# Patient Record
Sex: FEMALE | Race: BLACK OR AFRICAN AMERICAN | Employment: OTHER | URBAN - METROPOLITAN AREA
[De-identification: names, ages, dates, MRNs, and addresses within clinical notes are randomized per-mention and may not be internally consistent; named-entity substitution may affect disease eponyms.]

---

## 2018-05-29 LAB — COLONOSCOPY, EXTERNAL: NORMAL

## 2018-07-13 ENCOUNTER — OP HISTORICAL/CONVERTED ENCOUNTER (OUTPATIENT)
Dept: OTHER | Age: 58
End: 2018-07-13

## 2019-01-02 ENCOUNTER — OP HISTORICAL/CONVERTED ENCOUNTER (OUTPATIENT)
Dept: OTHER | Age: 59
End: 2019-01-02

## 2019-02-19 LAB — MAMMOGRAPHY, EXTERNAL: NORMAL

## 2020-06-04 LAB
CREATININE, EXTERNAL: 1.6
HBA1C MFR BLD HPLC: 7.2 %
LDL-C, EXTERNAL: 75

## 2020-08-14 RX ORDER — PIOGLITAZONEHYDROCHLORIDE 15 MG/1
TABLET ORAL
Qty: 30 TAB | Refills: 0 | Status: SHIPPED | OUTPATIENT
Start: 2020-08-14 | End: 2020-09-17

## 2020-09-02 RX ORDER — LANOLIN ALCOHOL/MO/W.PET/CERES
CREAM (GRAM) TOPICAL
Qty: 90 TAB | Refills: 0 | Status: SHIPPED | OUTPATIENT
Start: 2020-09-02 | End: 2020-11-30

## 2020-09-02 RX ORDER — MECLIZINE HYDROCHLORIDE 25 MG/1
TABLET ORAL
Qty: 30 TAB | Refills: 0 | Status: SHIPPED | OUTPATIENT
Start: 2020-09-02 | End: 2020-10-01

## 2020-09-02 RX ORDER — GLIMEPIRIDE 4 MG/1
TABLET ORAL
Qty: 180 TAB | Refills: 0 | Status: SHIPPED | OUTPATIENT
Start: 2020-09-02 | End: 2020-11-23

## 2020-09-14 ENCOUNTER — HOSPITAL ENCOUNTER (OUTPATIENT)
Dept: MAMMOGRAPHY | Age: 60
Discharge: HOME | End: 2020-09-14
Attending: SURGERY
Payer: MEDICARE

## 2020-09-14 DIAGNOSIS — R92.8 ABNORMAL MAMMOGRAM: ICD-10-CM

## 2020-09-14 PROCEDURE — 77065 DX MAMMO INCL CAD UNI: CPT

## 2020-09-17 RX ORDER — PIOGLITAZONEHYDROCHLORIDE 15 MG/1
TABLET ORAL
Qty: 30 TAB | Refills: 2 | Status: SHIPPED | OUTPATIENT
Start: 2020-09-17 | End: 2020-12-16

## 2020-10-01 RX ORDER — MECLIZINE HYDROCHLORIDE 25 MG/1
TABLET ORAL
Qty: 30 TAB | Refills: 0 | Status: SHIPPED | OUTPATIENT
Start: 2020-10-01 | End: 2020-10-26

## 2020-10-01 RX ORDER — FUROSEMIDE 40 MG/1
TABLET ORAL
Qty: 30 TAB | Refills: 0 | Status: SHIPPED | OUTPATIENT
Start: 2020-10-01 | End: 2020-12-16

## 2020-10-01 RX ORDER — LISINOPRIL 10 MG/1
TABLET ORAL
Qty: 90 TAB | Refills: 0 | Status: SHIPPED | OUTPATIENT
Start: 2020-10-01 | End: 2020-12-31 | Stop reason: SDUPTHER

## 2020-10-01 RX ORDER — ALLOPURINOL 300 MG/1
TABLET ORAL
Qty: 30 TAB | Refills: 0 | Status: SHIPPED | OUTPATIENT
Start: 2020-10-01 | End: 2020-10-26

## 2020-10-07 ENCOUNTER — HOSPITAL ENCOUNTER (EMERGENCY)
Age: 60
Discharge: HOME | End: 2020-10-07
Attending: EMERGENCY MEDICINE
Payer: MEDICARE

## 2020-10-07 ENCOUNTER — APPOINTMENT (OUTPATIENT)
Dept: GENERAL RADIOLOGY | Age: 60
End: 2020-10-07
Attending: EMERGENCY MEDICINE
Payer: MEDICARE

## 2020-10-07 VITALS
RESPIRATION RATE: 16 BRPM | SYSTOLIC BLOOD PRESSURE: 124 MMHG | OXYGEN SATURATION: 99 % | BODY MASS INDEX: 47.09 KG/M2 | HEART RATE: 62 BPM | HEIGHT: 66 IN | TEMPERATURE: 98.1 F | WEIGHT: 293 LBS | DIASTOLIC BLOOD PRESSURE: 72 MMHG

## 2020-10-07 DIAGNOSIS — J06.9 ACUTE UPPER RESPIRATORY INFECTION: Primary | ICD-10-CM

## 2020-10-07 LAB
ALBUMIN SERPL-MCNC: 3.3 G/DL (ref 3.5–5)
ALBUMIN/GLOB SERPL: 0.9 {RATIO} (ref 1.1–2.2)
ALP SERPL-CCNC: 97 U/L (ref 45–117)
ALT SERPL-CCNC: 22 U/L (ref 12–78)
ANION GAP SERPL CALC-SCNC: 9 MMOL/L (ref 5–15)
AST SERPL W P-5'-P-CCNC: 15 U/L (ref 15–37)
BASOPHILS # BLD: 0 K/UL (ref 0–0.2)
BASOPHILS NFR BLD: 1 % (ref 0–2.5)
BILIRUB SERPL-MCNC: 0.4 MG/DL (ref 0.2–1)
BUN SERPL-MCNC: 18 MG/DL (ref 6–20)
BUN/CREAT SERPL: 12 (ref 12–20)
CA-I BLD-MCNC: 9.2 MG/DL (ref 8.5–10.1)
CHLORIDE SERPL-SCNC: 104 MMOL/L (ref 97–108)
CO2 SERPL-SCNC: 28 MMOL/L (ref 21–32)
CREAT SERPL-MCNC: 1.45 MG/DL (ref 0.55–1.02)
EOSINOPHIL # BLD: 0.2 K/UL (ref 0–0.7)
EOSINOPHIL NFR BLD: 7 % (ref 0.9–2.9)
ERYTHROCYTE [DISTWIDTH] IN BLOOD BY AUTOMATED COUNT: 15.8 % (ref 11.5–14.5)
GLOBULIN SER CALC-MCNC: 3.6 G/DL (ref 2–4)
GLUCOSE SERPL-MCNC: 74 MG/DL (ref 65–100)
HCT VFR BLD AUTO: 34 % (ref 36–46)
HGB BLD-MCNC: 11.3 G/DL (ref 13.5–17.5)
LYMPHOCYTES # BLD: 1 K/UL (ref 1–4.8)
LYMPHOCYTES NFR BLD: 39 % (ref 20.5–51.1)
MCH RBC QN AUTO: 30 PG (ref 31–34)
MCHC RBC AUTO-ENTMCNC: 33.1 G/DL (ref 31–36)
MCV RBC AUTO: 90.5 FL (ref 80–100)
MONOCYTES # BLD: 0.2 K/UL (ref 0.2–2.4)
MONOCYTES NFR BLD: 8 % (ref 1.7–9.3)
NEUTS SEG # BLD: 1.2 K/UL (ref 1.8–7.7)
NEUTS SEG NFR BLD: 45 % (ref 42–75)
NRBC # BLD: 0 K/UL
NRBC BLD-RTO: 10 PER 100 WBC
PLATELET # BLD AUTO: 222 K/UL
PMV BLD AUTO: 7.7 FL (ref 6.5–11.5)
POTASSIUM SERPL-SCNC: 4 MMOL/L (ref 3.5–5.1)
PROT SERPL-MCNC: 6.9 G/DL (ref 6.4–8.2)
RBC # BLD AUTO: 3.75 M/UL (ref 4.5–5.9)
SODIUM SERPL-SCNC: 141 MMOL/L (ref 136–145)
WBC # BLD AUTO: 2.6 K/UL (ref 4.4–11.3)

## 2020-10-07 PROCEDURE — 85025 COMPLETE CBC W/AUTO DIFF WBC: CPT

## 2020-10-07 PROCEDURE — 99284 EMERGENCY DEPT VISIT MOD MDM: CPT

## 2020-10-07 PROCEDURE — 80053 COMPREHEN METABOLIC PANEL: CPT

## 2020-10-07 PROCEDURE — 36415 COLL VENOUS BLD VENIPUNCTURE: CPT

## 2020-10-07 PROCEDURE — 71045 X-RAY EXAM CHEST 1 VIEW: CPT

## 2020-10-07 PROCEDURE — 74011250637 HC RX REV CODE- 250/637: Performed by: EMERGENCY MEDICINE

## 2020-10-07 PROCEDURE — 74011636637 HC RX REV CODE- 636/637: Performed by: EMERGENCY MEDICINE

## 2020-10-07 RX ORDER — PREDNISONE 20 MG/1
40 TABLET ORAL
Status: COMPLETED | OUTPATIENT
Start: 2020-10-07 | End: 2020-10-07

## 2020-10-07 RX ORDER — DEXTROMETHORPHAN HYDROBROMIDE, GUAIFENESIN 5; 100 MG/5ML; MG/5ML
650 LIQUID ORAL EVERY 8 HOURS
COMMUNITY

## 2020-10-07 RX ORDER — LEVOFLOXACIN 500 MG/1
500 TABLET, FILM COATED ORAL
Status: COMPLETED | OUTPATIENT
Start: 2020-10-07 | End: 2020-10-07

## 2020-10-07 RX ORDER — BISMUTH SUBSALICYLATE 262 MG
1 TABLET,CHEWABLE ORAL DAILY
COMMUNITY

## 2020-10-07 RX ORDER — PREDNISONE 20 MG/1
20 TABLET ORAL DAILY
Qty: 7 TAB | Refills: 0 | Status: SHIPPED | OUTPATIENT
Start: 2020-10-07 | End: 2020-10-14

## 2020-10-07 RX ORDER — ATORVASTATIN CALCIUM 10 MG/1
10 TABLET, FILM COATED ORAL DAILY
COMMUNITY
End: 2020-11-23

## 2020-10-07 RX ORDER — GUAIFENESIN 100 MG/5ML
81 LIQUID (ML) ORAL DAILY
COMMUNITY

## 2020-10-07 RX ORDER — LANOLIN ALCOHOL/MO/W.PET/CERES
325 CREAM (GRAM) TOPICAL
COMMUNITY
End: 2022-03-14 | Stop reason: ALTCHOICE

## 2020-10-07 RX ORDER — LEVOFLOXACIN 500 MG/1
500 TABLET, FILM COATED ORAL DAILY
Qty: 5 TAB | Refills: 0 | Status: SHIPPED | OUTPATIENT
Start: 2020-10-07 | End: 2020-10-12

## 2020-10-07 RX ORDER — MELATONIN
1000 DAILY
COMMUNITY

## 2020-10-07 RX ADMIN — LEVOFLOXACIN 500 MG: 500 TABLET, FILM COATED ORAL at 13:07

## 2020-10-07 RX ADMIN — PREDNISONE 40 MG: 20 TABLET ORAL at 13:07

## 2020-10-07 NOTE — ED TRIAGE NOTES
C/o left upper back pain and cough x 3 days. Pt has hx of COPD. No respiratory distress at this time.

## 2020-10-08 NOTE — ED PROVIDER NOTES
HPI   Patient is a 63 y.o. female [address] who presents to the ER with a chief complaint of Cough, Chest Congestion x 1-2 days  Chief Complaint   Patient presents with    Cough      Past Medical History:   Diagnosis Date    Asthma     Chronic kidney disease     Chronic obstructive pulmonary disease ([facility]     High cholesterol     Hypertension     Menopause        Past Surgical History:   Procedure Laterality Date    HX BREAST BIOPSY Left 2019    HX HYSTERECTOMY      HX OOPHORECTOMY           History reviewed. No pertinent family history.     Social History     Socioeconomic History    Marital status:      Spouse name: Not on file    Number of children: Not on file    Years of education: Not on file    Highest education level: Not on file   Occupational History    Not on file   Social Needs    Financial resource strain: Not on file    Food insecurity     Worry: Not on file     Inability: Not on file    Transportation needs     Medical: Not on file     Non-medical: Not on file   Tobacco Use    Smoking status: Never Smoker    Smokeless tobacco: Never Used   Substance and Sexual Activity    Alcohol use: Never     Frequency: Never    Drug use: Never    Sexual activity: Not on file   Lifestyle    Physical activity     Days per week: Not on file     Minutes per session: Not on file    Stress: Not on file   Relationships    Social connections     Talks on phone: Not on file     Gets together: Not on file     Attends Shinto service: Not on file     Active member of club or organization: Not on file     Attends meetings of clubs or organizations: Not on file     Relationship status: Not on file    Intimate partner violence     Fear of current or ex partner: Not on file     Emotionally abused: Not on file     Physically abused: Not on file     Forced sexual activity: Not on file   Other Topics Concern    Not on file   Social History Narrative    Not on file ALLERGIES: Celebrex [celecoxib]; Cleocin [clindamycin phosphate]; Mushroom; Strawberry; Ultracet [tramadol-acetaminophen]; and Zithromax [azithromycin]    Review of Systems   Constitutional: Negative. HENT: Negative. Eyes: Negative. Respiratory: Negative. Cardiovascular: Negative. Gastrointestinal: Negative. Endocrine: Negative. Genitourinary: Negative. Musculoskeletal: Negative. Skin: Negative. Allergic/Immunologic: Negative. Neurological: Negative. Hematological: Negative. Psychiatric/Behavioral: Negative. Vitals:    10/07/20 1203 10/07/20 1225 10/07/20 1542 10/07/20 1559   BP: 139/77  130/88 124/72   Pulse: 63   62   Resp: 22  18 16   Temp: 98.1 °F (36.7 °C)      SpO2: 99% 99%  99%   Weight: (!) 172.4 kg (380 lb)      Height: 5' 6\" (1.676 m)               Physical Exam  Vitals signs and nursing note reviewed. Constitutional:       Appearance: Normal appearance. She is normal weight. HENT:      Head: Normocephalic and atraumatic. Nose: Nose normal.   Eyes:      Extraocular Movements: Extraocular movements intact. Pupils: Pupils are equal, round, and reactive to light. Neck:      Musculoskeletal: Normal range of motion and neck supple. Cardiovascular:      Rate and Rhythm: Normal rate and regular rhythm. Pulses: Normal pulses. Pulmonary:      Effort: Pulmonary effort is normal.      Breath sounds: Normal breath sounds. Abdominal:      General: Abdomen is flat. Palpations: Abdomen is soft. Musculoskeletal: Normal range of motion. Skin:     General: Skin is warm and dry. Neurological:      General: No focal deficit present. Mental Status: She is alert and oriented to person, place, and time. Psychiatric:         Mood and Affect: Mood normal.          MDM  Number of Diagnoses or Management Options  Acute upper respiratory infection:          Procedures    ICD-10-CM ICD-9-CM    1.  Acute upper respiratory infection  J06.9 465.9

## 2020-10-26 RX ORDER — ALLOPURINOL 300 MG/1
TABLET ORAL
Qty: 30 TAB | Refills: 0 | Status: SHIPPED | OUTPATIENT
Start: 2020-10-26 | End: 2020-11-30

## 2020-10-26 RX ORDER — MECLIZINE HYDROCHLORIDE 25 MG/1
TABLET ORAL
Qty: 30 TAB | Refills: 0 | Status: SHIPPED | OUTPATIENT
Start: 2020-10-26 | End: 2020-11-23

## 2020-11-11 VITALS
TEMPERATURE: 98.1 F | OXYGEN SATURATION: 95 % | WEIGHT: 293 LBS | SYSTOLIC BLOOD PRESSURE: 139 MMHG | HEART RATE: 76 BPM | DIASTOLIC BLOOD PRESSURE: 83 MMHG | RESPIRATION RATE: 20 BRPM | HEIGHT: 66 IN | BODY MASS INDEX: 47.09 KG/M2

## 2020-11-11 PROBLEM — E78.5 HYPERLIPIDEMIA: Status: ACTIVE | Noted: 2020-11-11

## 2020-11-11 PROBLEM — I83.10 VARICOSE VEINS OF LOWER EXTREMITIES WITH INFLAMMATION: Status: ACTIVE | Noted: 2020-11-11

## 2020-11-11 PROBLEM — E87.5 HYPERKALEMIA: Status: ACTIVE | Noted: 2020-11-11

## 2020-11-11 PROBLEM — M10.9 GOUT: Status: ACTIVE | Noted: 2020-11-11

## 2020-11-11 PROBLEM — E11.69 MIXED HYPERLIPIDEMIA DUE TO TYPE 2 DIABETES MELLITUS (HCC): Status: ACTIVE | Noted: 2020-11-11

## 2020-11-11 PROBLEM — D64.9 ANEMIA: Status: ACTIVE | Noted: 2020-11-11

## 2020-11-11 PROBLEM — I10 ESSENTIAL HYPERTENSION: Status: ACTIVE | Noted: 2020-11-11

## 2020-11-11 PROBLEM — I50.9 CHF (CONGESTIVE HEART FAILURE) (HCC): Status: ACTIVE | Noted: 2020-11-11

## 2020-11-11 PROBLEM — N18.9 CHRONIC KIDNEY DISEASE: Status: ACTIVE | Noted: 2020-11-11

## 2020-11-11 PROBLEM — J45.909 ASTHMA: Status: ACTIVE | Noted: 2020-11-11

## 2020-11-11 PROBLEM — J44.9 CHRONIC OBSTRUCTIVE LUNG DISEASE (HCC): Status: ACTIVE | Noted: 2020-11-11

## 2020-11-11 PROBLEM — B96.81 HELICOBACTER PYLORI GASTRITIS: Status: ACTIVE | Noted: 2020-11-11

## 2020-11-11 PROBLEM — E11.9 DIABETES MELLITUS (HCC): Status: ACTIVE | Noted: 2020-11-11

## 2020-11-11 PROBLEM — K57.92 DIVERTICULITIS: Status: ACTIVE | Noted: 2020-11-11

## 2020-11-11 PROBLEM — E66.01 MORBID OBESITY (HCC): Status: ACTIVE | Noted: 2020-11-11

## 2020-11-11 PROBLEM — K21.9 GERD (GASTROESOPHAGEAL REFLUX DISEASE): Status: ACTIVE | Noted: 2020-11-11

## 2020-11-11 PROBLEM — K29.70 HELICOBACTER PYLORI GASTRITIS: Status: ACTIVE | Noted: 2020-11-11

## 2020-11-11 PROBLEM — E78.2 MIXED HYPERLIPIDEMIA DUE TO TYPE 2 DIABETES MELLITUS (HCC): Status: ACTIVE | Noted: 2020-11-11

## 2020-11-11 PROBLEM — M19.90 OSTEOARTHRITIS: Status: ACTIVE | Noted: 2020-11-11

## 2020-11-11 RX ORDER — CALCIUM CARB/VITAMIN D3/VIT K1 500-100-40
TABLET,CHEWABLE ORAL
COMMUNITY
End: 2021-12-14 | Stop reason: ALTCHOICE

## 2020-11-11 RX ORDER — ISOPROPYL ALCOHOL 70 ML/100ML
SWAB TOPICAL
COMMUNITY
End: 2021-09-22 | Stop reason: ALTCHOICE

## 2020-11-11 RX ORDER — GABAPENTIN 300 MG/1
300 CAPSULE ORAL 3 TIMES DAILY
COMMUNITY
End: 2020-12-18

## 2020-11-11 RX ORDER — CIPROFLOXACIN 500 MG/1
TABLET ORAL 2 TIMES DAILY
COMMUNITY
End: 2020-11-30 | Stop reason: ALTCHOICE

## 2020-11-11 RX ORDER — BLOOD SUGAR DIAGNOSTIC
STRIP MISCELLANEOUS SEE ADMIN INSTRUCTIONS
COMMUNITY
End: 2020-12-31 | Stop reason: SDUPTHER

## 2020-11-11 RX ORDER — ALBUTEROL SULFATE 90 UG/1
AEROSOL, METERED RESPIRATORY (INHALATION)
COMMUNITY
End: 2022-05-11 | Stop reason: SDUPTHER

## 2020-11-11 RX ORDER — CODEINE PHOSPHATE AND GUAIFENESIN 10; 100 MG/5ML; MG/5ML
5 SOLUTION ORAL
COMMUNITY
End: 2020-12-18

## 2020-11-11 RX ORDER — HYDROCODONE BITARTRATE AND ACETAMINOPHEN 7.5; 325 MG/1; MG/1
TABLET ORAL
COMMUNITY
End: 2020-12-18

## 2020-11-11 RX ORDER — CEFUROXIME AXETIL 250 MG/1
250 TABLET ORAL 2 TIMES DAILY
COMMUNITY
End: 2021-06-15 | Stop reason: ALTCHOICE

## 2020-11-11 RX ORDER — IPRATROPIUM BROMIDE AND ALBUTEROL SULFATE 2.5; .5 MG/3ML; MG/3ML
3 SOLUTION RESPIRATORY (INHALATION)
COMMUNITY

## 2020-11-11 RX ORDER — AMOXICILLIN AND CLAVULANATE POTASSIUM 875; 125 MG/1; MG/1
TABLET, FILM COATED ORAL EVERY 12 HOURS
COMMUNITY
End: 2021-06-15 | Stop reason: ALTCHOICE

## 2020-11-23 RX ORDER — MECLIZINE HYDROCHLORIDE 25 MG/1
TABLET ORAL
Qty: 30 TAB | Refills: 0 | Status: SHIPPED | OUTPATIENT
Start: 2020-11-23 | End: 2020-12-23

## 2020-11-23 RX ORDER — ATORVASTATIN CALCIUM 10 MG/1
TABLET, FILM COATED ORAL
Qty: 90 TAB | Refills: 0 | Status: SHIPPED | OUTPATIENT
Start: 2020-11-23 | End: 2020-12-31 | Stop reason: SDUPTHER

## 2020-11-23 RX ORDER — GLIMEPIRIDE 4 MG/1
TABLET ORAL
Qty: 180 TAB | Refills: 0 | Status: SHIPPED | OUTPATIENT
Start: 2020-11-23 | End: 2020-12-31 | Stop reason: SDUPTHER

## 2020-11-30 RX ORDER — LANOLIN ALCOHOL/MO/W.PET/CERES
CREAM (GRAM) TOPICAL
Qty: 90 TAB | Refills: 0 | Status: SHIPPED | OUTPATIENT
Start: 2020-11-30 | End: 2021-03-19

## 2020-11-30 RX ORDER — ALLOPURINOL 300 MG/1
TABLET ORAL
Qty: 30 TAB | Refills: 0 | Status: SHIPPED | OUTPATIENT
Start: 2020-11-30 | End: 2020-12-31 | Stop reason: SDUPTHER

## 2020-12-04 ENCOUNTER — TELEPHONE (OUTPATIENT)
Dept: PRIMARY CARE CLINIC | Age: 60
End: 2020-12-04

## 2020-12-16 RX ORDER — FUROSEMIDE 40 MG/1
TABLET ORAL
Qty: 30 TAB | Refills: 0 | Status: SHIPPED | OUTPATIENT
Start: 2020-12-16 | End: 2020-12-31 | Stop reason: SDUPTHER

## 2020-12-16 RX ORDER — PIOGLITAZONEHYDROCHLORIDE 15 MG/1
TABLET ORAL
Qty: 30 TAB | Refills: 0 | Status: SHIPPED | OUTPATIENT
Start: 2020-12-16 | End: 2020-12-31 | Stop reason: SDUPTHER

## 2020-12-18 ENCOUNTER — OFFICE VISIT (OUTPATIENT)
Dept: PRIMARY CARE CLINIC | Age: 60
End: 2020-12-18
Payer: MEDICARE

## 2020-12-18 VITALS
TEMPERATURE: 97.1 F | DIASTOLIC BLOOD PRESSURE: 54 MMHG | WEIGHT: 293 LBS | HEIGHT: 66 IN | RESPIRATION RATE: 20 BRPM | SYSTOLIC BLOOD PRESSURE: 136 MMHG | HEART RATE: 60 BPM | OXYGEN SATURATION: 100 % | BODY MASS INDEX: 47.09 KG/M2

## 2020-12-18 DIAGNOSIS — E11.69 MIXED HYPERLIPIDEMIA DUE TO TYPE 2 DIABETES MELLITUS (HCC): ICD-10-CM

## 2020-12-18 DIAGNOSIS — G47.33 OBSTRUCTIVE SLEEP APNEA SYNDROME: ICD-10-CM

## 2020-12-18 DIAGNOSIS — I10 ESSENTIAL HYPERTENSION: Primary | ICD-10-CM

## 2020-12-18 DIAGNOSIS — J44.9 CHRONIC OBSTRUCTIVE PULMONARY DISEASE, UNSPECIFIED COPD TYPE (HCC): ICD-10-CM

## 2020-12-18 DIAGNOSIS — E11.69 TYPE 2 DIABETES MELLITUS WITH OTHER SPECIFIED COMPLICATION, WITHOUT LONG-TERM CURRENT USE OF INSULIN (HCC): ICD-10-CM

## 2020-12-18 DIAGNOSIS — E78.2 MIXED HYPERLIPIDEMIA DUE TO TYPE 2 DIABETES MELLITUS (HCC): ICD-10-CM

## 2020-12-18 DIAGNOSIS — Z23 ENCOUNTER FOR IMMUNIZATION: ICD-10-CM

## 2020-12-18 DIAGNOSIS — E66.01 MORBID OBESITY (HCC): ICD-10-CM

## 2020-12-18 DIAGNOSIS — I50.9 CHRONIC CONGESTIVE HEART FAILURE, UNSPECIFIED HEART FAILURE TYPE (HCC): ICD-10-CM

## 2020-12-18 PROCEDURE — 99214 OFFICE O/P EST MOD 30 MIN: CPT | Performed by: FAMILY MEDICINE

## 2020-12-18 PROCEDURE — G0008 ADMIN INFLUENZA VIRUS VAC: HCPCS | Performed by: FAMILY MEDICINE

## 2020-12-18 PROCEDURE — 90756 CCIIV4 VACC ABX FREE IM: CPT | Performed by: FAMILY MEDICINE

## 2020-12-18 RX ORDER — BENZONATATE 200 MG/1
CAPSULE ORAL
COMMUNITY
End: 2021-06-15 | Stop reason: ALTCHOICE

## 2020-12-18 NOTE — PROGRESS NOTES
James Crisostomo is a 63 y.o. female who presents today for the following:  Chief Complaint   Patient presents with    Follow Up Chronic Condition    Labs   ,     ICD-10-CM ICD-9-CM    1. Encounter for immunization  Z23 V03.89 INFLUENZA VACCINE (CCIIV4 VACCINE ANTIBIO FREE 0.5 ML)   2. Essential hypertension  I10 401.9    3. Chronic obstructive pulmonary disease, unspecified COPD type ([facility]  J44.9 496    4. Chronic congestive heart failure, unspecified heart failure type (HCC)  I50.9 428.0    5. Mixed hyperlipidemia due to type 2 diabetes mellitus ([facility])  E11.69 250.80     E78.2 272.2    6. Morbid obesity ([facility]  E66.01 278.01    7. Type 2 diabetes mellitus with other specified complication, without long-term current use of insulin ([facility])  E11.69 250.80    8. Obstructive sleep apnea syndrome  G47.33 327.23     . She comes in for follow-up of her hypertension, chronic obstructive pulmonary disease, congestive heart failure, mixed hyperlipidemia, morbid obesity, type 2 diabetes, obstructive sleep apnea. She has actually gained additional weight since her last visit. When we work towards getting her approved for bariatric surgery she demonstrated almost no insight into her problems and had no motivation to make changes. We again discussed this today and the fact that she has to try and do something is changed her life to get her weight off her more conditions are going to happen.     Allergies   Allergen Reactions    Celebrex [Celecoxib] Rash    Cleocin [Clindamycin Phosphate] Palpitations    Mushroom Swelling    Strawberry Rash    Ultracet [Tramadol-Acetaminophen] Rash    Zithromax [Azithromycin] Rash       Current Outpatient Medications   Medication Sig    benzonatate (TESSALON) 200 mg capsule benzonatate 200 mg capsule    furosemide (LASIX) 40 mg tablet TAKE 1 TABLET BY MOUTH ON MONDAY, WEDNESDAY AND FRIDAY    pioglitazone (ACTOS) 15 mg tablet Take 1 tablet by mouth once daily    allopurinoL (ZYLOPRIM) 300 mg tablet Take 1 tablet by mouth once daily    magnesium oxide (MAG-OX) 400 mg tablet Take 1 tablet by mouth once daily    meclizine (ANTIVERT) 25 mg tablet TAKE 1 TABLET BY MOUTH EVERY 8 HOURS AS NEEDED    glimepiride (AMARYL) 4 mg tablet Take 2 tablets by mouth once daily    atorvastatin (LIPITOR) 10 mg tablet Take 1 tablet by mouth once daily    glucose blood VI test strips (Accu-Chek Cherie Plus test strp) strip by Does Not Apply route See Admin Instructions.  lancing device/lancets (ACCU-CHEK FASTCLIX LANCING DEV) by Does Not Apply route.  albuterol (PROVENTIL HFA, VENTOLIN HFA, PROAIR HFA) 90 mcg/actuation inhaler Take  by inhalation.  alcohol swabs (BD Single Use Swabs Regular) padm by Apply Externally route.  Insulin Syringe-Needle U-100 (BD Insulin Syringe Ultra-Fine) 0.3 mL 31 gauge x 5/16\" syrg by Does Not Apply route.  albuterol-ipratropium (DUO-NEB) 2.5 mg-0.5 mg/3 ml nebu 3 mL by Nebulization route.  amoxicillin-clavulanate (AUGMENTIN) 875-125 mg per tablet Take  by mouth every twelve (12) hours.  cefUROXime (CEFTIN) 250 mg tablet Take 250 mg by mouth two (2) times a day.  insulin NPH/insulin regular (NovoLIN 70/30 U-100 Insulin) 100 unit/mL (70-30) injection 20 Units by SubCUTAneous route as needed.  ferrous sulfate (Iron) 325 mg (65 mg iron) tablet Take 325 mg by mouth every Monday, Wednesday, Friday.  multivitamin (ONE A DAY) tablet Take 1 Tab by mouth daily.  aspirin 81 mg chewable tablet Take 81 mg by mouth daily.  cholecalciferol (Vitamin D3) (1000 Units /25 mcg) tablet Take 1,000 Units by mouth daily.  acetaminophen (Tylenol Arthritis Pain) 650 mg TbER Take 650 mg by mouth every eight (8) hours.  lisinopriL (PRINIVIL, ZESTRIL) 10 mg tablet Take 1 tablet by mouth once daily     No current facility-administered medications for this visit.         Past Medical History:   Diagnosis Date    Arthritis     Asthma     Chronic kidney disease     Chronic obstructive pulmonary disease (HCC)     Diabetes (HCC)     GERD (gastroesophageal reflux disease)     High cholesterol     Hypertension     Menopause     Morbid obesity (HCC)        Past Surgical History:   Procedure Laterality Date    HX BREAST BIOPSY Left 2019    HX CHOLECYSTECTOMY      HX ENDOSCOPY  01/01/2019    HX HYSTERECTOMY      HX HYSTERECTOMY      HX OOPHORECTOMY      HX ROTATOR CUFF REPAIR Left     by suture    HX TUBAL LIGATION Bilateral        Family History   Problem Relation Age of Onset    Heart Disease Mother     Lung Disease Mother     Lung Disease Father        Social History     Socioeconomic History    Marital status:      Spouse name: Not on file    Number of children: Not on file    Years of education: Not on file    Highest education level: Not on file   Occupational History    Not on file   Social Needs    Financial resource strain: Not on file    Food insecurity     Worry: Not on file     Inability: Not on file    Transportation needs     Medical: Not on file     Non-medical: Not on file   Tobacco Use    Smoking status: Never Smoker    Smokeless tobacco: Never Used   Substance and Sexual Activity    Alcohol use: Never     Frequency: Never    Drug use: Never    Sexual activity: Not on file   Lifestyle    Physical activity     Days per week: Not on file     Minutes per session: Not on file    Stress: Not on file   Relationships    Social connections     Talks on phone: Not on file     Gets together: Not on file     Attends Evangelical service: Not on file     Active member of club or organization: Not on file     Attends meetings of clubs or organizations: Not on file     Relationship status: Not on file    Intimate partner violence     Fear of current or ex partner: Not on file     Emotionally abused: Not on file     Physically abused: Not on file     Forced sexual activity: Not on file   Other Topics Concern   [address] Service Not Asked    Blood Transfusions Not Asked    Caffeine Concern Not Asked    Occupational Exposure Not Asked    Hobby Hazards Not Asked    Sleep Concern Not Asked    Stress Concern Not Asked    Weight Concern Not Asked    Special Diet Not Asked    Back Care Not Asked    Exercise Not Asked    Bike Helmet Not Asked   [address] Not Asked    Self-Exams Not Asked   Social History Narrative    Not on file         Review of Systems   Constitutional: Negative. Respiratory: Negative. Cardiovascular: Negative. Gastrointestinal: Positive for heartburn. Genitourinary: Negative. Musculoskeletal: Positive for back pain and joint pain. Neurological: Positive for tingling. Endo/Heme/Allergies:        Checks sugars daily and sugars usually under 130   Psychiatric/Behavioral: Negative. All other systems reviewed and are negative. Visit Vitals  BP (!) 136/54 (BP 1 Location: Right arm, BP Patient Position: Sitting)   Pulse 60   Temp 97.1 °F (36.2 °C) (Oral)   Resp 20   Ht 5' 6\" (1.676 m)   Wt (!) 417 lb 2 oz (189.2 kg)   LMP  (LMP Unknown)   SpO2 100%   BMI 67.33 kg/m²       Physical Exam  Vitals signs and nursing note reviewed. Constitutional:       Appearance: Normal appearance. She is well-groomed. She is morbidly obese. Neck:      Musculoskeletal: Normal range of motion and neck supple. Cardiovascular:      Rate and Rhythm: Normal rate and regular rhythm. Pulses: Normal pulses. Heart sounds: Normal heart sounds. Pulmonary:      Effort: Pulmonary effort is normal.      Breath sounds: Normal breath sounds. Abdominal:      General: Abdomen is flat. Bowel sounds are normal.      Palpations: Abdomen is soft. Musculoskeletal: Normal range of motion. Skin:     General: Skin is warm and dry. Neurological:      General: No focal deficit present. Mental Status: She is alert. Psychiatric:         Mood and Affect: Mood normal.         Behavior: Behavior normal. Behavior is cooperative. Thought Content: Thought content normal.         Judgment: Judgment normal.            ICD-10-CM ICD-9-CM    1. Encounter for immunization  Z23 V03.89 INFLUENZA VACCINE (CCIIV4 VACCINE ANTIBIO FREE 0.5 ML)   2. Essential hypertension  I10 401.9    3. Chronic obstructive pulmonary disease, unspecified COPD type ([facility]  J44.9 496    4. Chronic congestive heart failure, unspecified heart failure type ([facility])  I50.9 428.0    5. Mixed hyperlipidemia due to type 2 diabetes mellitus ([facility])  E11.69 250.80     E78.2 272.2    6. Morbid obesity ([facility]  E66.01 278.01    7. Type 2 diabetes mellitus with other specified complication, without long-term current use of insulin ([facility])  E11.69 250.80    8. Obstructive sleep apnea syndrome  G47.33 327.23         1. Encounter for immunization    - INFLUENZA VACCINE (CCIIV4 VACCINE ANTIBIO FREE 0.5 ML)    2. Essential hypertension      3. Chronic obstructive pulmonary disease, unspecified COPD type ([facility]      4. Chronic congestive heart failure, unspecified heart failure type ([facility]      5. Mixed hyperlipidemia due to type 2 diabetes mellitus ([facility]      6. Morbid obesity ([facility]      7. Type 2 diabetes mellitus with other specified complication, without long-term current use of insulin ([facility]      8.  Obstructive sleep apnea syndrome

## 2020-12-19 LAB
ALBUMIN SERPL-MCNC: 4.1 G/DL (ref 3.8–4.8)
ALBUMIN/CREAT UR: <2 MG/G CREAT (ref 0–29)
ALBUMIN/GLOB SERPL: 1.7 {RATIO} (ref 1.2–2.2)
ALP SERPL-CCNC: 112 IU/L (ref 39–117)
ALT SERPL-CCNC: 18 IU/L (ref 0–32)
APPEARANCE UR: CLEAR
AST SERPL-CCNC: 21 IU/L (ref 0–40)
BASOPHILS # BLD AUTO: 0 X10E3/UL (ref 0–0.2)
BASOPHILS NFR BLD AUTO: 1 %
BILIRUB SERPL-MCNC: 0.4 MG/DL (ref 0–1.2)
BILIRUB UR QL STRIP: NEGATIVE
BUN SERPL-MCNC: 15 MG/DL (ref 8–27)
BUN/CREAT SERPL: 13 (ref 12–28)
CALCIUM SERPL-MCNC: 9.4 MG/DL (ref 8.7–10.3)
CHLORIDE SERPL-SCNC: 105 MMOL/L (ref 96–106)
CHOLEST SERPL-MCNC: 148 MG/DL (ref 100–199)
CO2 SERPL-SCNC: 24 MMOL/L (ref 20–29)
COLOR UR: YELLOW
CREAT SERPL-MCNC: 1.14 MG/DL (ref 0.57–1)
CREAT UR-MCNC: 146 MG/DL
EOSINOPHIL # BLD AUTO: 0.2 X10E3/UL (ref 0–0.4)
EOSINOPHIL NFR BLD AUTO: 6 %
ERYTHROCYTE [DISTWIDTH] IN BLOOD BY AUTOMATED COUNT: 14.5 % (ref 11.7–15.4)
EST. AVERAGE GLUCOSE BLD GHB EST-MCNC: 146 MG/DL
GLOBULIN SER CALC-MCNC: 2.4 G/DL (ref 1.5–4.5)
GLUCOSE SERPL-MCNC: 79 MG/DL (ref 65–99)
GLUCOSE UR QL: NEGATIVE
HBA1C MFR BLD: 6.7 % (ref 4.8–5.6)
HCT VFR BLD AUTO: 33 % (ref 34–46.6)
HDLC SERPL-MCNC: 61 MG/DL
HGB BLD-MCNC: 10.9 G/DL (ref 11.1–15.9)
HGB UR QL STRIP: NEGATIVE
IMM GRANULOCYTES # BLD AUTO: 0 X10E3/UL (ref 0–0.1)
IMM GRANULOCYTES NFR BLD AUTO: 0 %
KETONES UR QL STRIP: NEGATIVE
LDLC SERPL CALC-MCNC: 63 MG/DL (ref 0–99)
LEUKOCYTE ESTERASE UR QL STRIP: NEGATIVE
LYMPHOCYTES # BLD AUTO: 1.1 X10E3/UL (ref 0.7–3.1)
LYMPHOCYTES NFR BLD AUTO: 38 %
MCH RBC QN AUTO: 30.2 PG (ref 26.6–33)
MCHC RBC AUTO-ENTMCNC: 33 G/DL (ref 31.5–35.7)
MCV RBC AUTO: 91 FL (ref 79–97)
MICRO URNS: NORMAL
MICROALBUMIN UR-MCNC: <3 UG/ML
MONOCYTES # BLD AUTO: 0.3 X10E3/UL (ref 0.1–0.9)
MONOCYTES NFR BLD AUTO: 9 %
NEUTROPHILS # BLD AUTO: 1.4 X10E3/UL (ref 1.4–7)
NEUTROPHILS NFR BLD AUTO: 46 %
NITRITE UR QL STRIP: NEGATIVE
PH UR STRIP: 5.5 [PH] (ref 5–7.5)
PLATELET # BLD AUTO: 228 X10E3/UL (ref 150–450)
POTASSIUM SERPL-SCNC: 4.6 MMOL/L (ref 3.5–5.2)
PROT SERPL-MCNC: 6.5 G/DL (ref 6–8.5)
PROT UR QL STRIP: NEGATIVE
RBC # BLD AUTO: 3.61 X10E6/UL (ref 3.77–5.28)
SODIUM SERPL-SCNC: 140 MMOL/L (ref 134–144)
SP GR UR: 1.02 (ref 1–1.03)
TRIGL SERPL-MCNC: 143 MG/DL (ref 0–149)
UROBILINOGEN UR STRIP-MCNC: 0.2 MG/DL (ref 0.2–1)
VLDLC SERPL CALC-MCNC: 24 MG/DL (ref 5–40)
WBC # BLD AUTO: 2.9 X10E3/UL (ref 3.4–10.8)

## 2020-12-20 NOTE — PATIENT INSTRUCTIONS
Noninsulin Medicines for Type 2 Diabetes: Care Instructions Overview There are different types of noninsulin medicines for diabetes. Each works in a different way. But they all help you control your blood sugar. Some types help your body make insulin to lower your blood sugar. Others lower how much insulin your body needs. Some can slow how fast your body digests sugars. And some can remove extra glucose through your urine. You may need to take more than one medicine for diabetes. Two or more medicines may work better to lower your blood sugar level than just one does. · Metformin. This lowers how much glucose your liver makes. And it helps you respond better to insulin. It also lowers the amount of stored sugar that your liver releases when you are not eating. · Sulfonylureas. These help your body release more insulin. Some work for many hours. They can cause low blood sugar if you don't eat as you planned. An example is glipizide. · Thiazolidinediones. These reduce the amount of blood glucose. They also help you respond better to insulin. An example is pioglitazone. · SGLT2 inhibitors. These help to remove extra glucose through your urine. They may also help some people lose weight. An example is ertugliflozin. · DPP-4 inhibitors. These help your body raise the level of insulin after you eat. They also help your body make less of a hormone that raises blood sugar. An example is alogliptin. · Incretin hormones (GLP-1 receptor agonists). These help your body make a protein that can raise your insulin level and make you less hungry. They're given as shots or pills. An example is semaglutide. · Meglitinides. These help your body release insulin. They also help slow how your body digests sugars. So they can keep your blood sugar from rising too fast after you eat. · Alpha-glucosidase inhibitors. These keep starches from breaking down. This means that they lower the amount of glucose absorbed when you eat. They don't help your body make more insulin. So they will not cause low blood sugar unless you use them with other medicines for diabetes. Follow-up care is a key part of your treatment and safety. Be sure to make and go to all appointments, and call your doctor if you are having problems. It's also a good idea to know your test results and keep a list of the medicines you take. How can you care for yourself at home? · Eat a healthy diet. Get some exercise each day. This may help you to reduce how much medicine you need. · Do not take other prescription or over-the-counter medicines, vitamins, herbal products, or supplements without talking to your doctor first. Some medicines for type 2 diabetes can cause problems with other medicines or supplements. · Tell your doctor if you plan to get pregnant. Some of these drugs are not safe for pregnant women. · Be safe with medicines. Take your medicines exactly as prescribed. Meglitinides and sulfonylureas can cause your blood sugar to drop very low. Call your doctor if you think you are having a problem with your medicine. · Check your blood sugar often. You can use a glucose monitor. Keeping track can help you know how certain foods, activities, and medicines affect your blood sugar. And it can help you keep your blood sugar from getting so low that it's not safe. When should you call for help? Call 911 anytime you think you may need emergency care. For example, call if: 
  · You passed out (lost consciousness).  
  · You are confused or cannot think clearly.  
  · Your blood sugar is very high or very low. Watch closely for changes in your health, and be sure to contact your doctor if: 
  · Your blood sugar stays outside the level your doctor set for you.  
  · You have any problems. Where can you learn more? Go to http://www.gray.com/ Enter H153 in the search box to learn more about \"Noninsulin Medicines for Type 2 Diabetes: Care Instructions. \" Current as of: December 20, 2019               Content Version: 12.6 © 6666-7711 [employer]. Care instructions adapted under license by [employer] (which disclaims liability or warranty for this information). If you have questions about a medical condition or this instruction, always ask your healthcare professional. [address] any warranty or liability for your use of this information. Learning About Obesity What is obesity? Obesity means having a body mass index (BMI) of 30 or above. BMI is a number that is calculated from your weight and your height. How do you know if your weight is in the obesity range? To know if your weight is in the obesity range, your doctor looks at your body mass index (BMI) and waist size. BMI is a number that is calculated from your weight and your height. To figure out your BMI for yourself, you can use an online tool, such as http://www.gutierrez.com/ on the [employer] of [employer]. If your BMI is 30.0 or higher, it falls within the obesity range. Keep in mind that BMI and waist size are only guides. They are not tools to determine your ideal body weight. What causes obesity? When you take in more calories than you burn off, you gain weight. How you eat, how active you are, and other things affect how your body uses calories and whether you gain weight. If you have family members who have too much body fat, you may have inherited a tendency to gain weight. And your family also helps form your eating and lifestyle habits, which can lead to obesity. Also, our busy lives make it harder to plan and cook healthy meals. For many of us, it's easier to reach for prepared foods, go out to eat, or go to the drive-through. But these foods are often high in saturated fat and calories. Portions are often too large. What can you do to reach a healthy weight? Focus on health, not diets. Diets are hard to stay on and don't work in the long run. It is very hard to stay with a diet that includes lots of big changes in your eating habits. Instead of a diet, focus on lifestyle changes that will improve your health and achieve the right balance of energy and calories. To lose weight, you need to burn more calories than you take in. You can do it by eating healthy foods in reasonable amounts and becoming more active, even a little bit every day. Making small changes over time can add up to a lot. Make a plan for change. Many people have found that naming their reasons for change and staying focused on their plan can make a big difference. Work with your doctor to create a plan that is right for you. · Ask yourself: José Mariscal are my personal, most powerful reasons for wanting this change? What will my life look like when I've made the change? \" · Set your long-term goal. Make it specific, such as \"I will lose x pounds. \" 
· Break your long-term goal into smaller, short-term goals. Make these small steps specific and within your reach, things you know you can do. These steps are what keep you going from day to day. Talk with your doctor about other weight-loss options. If you have a BMI in a certain range and have not been able to lose weight with diet and exercise, medicine or surgery may be an option for you. Before your doctor will prescribe medicines or surgery, he or she will probably want you to be more active and follow your healthy eating plan for a period of time. These habits are key lifelong changes for managing your weight, with or without other medical treatment. And these changes can help you avoid weight-related health problems. How can you stay on your plan for change? Be ready. Choose to start during a time when there are few events that might trigger slip-ups, like holidays, social events, and high-stress periods. Decide on your first few steps. Most people have more success when they make small changes, one step at a time. For example, you might switch a daily candy bar to a piece of fruit, walk 10 minutes more, or add more vegetables to a meal. 
Line up your support people. Make sure you're not going to be alone as you make this change. Connect with people who understand how important it is to you. Ask family members and friends for help in keeping with your plan. And think about who could make it harder for you, and how to handle them. Try tracking. People who keep track of what they eat, feel, and do are better at losing weight. Try writing down things like: · What and how much you eat. · How you feel before and after each meal. 
· Details about each meal (like eating out or at home, eating alone, or with friends or family). · What you do to be active. Look and plan. As you track, look for patterns that you may want to change. Take note of: · When you eat and whether you skip meals. · How often you eat out. · How many fruits and vegetables you eat. · When you eat beyond feeling full. · When and why you eat for reasons other than being hungry. When you stray from your plan, don't get upset. Figure out what made you slip up and how you can fix it. Can you take medicines or have surgery to lose weight? If you have a BMI in a certain range and have not been able to lose weight with diet and exercise, medicine or surgery may be an option for you. If you have a BMI of at least 30.0 (or a BMI of at least 27.0 and another health problem related to your weight), ask your doctor about weight-loss medicines. They work by making you feel less hungry, making you feel full more quickly, or changing how you digest fat. Medicines are used along with diet changes and more physical activity to help you make lasting changes. If you have a BMI of 40.0 or more (or a BMI of 35.0 or more and another health problem related to your weight), your doctor may talk with you about surgery. Weight-loss surgery has risks, and you will need to work with your doctor to compare the risk of having obesity with the risks of surgery. With any option you choose, you will still need to eat a healthy diet and get regular exercise. Follow-up care is a key part of your treatment and safety. Be sure to make and go to all appointments, and call your doctor if you are having problems. It's also a good idea to know your test results and keep a list of the medicines you take. Where can you learn more? Go to http://www.gray.com/ Enter N111 in the search box to learn more about \"Learning About Obesity. \" Current as of: December 11, 2019               Content Version: 12.6 © 8657-6456 [employer]. Care instructions adapted under license by [employer] (which disclaims liability or warranty for this information). If you have questions about a medical condition or this instruction, always ask your healthcare professional. [address] any warranty or liability for your use of this information. When You Are Overweight: Care Instructions Your Care Instructions If you're overweight, your doctor may recommend that you make changes in your eating and exercise habits. Being overweight can lead to serious health problems, such as high blood pressure, heart disease, type 2 diabetes, and arthritis, or it can make these problems worse. Eating a healthy diet and being more active can help you reach and stay at a healthy weight. You don't have to make huge changes all at once. Start by making small changes in your eating and exercise habits. To lose weight, you need to burn more calories than you take in. You can do this by eating healthy foods in reasonable amounts and becoming more active every day. Follow-up care is a key part of your treatment and safety. Be sure to make and go to all appointments, and call your doctor if you are having problems. It's also a good idea to know your test results and keep a list of the medicines you take. How can you care for yourself at home? · Improve your eating habits. You'll be more successful if you work on changing one eating habit at a time. All foods, if eaten in moderation, can be part of healthy eating. Remember to: 
? Eat a variety of foods from each food group. Include grains, vegetables, fruits, dairy, and protein foods. ? Limit foods high in fat, sugar, and calories. ? Eat slowly. And don't do anything else, such as watch TV, while you are eating. ? Pay attention to portion sizes. Put your food on a smaller plate. ? Plan your meals ahead of time. You'll be less likely to grab something that's not as healthy. · Get active. Regular activity can help you feel better, have more energy, and burn more calories. If you haven't been active, start slowly. Start with at least 30 minutes of moderate activity on most days of the week. Then gradually increase the amount of activity. Try for 60 or 90 minutes a day, at least 5 days a week. There are a lot of ways to fit activity into your life. You can: 
? Walk or bike to the store. Or walk with a friend, or walk the dog. 
? Mow the lawn, rake leaves, shovel snow, or do some gardening. ? Use the stairs instead of the elevator, at least for a few floors. · Change your thinking. Your thoughts have a lot to do with how you feel and what you do. When you're trying to reach a healthy weight, changing how you think about certain things may help. Here are some ideas: 
? Don't compare yourself to others. Healthy bodies come in all shapes and sizes. ? Pay attention to how hungry or full you feel. When you eat, be aware of why you're eating and how much you're eating. ? Focus on improving your health instead of dieting. Dieting almost never works over the long term. · Ask your doctor about other health professionals who can help you reach a healthy weight. ? A dietitian can help you make healthy changes in your diet. ? An exercise specialist or  can help you develop a safe and effective exercise program. 
? A counselor or psychiatrist can help you cope with issues such as depression, anxiety, or family problems that can make it hard to focus on reaching a healthy weight. · Get support from your family, your doctor, your friends, a support groupand support yourself. Where can you learn more? Go to http://www.gray.com/ Enter O447 in the search box to learn more about \"When You Are Overweight: Care Instructions. \" Current as of: December 11, 2019               Content Version: 12.6 © 1613-1781 [employer]. Care instructions adapted under license by [employer] (which disclaims liability or warranty for this information). If you have questions about a medical condition or this instruction, always ask your healthcare professional. [address] any warranty or liability for your use of this information. Starting a Weight Loss Plan: Care Instructions Your Care Instructions If you are thinking about losing weight, it can be hard to know where to start. Your doctor can help you set up a weight loss plan that best meets your needs. You may want to take a class on nutrition or exercise, or join a weight loss support group. If you have questions about how to make changes to your eating or exercise habits, ask your doctor about seeing a registered dietitian or an exercise specialist. 
It can be a big challenge to lose weight. But you do not have to make huge changes at once. Make small changes, and stick with them. When those changes become habit, add a few more changes. If you do not think you are ready to make changes right now, try to pick a date in the future. Make an appointment to see your doctor to discuss whether the time is right for you to start a plan. Follow-up care is a key part of your treatment and safety. Be sure to make and go to all appointments, and call your doctor if you are having problems. It's also a good idea to know your test results and keep a list of the medicines you take. How can you care for yourself at home? · Set realistic goals. Many people expect to lose much more weight than is likely. A weight loss of 5% to 10% of your body weight may be enough to improve your health. · Get family and friends involved to provide support. Talk to them about why you are trying to lose weight, and ask them to help. They can help by participating in exercise and having meals with you, even if they may be eating something different. · Find what works best for you. If you do not have time or do not like to cook, a program that offers meal replacement bars or shakes may be better for you. Or if you like to prepare meals, finding a plan that includes daily menus and recipes may be best. 
· Ask your doctor about other health professionals who can help you achieve your weight loss goals. ? A dietitian can help you make healthy changes in your diet. ? An exercise specialist or  can help you develop a safe and effective exercise program. 
? A counselor or psychiatrist can help you cope with issues such as depression, anxiety, or family problems that can make it hard to focus on weight loss. · Consider joining a support group for people who are trying to lose weight. Your doctor can suggest groups in your area. Where can you learn more? Go to http://www.gray.com/ Enter Q036 in the search box to learn more about \"Starting a Weight Loss Plan: Care Instructions. \" Current as of: December 11, 2019               Content Version: 12.6 © 7264-4067 [employer]. Care instructions adapted under license by [employer] (which disclaims liability or warranty for this information). If you have questions about a medical condition or this instruction, always ask your healthcare professional. [address] any warranty or liability for your use of this information.

## 2020-12-23 RX ORDER — MECLIZINE HYDROCHLORIDE 25 MG/1
TABLET ORAL
Qty: 30 TAB | Refills: 0 | Status: SHIPPED | OUTPATIENT
Start: 2020-12-23 | End: 2021-01-22

## 2020-12-31 DIAGNOSIS — I50.9 CHRONIC CONGESTIVE HEART FAILURE, UNSPECIFIED HEART FAILURE TYPE (HCC): ICD-10-CM

## 2020-12-31 DIAGNOSIS — I10 ESSENTIAL HYPERTENSION: ICD-10-CM

## 2020-12-31 DIAGNOSIS — E11.69 TYPE 2 DIABETES MELLITUS WITH OTHER SPECIFIED COMPLICATION, WITHOUT LONG-TERM CURRENT USE OF INSULIN (HCC): Primary | ICD-10-CM

## 2020-12-31 DIAGNOSIS — E78.2 MIXED HYPERLIPIDEMIA DUE TO TYPE 2 DIABETES MELLITUS (HCC): ICD-10-CM

## 2020-12-31 DIAGNOSIS — E11.69 MIXED HYPERLIPIDEMIA DUE TO TYPE 2 DIABETES MELLITUS (HCC): ICD-10-CM

## 2020-12-31 DIAGNOSIS — M10.9 ACUTE GOUT OF MULTIPLE SITES, UNSPECIFIED CAUSE: ICD-10-CM

## 2020-12-31 RX ORDER — BLOOD SUGAR DIAGNOSTIC
STRIP MISCELLANEOUS SEE ADMIN INSTRUCTIONS
Qty: 100 STRIP | Refills: 1 | Status: SHIPPED | OUTPATIENT
Start: 2020-12-31 | End: 2021-09-25

## 2020-12-31 RX ORDER — ATORVASTATIN CALCIUM 10 MG/1
TABLET, FILM COATED ORAL
Qty: 90 TAB | Refills: 1 | Status: SHIPPED | OUTPATIENT
Start: 2020-12-31 | End: 2021-06-15 | Stop reason: SDUPTHER

## 2020-12-31 RX ORDER — FUROSEMIDE 40 MG/1
TABLET ORAL
Qty: 36 TAB | Refills: 1 | Status: SHIPPED | OUTPATIENT
Start: 2020-12-31 | End: 2021-08-28

## 2020-12-31 RX ORDER — LISINOPRIL 10 MG/1
TABLET ORAL
Qty: 90 TAB | Refills: 1 | Status: SHIPPED | OUTPATIENT
Start: 2020-12-31 | End: 2021-06-15 | Stop reason: SDUPTHER

## 2020-12-31 RX ORDER — GLIMEPIRIDE 4 MG/1
TABLET ORAL
Qty: 180 TAB | Refills: 1 | Status: SHIPPED | OUTPATIENT
Start: 2020-12-31 | End: 2021-06-15 | Stop reason: SDUPTHER

## 2020-12-31 RX ORDER — ALLOPURINOL 300 MG/1
TABLET ORAL
Qty: 90 TAB | Refills: 1 | Status: SHIPPED | OUTPATIENT
Start: 2020-12-31 | End: 2021-06-15 | Stop reason: SDUPTHER

## 2020-12-31 RX ORDER — BLOOD-GLUCOSE METER
1 EACH MISCELLANEOUS DAILY
Qty: 1 EACH | Refills: 0 | Status: SHIPPED | OUTPATIENT
Start: 2020-12-31 | End: 2021-12-14 | Stop reason: ALTCHOICE

## 2020-12-31 RX ORDER — PIOGLITAZONEHYDROCHLORIDE 15 MG/1
TABLET ORAL
Qty: 90 TAB | Refills: 1 | Status: SHIPPED | OUTPATIENT
Start: 2020-12-31 | End: 2021-06-15 | Stop reason: SDUPTHER

## 2020-12-31 RX ORDER — BLOOD-GLUCOSE METER
1 EACH MISCELLANEOUS DAILY
COMMUNITY
End: 2020-12-31 | Stop reason: SDUPTHER

## 2021-01-12 ENCOUNTER — TELEPHONE (OUTPATIENT)
Dept: PRIMARY CARE CLINIC | Age: 61
End: 2021-01-12

## 2021-01-12 NOTE — TELEPHONE ENCOUNTER
----- Message from Nikita Phillip MD sent at 1/10/2021  9:04 PM EST -----  Inform the patient that her lab results were normal except for the following:  Overall her labs were normal except your blood count is decreased slightly from 3 months ago. Make sure she is taking a multivitamin once a day. Recommend a repeat CBC in 1 month and a follow-up fasting office visit in 6 months.

## 2021-01-22 RX ORDER — MECLIZINE HYDROCHLORIDE 25 MG/1
TABLET ORAL
Qty: 30 TAB | Refills: 0 | Status: SHIPPED | OUTPATIENT
Start: 2021-01-22 | End: 2021-02-18

## 2021-02-12 DIAGNOSIS — E11.69 TYPE 2 DIABETES MELLITUS WITH OTHER SPECIFIED COMPLICATION, WITHOUT LONG-TERM CURRENT USE OF INSULIN (HCC): Primary | ICD-10-CM

## 2021-02-12 LAB
BASOPHILS # BLD AUTO: 0 X10E3/UL (ref 0–0.2)
BASOPHILS NFR BLD AUTO: 1 %
EOSINOPHIL # BLD AUTO: 0.2 X10E3/UL (ref 0–0.4)
EOSINOPHIL NFR BLD AUTO: 6 %
ERYTHROCYTE [DISTWIDTH] IN BLOOD BY AUTOMATED COUNT: 13.4 % (ref 11.7–15.4)
HCT VFR BLD AUTO: 36.6 % (ref 34–46.6)
HGB BLD-MCNC: 11.9 G/DL (ref 11.1–15.9)
IMM GRANULOCYTES # BLD AUTO: 0 X10E3/UL (ref 0–0.1)
IMM GRANULOCYTES NFR BLD AUTO: 0 %
LYMPHOCYTES # BLD AUTO: 1.2 X10E3/UL (ref 0.7–3.1)
LYMPHOCYTES NFR BLD AUTO: 37 %
MCH RBC QN AUTO: 30 PG (ref 26.6–33)
MCHC RBC AUTO-ENTMCNC: 32.5 G/DL (ref 31.5–35.7)
MCV RBC AUTO: 92 FL (ref 79–97)
MONOCYTES # BLD AUTO: 0.3 X10E3/UL (ref 0.1–0.9)
MONOCYTES NFR BLD AUTO: 9 %
NEUTROPHILS # BLD AUTO: 1.5 X10E3/UL (ref 1.4–7)
NEUTROPHILS NFR BLD AUTO: 47 %
PLATELET # BLD AUTO: 261 X10E3/UL (ref 150–450)
RBC # BLD AUTO: 3.97 X10E6/UL (ref 3.77–5.28)
WBC # BLD AUTO: 3.2 X10E3/UL (ref 3.4–10.8)

## 2021-02-15 ENCOUNTER — APPOINTMENT (OUTPATIENT)
Dept: GENERAL RADIOLOGY | Age: 61
End: 2021-02-15
Attending: EMERGENCY MEDICINE
Payer: MEDICARE

## 2021-02-15 ENCOUNTER — APPOINTMENT (OUTPATIENT)
Dept: CT IMAGING | Age: 61
End: 2021-02-15
Attending: EMERGENCY MEDICINE
Payer: MEDICARE

## 2021-02-15 ENCOUNTER — HOSPITAL ENCOUNTER (EMERGENCY)
Age: 61
Discharge: HOME | End: 2021-02-15
Attending: EMERGENCY MEDICINE
Payer: MEDICARE

## 2021-02-15 VITALS
WEIGHT: 293 LBS | DIASTOLIC BLOOD PRESSURE: 62 MMHG | HEIGHT: 66 IN | RESPIRATION RATE: 18 BRPM | BODY MASS INDEX: 47.09 KG/M2 | HEART RATE: 67 BPM | SYSTOLIC BLOOD PRESSURE: 135 MMHG | TEMPERATURE: 97.6 F | OXYGEN SATURATION: 100 %

## 2021-02-15 DIAGNOSIS — K59.00 CONSTIPATION, UNSPECIFIED CONSTIPATION TYPE: ICD-10-CM

## 2021-02-15 DIAGNOSIS — R10.9 RIGHT FLANK PAIN: Primary | ICD-10-CM

## 2021-02-15 LAB
APPEARANCE UR: CLEAR
BILIRUB UR QL: NEGATIVE
COLOR UR: NORMAL
GLUCOSE UR STRIP.AUTO-MCNC: NEGATIVE MG/DL
HGB UR QL STRIP: NEGATIVE
KETONES UR QL STRIP.AUTO: NEGATIVE MG/DL
LEUKOCYTE ESTERASE UR QL STRIP.AUTO: NEGATIVE
NITRITE UR QL STRIP.AUTO: NEGATIVE
PH UR STRIP: 5 [PH] (ref 5–8)
PROT UR STRIP-MCNC: NEGATIVE MG/DL
SP GR UR REFRACTOMETRY: 1.02 (ref 1–1.03)
UROBILINOGEN UR QL STRIP.AUTO: 0.2 EU/DL (ref 0.2–1)

## 2021-02-15 PROCEDURE — 81003 URINALYSIS AUTO W/O SCOPE: CPT

## 2021-02-15 PROCEDURE — 74011250637 HC RX REV CODE- 250/637: Performed by: EMERGENCY MEDICINE

## 2021-02-15 PROCEDURE — 99283 EMERGENCY DEPT VISIT LOW MDM: CPT

## 2021-02-15 PROCEDURE — 74018 RADEX ABDOMEN 1 VIEW: CPT

## 2021-02-15 RX ORDER — MAGNESIUM CITRATE
296 SOLUTION, ORAL ORAL
Qty: 1 BOTTLE | Refills: 0 | Status: SHIPPED | OUTPATIENT
Start: 2021-02-15 | End: 2021-02-15

## 2021-02-15 RX ORDER — ACETAMINOPHEN 500 MG
1000 TABLET ORAL ONCE
Status: COMPLETED | OUTPATIENT
Start: 2021-02-15 | End: 2021-02-15

## 2021-02-15 RX ADMIN — ACETAMINOPHEN 1000 MG: 500 TABLET ORAL at 12:20

## 2021-02-15 NOTE — ED PROVIDER NOTES
EMERGENCY DEPARTMENT HISTORY AND PHYSICAL EXAM      Date: 2/15/2021  Patient Name: Augustin Lopez    History of Presenting Illness     Chief Complaint   Patient presents with    Flank Pain       History Provided By: Patient    HPI: Augustin Lopez, 63 y.o. female with a past medical history significant diabetes, hypertension, hyperlipidemia, obesity, renal insufficiency, COPD and Previous kidney stone presents to the ED with cc of right flank and right lower lateral rib pain. Pain began 4 days ago has been constant since worse with movement deep breath. Denies nausea vomiting diarrhea no urinary symptoms or change in urine. No fever    There are no other complaints, changes, or physical findings at this time. PCP: Luis Daniel Garcia MD    No current facility-administered medications on file prior to encounter. Current Outpatient Medications on File Prior to Encounter   Medication Sig Dispense Refill    meclizine (ANTIVERT) 25 mg tablet TAKE 1 TABLET BY MOUTH EVERY 8 HOURS AS NEEDED 30 Tab 0    atorvastatin (LIPITOR) 10 mg tablet Take 1 tablet by mouth once daily 90 Tab 1    furosemide (LASIX) 40 mg tablet TAKE 1 TABLET BY MOUTH ON MONDAY, WEDNESDAY AND FRIDAY 36 Tab 1    glimepiride (AMARYL) 4 mg tablet Take 2 tablets by mouth once daily 180 Tab 1    allopurinoL (ZYLOPRIM) 300 mg tablet Take 1 tablet by mouth once daily 90 Tab 1    pioglitazone (ACTOS) 15 mg tablet Take 1 tablet by mouth once daily 90 Tab 1    lisinopriL (PRINIVIL, ZESTRIL) 10 mg tablet Take 1 tablet by mouth once daily 90 Tab 1    glucose blood VI test strips (Accu-Chek Cherie Plus test strp) strip by Does Not Apply route See Admin Instructions. 100 Strip 1    Blood-Glucose Meter misc 1 m by Does Not Apply route daily.  1 Each 0    benzonatate (TESSALON) 200 mg capsule benzonatate 200 mg capsule      magnesium oxide (MAG-OX) 400 mg tablet Take 1 tablet by mouth once daily 90 Tab 0    lancing device/lancets (ACCU-CHEK FASTCLIX LANCING DEV) by Does Not Apply route.  albuterol (PROVENTIL HFA, VENTOLIN HFA, PROAIR HFA) 90 mcg/actuation inhaler Take  by inhalation.  alcohol swabs (BD Single Use Swabs Regular) padm by Apply Externally route.  Insulin Syringe-Needle U-100 (BD Insulin Syringe Ultra-Fine) 0.3 mL 31 gauge x 5/16\" syrg by Does Not Apply route.  albuterol-ipratropium (DUO-NEB) 2.5 mg-0.5 mg/3 ml nebu 3 mL by Nebulization route.  amoxicillin-clavulanate (AUGMENTIN) 875-125 mg per tablet Take  by mouth every twelve (12) hours.  cefUROXime (CEFTIN) 250 mg tablet Take 250 mg by mouth two (2) times a day.  insulin NPH/insulin regular (NovoLIN 70/30 U-100 Insulin) 100 unit/mL (70-30) injection 20 Units by SubCUTAneous route as needed.  ferrous sulfate (Iron) 325 mg (65 mg iron) tablet Take 325 mg by mouth every Monday, Wednesday, Friday.  multivitamin (ONE A DAY) tablet Take 1 Tab by mouth daily.  aspirin 81 mg chewable tablet Take 81 mg by mouth daily.  cholecalciferol (Vitamin D3) (1000 Units /25 mcg) tablet Take 1,000 Units by mouth daily.  acetaminophen (Tylenol Arthritis Pain) 650 mg TbER Take 650 mg by mouth every eight (8) hours.          Past History     Past Medical History:  Past Medical History:   Diagnosis Date    Arthritis     Asthma     Chronic kidney disease     Chronic obstructive pulmonary disease (HCC)     Diabetes ([facility] [address]     GERD (gastroesophageal reflux disease)     High cholesterol     Hypertension     Menopause     Morbid obesity (HCC)     Vertigo        Past Surgical History:  Past Surgical History:   Procedure Laterality Date    HX BREAST BIOPSY Left 2019    HX CHOLECYSTECTOMY      HX ENDOSCOPY  01/01/2019    HX HYSTERECTOMY      HX HYSTERECTOMY      HX OOPHORECTOMY      HX ROTATOR CUFF REPAIR Left     by suture    HX TUBAL LIGATION Bilateral        Family History:  Family History   Problem Relation Age of Onset    Heart Disease Mother     Lung Disease Mother     Lung Disease Father        Social History:  Social History     Tobacco Use    Smoking status: Never Smoker    Smokeless tobacco: Never Used   Substance Use Topics    Alcohol use: Never     Frequency: Never    Drug use: Never       Allergies: Allergies   Allergen Reactions    Celebrex [Celecoxib] Rash    Cleocin [Clindamycin Phosphate] Palpitations    Mushroom Swelling    Strawberry Rash    Ultracet [Tramadol-Acetaminophen] Rash    Zithromax [Azithromycin] Rash         Review of Systems   Review of all other systems negative  Review of Systems    Physical Exam   Pleasant morbidly obese female no acute distress  Physical Exam  Vitals signs and nursing note reviewed. Constitutional:       General: She is not in acute distress. Appearance: Normal appearance. She is not ill-appearing, toxic-appearing or diaphoretic. HENT:      Head: Normocephalic and atraumatic. Nose: Nose normal.      Mouth/Throat:      Mouth: Mucous membranes are moist.   Eyes:      Conjunctiva/sclera: Conjunctivae normal.   Neck:      Musculoskeletal: Normal range of motion and neck supple. No neck rigidity or muscular tenderness. Cardiovascular:      Rate and Rhythm: Normal rate and regular rhythm. Pulses: Normal pulses. Heart sounds: Normal heart sounds. No murmur. Pulmonary:      Effort: Pulmonary effort is normal. No respiratory distress. Breath sounds: Normal breath sounds. No wheezing, rhonchi or rales. Chest:      Chest wall: No tenderness. Abdominal:      General: Abdomen is flat. Palpations: There is no mass. Tenderness: There is no abdominal tenderness. There is right CVA tenderness. There is no left CVA tenderness, guarding or rebound. Hernia: No hernia is present. Comments: Morbidly obese tender near the right flank area though also's right lower thoracic region   Musculoskeletal: Normal range of motion.          General: No tenderness, deformity or signs of injury. Right lower leg: No edema. Left lower leg: No edema. Skin:     General: Skin is warm. Findings: No erythema or rash. Neurological:      General: No focal deficit present. Mental Status: She is alert and oriented to person, place, and time. Cranial Nerves: No cranial nerve deficit. Sensory: No sensory deficit. Motor: No weakness. Psychiatric:         Mood and Affect: Mood normal.         Behavior: Behavior normal.         Thought Content: Thought content normal.         Lab and Diagnostic Study Results     Labs -     Recent Results (from the past 12 hour(s))   URINALYSIS W/ RFLX MICROSCOPIC    Collection Time: 02/15/21  1:15 PM   Result Value Ref Range    Color Yellow/Straw      Appearance Clear Clear      Specific gravity 1.025 1.003 - 1.030      pH (UA) 5.0 5.0 - 8.0      Protein Negative Negative mg/dL    Glucose Negative Negative mg/dL    Ketone Negative Negative mg/dL    Bilirubin Negative Negative      Blood Negative Negative      Urobilinogen 0.2 0.2 - 1.0 EU/dL    Nitrites Negative Negative      Leukocyte Esterase Negative Negative         Radiologic Studies -   @lastxrresult@  CT Results  (Last 48 hours)    None        CXR Results  (Last 48 hours)    None            Medical Decision Making   - I am the first provider for this patient. - I reviewed the vital signs, available nursing notes, past medical history, past surgical history, family history and social history. - Initial assessment performed. The patients presenting problems have been discussed, and they are in agreement with the care plan formulated and outlined with them. I have encouraged them to ask questions as they arise throughout their visit. Vital Signs-Reviewed the patient's vital signs.   Patient Vitals for the past 12 hrs:   Temp Pulse Resp BP SpO2   02/15/21 1126 97.6 °F (36.4 °C) 62 17 (!) 141/58 100 %       Records Reviewed: Nursing Notes and Old Medical Records    The patient presents with right flank pain with a differential diagnosis of pyelonephritis kidney stone muscle strain intra-abdominal process pneumonia      ED Course:          Provider Notes (Medical Decision Making):   59-year-old female with multiple medical problems listed above right flank pain x4 days clinically I suspect this is myofascial pain by exam.  Unable perform CT due to the patient's body habitus plain KUB shows stool throughout colon with air pocket in the hepatic flexure. Constipation has been a problem will give trial citrate of magnesia Tylenol for pain for now instructed return for worse pain fever vomiting or any new symptoms  MDM       Procedures   Medical Decision Makingedical Decision Making  Performed by: Celeste Schuster MD  PROCEDURES:  Procedures       Disposition   Disposition: Condition unchanged and stable  DC- Adult Discharges: All of the diagnostic tests were reviewed and questions answered. Diagnosis, care plan and treatment options were discussed. The patient understands the instructions and will follow up as directed. The patients results have been reviewed with them. They have been counseled regarding their diagnosis. The patient and spouse/SO verbally convey understanding and agreement of the signs, symptoms, diagnosis, treatment and prognosis and additionally agrees to follow up as recommended with their PCP in 24 - 48 hours. They also agree with the care-plan and convey that all of their questions have been answered. I have also put together some discharge instructions for them that include: 1) educational information regarding their diagnosis, 2) how to care for their diagnosis at home, as well a 3) list of reasons why they would want to return to the ED prior to their follow-up appointment, should their condition change. DISCHARGE PLAN:  1.    Current Discharge Medication List      CONTINUE these medications which have NOT CHANGED    Details   meclizine (ANTIVERT) 25 mg tablet TAKE 1 TABLET BY MOUTH EVERY 8 HOURS AS NEEDED  Qty: 30 Tab, Refills: 0      atorvastatin (LIPITOR) 10 mg tablet Take 1 tablet by mouth once daily  Qty: 90 Tab, Refills: 1    Associated Diagnoses: Mixed hyperlipidemia due to type 2 diabetes mellitus ([facility])      furosemide (LASIX) 40 mg tablet TAKE 1 TABLET BY MOUTH ON MONDAY, WEDNESDAY AND FRIDAY  Qty: 36 Tab, Refills: 1    Associated Diagnoses: Chronic congestive heart failure, unspecified heart failure type ([facility])      glimepiride (AMARYL) 4 mg tablet Take 2 tablets by mouth once daily  Qty: 180 Tab, Refills: 1    Associated Diagnoses: Type 2 diabetes mellitus with other specified complication, without long-term current use of insulin ([facility])      allopurinoL (ZYLOPRIM) 300 mg tablet Take 1 tablet by mouth once daily  Qty: 90 Tab, Refills: 1    Associated Diagnoses: Acute gout of multiple sites, unspecified cause      pioglitazone (ACTOS) 15 mg tablet Take 1 tablet by mouth once daily  Qty: 90 Tab, Refills: 1    Associated Diagnoses: Type 2 diabetes mellitus with other specified complication, without long-term current use of insulin ([facility])      lisinopriL (PRINIVIL, ZESTRIL) 10 mg tablet Take 1 tablet by mouth once daily  Qty: 90 Tab, Refills: 1    Associated Diagnoses: Essential hypertension      glucose blood VI test strips (Accu-Chek Cherie Plus test strp) strip by Does Not Apply route See Admin Instructions. Qty: 100 Strip, Refills: 1    Associated Diagnoses: Type 2 diabetes mellitus with other specified complication, without long-term current use of insulin ([facility])      Blood-Glucose Meter misc 1 m by Does Not Apply route daily.   Qty: 1 Each, Refills: 0    Comments: DX: E11.9 NPI [id number]  Associated Diagnoses: Type 2 diabetes mellitus with other specified complication, without long-term current use of insulin ([facility])      benzonatate (TESSALON) 200 mg capsule benzonatate 200 mg capsule      magnesium oxide (MAG-OX) 400 mg tablet Take 1 tablet by mouth once daily  Qty: 90 Tab, Refills: 0      lancing device/lancets (ACCU-CHEK FASTCLIX LANCING DEV) by Does Not Apply route. albuterol (PROVENTIL HFA, VENTOLIN HFA, PROAIR HFA) 90 mcg/actuation inhaler Take  by inhalation. alcohol swabs (BD Single Use Swabs Regular) padm by Apply Externally route. Insulin Syringe-Needle U-100 (BD Insulin Syringe Ultra-Fine) 0.3 mL 31 gauge x 5/16\" syrg by Does Not Apply route. albuterol-ipratropium (DUO-NEB) 2.5 mg-0.5 mg/3 ml nebu 3 mL by Nebulization route. amoxicillin-clavulanate (AUGMENTIN) 875-125 mg per tablet Take  by mouth every twelve (12) hours. cefUROXime (CEFTIN) 250 mg tablet Take 250 mg by mouth two (2) times a day. insulin NPH/insulin regular (NovoLIN 70/30 U-100 Insulin) 100 unit/mL (70-30) injection 20 Units by SubCUTAneous route as needed. ferrous sulfate (Iron) 325 mg (65 mg iron) tablet Take 325 mg by mouth every Monday, Wednesday, Friday. multivitamin (ONE A DAY) tablet Take 1 Tab by mouth daily. aspirin 81 mg chewable tablet Take 81 mg by mouth daily. cholecalciferol (Vitamin D3) (1000 Units /25 mcg) tablet Take 1,000 Units by mouth daily. acetaminophen (Tylenol Arthritis Pain) 650 mg TbER Take 650 mg by mouth every eight (8) hours. 2.   Follow-up Information    None       3. Return to ED if worse   4. Current Discharge Medication List            Diagnosis     Clinical Impression: Right flank pain attestations:    Brenton Escalante MD    Please note that this dictation was completed with [employer], the [employer] voice recognition software. Quite often unanticipated grammatical, syntax, homophones, and other interpretive errors are inadvertently transcribed by the computer software. Please disregard these errors. Please excuse any errors that have escaped final proofreading. Thank you.

## 2021-02-17 ENCOUNTER — TELEPHONE (OUTPATIENT)
Dept: PRIMARY CARE CLINIC | Age: 61
End: 2021-02-17

## 2021-02-17 NOTE — TELEPHONE ENCOUNTER
Would take Miralax twice daily until she starts to have bowel movements and then reduce to once a day. Recommend consult with a GI doctor such as Dr. Cline.

## 2021-02-18 RX ORDER — MECLIZINE HYDROCHLORIDE 25 MG/1
TABLET ORAL
Qty: 30 TAB | Refills: 0 | Status: SHIPPED | OUTPATIENT
Start: 2021-02-18 | End: 2021-03-19

## 2021-02-19 NOTE — TELEPHONE ENCOUNTER
Spoke with patient and informed her of recommendations per Dr. Carmella Dutta. She stated she felt better and was having a little watery stool at times. I also informed her of the referral to a GI. She wants to hold off on this until later. She will call the office when she decides to do this.

## 2021-03-19 RX ORDER — LANOLIN ALCOHOL/MO/W.PET/CERES
CREAM (GRAM) TOPICAL
Qty: 90 TAB | Refills: 0 | Status: SHIPPED | OUTPATIENT
Start: 2021-03-19 | End: 2021-06-15 | Stop reason: SDUPTHER

## 2021-03-19 RX ORDER — MECLIZINE HYDROCHLORIDE 25 MG/1
TABLET ORAL
Qty: 30 TAB | Refills: 0 | Status: SHIPPED | OUTPATIENT
Start: 2021-03-19 | End: 2021-04-16 | Stop reason: SDUPTHER

## 2021-03-30 DIAGNOSIS — E11.9 TYPE 2 DIABETES MELLITUS WITHOUT COMPLICATION, WITHOUT LONG-TERM CURRENT USE OF INSULIN (HCC): Primary | ICD-10-CM

## 2021-04-03 RX ORDER — LANCING DEVICE/LANCETS
100 KIT MISCELLANEOUS DAILY
Qty: 1 KIT | Refills: 0 | Status: SHIPPED | OUTPATIENT
Start: 2021-04-03 | End: 2021-12-14 | Stop reason: ALTCHOICE

## 2021-04-16 NOTE — TELEPHONE ENCOUNTER
Requested Prescriptions     Pending Prescriptions Disp Refills    meclizine (ANTIVERT) 25 mg tablet 30 Tab 0     Request refill to [facility].

## 2021-04-19 ENCOUNTER — TELEPHONE (OUTPATIENT)
Dept: PRIMARY CARE CLINIC | Age: 61
End: 2021-04-19

## 2021-04-19 RX ORDER — MECLIZINE HYDROCHLORIDE 25 MG/1
25 TABLET ORAL
Qty: 30 TAB | Refills: 0 | Status: SHIPPED | OUTPATIENT
Start: 2021-04-19 | End: 2021-04-23

## 2021-04-19 NOTE — TELEPHONE ENCOUNTER
Per [facility] tech. , mammogram was due in Feb. 2021. She stated patient has to call [phone] once the order is sent to schedule her own mammogram or if she wants to wait (which may take months) she can just wait until someone calls her with a time and date.

## 2021-04-19 NOTE — TELEPHONE ENCOUNTER
Called and LVM for Mammography to call me back re: last mammogram patient had and if she is due yet. Our records show last one was 11/11/2020.

## 2021-04-20 ENCOUNTER — TELEPHONE (OUTPATIENT)
Dept: PRIMARY CARE CLINIC | Age: 61
End: 2021-04-20

## 2021-04-20 DIAGNOSIS — R42 DIZZINESS: Primary | ICD-10-CM

## 2021-04-20 DIAGNOSIS — Z12.31 ENCOUNTER FOR SCREENING MAMMOGRAM FOR MALIGNANT NEOPLASM OF BREAST: Primary | ICD-10-CM

## 2021-04-20 NOTE — TELEPHONE ENCOUNTER
Requesting new RX for Meclizine. The 30-day supply was inadvertently sent to [facility].

## 2021-04-23 RX ORDER — MECLIZINE HYDROCHLORIDE 25 MG/1
TABLET ORAL
Qty: 30 TAB | Refills: 0 | Status: SHIPPED | OUTPATIENT
Start: 2021-04-23 | End: 2021-05-19 | Stop reason: SDUPTHER

## 2021-05-19 DIAGNOSIS — R42 DIZZINESS: ICD-10-CM

## 2021-05-19 NOTE — TELEPHONE ENCOUNTER
Requested Prescriptions     Pending Prescriptions Disp Refills    meclizine (ANTIVERT) 25 mg tablet 30 Tablet 0     To [facility].

## 2021-05-24 RX ORDER — MECLIZINE HYDROCHLORIDE 25 MG/1
TABLET ORAL
Qty: 30 TABLET | Refills: 1 | Status: SHIPPED | OUTPATIENT
Start: 2021-05-24 | End: 2021-07-23

## 2021-06-15 ENCOUNTER — TELEPHONE (OUTPATIENT)
Dept: PRIMARY CARE CLINIC | Age: 61
End: 2021-06-15

## 2021-06-15 ENCOUNTER — TRANSCRIBE ORDER (OUTPATIENT)
Dept: REGISTRATION | Age: 61
End: 2021-06-15

## 2021-06-15 ENCOUNTER — HOSPITAL ENCOUNTER (OUTPATIENT)
Dept: GENERAL RADIOLOGY | Age: 61
Discharge: HOME | End: 2021-06-15
Payer: MEDICARE

## 2021-06-15 ENCOUNTER — OFFICE VISIT (OUTPATIENT)
Dept: PRIMARY CARE CLINIC | Age: 61
End: 2021-06-15
Payer: MEDICARE

## 2021-06-15 VITALS
OXYGEN SATURATION: 100 % | HEART RATE: 61 BPM | BODY MASS INDEX: 47.09 KG/M2 | HEIGHT: 66 IN | TEMPERATURE: 97.2 F | SYSTOLIC BLOOD PRESSURE: 132 MMHG | WEIGHT: 293 LBS | DIASTOLIC BLOOD PRESSURE: 61 MMHG | RESPIRATION RATE: 20 BRPM

## 2021-06-15 DIAGNOSIS — I10 ESSENTIAL HYPERTENSION: ICD-10-CM

## 2021-06-15 DIAGNOSIS — M54.16 LUMBAR RADICULOPATHY: Primary | ICD-10-CM

## 2021-06-15 DIAGNOSIS — G47.33 OBSTRUCTIVE SLEEP APNEA SYNDROME: ICD-10-CM

## 2021-06-15 DIAGNOSIS — E66.01 MORBID OBESITY (HCC): ICD-10-CM

## 2021-06-15 DIAGNOSIS — M54.16 LUMBAR RADICULOPATHY: ICD-10-CM

## 2021-06-15 DIAGNOSIS — Z00.00 ENCOUNTER FOR ANNUAL WELLNESS VISIT (AWV) IN MEDICARE PATIENT: Primary | ICD-10-CM

## 2021-06-15 DIAGNOSIS — E11.9 TYPE 2 DIABETES MELLITUS WITHOUT COMPLICATION, WITHOUT LONG-TERM CURRENT USE OF INSULIN (HCC): ICD-10-CM

## 2021-06-15 DIAGNOSIS — M25.552 ACUTE HIP PAIN, LEFT: ICD-10-CM

## 2021-06-15 DIAGNOSIS — R68.89 OTHER GENERAL SYMPTOMS AND SIGNS: ICD-10-CM

## 2021-06-15 DIAGNOSIS — N18.31 STAGE 3A CHRONIC KIDNEY DISEASE (HCC): ICD-10-CM

## 2021-06-15 DIAGNOSIS — E11.69 TYPE 2 DIABETES MELLITUS WITH OTHER SPECIFIED COMPLICATION, WITHOUT LONG-TERM CURRENT USE OF INSULIN (HCC): ICD-10-CM

## 2021-06-15 DIAGNOSIS — M25.552 LEFT HIP PAIN: ICD-10-CM

## 2021-06-15 DIAGNOSIS — Z11.59 ENCOUNTER FOR HEPATITIS C SCREENING TEST FOR LOW RISK PATIENT: ICD-10-CM

## 2021-06-15 DIAGNOSIS — M10.9 ACUTE GOUT OF MULTIPLE SITES, UNSPECIFIED CAUSE: ICD-10-CM

## 2021-06-15 DIAGNOSIS — E11.69 MIXED HYPERLIPIDEMIA DUE TO TYPE 2 DIABETES MELLITUS (HCC): ICD-10-CM

## 2021-06-15 DIAGNOSIS — E78.2 MIXED HYPERLIPIDEMIA: ICD-10-CM

## 2021-06-15 DIAGNOSIS — D64.9 ANEMIA, UNSPECIFIED TYPE: ICD-10-CM

## 2021-06-15 DIAGNOSIS — E78.2 MIXED HYPERLIPIDEMIA DUE TO TYPE 2 DIABETES MELLITUS (HCC): ICD-10-CM

## 2021-06-15 DIAGNOSIS — M19.90 OSTEOARTHRITIS, UNSPECIFIED OSTEOARTHRITIS TYPE, UNSPECIFIED SITE: ICD-10-CM

## 2021-06-15 DIAGNOSIS — K21.9 GASTROESOPHAGEAL REFLUX DISEASE WITHOUT ESOPHAGITIS: ICD-10-CM

## 2021-06-15 DIAGNOSIS — J44.9 CHRONIC OBSTRUCTIVE PULMONARY DISEASE, UNSPECIFIED COPD TYPE (HCC): ICD-10-CM

## 2021-06-15 PROBLEM — D50.9 IRON DEFICIENCY ANEMIA: Status: ACTIVE | Noted: 2021-06-10

## 2021-06-15 LAB — HBA1C MFR BLD HPLC: 7 %

## 2021-06-15 PROCEDURE — 3017F COLORECTAL CA SCREEN DOC REV: CPT | Performed by: FAMILY MEDICINE

## 2021-06-15 PROCEDURE — G8427 DOCREV CUR MEDS BY ELIG CLIN: HCPCS | Performed by: FAMILY MEDICINE

## 2021-06-15 PROCEDURE — 99214 OFFICE O/P EST MOD 30 MIN: CPT | Performed by: FAMILY MEDICINE

## 2021-06-15 PROCEDURE — 73502 X-RAY EXAM HIP UNI 2-3 VIEWS: CPT

## 2021-06-15 PROCEDURE — G8752 SYS BP LESS 140: HCPCS | Performed by: FAMILY MEDICINE

## 2021-06-15 PROCEDURE — G8754 DIAS BP LESS 90: HCPCS | Performed by: FAMILY MEDICINE

## 2021-06-15 PROCEDURE — G0439 PPPS, SUBSEQ VISIT: HCPCS | Performed by: FAMILY MEDICINE

## 2021-06-15 PROCEDURE — 72100 X-RAY EXAM L-S SPINE 2/3 VWS: CPT

## 2021-06-15 PROCEDURE — G8417 CALC BMI ABV UP PARAM F/U: HCPCS | Performed by: FAMILY MEDICINE

## 2021-06-15 PROCEDURE — G9899 SCRN MAM PERF RSLTS DOC: HCPCS | Performed by: FAMILY MEDICINE

## 2021-06-15 PROCEDURE — 3044F HG A1C LEVEL LT 7.0%: CPT | Performed by: FAMILY MEDICINE

## 2021-06-15 PROCEDURE — 83036 HEMOGLOBIN GLYCOSYLATED A1C: CPT | Performed by: FAMILY MEDICINE

## 2021-06-15 PROCEDURE — 2022F DILAT RTA XM EVC RTNOPTHY: CPT | Performed by: FAMILY MEDICINE

## 2021-06-15 PROCEDURE — G8510 SCR DEP NEG, NO PLAN REQD: HCPCS | Performed by: FAMILY MEDICINE

## 2021-06-15 RX ORDER — PIOGLITAZONEHYDROCHLORIDE 15 MG/1
TABLET ORAL
Qty: 90 TABLET | Refills: 1 | Status: SHIPPED | OUTPATIENT
Start: 2021-06-15 | End: 2021-11-07

## 2021-06-15 RX ORDER — GLIMEPIRIDE 4 MG/1
TABLET ORAL
Qty: 180 TABLET | Refills: 1 | Status: SHIPPED | OUTPATIENT
Start: 2021-06-15 | End: 2022-01-25 | Stop reason: SDUPTHER

## 2021-06-15 RX ORDER — LANOLIN ALCOHOL/MO/W.PET/CERES
CREAM (GRAM) TOPICAL
Qty: 90 TABLET | Refills: 1 | Status: SHIPPED | OUTPATIENT
Start: 2021-06-15 | End: 2021-11-05 | Stop reason: SDUPTHER

## 2021-06-15 RX ORDER — LISINOPRIL 10 MG/1
TABLET ORAL
Qty: 90 TABLET | Refills: 1 | Status: SHIPPED | OUTPATIENT
Start: 2021-06-15 | End: 2021-11-07

## 2021-06-15 RX ORDER — ATORVASTATIN CALCIUM 10 MG/1
TABLET, FILM COATED ORAL
Qty: 90 TABLET | Refills: 1 | Status: SHIPPED | OUTPATIENT
Start: 2021-06-15 | End: 2022-01-25 | Stop reason: SDUPTHER

## 2021-06-15 RX ORDER — ALLOPURINOL 300 MG/1
TABLET ORAL
Qty: 90 TABLET | Refills: 1 | Status: SHIPPED | OUTPATIENT
Start: 2021-06-15 | End: 2021-11-07

## 2021-06-15 NOTE — TELEPHONE ENCOUNTER
Re:  Novolin 20 units as needed. HOSP [facility] LARA left message, needs clarification on directions, how often?

## 2021-06-15 NOTE — ASSESSMENT & PLAN NOTE
borderline controlled, continue current medications, medication adherence emphasized, lifestyle modifications recommended

## 2021-06-15 NOTE — ASSESSMENT & PLAN NOTE
asymptomatic, continue current medications, medication adherence emphasized, lifestyle modifications recommended

## 2021-06-15 NOTE — PATIENT INSTRUCTIONS
Medicare Wellness Visit, Female The best way to improve and maintain good health is to have a healthy lifestyle by eating a well-balanced diet, exercising regularly, limiting alcohol and stopping smoking. Regular visits with your physician or non-physician health care provider also support your good health. Preventive screening tests can find health problems before they become diseases or illnesses. Here is a list of your current Health Maintenance items with a due date: 
Health Maintenance Topic Date Due  
 Hepatitis C Screening  Never done  Foot Exam Q1  Never done  Eye Exam Retinal or Dilated  Never done  DTaP/Tdap/Td series (1 - Tdap) Never done  PAP AKA CERVICAL CYTOLOGY  Never done  Shingrix Vaccine Age 50> (1 of 2) Never done  Pneumococcal 0-64 years (1 of 2 - PPSV23) 05/28/2018  MICROALBUMIN Q1  12/18/2021  Lipid Screen  12/18/2021  A1C test (Diabetic or Prediabetic)  06/15/2022  Medicare Yearly Exam  06/16/2022  Breast Cancer Screen Mammogram  09/14/2022  Colorectal Cancer Screening Combo  05/29/2028  Flu Vaccine  Completed  COVID-19 Vaccine  Completed Preventive services such as immunizations prevent serious infections. All people over age 72 should have a Pneumovax and a Prevnar-13 shot to prevent potentially life threatening infections with the pneumococcus bacteria, a common cause of pneumonia. These are once in a lifetime unless you and your provider decide differently. All people over 65 should have a yearly influenza vaccine or \"flu\" shot. This does not prevent infection with cold viruses but has been proven to prevent hospitalization and death from influenza. Although Medicare part B \"regular Medicare\" currently only covers tetanus vaccination in the context of an injury, a tetanus vaccine (Tdap or Td) is recommended every 10 years.  
 
A new 2 shot shingles vaccine series (Shingrix) is recommended after age 48 even for people who have already received Zostavax (the old vaccine). It is also not covered by Medicare part B. Note, however, that both the Shingles vaccine and Tdap/Td are generally covered by secondary carriers. Please check your coverage and out of pocket expenses. Consider contacting your local health department because it may stock these vaccines for a reasonable charge. We currently have documentation of the following immunization history for you: 
Immunization History Administered Date(s) Administered  Covid-19, MODERNA, Mrna, Lnp-s, Pf, 100mcg/0.5mL 03/18/2021, 04/20/2021  Influenza Vaccine 11/18/2019  Influenza Vaccine (>6 mo Afluria QUAD Vial [id number] (0.25 mL) / [id number] (0.5 mL)) 01/11/2018  Influenza Vaccine (Mdck Quadrivalent)(>4 Yrs Flucelvax Quad vial [id number]) 12/18/2020  Pneumococcal Conjugate (PCV-13) 04/02/2018 Screening for infection with Hepatitis C is recommended for anyone born between 80 through [address]. The table at the top of this document indicates the status of this and other preventive services. A bone mass density test (DEXA) to screen for osteoporosis or thinning of the bones should be done at least once after age 72 and may be done up to every 2 years as determined by you and your health care provider. The most recent DEXA we have on file for you is: DEXA Results (most recent): No results found for this or any previous visit. Screening for diabetes mellitus with a blood sugar test (glucose) should be done at least every 3 years until age 79. You and your health care provider may decide whether to continue screening after age 79. The most recent blood glucose we have on file for you is:  
Lab Results Component Value Date/Time Glucose 78 12/18/2020 11:34 AM  
 
 
Fasting sugars >126 on 2 separate occassions are consistent with diabetes. Random sugars >200 on 2 separate occassions are consistent with diabetes Glaucoma is a disease of the eye due to increased ocular pressure that can lead to blindness. People with risk factors for glaucoma ( race, diabetes, family history) should consider screening at least every 2 years by an eye professional.  
 
Cardiovascular screening tests that check for elevated lipids or cholesterol (fatty part of blood) which can lead to heart disease and strokes should be done every 4-6 years through age 79. You and your health care provider may decide whether to continue screening after age 79. The most recent lipid panel we have on file for you is:  
Lab Results Component Value Date/Time Cholesterol, total 148 12/18/2020 11:34 AM  
 HDL Cholesterol 61 12/18/2020 11:34 AM  
 LDL, calculated 63 12/18/2020 11:34 AM  
 VLDL, calculated 24 12/18/2020 11:34 AM  
 Triglyceride 143 12/18/2020 11:34 AM  
 
 
Colorectal cancer screening that evaluates for blood or polyps in your colon for people with average risk should be done yearly as a stool test, every five years as a flexible sigmoidoscope or every 10 years as a colonoscopy up to age 76. You and your health care provider may decide whether to continue screening after age 76. Breast cancer screening with a mammogram is recommended at least once every 2 years  for women age 54-69. You and your health care provider may decide whether to continue screening after age 76. The most recent mammogram we have on file for you is: [facility] Results (most recent): 
Results from Orders Only encounter on 09/14/20 [facility] MAMMO LT DX INCL CAD Narrative DIGITAL UNILATERAL LEFT DIAGNOSTIC MAMMOGRAM WITH CAD: 
 
HISTORY:  Short interval six-month follow-up left breast for 5 mm density seen 
only in the CC projection. COMPARISON:2/20/2020. TECHNIQUE:  Digital left MLO and CC views obtained. Spot compression left CC 
view also obtained. Computer Aided Detection (CAD) was used in evaluating this 
mammogram. 
 
MAMMOGRAM FINDINGS: The breast parenchyma demonstrate scattered fibroglandular tissue. The small 5 mm focal asymmetry is not reproduced in the current study. No new focal abnormality. Impression IMPRESSION: No mammographic evidence of malignancy. ASSESSMENT CODE: BI-RADS Category 2: Benign. RECOMMENDATION: Resume routine annual mammogram in 6 months. According to the [address], yearly mammograms are recommended 
starting at age 36 and continuing as long as a woman is in good health. Clinical Breast Exams should be a part of a periodic health exam - about every 
three years for women in their 25s and 35s and every year for women 40 and over. Breast self exam is an option for women starting in their 25s. Any breast 
change noted on a breast self exam should be reported promptly to the patient's 
healthcare provider. Breast MRI is recommended for women with an approximately 20-25% or greater lifetime risk of breast cancer, including women with a strong 
family history of breast or ovarian cancer and women who have been treated for Hodgkin's disease. A negative Mammography report should not discourage follow 
up or biopsy of a clinically significant finding and/or abnormality. Dense breast tissue may obscure small neoplasms. Screening for cervical cancer with a pap smear is recommended for all women with a cervix until age 72. The frequency of this test is based on the details of her prior pap smear testing. You and your health care provider may decide whether to continue screening after age 72. Your Medicare Wellness Exam is recommended annually. Well Visit, Women 48 to 72: Care Instructions Overview Well visits can help you stay healthy. Your doctor has checked your overall health and may have suggested ways to take good care of yourself. Your doctor also may have recommended tests. At home, you can help prevent illness with healthy eating, regular exercise, and other steps.  
Follow-up care is a key part of your treatment and safety. Be sure to make and go to all appointments, and call your doctor if you are having problems. It's also a good idea to know your test results and keep a list of the medicines you take. How can you care for yourself at home? · Get screening tests that you and your doctor decide on. Screening helps find diseases before any symptoms appear. · Eat healthy foods. Choose fruits, vegetables, whole grains, protein, and low-fat dairy foods. Limit fat, especially saturated fat. Reduce salt in your diet. · Limit alcohol. Have no more than 1 drink a day or 7 drinks a week. · Get at least 30 minutes of exercise on most days of the week. Walking is a good choice. You also may want to do other activities, such as running, swimming, cycling, or playing tennis or team sports. · Reach and stay at a healthy weight. This will lower your risk for many problems, such as obesity, diabetes, heart disease, and high blood pressure. · Do not smoke. Smoking can make health problems worse. If you need help quitting, talk to your doctor about stop-smoking programs and medicines. These can increase your chances of quitting for good. · Care for your mental health. It is easy to get weighed down by worry and stress. Learn strategies to manage stress, like deep breathing and mindfulness, and stay connected with your family and community. If you find you often feel sad or hopeless, talk with your doctor. Treatment can help. · Talk to your doctor about whether you have any risk factors for sexually transmitted infections (STIs). You can help prevent STIs if you wait to have sex with a new partner (or partners) until you've each been tested for STIs. It also helps if you use condoms (male or female condoms) and if you limit your sex partners to one person who only has sex with you. Vaccines are available for some STIs. · If you think you may have a problem with alcohol or drug use, talk to your doctor.  This includes prescription medicines (such as amphetamines and opioids) and illegal drugs (such as cocaine and methamphetamine). Your doctor can help you figure out what type of treatment is best for you. · Protect your skin from too much sun. When you're outdoors from 10 a.m. to 4 p.m., stay in the shade or cover up with clothing and a hat with a wide brim. Wear sunglasses that block UV rays. Even when it's cloudy, put broad-spectrum sunscreen (SPF 30 or higher) on any exposed skin. · See a dentist one or two times a year for checkups and to have your teeth cleaned. · Wear a seat belt in the car. When should you call for help? Watch closely for changes in your health, and be sure to contact your doctor if you have any problems or symptoms that concern you. Where can you learn more? Go to http://www.gray.com/ Enter E527 in the search box to learn more about \"Well Visit, Women 50 to 72: Care Instructions. \" Current as of: May 27, 2020               Content Version: 12.8 © 3124-2384 [employer]. Care instructions adapted under license by [employer] (which disclaims liability or warranty for this information). If you have questions about a medical condition or this instruction, always ask your healthcare professional. [address] any warranty or liability for your use of this information. Anemia: Care Instructions Your Care Instructions Anemia is a low level of red blood cells, which carry oxygen throughout your body. Many things can cause anemia. Lack of iron is one of the most common causes. Your body needs iron to make hemoglobin, a substance in red blood cells that carries oxygen from the lungs to your body's cells. Without enough iron, the body produces fewer and smaller red blood cells. As a result, your body's cells do not get enough oxygen, and you feel tired and weak. And you may have trouble concentrating.  
Bleeding is the most common cause of a lack of iron. You may have heavy menstrual bleeding or bleeding caused by conditions such as ulcers, hemorrhoids, or cancer. Regular use of aspirin or other anti-inflammatory medicines (such as ibuprofen) also can cause bleeding in some people. A lack of iron in your diet also can cause anemia, especially at times when the body needs more iron, such as during pregnancy, infancy, and the teen years. Your doctor may have prescribed iron pills. It may take several months of treatment for your iron levels to return to normal. Your doctor also may suggest that you eat foods that are rich in iron, such as meat and beans. There are many other causes of anemia. It is not always due to a lack of iron. Finding the specific cause of your anemia will help your doctor find the right treatment for you. Follow-up care is a key part of your treatment and safety. Be sure to make and go to all appointments, and call your doctor if you are having problems. It's also a good idea to know your test results and keep a list of the medicines you take. How can you care for yourself at home? · Take your medicines exactly as prescribed. Call your doctor if you think you are having a problem with your medicine. · If your doctor recommends iron pills, take them as directed: ? Try to take the pills on an empty stomach about 1 hour before or 2 hours after meals. But you may need to take iron with food to avoid an upset stomach. ? Do not take antacids or drink milk or caffeine drinks (such as coffee, tea, or cola) at the same time or within 2 hours of the time that you take your iron. They can make it hard for your body to absorb the iron. ? Vitamin C (from food or supplements) helps your body absorb iron. Try taking iron pills with a glass of orange juice or some other food that is high in vitamin C, such as citrus fruits. ? Iron pills may cause stomach problems, such as heartburn, nausea, diarrhea, constipation, and cramps.  Be sure to drink plenty of fluids, and include fruits, vegetables, and fiber in your diet each day. Iron pills often make your bowel movements dark or green. ? If you forget to take an iron pill, do not take a double dose of iron the next time you take a pill. ? Keep iron pills out of the reach of small children. An overdose of iron can be very dangerous. · Follow your doctor's advice about eating iron-rich foods. These include red meat, shellfish, poultry, eggs, beans, raisins, whole-grain bread, and leafy green vegetables. · Steam vegetables to help them keep their iron content. When should you call for help? Call 911 anytime you think you may need emergency care. For example, call if: 
  · You have symptoms of a heart attack. These may include: 
? Chest pain or pressure, or a strange feeling in the chest. 
? Sweating. ? Shortness of breath. ? Nausea or vomiting. ? Pain, pressure, or a strange feeling in the back, neck, jaw, or upper belly or in one or both shoulders or arms. ? Lightheadedness or sudden weakness. ? A fast or irregular heartbeat. After you call 911, the  may tell you to chew 1 adult-strength or 2 to 4 low-dose aspirin. Wait for an ambulance. Do not try to drive yourself.  
  · You passed out (lost consciousness). Call your doctor now or seek immediate medical care if: 
  · You have new or increased shortness of breath.  
  · You are dizzy or lightheaded, or you feel like you may faint.  
  · Your fatigue and weakness continue or get worse.  
  · You have any abnormal bleeding, such as: 
? Nosebleeds. ? Vaginal bleeding that is different (heavier, more frequent, at a different time of the month) than what you are used to. 
? Bloody or black stools, or rectal bleeding. ? Bloody or pink urine. Watch closely for changes in your health, and be sure to contact your doctor if: 
  · You do not get better as expected. Where can you learn more?  
Go to http://shanice-luis a.info/ Enter R301 in the search box to learn more about \"Anemia: Care Instructions. \" Current as of: September 23, 2020               Content Version: 12.8 © 2006-2021 [employer]. Care instructions adapted under license by [employer] (which disclaims liability or warranty for this information). If you have questions about a medical condition or this instruction, always ask your healthcare professional. [place] [address] any warranty or liability for your use of this information. Arthritis: Care Instructions Overview Arthritis, also called osteoarthritis, is a breakdown of the cartilage that cushions your joints. When the cartilage wears down, your bones rub against each other. This causes pain and stiffness. Many people have some arthritis as they age. Arthritis most often affects the joints of the spine, hands, hips, knees, or feet. Arthritis never goes away completely. But medicine and home treatment can help reduce pain and help you stay active. Follow-up care is a key part of your treatment and safety. Be sure to make and go to all appointments, and call your doctor if you are having problems. It's also a good idea to know your test results and keep a list of the medicines you take. How can you care for yourself at home? · Stay at a healthy weight. Being overweight puts extra strain on your joints. · Talk to your doctor or physical therapist about exercises that will help ease joint pain. ? Stretch. You may enjoy gentle forms of yoga to help keep your joints and muscles flexible. ? Walk instead of jog. Other types of exercise that are less stressful on the joints include riding a bike, swimming, [place], or water exercise. ? Lift weights. Strong muscles help reduce stress on your joints. Stronger thigh muscles, for example, take some of the stress off of the knees and hips.  Learn the right way to lift weights so you don't make joint pain worse. · Take your medicines exactly as prescribed. Call your doctor if you think you are having a problem with your medicine. · Take pain medicines exactly as directed. ? If the doctor gave you a prescription medicine for pain, take it as prescribed. ? If you are not taking a prescription pain medicine, ask your doctor if you can take an over-the-counter medicine. · Use a cane, crutch, walker, or another device if you need help to get around. These can help rest your joints. You also can use other things to make life easier, such as a higher toilet seat and padded handles on kitchen utensils. · Do not sit in low chairs. They can make it hard to get up. · Put heat or cold on your sore joints as needed. Use whichever helps you most. You can also switch between hot and cold packs. ? Apply heat 2 or 3 times a day for 20 to 30 minutesusing a heating pad, hot shower, or hot packto relieve pain and stiffness. But don't use heat on a swollen joint. ? Put ice or a cold pack on your sore joint for 10 to 20 minutes at a time. Put a thin cloth between the ice and your skin. When should you call for help? Call your doctor now or seek immediate medical care if: 
  · You have sudden swelling, warmth, or pain in any joint.  
  · You have joint pain and a fever or rash.  
  · You have such bad pain that you cannot use a joint. Watch closely for changes in your health, and be sure to contact your doctor if: 
  · You have mild joint symptoms that continue even with more than 6 weeks of care at home.  
  · You have stomach pain or other problems with your medicine. Where can you learn more? Go to http://www.gray.com/ Enter J209 in the search box to learn more about \"Arthritis: Care Instructions. \" Current as of: August 5, 2020               Content Version: 12.8 © 4220-0691 [employer].   
Care instructions adapted under license by [employer] (which disclaims liability or warranty for this information). If you have questions about a medical condition or this instruction, always ask your healthcare professional. [address] any warranty or liability for your use of this information. Type 2 Diabetes: Care Instructions Your Care Instructions Type 2 diabetes is a disease that develops when the body's tissues cannot use insulin properly. Over time, the pancreas cannot make enough insulin. Insulin is a hormone that helps the body's cells use sugar (glucose) for energy. It also helps the body store extra sugar in muscle, fat, and liver cells. Without insulin, the sugar cannot get into the cells to do its work. It stays in the blood instead. This can cause high blood sugar levels. A person has diabetes when the blood sugar stays too high too much of the time. Over time, diabetes can lead to diseases of the heart, blood vessels, nerves, kidneys, and eyes. You may be able to control your blood sugar by losing weight, eating a healthy diet, and getting daily exercise. You may also have to take insulin or other diabetes medicine. Follow-up care is a key part of your treatment and safety. Be sure to make and go to all appointments. Call your doctor if you are having problems. It's also a good idea to know your test results and keep a list of the medicines you take. How can you care for yourself at home? · Keep your blood sugar at a target level (which you set with your doctor). ? Carbohydratethe body's main source of fuelaffects blood sugar more than any other nutrient. Carbohydrate is in fruits, vegetables, milk, and yogurt. It also is in breads, cereals, vegetables such as potatoes and corn, and sugary foods such as candy and cakes. Follow your meal plan to know how much carbohydrate to eat at each meal and snack. ? Aim for 30 minutes of exercise on most, preferably all, days of the week.  Walking is a good choice. You also may want to do other activities, such as running, swimming, cycling, or playing tennis or team sports. Try to do muscle-strengthening exercises at least 2 times a week. ? Take your medicines exactly as prescribed. Call your doctor if you think you are having a problem with your medicine. You will get more details on the specific medicines your doctor prescribes. · Check your blood sugar as often as your doctor recommends. It is important to keep track of any symptoms you have, such as low blood sugar. Also tell your doctor if you have any changes in your activities, diet, or insulin use. · Talk to your doctor before you start taking aspirin every day. Aspirin can help certain people lower their risk of a heart attack or stroke. But taking aspirin isn't right for everyone, because it can cause serious bleeding. · Do not smoke. If you need help quitting, talk to your doctor about stop-smoking programs and medicines. These can increase your chances of quitting for good. · Keep your cholesterol and blood pressure at normal levels. You may need to take one or more medicines to reach your goals. Take them exactly as directed. Do not stop or change a medicine without talking to your doctor first. 
When should you call for help? Call 911 anytime you think you may need emergency care. For example, call if: 
  · You passed out (lost consciousness), or you suddenly become very sleepy or confused. (You may have very low blood sugar.) Call your doctor now or seek immediate medical care if: 
  · Your blood sugar is 300 mg/dL or is higher than the level your doctor has set for you.  
  · You have symptoms of low blood sugar, such as: ? Sweating. ? Feeling nervous, shaky, and weak. ? Extreme hunger and slight nausea. ? Dizziness and headache. 
? Blurred vision. ? Confusion.   
Watch closely for changes in your health, and be sure to contact your doctor if: 
  · You often have problems controlling your blood sugar.  
  · You have symptoms of long-term diabetes problems, such as: ? New vision changes. ? New pain, numbness, or tingling in your hands or feet. ? Skin problems. Where can you learn more? Go to http://www.gray.com/ Enter C553 in the search box to learn more about \"Type 2 Diabetes: Care Instructions. \" Current as of: August 31, 2020               Content Version: 12.8 © 2006-2021 [employer]. Care instructions adapted under license by [employer] (which disclaims liability or warranty for this information). If you have questions about a medical condition or this instruction, always ask your healthcare professional. [address] any warranty or liability for your use of this information.

## 2021-06-15 NOTE — ACP (ADVANCE CARE PLANNING)
Advance Care Planning     Advance Care Planning (ACP) Physician/NP/PA Conversation      Date of Conversation: 6/15/2021  Conducted with: Patient with Decision Making Capacity    Healthcare Decision Maker:     Primary Decision Maker: Carlos A Grider - [phone]  Click here to complete [address] including selection of the Healthcare Decision Maker Relationship (ie \"Primary\")  Today we referred to ACP Clinical Specialist for assistance. Care Preferences:    Hospitalization: \"If your health worsens and it becomes clear that your chance of recovery is unlikely, what would be your preference regarding hospitalization? \"  The patient would prefer hospitalization. Ventilation: \"If you were unable to breathe on your own and your chance of recovery was unlikely, what would be your preference about the use of a ventilator (breathing machine) if it was available to you? \"   The patient would desire the use of a ventilator. Resuscitation: \"In the event your heart stopped as a result of an underlying serious health condition, would you want attempts to be made to restart your heart, or would you prefer a natural death? \"   Yes, attempt to resuscitate.     Additional topics discussed: treatment goals, benefit/burden of treatment options, artificial nutrition, ventilation preferences, hospitalization preferences, resuscitation preferences and end of life care preferences (vegetative state/imminent death)    Conversation Outcomes / Follow-Up Plan:   ACP incomplete - refer to ACP Clinical Specialist  Reviewed DNR/DNI and patient elects Full Code (Attempt Resuscitation)     Length of Voluntary ACP Conversation in minutes:  <16 minutes (Non-Billable)    Rios Zepeda MD

## 2021-06-15 NOTE — PROGRESS NOTES
Chief Complaint   Patient presents with    Hypertension    CHF    Diabetes    COPD    Labs    Medication Refill    Hip Pain     States has pain that starts in left buttock are and goes around to front of pelvic area for approximately 3 months.  Anemia     Patient states Kidney doctor wants to know why she is anemic. 1. Have you been to the ER, urgent care clinic since your last visit? Hospitalized since your last visit? No    2. Have you seen or consulted any other health care providers outside of the [address] [place] [address] since your last visit? Include any pap smears or colon screening. No      This is the Subsequent Medicare Annual Wellness Exam, performed 12 months or more after the Initial AWV or the last Subsequent AWV    I have reviewed the patient's medical history in detail and updated the computerized patient record. Assessment/Plan   Education and counseling provided:  Are appropriate based on today's review and evaluation  End-of-Life planning (with patient's consent)    1. Encounter for annual wellness visit (AWV) in Medicare patient  -     REFERRAL TO ACP CLINICAL SPECIALIST; Future  2. Type 2 diabetes mellitus without complication, without long-term current use of insulin (HCC)  -     AMB POC HEMOGLOBIN A1C  -     insulin NPH/insulin regular (NovoLIN 70/30 U-100 Insulin) 100 unit/mL (70-30) injection; 20 Units by SubCUTAneous route as needed (Elevated sugar. ). Indications: type 2 diabetes mellitus, Normal, Disp-10 mL, R-1  -     MICROALBUMIN, UR, RAND W/ MICROALB/CREAT RATIO  -     HEMOGLOBIN A1C WITH EAG  3. Chronic obstructive pulmonary disease, unspecified COPD type ([facility]  Assessment & Plan:   borderline controlled, continue current medications, medication adherence emphasized, lifestyle modifications recommended  4.  Mixed hyperlipidemia due to type 2 diabetes mellitus ([facility]  Assessment & Plan:   at goal, continue current medications, medication adherence emphasized, lifestyle modifications recommended  Orders:  -     atorvastatin (LIPITOR) 10 mg tablet; Take 1 tablet by mouth once daily, Normal, Disp-90 Tablet, R-1  5. Morbid obesity ([address]  Assessment & Plan:   uncontrolled, continue current medications, lifestyle modifications recommended  6. Essential hypertension  -     CBC WITH AUTOMATED DIFF  -     METABOLIC PANEL, COMPREHENSIVE  -     URINALYSIS W/ RFLX MICROSCOPIC  -     lisinopriL (PRINIVIL, ZESTRIL) 10 mg tablet; Take 1 tablet by mouth once daily, Normal, Disp-90 Tablet, R-1  7. Gastroesophageal reflux disease without esophagitis  8. Obstructive sleep apnea syndrome  9. Osteoarthritis, unspecified osteoarthritis type, unspecified site  10. Stage 3a chronic kidney disease (HCC)  -     magnesium oxide (MAG-OX) 400 mg tablet; Take 1 tablet by mouth once daily, Normal, Disp-90 Tablet, R-1  -     MAGNESIUM  11. Mixed hyperlipidemia  -     LIPID PANEL  12. Encounter for hepatitis C screening test for low risk patient  -     HEPATITIS C AB  13. Anemia, unspecified type  -     IRON PROFILE  -     CBC WITH AUTOMATED DIFF  -     VITAMIN B12  14. Other general symptoms and signs   -     VITAMIN B12  15. Acute hip pain, left  -     XR HIP LT W OR WO PELV 2-3 VWS; Future  16. Lumbar radiculopathy  -     XR SPINE LUMB 2 OR 3 V; Future  17. Type 2 diabetes mellitus with other specified complication, without long-term current use of insulin (HCC)  -     pioglitazone (ACTOS) 15 mg tablet; Take 1 tablet by mouth once daily, Normal, Disp-90 Tablet, R-1  -     glimepiride (AMARYL) 4 mg tablet; Take 2 tablets by mouth once daily, Normal, Disp-180 Tablet, R-1  18. Acute gout of multiple sites, unspecified cause  -     allopurinoL (ZYLOPRIM) 300 mg tablet;  Take 1 tablet by mouth once daily, Normal, Disp-90 Tablet, R-1       Depression Risk Factor Screening     3 most recent PHQ Screens 6/15/2021   Little interest or pleasure in doing things Not at all   Feeling down, depressed, irritable, or hopeless Not at all   Total Score PHQ 2 0       Alcohol Risk Screen    Do you average more than 1 drink per night or more than 7 drinks a week:  No    On any one occasion in the past three months have you have had more than 3 drinks containing alcohol:  No        Functional Ability and Level of Safety    Hearing: Hearing is good. Activities of Daily Living: The home contains: handrails and grab bars  Patient does total self care      Ambulation: with difficulty, uses a cane     Fall Risk:  No flowsheet data found.    Abuse Screen:  Patient is not abused       Cognitive Screening    Has your family/caregiver stated any concerns about your memory: no         Health Maintenance Due     Health Maintenance Due   Topic Date Due    Foot Exam Q1  Never done    Eye Exam Retinal or Dilated  Never done    DTaP/Tdap/Td series (1 - Tdap) Never done    Shingrix Vaccine Age 50> (1 of 2) Never done    Pneumococcal 0-64 years (1 of 2 - PPSV23) 05/28/2018       Patient Care Team   Patient Care Team:  Gamaliel Mayorga MD as PCP - General (Family Medicine)  Gamaliel Mayorga MD as PCP - [address] [place] [address] Provider  Rachna Barrios MD as Consulting Provider (Pulmonary Disease)    History     Patient Active Problem List   Diagnosis Code    Asthma J45.909    Chronic obstructive pulmonary disease ([address] J44.9    Diabetes mellitus ([address] E11.9    Diverticulitis K57.92    Essential hypertension I10    GERD (gastroesophageal reflux disease) K21.9    Gout M10.9    Hyperkalemia E87.5    Mixed hyperlipidemia due to type 2 diabetes mellitus ([address] E11.69, E78.2    Osteoarthritis H71.41    Helicobacter pylori gastritis K29.70, B96.81    Hyperlipidemia E78.5    Varicose veins of lower extremities with inflammation I83.10    Obstructive sleep apnea syndrome G47.33    Body mass index (BMI)40.0-44.9, adult (HCC) Z68.41    Iron deficiency anemia D50.9    Stage 3a chronic kidney disease ([address] N18.31     Past Medical History: Diagnosis Date    Arthritis     Asthma     Chronic kidney disease     Chronic obstructive pulmonary disease (HCC)     Diabetes [facility]     GERD (gastroesophageal reflux disease)     High cholesterol     Hypertension     Menopause     Morbid obesity (HCC)     Vertigo       Past Surgical History:   Procedure Laterality Date    HX BREAST BIOPSY Left 2019    HX CHOLECYSTECTOMY      HX ENDOSCOPY  01/01/2019    HX HYSTERECTOMY      HX HYSTERECTOMY      HX OOPHORECTOMY      HX ROTATOR CUFF REPAIR Left     by suture    HX TUBAL LIGATION Bilateral      Current Outpatient Medications   Medication Sig Dispense Refill    magnesium oxide (MAG-OX) 400 mg tablet Take 1 tablet by mouth once daily 90 Tablet 1    insulin NPH/insulin regular (NovoLIN 70/30 U-100 Insulin) 100 unit/mL (70-30) injection 20 Units by SubCUTAneous route as needed (Elevated sugar. ). Indications: type 2 diabetes mellitus 10 mL 1    pioglitazone (ACTOS) 15 mg tablet Take 1 tablet by mouth once daily 90 Tablet 1    atorvastatin (LIPITOR) 10 mg tablet Take 1 tablet by mouth once daily 90 Tablet 1    glimepiride (AMARYL) 4 mg tablet Take 2 tablets by mouth once daily 180 Tablet 1    lisinopriL (PRINIVIL, ZESTRIL) 10 mg tablet Take 1 tablet by mouth once daily 90 Tablet 1    allopurinoL (ZYLOPRIM) 300 mg tablet Take 1 tablet by mouth once daily 90 Tablet 1    meclizine (ANTIVERT) 25 mg tablet TAKE 1 TABLET BY MOUTH EVERY 8 HOURS AS NEEDED 30 Tablet 1    Lancing Device with Lancets (Accu-Chek FastClix Lancing Dev) kit 100 Lancet by Does Not Apply route daily. 1 Kit 0    furosemide (LASIX) 40 mg tablet TAKE 1 TABLET BY MOUTH ON MONDAY, WEDNESDAY AND FRIDAY 36 Tab 1    glucose blood VI test strips (Accu-Chek Cherie Plus test strp) strip by Does Not Apply route See Admin Instructions. 100 Strip 1    Blood-Glucose Meter misc 1 m by Does Not Apply route daily.  1 Each 0    albuterol (PROVENTIL HFA, VENTOLIN HFA, PROAIR HFA) 90 mcg/actuation inhaler Take  by inhalation.  alcohol swabs (BD Single Use Swabs Regular) padm by Apply Externally route.  Insulin Syringe-Needle U-100 (BD Insulin Syringe Ultra-Fine) 0.3 mL 31 gauge x 5/16\" syrg by Does Not Apply route.  albuterol-ipratropium (DUO-NEB) 2.5 mg-0.5 mg/3 ml nebu 3 mL by Nebulization route.  ferrous sulfate (Iron) 325 mg (65 mg iron) tablet Take 325 mg by mouth every Monday, Wednesday, Friday.  multivitamin (ONE A DAY) tablet Take 1 Tab by mouth daily.  aspirin 81 mg chewable tablet Take 81 mg by mouth daily.  cholecalciferol (Vitamin D3) (1000 Units /25 mcg) tablet Take 1,000 Units by mouth daily.  acetaminophen (Tylenol Arthritis Pain) 650 mg TbER Take 650 mg by mouth every eight (8) hours. Allergies   Allergen Reactions    Celebrex [Celecoxib] Rash    Cleocin [Clindamycin Phosphate] Palpitations    Mushroom Swelling    Strawberry Rash    Ultracet [Tramadol-Acetaminophen] Rash    Zithromax [Azithromycin] Rash       Family History   Problem Relation Age of Onset    Heart Disease Mother     Lung Disease Mother     Lung Disease Father      Social History     Tobacco Use    Smoking status: Never Smoker    Smokeless tobacco: Never Used   Substance Use Topics    Alcohol use: Never     Harper Orlando (: 1960) is a 63 y.o. female, established patient, here for evaluation of the following chief complaint(s):  Hypertension, CHF, Diabetes, COPD, Labs, Medication Refill, Hip Pain (States has pain that starts in left buttock are and goes around to front of pelvic area for approximately 3 months.), and Anemia (Patient states Kidney doctor wants to know why she is anemic.)       ASSESSMENT/PLAN:  Below is the assessment and plan developed based on review of pertinent history, physical exam, labs, studies, and medications. 1. Encounter for annual wellness visit (AWV) in Medicare patient  -     [facility]; Future  2.  Type 2 diabetes mellitus without complication, without long-term current use of insulin (HCC)  -     AMB POC HEMOGLOBIN A1C  -     insulin NPH/insulin regular (NovoLIN 70/30 U-100 Insulin) 100 unit/mL (70-30) injection; 20 Units by SubCUTAneous route as needed (Elevated sugar. ). Indications: type 2 diabetes mellitus, Normal, Disp-10 mL, R-1  -     MICROALBUMIN, UR, RAND W/ MICROALB/CREAT RATIO  -     HEMOGLOBIN A1C WITH EAG  3. Chronic obstructive pulmonary disease, unspecified COPD type ([facility]  Assessment & Plan:   borderline controlled, continue current medications, medication adherence emphasized, lifestyle modifications recommended  4. Mixed hyperlipidemia due to type 2 diabetes mellitus ([facility]  Assessment & Plan:   at goal, continue current medications, medication adherence emphasized, lifestyle modifications recommended  Orders:  -     atorvastatin (LIPITOR) 10 mg tablet; Take 1 tablet by mouth once daily, Normal, Disp-90 Tablet, R-1  5. Morbid obesity ([facility]  Assessment & Plan:   uncontrolled, continue current medications, lifestyle modifications recommended  6. Essential hypertension  -     CBC WITH AUTOMATED DIFF  -     METABOLIC PANEL, COMPREHENSIVE  -     URINALYSIS W/ RFLX MICROSCOPIC  -     lisinopriL (PRINIVIL, ZESTRIL) 10 mg tablet; Take 1 tablet by mouth once daily, Normal, Disp-90 Tablet, R-1  7. Gastroesophageal reflux disease without esophagitis  8. Obstructive sleep apnea syndrome  9. Osteoarthritis, unspecified osteoarthritis type, unspecified site  10. Stage 3a chronic kidney disease (HCC)  -     magnesium oxide (MAG-OX) 400 mg tablet; Take 1 tablet by mouth once daily, Normal, Disp-90 Tablet, R-1  -     MAGNESIUM  11. Mixed hyperlipidemia  -     LIPID PANEL  12. Encounter for hepatitis C screening test for low risk patient  -     HEPATITIS C AB  13. Anemia, unspecified type  -     IRON PROFILE  -     CBC WITH AUTOMATED DIFF  -     VITAMIN B12  14.  Other general symptoms and signs   -     VITAMIN B12  15. Acute hip pain, left  -     XR HIP LT W OR WO PELV 2-3 VWS; Future  16. Lumbar radiculopathy  -     XR SPINE LUMB 2 OR 3 V; Future  17. Type 2 diabetes mellitus with other specified complication, without long-term current use of insulin (HCC)  -     pioglitazone (ACTOS) 15 mg tablet; Take 1 tablet by mouth once daily, Normal, Disp-90 Tablet, R-1  -     glimepiride (AMARYL) 4 mg tablet; Take 2 tablets by mouth once daily, Normal, Disp-180 Tablet, R-1  18. Acute gout of multiple sites, unspecified cause  -     allopurinoL (ZYLOPRIM) 300 mg tablet; Take 1 tablet by mouth once daily, Normal, Disp-90 Tablet, R-1      Return in about 6 months (around 12/15/2021) for Follow up of chronic medical conditions, Fasting Lab Appointment. SUBJECTIVE/OBJECTIVE:  This patient comes in for an annual Medicare Wellness Exam and follow up of her Morbid obesity, hyperlipidemia, sleep apnea, Asthma, CKD, Anemia, GERD, and arthritis. She has had a pain in her lower back radiating to the pelvic area for about 3 months. Unfortunately she continues to gain weight and her insurance did not cover bariatric surgery which she was hopeful of would help her. She is eating more food during the pandemic and knows she needs to make changes. She does see an endocrinologist and a kidney doctor. Review of Systems   Constitutional: Negative. Respiratory: Negative. Cardiovascular: Negative. Gastrointestinal: Negative. Endocrine: Negative. Genitourinary: Negative. Musculoskeletal: Negative. Allergic/Immunologic: Negative. Neurological: Negative. Hematological: Negative. Psychiatric/Behavioral: Negative. Physical Exam  Vitals and nursing note reviewed. Constitutional:       Appearance: Normal appearance. She is well-developed and well-groomed. She is morbidly obese. HENT:      Head: Normocephalic and atraumatic. Cardiovascular:      Rate and Rhythm: Normal rate and regular rhythm.       Heart sounds: Normal heart sounds. Pulmonary:      Effort: Pulmonary effort is normal.      Breath sounds: Normal breath sounds. Abdominal:      General: Abdomen is flat. Bowel sounds are normal.      Palpations: Abdomen is soft. Musculoskeletal:         General: Normal range of motion. Neurological:      General: No focal deficit present. Mental Status: She is alert. Psychiatric:         Mood and Affect: Mood normal.         Behavior: Behavior normal. Behavior is cooperative. Thought Content: Thought content normal.         Judgment: Judgment normal.       Diabetes mellitus: We will obtain lab work and send this to her endocrinologist.  She will continue her current medications for now  Kidney disease: We will check her labs and make sure she everything is stable  Morbid obesity: This is become significantly abnormal.  She does not seem to be able to control her appetite and when we work with her for the bariatric surgery did not seem motivated to change. We discussed this at length and the need to trying to change her eating attitude. Back pain: This is likely related to her weight. We will obtain an x-ray of the back and the hip area to see if she has significant disease at this point but this pain will likely worsen unless she can get some weight off  Further recommendations once we get the lab work back and see how she is doing. An electronic signature was used to authenticate this note.   -- MD Mihir Amos MD

## 2021-06-15 NOTE — ASSESSMENT & PLAN NOTE
at goal, continue current medications, medication adherence emphasized, lifestyle modifications recommended

## 2021-06-16 LAB
ALBUMIN SERPL-MCNC: 4.1 G/DL (ref 3.8–4.8)
ALBUMIN/CREAT UR: <3 MG/G CREAT (ref 0–29)
ALBUMIN/GLOB SERPL: 1.5 {RATIO} (ref 1.2–2.2)
ALP SERPL-CCNC: 105 IU/L (ref 48–121)
ALT SERPL-CCNC: 15 IU/L (ref 0–32)
APPEARANCE UR: CLEAR
AST SERPL-CCNC: 18 IU/L (ref 0–40)
BASOPHILS # BLD AUTO: 0 X10E3/UL (ref 0–0.2)
BASOPHILS NFR BLD AUTO: 1 %
BILIRUB SERPL-MCNC: 0.5 MG/DL (ref 0–1.2)
BILIRUB UR QL STRIP: NEGATIVE
BUN SERPL-MCNC: 22 MG/DL (ref 8–27)
BUN/CREAT SERPL: 16 (ref 12–28)
CALCIUM SERPL-MCNC: 9.8 MG/DL (ref 8.7–10.3)
CHLORIDE SERPL-SCNC: 108 MMOL/L (ref 96–106)
CHOLEST SERPL-MCNC: 157 MG/DL (ref 100–199)
CO2 SERPL-SCNC: 23 MMOL/L (ref 20–29)
COLOR UR: YELLOW
CREAT SERPL-MCNC: 1.37 MG/DL (ref 0.57–1)
CREAT UR-MCNC: 86.4 MG/DL
EOSINOPHIL # BLD AUTO: 0.2 X10E3/UL (ref 0–0.4)
EOSINOPHIL NFR BLD AUTO: 5 %
ERYTHROCYTE [DISTWIDTH] IN BLOOD BY AUTOMATED COUNT: 14.3 % (ref 11.7–15.4)
EST. AVERAGE GLUCOSE BLD GHB EST-MCNC: 148 MG/DL
GLOBULIN SER CALC-MCNC: 2.8 G/DL (ref 1.5–4.5)
GLUCOSE SERPL-MCNC: 93 MG/DL (ref 65–99)
GLUCOSE UR QL: NEGATIVE
HBA1C MFR BLD: 6.8 % (ref 4.8–5.6)
HCT VFR BLD AUTO: 33.5 % (ref 34–46.6)
HCV AB S/CO SERPL IA: <0.1 S/CO RATIO (ref 0–0.9)
HDLC SERPL-MCNC: 57 MG/DL
HGB BLD-MCNC: 11.2 G/DL (ref 11.1–15.9)
HGB UR QL STRIP: NEGATIVE
IMM GRANULOCYTES # BLD AUTO: 0 X10E3/UL (ref 0–0.1)
IMM GRANULOCYTES NFR BLD AUTO: 0 %
IRON SATN MFR SERPL: 22 % (ref 15–55)
IRON SERPL-MCNC: 64 UG/DL (ref 27–139)
KETONES UR QL STRIP: NEGATIVE
LDLC SERPL CALC-MCNC: 81 MG/DL (ref 0–99)
LEUKOCYTE ESTERASE UR QL STRIP: NEGATIVE
LYMPHOCYTES # BLD AUTO: 0.9 X10E3/UL (ref 0.7–3.1)
LYMPHOCYTES NFR BLD AUTO: 34 %
MAGNESIUM SERPL-MCNC: 2 MG/DL (ref 1.6–2.3)
MCH RBC QN AUTO: 30.4 PG (ref 26.6–33)
MCHC RBC AUTO-ENTMCNC: 33.4 G/DL (ref 31.5–35.7)
MCV RBC AUTO: 91 FL (ref 79–97)
MICRO URNS: NORMAL
MICROALBUMIN UR-MCNC: <3 UG/ML
MONOCYTES # BLD AUTO: 0.3 X10E3/UL (ref 0.1–0.9)
MONOCYTES NFR BLD AUTO: 9 %
NEUTROPHILS # BLD AUTO: 1.4 X10E3/UL (ref 1.4–7)
NEUTROPHILS NFR BLD AUTO: 51 %
NITRITE UR QL STRIP: NEGATIVE
PH UR STRIP: 7.5 [PH] (ref 5–7.5)
PLATELET # BLD AUTO: 218 X10E3/UL (ref 150–450)
POTASSIUM SERPL-SCNC: 4.8 MMOL/L (ref 3.5–5.2)
PROT SERPL-MCNC: 6.9 G/DL (ref 6–8.5)
PROT UR QL STRIP: NEGATIVE
RBC # BLD AUTO: 3.68 X10E6/UL (ref 3.77–5.28)
SODIUM SERPL-SCNC: 144 MMOL/L (ref 134–144)
SP GR UR: 1.02 (ref 1–1.03)
TIBC SERPL-MCNC: 285 UG/DL (ref 250–450)
TRIGL SERPL-MCNC: 106 MG/DL (ref 0–149)
UIBC SERPL-MCNC: 221 UG/DL (ref 118–369)
UROBILINOGEN UR STRIP-MCNC: 0.2 MG/DL (ref 0.2–1)
VIT B12 SERPL-MCNC: 813 PG/ML (ref 232–1245)
VLDLC SERPL CALC-MCNC: 19 MG/DL (ref 5–40)
WBC # BLD AUTO: 2.8 X10E3/UL (ref 3.4–10.8)

## 2021-06-17 ENCOUNTER — PATIENT OUTREACH (OUTPATIENT)
Dept: CASE MANAGEMENT | Age: 61
End: 2021-06-17

## 2021-06-17 NOTE — ACP (ADVANCE CARE PLANNING)
Advance Care Planning   Ambulatory ACP Specialist Patient Outreach    Date:  6/17/2021    ACP Specialist:  Evi Frias LPN    Outreach call to patient in follow-up to ACP Specialist referral from:    [x] PCP  [] Provider   [] Ambulatory Care Management [] Other     For:                  [x] Continued Conversation for ACP decision making / Goals of Care             [] Code Status Discussion             [] Completion of Adv Directive             [] Completion of Portable DNR order             [] Other (Specify)    Date Referral Received:6/15/21    Today's Outreach:  [x] First   [] Second  [] Third                                           Third outreach made by []  phone  [] email []   MyChart     Intervention:  [x] Spoke with Patient   [] Left VM requesting return call      Outcome: Spoke with pt regarding ACP. Pt is interested in completing AMD. Documents will be e-mailed to pt for review prior to appt with specialist on 7/2/21 at 2pm.    Next Step:   [x] ACP scheduled conversation  [] Outreach again in one week               [x] Email / Mail [address]  [] Email / Mail Advance Directive   []  Closing referral.  Routing closure to referring provider/staff and to ACP Specialist .      Thank you for this referral.

## 2021-06-20 PROBLEM — I50.9 CONGESTIVE HEART FAILURE (HCC): Status: RESOLVED | Noted: 2020-11-11 | Resolved: 2021-06-20

## 2021-06-21 ENCOUNTER — TELEPHONE (OUTPATIENT)
Dept: PRIMARY CARE CLINIC | Age: 61
End: 2021-06-21

## 2021-06-21 NOTE — TELEPHONE ENCOUNTER
Patient called back again following up on x-ray results. States she is in a lot of pain. Patient following up on x-ray results and labs.

## 2021-06-21 NOTE — TELEPHONE ENCOUNTER
Nicholas Chin wants to know specifically how you want the patient to take the Novolin Insulin such as how often is as needed,  is it to be on a sliding scale, etc?  Thanks.

## 2021-06-24 NOTE — TELEPHONE ENCOUNTER
Check with the patient to see how she is currently doing this.  Check to see if she sees an endocrinologist.

## 2021-06-28 NOTE — PROGRESS NOTES
Informed patient of xray results of Left hip and recommendations per Dr. Sallie Espitia. Patient stated understanding.

## 2021-06-28 NOTE — TELEPHONE ENCOUNTER
Patient informed of all xray results and recommendations per Dr. Katie Rhodes and stated understanding.

## 2021-06-28 NOTE — PROGRESS NOTES
Informed patient of results of spine xray and recommendations per Dr. Tank Stewart. Patient stated understanding.

## 2021-06-29 ENCOUNTER — TELEPHONE (OUTPATIENT)
Dept: PRIMARY CARE CLINIC | Age: 61
End: 2021-06-29

## 2021-06-29 NOTE — TELEPHONE ENCOUNTER
Spoke with patient. She stated she checks her sugar QID and gives herself 20 units if needed of the Novolin 70/30 insulin.   She does not see an Endocrinologist.

## 2021-06-29 NOTE — TELEPHONE ENCOUNTER
Unable to reach patient. LVM for patient to call office re: amount of insulin she uses and how often.

## 2021-06-30 NOTE — TELEPHONE ENCOUNTER
How does she decide when to take the insulin. This is not how this insulin should be given as it is a daily medicine not an as needed medicine if she is going to use an as needed medicine she should have a regular insulin without the extended release insulin. I recommend she check her sugars 4 times a day prior to each meal and then in the evening for the next few days and let me know what the readings are.   I can then suggest whether to continue this medication but on a daily dose or change to a different type of insulin I also feel she should see an endocrinologist as I think she is not understanding how to use insulin properly

## 2021-07-01 ENCOUNTER — PATIENT OUTREACH (OUTPATIENT)
Dept: CASE MANAGEMENT | Age: 61
End: 2021-07-01

## 2021-07-01 DIAGNOSIS — E11.9 TYPE 2 DIABETES MELLITUS WITHOUT COMPLICATION, WITHOUT LONG-TERM CURRENT USE OF INSULIN (HCC): Primary | ICD-10-CM

## 2021-07-01 DIAGNOSIS — D64.9 ANEMIA, UNSPECIFIED TYPE: ICD-10-CM

## 2021-07-01 NOTE — TELEPHONE ENCOUNTER
Spoke with patient re: to check her blood sugar four times a day (before each meal and in the evening) and to let us know the readings she gets for the next few days. I explained to her that the Novolin Insulin is not to be used PRN. It is to be used on a daily basis. Once Dr. Nic Gallagher receives the readings, he will let her know what to do next. She also agreed to see Dr. Hussein Montero here in [facility]. She asked aboout her lab results because she stated her kidney doctor wanted to know why she was anemic.

## 2021-07-01 NOTE — TELEPHONE ENCOUNTER
Her blood count is just slightly below normal and was normal in February. May be lack of vitamins in her diet. Her white count also was borderline low. Recommend repeat CBC with iron tests in 1 month and if still low will send her to a blood specialist to figure this out. She should be taking a multivitamin tablet daily. Will put in a referral to Dr. Sheri Sim.

## 2021-07-01 NOTE — TELEPHONE ENCOUNTER
Informed patient of lab results and recommendations per Dr. Tank Stewart. Patient stated she takes a MVI every day. She has an appointment with Dr. Chito Linares 09/2021. She will schedule a one month lab appointment.

## 2021-07-01 NOTE — ACP (ADVANCE CARE PLANNING)
Reached out to pt to remind her of appointment with ACP specialist on 7/2/21 at 2pm. Pt states she remembers appt and wishes to move forward.

## 2021-07-02 ENCOUNTER — DOCUMENTATION ONLY (OUTPATIENT)
Dept: CASE MANAGEMENT | Age: 61
End: 2021-07-02

## 2021-07-09 NOTE — PROGRESS NOTES
Informed patient of recommendations per Dr. Nic Gallagher. Patient stated she does not have an OB-GYN so you can make her a referral to see Dr. Troy Speak. She also stated she will not take any of the 70/30 insulin until she hears back from us.

## 2021-07-09 NOTE — PROGRESS NOTES
Informed patient of lab results and recommendations per Dr. Brent Rodrigez. Results sent to Dr. Celsa Trevino. After speaking with patient, she stated she has been having some vaginal bleeding (small amount) randomly since December of 2020, sometimes one day or at the most 2 days. Not enough to wear anything. She wants to know if she needs to keep taking the Ferrous Sulfate? For fasting OV in 6 months. You can review her fingerstick readings from home. They are in her chart under Media with the date 07/09/2021.

## 2021-07-12 DIAGNOSIS — N95.0 POSTMENOPAUSAL BLEEDING: Primary | ICD-10-CM

## 2021-07-12 NOTE — PROGRESS NOTES
Informed patient of recommendations per Dr. Sallie Espitia. Patient stated understanding. She stated she was on Metformin before but it was damaging her kidneys and had to come off of it  Informed of GYN referral that is going to be made.

## 2021-08-03 LAB
BASOPHILS # BLD AUTO: 0 X10E3/UL (ref 0–0.2)
BASOPHILS NFR BLD AUTO: 1 %
EOSINOPHIL # BLD AUTO: 0.2 X10E3/UL (ref 0–0.4)
EOSINOPHIL NFR BLD AUTO: 7 %
ERYTHROCYTE [DISTWIDTH] IN BLOOD BY AUTOMATED COUNT: 14.1 % (ref 11.7–15.4)
HCT VFR BLD AUTO: 33.4 % (ref 34–46.6)
HGB BLD-MCNC: 10.6 G/DL (ref 11.1–15.9)
IMM GRANULOCYTES # BLD AUTO: 0 X10E3/UL (ref 0–0.1)
IMM GRANULOCYTES NFR BLD AUTO: 0 %
IRON SATN MFR SERPL: 26 % (ref 15–55)
IRON SERPL-MCNC: 67 UG/DL (ref 27–139)
LYMPHOCYTES # BLD AUTO: 1 X10E3/UL (ref 0.7–3.1)
LYMPHOCYTES NFR BLD AUTO: 34 %
MCH RBC QN AUTO: 29.6 PG (ref 26.6–33)
MCHC RBC AUTO-ENTMCNC: 31.7 G/DL (ref 31.5–35.7)
MCV RBC AUTO: 93 FL (ref 79–97)
MONOCYTES # BLD AUTO: 0.3 X10E3/UL (ref 0.1–0.9)
MONOCYTES NFR BLD AUTO: 10 %
NEUTROPHILS # BLD AUTO: 1.4 X10E3/UL (ref 1.4–7)
NEUTROPHILS NFR BLD AUTO: 48 %
PLATELET # BLD AUTO: 212 X10E3/UL (ref 150–450)
RBC # BLD AUTO: 3.58 X10E6/UL (ref 3.77–5.28)
TIBC SERPL-MCNC: 258 UG/DL (ref 250–450)
UIBC SERPL-MCNC: 191 UG/DL (ref 118–369)
WBC # BLD AUTO: 2.9 X10E3/UL (ref 3.4–10.8)

## 2021-08-04 ENCOUNTER — HOSPITAL ENCOUNTER (EMERGENCY)
Age: 61
Discharge: HOME | End: 2021-08-04
Attending: EMERGENCY MEDICINE
Payer: MEDICARE

## 2021-08-04 VITALS
WEIGHT: 293 LBS | SYSTOLIC BLOOD PRESSURE: 128 MMHG | HEIGHT: 66 IN | HEART RATE: 62 BPM | RESPIRATION RATE: 16 BRPM | TEMPERATURE: 98.8 F | DIASTOLIC BLOOD PRESSURE: 80 MMHG | OXYGEN SATURATION: 100 % | BODY MASS INDEX: 47.09 KG/M2

## 2021-08-04 DIAGNOSIS — N39.0 URINARY TRACT INFECTION WITHOUT HEMATURIA, SITE UNSPECIFIED: Primary | ICD-10-CM

## 2021-08-04 LAB
APPEARANCE UR: CLEAR
BACTERIA URNS QL MICRO: ABNORMAL /HPF
BILIRUB UR QL: NEGATIVE
COLOR UR: ABNORMAL
GLUCOSE UR STRIP.AUTO-MCNC: NEGATIVE MG/DL
HGB UR QL STRIP: NEGATIVE
KETONES UR QL STRIP.AUTO: NEGATIVE MG/DL
LEUKOCYTE ESTERASE UR QL STRIP.AUTO: ABNORMAL
NITRITE UR QL STRIP.AUTO: NEGATIVE
PH UR STRIP: 6.5 [PH] (ref 5–8)
PROT UR STRIP-MCNC: NEGATIVE MG/DL
RBC #/AREA URNS HPF: ABNORMAL /HPF (ref 0–3)
SP GR UR REFRACTOMETRY: 1.01 (ref 1–1.03)
UROBILINOGEN UR QL STRIP.AUTO: 0.2 EU/DL (ref 0.2–1)
WBC URNS QL MICRO: ABNORMAL /HPF (ref 0–5)

## 2021-08-04 PROCEDURE — 99283 EMERGENCY DEPT VISIT LOW MDM: CPT

## 2021-08-04 PROCEDURE — 81001 URINALYSIS AUTO W/SCOPE: CPT

## 2021-08-04 RX ORDER — SULFAMETHOXAZOLE AND TRIMETHOPRIM 800; 160 MG/1; MG/1
1 TABLET ORAL 2 TIMES DAILY
Qty: 6 TABLET | Refills: 0 | Status: SHIPPED | OUTPATIENT
Start: 2021-08-04 | End: 2021-08-07

## 2021-08-04 NOTE — ED NOTES
RN at bedside, patient assessment complete. Patient ambulated to bathroom for urine sample, patient instructed on proper sample technique, patient verbalized understanding. Patient reports frequent urination, denies fever, reports chills, tender to palpation to right flank. Denies burning with urination.

## 2021-08-04 NOTE — ED TRIAGE NOTES
.  Chief Complaint   Patient presents with   Logan Maribel     pt presents with chills that have been intermitent ongoing since Monday afternoon with left sided flank pain, pt rates pain 6/10. States that she has been taking OTC medications at home w/o relief. Pt denies hematuria or pain on voiding. Pt describes normal bowel movements. Pt ambulatory to triage w/o difficulty.  Pt VS stable    Flank Pain

## 2021-08-04 NOTE — ED PROVIDER NOTES
HPI   Patient reports that she has been experiencing \"cold chills and hot chills\" for the past 1 to 2 days. She reports that this means that at times she feels cold and other times she feels hot. She denies fever. Patient also reports intermittent pain in the left upper and lower back and urinary frequency which is unusual for her. She denies abdominal pain, hematuria, dysuria.     Past Medical History:   Diagnosis Date    Arthritis     Asthma     Chronic kidney disease     Chronic obstructive pulmonary disease (HCC)     Diabetes (HCC)     GERD (gastroesophageal reflux disease)     High cholesterol     Hypertension     Menopause     Morbid obesity (HCC)     Vertigo        Past Surgical History:   Procedure Laterality Date    HX BREAST BIOPSY Left 2019    HX CHOLECYSTECTOMY      HX ENDOSCOPY  01/01/2019    HX HYSTERECTOMY      HX HYSTERECTOMY      HX OOPHORECTOMY      HX ROTATOR CUFF REPAIR Left     by suture    HX TUBAL LIGATION Bilateral          Family History:   Problem Relation Age of Onset    Heart Disease Mother     Lung Disease Mother     Lung Disease Father        Social History     Socioeconomic History    Marital status:      Spouse name: Not on file    Number of children: Not on file    Years of education: Not on file    Highest education level: Not on file   Occupational History    Not on file   Tobacco Use    Smoking status: Never Smoker    Smokeless tobacco: Never Used   Substance and Sexual Activity    Alcohol use: Never    Drug use: Never    Sexual activity: Not on file   Other Topics Concern     Service Not Asked    Blood Transfusions Not Asked    Caffeine Concern Not Asked    Occupational Exposure Not Asked    Hobby Hazards Not Asked    Sleep Concern Not Asked    Stress Concern Not Asked    Weight Concern Not Asked    Special Diet Not Asked    Back Care Not Asked    Exercise Not Asked    Bike Helmet Not Asked   [address] Not Asked    Self-Exams Not Asked   Social History Narrative    Not on file     Social Determinants of Health     Financial Resource Strain:     Difficulty of Paying Living Expenses:    Food Insecurity:     Worried About Running Out of Food in the Last Year:     [address] in the Last Year:    Transportation Needs:     Lack of Transportation (Medical):  Lack of Transportation (Non-Medical):    Physical Activity:     Days of Exercise per Week:     Minutes of Exercise per Session:    Stress:     Feeling of Stress :    Social Connections:     Frequency of Communication with Friends and Family:     Frequency of Social Gatherings with Friends and Family:     Attends Caodaism Services:     Active Member of Clubs or Organizations:     Attends Club or Organization Meetings:     Marital Status:    Intimate Partner Violence:     Fear of Current or Ex-Partner:     Emotionally Abused:     Physically Abused:     Sexually Abused: ALLERGIES: Celebrex [celecoxib], Cleocin [clindamycin phosphate], Mushroom, Strawberry, Ultracet [tramadol-acetaminophen], and Zithromax [azithromycin]    Review of Systems   Constitutional: Positive for chills. HENT: Negative. Eyes: Negative. Respiratory: Negative. Cardiovascular: Negative. Gastrointestinal: Negative. Endocrine: Negative. Genitourinary: Positive for frequency. Musculoskeletal: Negative. Allergic/Immunologic: Negative. Neurological: Negative. Hematological: Negative. Psychiatric/Behavioral: Negative. All other systems reviewed and are negative. Vitals:    08/04/21 0859   BP: (!) 146/75   Pulse: 65   Resp: 20   Temp: 98.7 °F (37.1 °C)   SpO2: 99%   Weight: (!) 180.5 kg (398 lb)   Height: 5' 6\" (1.676 m)            Physical Exam  Vitals reviewed. Constitutional:       General: She is not in acute distress. Appearance: Normal appearance. She is obese. She is not ill-appearing, toxic-appearing or diaphoretic.    HENT: Head: Normocephalic and atraumatic. Nose: Nose normal.      Mouth/Throat:      Mouth: Mucous membranes are moist.   Eyes:      Extraocular Movements: Extraocular movements intact. Pupils: Pupils are equal, round, and reactive to light. Pulmonary:      Effort: Pulmonary effort is normal. No respiratory distress. Breath sounds: Normal breath sounds. No stridor. No wheezing, rhonchi or rales. Abdominal:      Tenderness: There is left CVA tenderness. Musculoskeletal:         General: No swelling, tenderness, deformity or signs of injury. Normal range of motion. Cervical back: Normal range of motion. No rigidity. Right lower leg: No edema. Left lower leg: No edema. Skin:     General: Skin is warm and dry. Coloration: Skin is not jaundiced or pale. Findings: No bruising, erythema, lesion or rash. Neurological:      General: No focal deficit present. Mental Status: She is alert and oriented to person, place, and time. Mental status is at baseline. Cranial Nerves: No cranial nerve deficit. Sensory: No sensory deficit. Motor: No weakness. Coordination: Coordination normal.   Psychiatric:         Mood and Affect: Mood normal.         Behavior: Behavior normal.          MDM     History and physical suggestive of uncomplicated UTI. No fever to suggest pyelonephritis or infected stone. Will check UA and okay for discharge.   Procedures

## 2021-08-04 NOTE — ED NOTES
Patient discharged to home, VSS, rx sent to pharmacy, patient teaching done and pt verbalized understanding. All questions and concerns addressed at this time. Patient ambulated to check out.

## 2021-08-28 DIAGNOSIS — I50.9 CHRONIC CONGESTIVE HEART FAILURE, UNSPECIFIED HEART FAILURE TYPE (HCC): ICD-10-CM

## 2021-08-28 RX ORDER — FUROSEMIDE 40 MG/1
TABLET ORAL
Qty: 36 TABLET | Refills: 1 | Status: SHIPPED | OUTPATIENT
Start: 2021-08-28 | End: 2022-02-17 | Stop reason: SDUPTHER

## 2021-09-13 ENCOUNTER — HOSPITAL ENCOUNTER (EMERGENCY)
Age: 61
Discharge: HOME | End: 2021-09-13
Attending: EMERGENCY MEDICINE | Admitting: EMERGENCY MEDICINE
Payer: MEDICARE

## 2021-09-13 ENCOUNTER — APPOINTMENT (OUTPATIENT)
Dept: GENERAL RADIOLOGY | Age: 61
End: 2021-09-13
Attending: EMERGENCY MEDICINE
Payer: MEDICARE

## 2021-09-13 VITALS
SYSTOLIC BLOOD PRESSURE: 111 MMHG | WEIGHT: 293 LBS | RESPIRATION RATE: 16 BRPM | OXYGEN SATURATION: 100 % | HEART RATE: 63 BPM | BODY MASS INDEX: 47.09 KG/M2 | DIASTOLIC BLOOD PRESSURE: 43 MMHG | TEMPERATURE: 98.3 F | HEIGHT: 66 IN

## 2021-09-13 DIAGNOSIS — B34.9 VIRAL ILLNESS: Primary | ICD-10-CM

## 2021-09-13 LAB
APPEARANCE UR: CLEAR
BILIRUB UR QL: NEGATIVE
COLOR UR: NORMAL
GLUCOSE UR STRIP.AUTO-MCNC: NEGATIVE MG/DL
HGB UR QL STRIP: NEGATIVE
KETONES UR QL STRIP.AUTO: NEGATIVE MG/DL
LEUKOCYTE ESTERASE UR QL STRIP.AUTO: NEGATIVE
NITRITE UR QL STRIP.AUTO: NEGATIVE
PH UR STRIP: 6 [PH] (ref 5–8)
PROT UR STRIP-MCNC: NEGATIVE MG/DL
SARS-COV-2, COV2: NORMAL
SP GR UR REFRACTOMETRY: 1.01 (ref 1–1.03)
UROBILINOGEN UR QL STRIP.AUTO: 0.2 EU/DL (ref 0.2–1)

## 2021-09-13 PROCEDURE — 71046 X-RAY EXAM CHEST 2 VIEWS: CPT

## 2021-09-13 PROCEDURE — U0003 INFECTIOUS AGENT DETECTION BY NUCLEIC ACID (DNA OR RNA); SEVERE ACUTE RESPIRATORY SYNDROME CORONAVIRUS 2 (SARS-COV-2) (CORONAVIRUS DISEASE [COVID-19]), AMPLIFIED PROBE TECHNIQUE, MAKING USE OF HIGH THROUGHPUT TECHNOLOGIES AS DESCRIBED BY CMS-2020-01-R: HCPCS

## 2021-09-13 PROCEDURE — 99283 EMERGENCY DEPT VISIT LOW MDM: CPT

## 2021-09-13 PROCEDURE — 81003 URINALYSIS AUTO W/O SCOPE: CPT

## 2021-09-13 NOTE — ED PROVIDER NOTES
HPI   Patient reports several days of generalized myalgias, suprapubic pressure, and coughing. She woke with a fever this morning. Patient reports she has been vaccinated for Covid. Denies nausea, vomiting, shortness of breath, GI bleeding, chest pain.   Past Medical History:   Diagnosis Date    Arthritis     Asthma     Chronic kidney disease     Chronic obstructive pulmonary disease (HCC)     Diabetes (HCC)     GERD (gastroesophageal reflux disease)     High cholesterol     Hypertension     Menopause     Morbid obesity (HCC)     Vertigo        Past Surgical History:   Procedure Laterality Date    HX BREAST BIOPSY Left 2019    HX CHOLECYSTECTOMY      HX ENDOSCOPY  01/01/2019    HX HYSTERECTOMY      HX HYSTERECTOMY      HX OOPHORECTOMY      HX ROTATOR CUFF REPAIR Left     by suture    HX TUBAL LIGATION Bilateral          Family History:   Problem Relation Age of Onset    Heart Disease Mother     Lung Disease Mother     Lung Disease Father        Social History     Socioeconomic History    Marital status:      Spouse name: Not on file    Number of children: Not on file    Years of education: Not on file    Highest education level: Not on file   Occupational History    Not on file   Tobacco Use    Smoking status: Never Smoker    Smokeless tobacco: Never Used   Substance and Sexual Activity    Alcohol use: Never    Drug use: Never    Sexual activity: Not on file   Other Topics Concern     Service Not Asked    Blood Transfusions Not Asked    Caffeine Concern Not Asked    Occupational Exposure Not Asked    Hobby Hazards Not Asked    Sleep Concern Not Asked    Stress Concern Not Asked    Weight Concern Not Asked    Special Diet Not Asked    Back Care Not Asked    Exercise Not Asked    Bike Helmet Not Asked   [address] Not Asked    Self-Exams Not Asked   Social History Narrative    Not on file     Social Determinants of Health     Financial Resource Strain:  Difficulty of Paying Living Expenses:    Food Insecurity:     Worried About Running Out of Food in the Last Year:     Ran Out of Food in the Last Year:    Transportation Needs:     Lack of Transportation (Medical):  Lack of Transportation (Non-Medical):    Physical Activity:     Days of Exercise per Week:     Minutes of Exercise per Session:    Stress:     Feeling of Stress :    Social Connections:     Frequency of Communication with Friends and Family:     Frequency of Social Gatherings with Friends and Family:     Attends Ep[employer]op[employer] Services:     Active Member of Clubs or Organizations:     Attends Club or Organization Meetings:     Marital Status:    Intimate Partner Violence:     Fear of Current or Ex-Partner:     Emotionally Abused:     Physically Abused:     Sexually Abused: ALLERGIES: Celebrex [celecoxib], Cleocin [clindamycin phosphate], Mushroom, Strawberry, Ultracet [tramadol-acetaminophen], and Zithromax [azithromycin]    Review of Systems   Constitutional: Positive for chills and fever. HENT: Negative. Eyes: Negative. Respiratory: Positive for cough. Negative for shortness of breath. Cardiovascular: Negative. Gastrointestinal: Negative. Endocrine: Negative. Genitourinary: Positive for pelvic pain. Musculoskeletal: Positive for arthralgias and myalgias. Allergic/Immunologic: Negative. Neurological: Negative. Hematological: Negative. Psychiatric/Behavioral: Negative. All other systems reviewed and are negative. Vitals:    09/13/21 1129   BP: (!) 111/43   Pulse: 63   Resp: 16   Temp: 98.3 °F (36.8 °C)   SpO2: 100%   Weight: (!) 174.6 kg (385 lb)   Height: 5' 6\" (1.676 m)            Physical Exam  Vitals and nursing note reviewed. Constitutional:       General: She is not in acute distress. Appearance: Normal appearance. She is obese. She is not ill-appearing or toxic-appearing. HENT:      Head: Normocephalic and atraumatic. Nose: Nose normal.      Mouth/Throat:      Mouth: Mucous membranes are moist.   Eyes:      Extraocular Movements: Extraocular movements intact. Pupils: Pupils are equal, round, and reactive to light. Cardiovascular:      Rate and Rhythm: Normal rate and regular rhythm. Pulses: Normal pulses. Heart sounds: Normal heart sounds. No murmur heard. No friction rub. No gallop. Pulmonary:      Effort: Pulmonary effort is normal. No respiratory distress. Breath sounds: Normal breath sounds. No stridor. No wheezing, rhonchi or rales. Chest:      Chest wall: No tenderness. Abdominal:      General: Abdomen is flat. There is no distension. Palpations: Abdomen is soft. There is no mass. Tenderness: There is no abdominal tenderness. There is no right CVA tenderness, left CVA tenderness, guarding or rebound. Hernia: No hernia is present. Musculoskeletal:         General: No swelling, tenderness, deformity or signs of injury. Normal range of motion. Cervical back: Normal range of motion and neck supple. No rigidity. Right lower leg: No edema. Left lower leg: No edema. Lymphadenopathy:      Cervical: No cervical adenopathy. Skin:     General: Skin is warm and dry. Coloration: Skin is not jaundiced or pale. Findings: No bruising, erythema, lesion or rash. Neurological:      General: No focal deficit present. Mental Status: She is alert and oriented to person, place, and time. Mental status is at baseline. Cranial Nerves: No cranial nerve deficit. Sensory: No sensory deficit. Motor: No weakness. Coordination: Coordination normal.      Gait: Gait normal.   Psychiatric:         Mood and Affect: Mood normal.         Behavior: Behavior normal.          MDM     Patient is well-appearing with reassuring vital signs and exam.  I suspect a viral process-possibly Covid breakthrough case.   Given suprapubic pressure and cough, will check chest x-ray and UA. Likely discharge.   Procedures

## 2021-09-13 NOTE — ED TRIAGE NOTES
Pt c/o chills, body aches,  headache, nausea lasting the last few day pt tried to see PCP today but they are not open

## 2021-09-14 ENCOUNTER — PATIENT OUTREACH (OUTPATIENT)
Dept: CASE MANAGEMENT | Age: 61
End: 2021-09-14

## 2021-09-14 LAB — SARS-COV-2, COV2NT: NOT DETECTED

## 2021-09-14 NOTE — PROGRESS NOTES
Patient contacted regarding COVID-19 risk. Discussed COVID-19 related testing which was pending at this time. Test results were pending. Patient informed of results, if available? no.     LPN Care Coordinator contacted the patient by telephone to perform post discharge assessment. Call within 2 business days of discharge: Yes Verified name and  with patient as identifiers. Provided introduction to self, and explanation of the CTN/ACM role, and reason for call due to risk factors for infection and/or exposure to COVID-19. Symptoms reviewed with patient who verbalized the following symptoms: fatigue and cough      Due to no new or worsening symptoms encounter was not routed to provider for escalation. Discussed follow-up appointments. If no appointment was previously scheduled, appointment scheduling offered:  no. [facility] follow up appointment(s):   Future Appointments   Date Time Provider [place]   9/15/2021  1:00 PM MD KARISSA Bright BS AMB   2021  1:30 PM MD EVELIO Eli BS AMB   2021 11:00 AM Myke Willams MD [facility] BS [facility]     Non-[facility] follow up appointment(s): n/a    Interventions to address risk factors: Education of patient/family/caregiver/guardian to support self-management-VD[facility] and COVID numbers given     Advance Care Planning:   Does patient have an Advance Directive: not on file. Educated patient about risk for severe COVID-19 due to risk factors according to CDC guidelines. LPN CC reviewed discharge instructions, medical action plan and red flag symptoms with the patient who verbalized understanding. Discussed COVID vaccination status: yes. Education provided on COVID-19 vaccination as appropriate. Discussed exposure protocols and quarantine with CDC Guidelines. Patient was given an opportunity to verbalize any questions and concerns and agrees to contact LPN CC or health care provider for questions related to their healthcare.     Reviewed and educated patient on any new and changed medications related to discharge diagnosis     Was patient discharged with a pulse oximeter? no     LPN CC provided contact information. Plan for follow-up call in 5-7 days based on severity of symptoms and risk factors.

## 2021-09-16 DIAGNOSIS — E11.69 TYPE 2 DIABETES MELLITUS WITH OTHER SPECIFIED COMPLICATION, WITHOUT LONG-TERM CURRENT USE OF INSULIN (HCC): ICD-10-CM

## 2021-09-21 ENCOUNTER — PATIENT OUTREACH (OUTPATIENT)
Dept: CASE MANAGEMENT | Age: 61
End: 2021-09-21

## 2021-09-21 NOTE — PROGRESS NOTES
Patient resolved from Transition of Care episode on 9/21/21. ACM/CTN was unsuccessful at contacting this patient today. Patient/family was provided the following resources and education related to COVID-19 during the initial call:                         Signs, symptoms and red flags related to COVID-19            CDC exposure and quarantine guidelines            [facility] exposure contact - [phone]            Contact for their local Department of Health                 Patient has not had any additional ED or hospital visits. No further outreach scheduled with this CTN/ACM. Episode of Care resolved. Patient has this CTN/ACM contact information if future needs arise.

## 2021-09-22 ENCOUNTER — OFFICE VISIT (OUTPATIENT)
Dept: OBGYN CLINIC | Age: 61
End: 2021-09-22
Payer: MEDICARE

## 2021-09-22 VITALS
HEIGHT: 66 IN | WEIGHT: 293 LBS | TEMPERATURE: 98 F | OXYGEN SATURATION: 99 % | SYSTOLIC BLOOD PRESSURE: 146 MMHG | BODY MASS INDEX: 47.09 KG/M2 | DIASTOLIC BLOOD PRESSURE: 70 MMHG | RESPIRATION RATE: 16 BRPM | HEART RATE: 79 BPM

## 2021-09-22 DIAGNOSIS — N93.9 VAGINAL BLEEDING: Primary | ICD-10-CM

## 2021-09-22 DIAGNOSIS — Z11.3 VENEREAL DISEASE SCREENING: ICD-10-CM

## 2021-09-22 DIAGNOSIS — E66.01 MORBID OBESITY WITH BMI OF 60.0-69.9, ADULT (HCC): ICD-10-CM

## 2021-09-22 DIAGNOSIS — Z90.710 HISTORY OF HYSTERECTOMY: ICD-10-CM

## 2021-09-22 PROCEDURE — G8417 CALC BMI ABV UP PARAM F/U: HCPCS | Performed by: OBSTETRICS & GYNECOLOGY

## 2021-09-22 PROCEDURE — G9899 SCRN MAM PERF RSLTS DOC: HCPCS | Performed by: OBSTETRICS & GYNECOLOGY

## 2021-09-22 PROCEDURE — G8510 SCR DEP NEG, NO PLAN REQD: HCPCS | Performed by: OBSTETRICS & GYNECOLOGY

## 2021-09-22 PROCEDURE — G8754 DIAS BP LESS 90: HCPCS | Performed by: OBSTETRICS & GYNECOLOGY

## 2021-09-22 PROCEDURE — 99214 OFFICE O/P EST MOD 30 MIN: CPT | Performed by: OBSTETRICS & GYNECOLOGY

## 2021-09-22 PROCEDURE — G8753 SYS BP > OR = 140: HCPCS | Performed by: OBSTETRICS & GYNECOLOGY

## 2021-09-22 PROCEDURE — 3017F COLORECTAL CA SCREEN DOC REV: CPT | Performed by: OBSTETRICS & GYNECOLOGY

## 2021-09-22 PROCEDURE — G8427 DOCREV CUR MEDS BY ELIG CLIN: HCPCS | Performed by: OBSTETRICS & GYNECOLOGY

## 2021-09-22 NOTE — PROGRESS NOTES
HPI: Karl Granados is a 63 y.o. female, [id number], menopausal, who presents today for the following:  Chief Complaint   Patient presents with    Check Up     having bleeding sometimes and skips a month and a pain in the vagina with some tingling hurts when she walks        Patient states she experiences vaginal spotting (red) intermittently throughout the month. Ongoing for 1 year. Patient had a vaginal hysterectomy in 1992 for fibroids. In 1998, she had laparotomy for \"mass tumor\" which patient states was benign. Performed by Dr Ernestine Mason at [facility] in [place]- possibly ovarian cyst. She complains of pelvic pain with walking. It is a throbbing, pulling sensation. She has chronic constipation- and is on Metamucil. She follows with Dr Hailee Wells. Last colonoscopy was in 2019- diverticulitis.        Past Medical History:   Diagnosis Date    Arthritis     Asthma     Chronic kidney disease     stage 3    Chronic obstructive pulmonary disease (HCC)     Diabetes (HCC)     GERD (gastroesophageal reflux disease)     High cholesterol     Hypertension     Menopause     Morbid obesity (HCC)     Vertigo        Past Surgical History:   Procedure Laterality Date    HX BREAST BIOPSY Left 2019    HX CHOLECYSTECTOMY      HX ENDOSCOPY  01/01/2019    HX HYSTERECTOMY      HX OOPHORECTOMY      HX ORTHOPAEDIC      right shoulder spur    HX ROTATOR CUFF REPAIR Left     by suture    HX TUBAL LIGATION Bilateral        Family History   Problem Relation Age of Onset    Heart Disease Mother     Lung Disease Mother         lung ca    Lung Disease Father         PNA    Cancer Maternal Grandmother         gyn       Social History     Socioeconomic History    Marital status:      Spouse name: Not on file    Number of children: Not on file    Years of education: Not on file    Highest education level: Some college, no degree   Occupational History    Not on file   Tobacco Use    Smoking status: Never Smoker    Smokeless tobacco: Never Used   Vaping Use    Vaping Use: Never used   Substance and Sexual Activity    Alcohol use: Never    Drug use: Never    Sexual activity: Not Currently     Partners: Male     Birth control/protection: Surgical   Other Topics Concern     Service Not Asked    Blood Transfusions Not Asked    Caffeine Concern Not Asked    Occupational Exposure Not Asked    Hobby Hazards Not Asked    Sleep Concern Not Asked    Stress Concern Not Asked    Weight Concern Not Asked    Special Diet Not Asked    Back Care Not Asked    Exercise Not Asked    Bike Helmet Not Asked   [address] Not Asked    Self-Exams Not Asked   Social History Narrative    Not on file     Social Determinants of Health     Financial Resource Strain:     Difficulty of Paying Living Expenses:    Food Insecurity:     Worried About Running Out of Food in the Last Year:     [address] in the Last Year:    Transportation Needs:     Lack of Transportation (Medical):  Lack of Transportation (Non-Medical):    Physical Activity:     Days of Exercise per Week:     Minutes of Exercise per Session:    Stress:     Feeling of Stress :    Social Connections:     Frequency of Communication with Friends and Family:     Frequency of Social Gatherings with Friends and Family:     Attends Confucianist Services:     Active Member of Clubs or Organizations:     Attends Club or Organization Meetings:     Marital Status:    Intimate Partner Violence:     Fear of Current or Ex-Partner:     Emotionally Abused:     Physically Abused:     Sexually Abused:         Allergies   Allergen Reactions    Celebrex [Celecoxib] Rash    Cleocin [Clindamycin Phosphate] Palpitations    Mushroom Swelling    Strawberry Rash    Ultracet [Tramadol-Acetaminophen] Rash    Zithromax [Azithromycin] Rash          Current Outpatient Medications:     furosemide (LASIX) 40 mg tablet, TAKE 1 TABLET ON MONDAY, WEDNESDAY AND FRIDAY, Disp: 36 Tablet, Rfl: 1    meclizine (ANTIVERT) 25 mg tablet, TAKE 1 TABLET BY MOUTH EVERY 8 HOURS AS NEEDED, Disp: 30 Tablet, Rfl: 3    magnesium oxide (MAG-OX) 400 mg tablet, Take 1 tablet by mouth once daily, Disp: 90 Tablet, Rfl: 1    pioglitazone (ACTOS) 15 mg tablet, Take 1 tablet by mouth once daily, Disp: 90 Tablet, Rfl: 1    atorvastatin (LIPITOR) 10 mg tablet, Take 1 tablet by mouth once daily, Disp: 90 Tablet, Rfl: 1    glimepiride (AMARYL) 4 mg tablet, Take 2 tablets by mouth once daily, Disp: 180 Tablet, Rfl: 1    lisinopriL (PRINIVIL, ZESTRIL) 10 mg tablet, Take 1 tablet by mouth once daily, Disp: 90 Tablet, Rfl: 1    allopurinoL (ZYLOPRIM) 300 mg tablet, Take 1 tablet by mouth once daily, Disp: 90 Tablet, Rfl: 1    albuterol (PROVENTIL HFA, VENTOLIN HFA, PROAIR HFA) 90 mcg/actuation inhaler, Take  by inhalation. , Disp: , Rfl:     albuterol-ipratropium (DUO-NEB) 2.5 mg-0.5 mg/3 ml nebu, 3 mL by Nebulization route., Disp: , Rfl:     multivitamin (ONE A DAY) tablet, Take 1 Tab by mouth daily. , Disp: , Rfl:     aspirin 81 mg chewable tablet, Take 81 mg by mouth daily. , Disp: , Rfl:     cholecalciferol (Vitamin D3) (1000 Units /25 mcg) tablet, Take 1,000 Units by mouth daily. , Disp: , Rfl:     acetaminophen (Tylenol Arthritis Pain) 650 mg TbER, Take 650 mg by mouth every eight (8) hours. , Disp: , Rfl:     insulin NPH/insulin regular (NovoLIN 70/30 U-100 Insulin) 100 unit/mL (70-30) injection, 20 Units by SubCUTAneous route as needed (Elevated sugar. ). Indications: type 2 diabetes mellitus (Patient not taking: Reported on 8/4/2021), Disp: 10 mL, Rfl: 1    Lancing Device with Lancets (Accu-Chek FastClix Lancing Dev) kit, 100 Lancet by Does Not Apply route daily. (Patient not taking: Reported on 9/22/2021), Disp: 1 Kit, Rfl: 0    glucose blood VI test strips (Accu-Chek Cherie Plus test strp) strip, by Does Not Apply route See Admin Instructions.  (Patient not taking: Reported on 9/22/2021), Disp: 100 Strip, Rfl: 1    Blood-Glucose Meter misc, 1 m by Does Not Apply route daily. (Patient not taking: Reported on 9/22/2021), Disp: 1 Each, Rfl: 0    Insulin Syringe-Needle U-100 (BD Insulin Syringe Ultra-Fine) 0.3 mL 31 gauge x 5/16\" syrg, by Does Not Apply route. (Patient not taking: Reported on 9/22/2021), Disp: , Rfl:     ferrous sulfate (Iron) 325 mg (65 mg iron) tablet, Take 325 mg by mouth every Monday, Wednesday, Friday. (Patient not taking: Reported on 8/4/2021), Disp: , Rfl:        Review of Systems: Denies issues with eyes, ears, mouth, nose. Denies fevers/chills. Weight fluctuates. Denies chest pain, shortness of breath, nausea, vomiting. Denies dysuria. + Rt and left knee muscle aches, weakness, numbness and tingling. Denies bleeding/clotting d/o's. Denies anxiety/depression, S/HI.        OBJECTIVE:  BP (!) 146/70 (BP 1 Location: Left upper arm, BP Patient Position: Sitting, BP Cuff Size: Adult)   Pulse 79   Temp 98 °F (36.7 °C)   Resp 16   Ht 5' 6\" (1.676 m)   Wt (!) 421 lb (191 kg)   LMP  (LMP Unknown)   SpO2 99%   BMI 67.95 kg/m²      Constitutional  · Appearance: well-nourished, well developed, alert, in no acute distress, obese    HENT  · Head and Face: appears normal    Neck  · Inspection/Palpation: normal appearance      Chest  · Respiratory Effort: normal      Gastrointestinal  · Abdominal Examination: abdomen non-tender to palpation, no masses present    Genitourinary  · External Genitalia: normal appearance for age, no discharge present, no tenderness present, no inflammatory lesions present, no masses present; atrophy present  · Vagina: normal vaginal vault without central or paravaginal defects, no discharge present, no inflammatory lesions present, no masses present, apex appears wnl, atrophic; swab obtained  · Bladder: non-tender to palpation  · Urethra: appears normal  · Cervix: absent  · Uterus: absent  · Adnexa: no adnexal tenderness present, no adnexal masses present  · Perineum: perineum within normal limits, no evidence of trauma, no rashes or skin lesions present  · Anus: anus within normal limits, no hemorrhoids present    Skin  · General Inspection: no rash, no lesions identified    Neurologic/Psychiatric  · Mental Status:  · Orientation: grossly oriented to person, place and time  · Mood and Affect: mood normal, affect appropriate          Assessment/plan:    ICD-10-CM ICD-9-CM    1. Vaginal bleeding  N93.9 623.8 US PELV NON OBS      CT/NG/T.VAGINALIS AMPLIFICATION   2. History of hysterectomy  Z90.710 V88.01 US PELV NON OBS   3. Morbid obesity with BMI of 60.0-69.9, adult (HCC)  E66.01 278.01 US PELV NON OBS    Z68.44 V85.44    4. Venereal disease screening  Z11.3 V74.5 CT/NG/T.VAGINALIS AMPLIFICATION     Might need CT a&p due to h/o benign tumor and morbid obesity if US suboptimal.  Return for vaginal bx.

## 2021-09-22 NOTE — PROGRESS NOTES
1. Have you been to the ER, urgent care clinic since your last visit? Hospitalized since your last visit? Yes last Monday cold chills    2. Have you seen or consulted any other health care providers outside of the [address] [place] [address] since your last visit? Include any pap smears or colon screening.   no    Chief Complaint   Patient presents with    Check Up     having bleeding sometimes and skips a month and a pain in the vagina with some tingling      Visit Vitals  BP (!) 146/70 (BP 1 Location: Left upper arm, BP Patient Position: Sitting, BP Cuff Size: Adult)   Pulse 79   Temp 98 °F (36.7 °C)   Resp 16   Ht 5' 6\" (1.676 m)   Wt (!) 421 lb (191 kg)   LMP  (LMP Unknown)   SpO2 99%   BMI 67.95 kg/m²

## 2021-09-24 LAB
C TRACH RRNA SPEC QL NAA+PROBE: NEGATIVE
N GONORRHOEA RRNA SPEC QL NAA+PROBE: NEGATIVE
T VAGINALIS DNA SPEC QL NAA+PROBE: NEGATIVE

## 2021-09-25 RX ORDER — LANCETS
EACH MISCELLANEOUS
Qty: 102 EACH | Refills: 1 | Status: SHIPPED | OUTPATIENT
Start: 2021-09-25 | End: 2022-01-26

## 2021-09-25 RX ORDER — BLOOD SUGAR DIAGNOSTIC
STRIP MISCELLANEOUS
Qty: 100 STRIP | Refills: 1 | Status: SHIPPED | OUTPATIENT
Start: 2021-09-25 | End: 2021-12-16

## 2021-10-01 ENCOUNTER — HOSPITAL ENCOUNTER (EMERGENCY)
Age: 61
Discharge: HOME | End: 2021-10-01
Attending: EMERGENCY MEDICINE
Payer: MEDICARE

## 2021-10-01 ENCOUNTER — APPOINTMENT (OUTPATIENT)
Dept: VASCULAR SURGERY | Age: 61
End: 2021-10-01
Attending: EMERGENCY MEDICINE
Payer: MEDICARE

## 2021-10-01 VITALS
WEIGHT: 293 LBS | RESPIRATION RATE: 18 BRPM | HEIGHT: 66 IN | TEMPERATURE: 98.4 F | OXYGEN SATURATION: 100 % | BODY MASS INDEX: 47.09 KG/M2 | SYSTOLIC BLOOD PRESSURE: 119 MMHG | HEART RATE: 58 BPM | DIASTOLIC BLOOD PRESSURE: 54 MMHG

## 2021-10-01 DIAGNOSIS — M79.661 RIGHT CALF PAIN: Primary | ICD-10-CM

## 2021-10-01 PROCEDURE — 99283 EMERGENCY DEPT VISIT LOW MDM: CPT

## 2021-10-01 PROCEDURE — 93971 EXTREMITY STUDY: CPT

## 2021-10-01 PROCEDURE — 74011250637 HC RX REV CODE- 250/637: Performed by: EMERGENCY MEDICINE

## 2021-10-01 RX ORDER — ACETAMINOPHEN 500 MG
1000 TABLET ORAL ONCE
Status: COMPLETED | OUTPATIENT
Start: 2021-10-01 | End: 2021-10-01

## 2021-10-01 RX ADMIN — ACETAMINOPHEN 1000 MG: 500 TABLET ORAL at 12:24

## 2021-10-01 NOTE — ED PROVIDER NOTES
EMERGENCY DEPARTMENT HISTORY AND PHYSICAL EXAM      Date: 10/1/2021  Patient Name: Liu Salazar    History of Presenting Illness     Chief Complaint   Patient presents with    Leg Pain     pt presents with c/o right lower extremity pain that began last night as she was leaving [employer]. Pt denies injury at this time. Pt rates pain 10/10 hasw not taken any pain medication at home, pt has pmh of arthritis       History Provided By: Patient    HPI: Liu Salazar, 57 y.o. female with a past medical history significant diabetes, hypertension, hyperlipidemia, obesity and COPD presents to the ED with cc of right lower extremity pain started last night pain intensity rated 10/10 in patient denies any trauma    There are no other complaints, changes, or physical findings at this time. PCP: Roberto Diehl MD    No current facility-administered medications on file prior to encounter. Current Outpatient Medications on File Prior to Encounter   Medication Sig Dispense Refill    Accu-Chek Cherie Plus test strp strip USE TO CHECK SUGAR ONCE A  Strip 1    Accu-Chek Fastclix Lancet Drum misc TEST EVERY  Each 1    furosemide (LASIX) 40 mg tablet TAKE 1 TABLET ON MONDAY, WEDNESDAY AND FRIDAY 36 Tablet 1    meclizine (ANTIVERT) 25 mg tablet TAKE 1 TABLET BY MOUTH EVERY 8 HOURS AS NEEDED 30 Tablet 3    magnesium oxide (MAG-OX) 400 mg tablet Take 1 tablet by mouth once daily 90 Tablet 1    insulin NPH/insulin regular (NovoLIN 70/30 U-100 Insulin) 100 unit/mL (70-30) injection 20 Units by SubCUTAneous route as needed (Elevated sugar. ).  Indications: type 2 diabetes mellitus (Patient not taking: Reported on 8/4/2021) 10 mL 1    pioglitazone (ACTOS) 15 mg tablet Take 1 tablet by mouth once daily 90 Tablet 1    atorvastatin (LIPITOR) 10 mg tablet Take 1 tablet by mouth once daily 90 Tablet 1    glimepiride (AMARYL) 4 mg tablet Take 2 tablets by mouth once daily 180 Tablet 1    lisinopriL (PRINIVIL, ZESTRIL) 10 mg tablet Take 1 tablet by mouth once daily 90 Tablet 1    allopurinoL (ZYLOPRIM) 300 mg tablet Take 1 tablet by mouth once daily 90 Tablet 1    Lancing Device with Lancets (Accu-Chek FastClix Lancing Dev) kit 100 Lancet by Does Not Apply route daily. (Patient not taking: Reported on 9/22/2021) 1 Kit 0    Blood-Glucose Meter misc 1 m by Does Not Apply route daily. (Patient not taking: Reported on 9/22/2021) 1 Each 0    albuterol (PROVENTIL HFA, VENTOLIN HFA, PROAIR HFA) 90 mcg/actuation inhaler Take  by inhalation.  Insulin Syringe-Needle U-100 (BD Insulin Syringe Ultra-Fine) 0.3 mL 31 gauge x 5/16\" syrg by Does Not Apply route. (Patient not taking: Reported on 9/22/2021)      albuterol-ipratropium (DUO-NEB) 2.5 mg-0.5 mg/3 ml nebu 3 mL by Nebulization route.  ferrous sulfate (Iron) 325 mg (65 mg iron) tablet Take 325 mg by mouth every Monday, Wednesday, Friday. (Patient not taking: Reported on 8/4/2021)      multivitamin (ONE A DAY) tablet Take 1 Tab by mouth daily.  aspirin 81 mg chewable tablet Take 81 mg by mouth daily.  cholecalciferol (Vitamin D3) (1000 Units /25 mcg) tablet Take 1,000 Units by mouth daily.  acetaminophen (Tylenol Arthritis Pain) 650 mg TbER Take 650 mg by mouth every eight (8) hours.          Past History     Past Medical History:  Past Medical History:   Diagnosis Date    Arthritis     Asthma     Chronic kidney disease     stage 3    Chronic obstructive pulmonary disease (HCC)     Diabetes (HCC)     GERD (gastroesophageal reflux disease)     High cholesterol     Hypertension     Menopause     Morbid obesity ([facility] [address]     Vertigo        Past Surgical History:  Past Surgical History:   Procedure Laterality Date    HX BREAST BIOPSY Left 2019    HX CHOLECYSTECTOMY      HX ENDOSCOPY  01/01/2019    HX HYSTERECTOMY      HX OOPHORECTOMY      HX ORTHOPAEDIC      right shoulder spur    HX ROTATOR CUFF REPAIR Left     by suture    HX TUBAL LIGATION Bilateral Family History:  Family History   Problem Relation Age of Onset    Heart Disease Mother     Lung Disease Mother         lung ca    Lung Disease Father         PNA    Cancer Maternal Grandmother         gyn       Social History:  Social History     Tobacco Use    Smoking status: Never Smoker    Smokeless tobacco: Never Used   Vaping Use    Vaping Use: Never used   Substance Use Topics    Alcohol use: Never    Drug use: Never       Allergies: Allergies   Allergen Reactions    Celebrex [Celecoxib] Rash    Cleocin [Clindamycin Phosphate] Palpitations    Mushroom Swelling    Strawberry Rash    Ultracet [Tramadol-Acetaminophen] Rash    Zithromax [Azithromycin] Rash         Review of Systems     Review of Systems   Constitutional: Negative for chills and fever. HENT: Negative for rhinorrhea and sore throat. Eyes: Negative for pain and visual disturbance. Respiratory: Negative for cough and shortness of breath. Cardiovascular: Negative for chest pain and leg swelling. Gastrointestinal: Negative for abdominal pain and vomiting. Endocrine: Negative for polydipsia and polyuria. Genitourinary: Negative for dysuria and urgency. Musculoskeletal: Positive for myalgias. Negative for arthralgias and back pain. Skin: Negative for color change and pallor. Neurological: Negative for weakness and numbness. Psychiatric/Behavioral: Negative. Physical Exam     Physical Exam  Vitals and nursing note reviewed. Constitutional:       General: She is not in acute distress. Appearance: Normal appearance. She is morbidly obese. She is not ill-appearing or toxic-appearing. HENT:      Head: Normocephalic and atraumatic. Mouth/Throat:      Mouth: Mucous membranes are moist.      Pharynx: Oropharynx is clear. Eyes:      Extraocular Movements: Extraocular movements intact. Conjunctiva/sclera: Conjunctivae normal.      Pupils: Pupils are equal, round, and reactive to light. Cardiovascular:      Rate and Rhythm: Normal rate and regular rhythm. Pulses: Normal pulses. Heart sounds: Normal heart sounds. Pulmonary:      Effort: Pulmonary effort is normal.      Breath sounds: Normal breath sounds. Abdominal:      General: Bowel sounds are normal.      Palpations: Abdomen is soft. Musculoskeletal:         General: Tenderness present. No deformity or signs of injury. Normal range of motion. Cervical back: Normal range of motion and neck supple. Legs:    Skin:     General: Skin is warm and dry. Capillary Refill: Capillary refill takes less than 2 seconds. Neurological:      General: No focal deficit present. Mental Status: She is alert and oriented to person, place, and time. Psychiatric:         Mood and Affect: Mood normal.         Behavior: Behavior normal.         Lab and Diagnostic Study Results     Labs -   No results found for this or any previous visit (from the past 12 hour(s)). Radiologic Studies -   @lastxrresult@  CT Results  (Last 48 hours)    None        CXR Results  (Last 48 hours)    None            Medical Decision Making   - I am the first provider for this patient. - I reviewed the vital signs, available nursing notes, past medical history, past surgical history, family history and social history. - Initial assessment performed. The patients presenting problems have been discussed, and they are in agreement with the care plan formulated and outlined with them. I have encouraged them to ask questions as they arise throughout their visit. Vital Signs-Reviewed the patient's vital signs.   Patient Vitals for the past 12 hrs:   Temp Pulse Resp BP SpO2   10/01/21 1143     98 %   10/01/21 1141 98.4 °F (36.9 °C) 60 20 (!) 154/65 100 %       Records Reviewed: Nursing Notes    The patient presents with leg pain with a differential diagnosis of closed fracture, ligamentous strain, DVT      ED Course:          Provider Notes (Medical Decision Making): MDM       Procedures   Medical Decision Makingedical Decision Making  Performed by: David Cuello MD  PROCEDURES:  Procedures       Disposition   Disposition: Condition stable and improved  DC- Adult Discharges: All of the diagnostic tests were reviewed and questions answered. Diagnosis, care plan and treatment options were discussed. The patient understands the instructions and will follow up as directed. The patients results have been reviewed with them. They have been counseled regarding their diagnosis. The patient verbally convey understanding and agreement of the signs, symptoms, diagnosis, treatment and prognosis and additionally agrees to follow up as recommended with their PCP in 24 - 48 hours. They also agree with the care-plan and convey that all of their questions have been answered. I have also put together some discharge instructions for them that include: 1) educational information regarding their diagnosis, 2) how to care for their diagnosis at home, as well a 3) list of reasons why they would want to return to the ED prior to their follow-up appointment, should their condition change. DISCHARGE PLAN:  1.    Current Discharge Medication List      CONTINUE these medications which have NOT CHANGED    Details   Accu-Chek Cherie Plus test strp strip USE TO CHECK SUGAR ONCE A DAY  Qty: 100 Strip, Refills: 1    Associated Diagnoses: Type 2 diabetes mellitus with other specified complication, without long-term current use of insulin ([facility])      Accu-Chek Fastclix Lancet Drum misc TEST EVERY DAY  Qty: 102 Each, Refills: 1      furosemide (LASIX) 40 mg tablet TAKE 1 TABLET ON MONDAY, WEDNESDAY AND FRIDAY  Qty: 36 Tablet, Refills: 1    Associated Diagnoses: Chronic congestive heart failure, unspecified heart failure type (HCC)      meclizine (ANTIVERT) 25 mg tablet TAKE 1 TABLET BY MOUTH EVERY 8 HOURS AS NEEDED  Qty: 30 Tablet, Refills: 3    Associated Diagnoses: Dizziness      magnesium oxide (MAG-OX) 400 mg tablet Take 1 tablet by mouth once daily  Qty: 90 Tablet, Refills: 1    Associated Diagnoses: Stage 3a chronic kidney disease ([facility])      insulin NPH/insulin regular (NovoLIN 70/30 U-100 Insulin) 100 unit/mL (70-30) injection 20 Units by SubCUTAneous route as needed (Elevated sugar. ). Indications: type 2 diabetes mellitus  Qty: 10 mL, Refills: 1    Associated Diagnoses: Type 2 diabetes mellitus without complication, without long-term current use of insulin ([facility])      pioglitazone (ACTOS) 15 mg tablet Take 1 tablet by mouth once daily  Qty: 90 Tablet, Refills: 1    Associated Diagnoses: Type 2 diabetes mellitus with other specified complication, without long-term current use of insulin ([facility])      atorvastatin (LIPITOR) 10 mg tablet Take 1 tablet by mouth once daily  Qty: 90 Tablet, Refills: 1    Associated Diagnoses: Mixed hyperlipidemia due to type 2 diabetes mellitus ([facility])      glimepiride (AMARYL) 4 mg tablet Take 2 tablets by mouth once daily  Qty: 180 Tablet, Refills: 1    Associated Diagnoses: Type 2 diabetes mellitus with other specified complication, without long-term current use of insulin ([facility])      lisinopriL (PRINIVIL, ZESTRIL) 10 mg tablet Take 1 tablet by mouth once daily  Qty: 90 Tablet, Refills: 1    Associated Diagnoses: Essential hypertension      allopurinoL (ZYLOPRIM) 300 mg tablet Take 1 tablet by mouth once daily  Qty: 90 Tablet, Refills: 1    Associated Diagnoses: Acute gout of multiple sites, unspecified cause      Lancing Device with Lancets (Accu-Chek FastClix Lancing Dev) kit 100 Lancet by Does Not Apply route daily. Qty: 1 Kit, Refills: 0    Associated Diagnoses: Type 2 diabetes mellitus without complication, without long-term current use of insulin ([facility])      Blood-Glucose Meter misc 1 m by Does Not Apply route daily.   Qty: 1 Each, Refills: 0    Comments: DX: E11.9 NPI [id number]  Associated Diagnoses: Type 2 diabetes mellitus with other specified complication, without long-term current use of insulin (HCC)      albuterol (PROVENTIL HFA, VENTOLIN HFA, PROAIR HFA) 90 mcg/actuation inhaler Take  by inhalation. Insulin Syringe-Needle U-100 (BD Insulin Syringe Ultra-Fine) 0.3 mL 31 gauge x 5/16\" syrg by Does Not Apply route. albuterol-ipratropium (DUO-NEB) 2.5 mg-0.5 mg/3 ml nebu 3 mL by Nebulization route. ferrous sulfate (Iron) 325 mg (65 mg iron) tablet Take 325 mg by mouth every Monday, Wednesday, Friday. multivitamin (ONE A DAY) tablet Take 1 Tab by mouth daily. aspirin 81 mg chewable tablet Take 81 mg by mouth daily. cholecalciferol (Vitamin D3) (1000 Units /25 mcg) tablet Take 1,000 Units by mouth daily. acetaminophen (Tylenol Arthritis Pain) 650 mg TbER Take 650 mg by mouth every eight (8) hours. 2.   Follow-up Information    None       3. Return to ED if worse   4. Current Discharge Medication List            Diagnosis     Clinical Impression: No diagnosis found. Attestations:    Glenny Duncan MD    Please note that this dictation was completed with [employer], the computer voice recognition software. Quite often unanticipated grammatical, syntax, homophones, and other interpretive errors are inadvertently transcribed by the computer software. Please disregard these errors. Please excuse any errors that have escaped final proofreading. Thank you.

## 2021-10-01 NOTE — ED NOTES
Dr. Jo-Ann Benavidez reviewed discharge instructions with the patient. The patient verbalized understanding. All questions and concerns were addressed. The patient declined a wheelchair and is discharged ambulatory in the care of family members with instructions and prescriptions in hand. Pt is alert and oriented x 4. Respirations are clear and unlabored.

## 2021-10-01 NOTE — ED TRIAGE NOTES
.  Chief Complaint   Patient presents with    Leg Pain     pt presents with c/o right lower extremity pain that began last night as she was leaving [employer]. Pt denies injury at this time.  Pt rates pain 10/10 hasw not taken any pain medication at home, pt has pmh of arthritis

## 2021-11-01 ENCOUNTER — TELEPHONE (OUTPATIENT)
Dept: BEHAVIORAL/MENTAL HEALTH CLINIC | Age: 61
End: 2021-11-01

## 2021-11-01 ENCOUNTER — OFFICE VISIT (OUTPATIENT)
Dept: PRIMARY CARE CLINIC | Age: 61
End: 2021-11-01
Payer: MEDICARE

## 2021-11-01 VITALS
OXYGEN SATURATION: 98 % | SYSTOLIC BLOOD PRESSURE: 130 MMHG | TEMPERATURE: 98.7 F | DIASTOLIC BLOOD PRESSURE: 58 MMHG | RESPIRATION RATE: 20 BRPM | HEART RATE: 64 BPM

## 2021-11-01 DIAGNOSIS — D64.9 ANEMIA, UNSPECIFIED TYPE: ICD-10-CM

## 2021-11-01 DIAGNOSIS — M19.90 ARTHRITIS: ICD-10-CM

## 2021-11-01 DIAGNOSIS — R68.89 COLD FEELING: Primary | ICD-10-CM

## 2021-11-01 PROCEDURE — G8754 DIAS BP LESS 90: HCPCS | Performed by: NURSE PRACTITIONER

## 2021-11-01 PROCEDURE — 99214 OFFICE O/P EST MOD 30 MIN: CPT | Performed by: NURSE PRACTITIONER

## 2021-11-01 PROCEDURE — G8417 CALC BMI ABV UP PARAM F/U: HCPCS | Performed by: NURSE PRACTITIONER

## 2021-11-01 PROCEDURE — 3017F COLORECTAL CA SCREEN DOC REV: CPT | Performed by: NURSE PRACTITIONER

## 2021-11-01 PROCEDURE — G8752 SYS BP LESS 140: HCPCS | Performed by: NURSE PRACTITIONER

## 2021-11-01 PROCEDURE — G8427 DOCREV CUR MEDS BY ELIG CLIN: HCPCS | Performed by: NURSE PRACTITIONER

## 2021-11-01 PROCEDURE — G8432 DEP SCR NOT DOC, RNG: HCPCS | Performed by: NURSE PRACTITIONER

## 2021-11-01 PROCEDURE — G9899 SCRN MAM PERF RSLTS DOC: HCPCS | Performed by: NURSE PRACTITIONER

## 2021-11-04 NOTE — PROGRESS NOTES
Merline Ambrosia is a 57 y.o. female who presents to the office today for the following:    Chief Complaint   Patient presents with    Joint Pain    Chills       Past Medical History:   Diagnosis Date    Arthritis     Asthma     Chronic kidney disease     stage 3    Chronic obstructive pulmonary disease ([address]     Diabetes ([address]     GERD (gastroesophageal reflux disease)     High cholesterol     Hypertension     Menopause     Morbid obesity ([address]     Vertigo        Past Surgical History:   Procedure Laterality Date    HX BREAST BIOPSY Left 2019    HX CHOLECYSTECTOMY      HX ENDOSCOPY  01/01/2019    HX HYSTERECTOMY      HX OOPHORECTOMY      HX ORTHOPAEDIC      right shoulder spur    HX ROTATOR CUFF REPAIR Left     by suture    HX TUBAL LIGATION Bilateral         Family History   Problem Relation Age of Onset    Heart Disease Mother     Lung Disease Mother         lung ca    Lung Disease Father         PNA    Cancer Maternal Grandmother         gyn        Social History     Tobacco Use    Smoking status: Never Smoker    Smokeless tobacco: Never Used   Vaping Use    Vaping Use: Never used   Substance Use Topics    Alcohol use: Never    Drug use: Never        HPI  Patient with PMH of type 2 diabetes, copd, obesity, hyperlipidemia, GERD, sleep apnea, osteoarthritis, CKD, anemia and gout who presents with complaint of feeling \"achy\" all over and \"cold feeling. \" Denies any fever, cough, headache, sore throat, ear pain or other associated symptoms. Does have history of arthritis and thinks that joints are hurting due to weather getting cold. Is concerned however that she is \"always\" feeling \"cold. \" Was told before that she was anemic so not sure if this is why.      Current Outpatient Medications on File Prior to Visit   Medication Sig    Accu-Chek Cherie Plus test strp strip USE TO CHECK SUGAR ONCE A DAY    Accu-Chek Fastclix Lancet Drum misc TEST EVERY DAY    furosemide (LASIX) 40 mg tablet TAKE 1 TABLET ON MONDAY, WEDNESDAY AND FRIDAY    meclizine (ANTIVERT) 25 mg tablet TAKE 1 TABLET BY MOUTH EVERY 8 HOURS AS NEEDED    magnesium oxide (MAG-OX) 400 mg tablet Take 1 tablet by mouth once daily    insulin NPH/insulin regular (NovoLIN 70/30 U-100 Insulin) 100 unit/mL (70-30) injection 20 Units by SubCUTAneous route as needed (Elevated sugar. ). Indications: type 2 diabetes mellitus (Patient not taking: Reported on 8/4/2021)    pioglitazone (ACTOS) 15 mg tablet Take 1 tablet by mouth once daily    atorvastatin (LIPITOR) 10 mg tablet Take 1 tablet by mouth once daily    glimepiride (AMARYL) 4 mg tablet Take 2 tablets by mouth once daily    lisinopriL (PRINIVIL, ZESTRIL) 10 mg tablet Take 1 tablet by mouth once daily    allopurinoL (ZYLOPRIM) 300 mg tablet Take 1 tablet by mouth once daily    Lancing Device with Lancets (Accu-Chek FastClix Lancing Dev) kit 100 Lancet by Does Not Apply route daily. (Patient not taking: Reported on 9/22/2021)    Blood-Glucose Meter misc 1 m by Does Not Apply route daily. (Patient not taking: Reported on 9/22/2021)    albuterol (PROVENTIL HFA, VENTOLIN HFA, PROAIR HFA) 90 mcg/actuation inhaler Take  by inhalation.  Insulin Syringe-Needle U-100 (BD Insulin Syringe Ultra-Fine) 0.3 mL 31 gauge x 5/16\" syrg by Does Not Apply route. (Patient not taking: Reported on 9/22/2021)    albuterol-ipratropium (DUO-NEB) 2.5 mg-0.5 mg/3 ml nebu 3 mL by Nebulization route.  ferrous sulfate (Iron) 325 mg (65 mg iron) tablet Take 325 mg by mouth every Monday, Wednesday, Friday. (Patient not taking: Reported on 8/4/2021)    multivitamin (ONE A DAY) tablet Take 1 Tab by mouth daily.  aspirin 81 mg chewable tablet Take 81 mg by mouth daily.  cholecalciferol (Vitamin D3) (1000 Units /25 mcg) tablet Take 1,000 Units by mouth daily.  acetaminophen (Tylenol Arthritis Pain) 650 mg TbER Take 650 mg by mouth every eight (8) hours.      No current facility-administered medications on file prior to visit. No orders of the defined types were placed in this encounter. Review of Systems   Constitutional: Negative for diaphoresis, fever, malaise/fatigue and weight loss. HENT: Negative. Respiratory: Negative. Cardiovascular: Negative. Gastrointestinal: Negative. Genitourinary: Negative. Musculoskeletal: Positive for joint pain and myalgias. Negative for falls and neck pain. Neurological: Negative for dizziness, tingling, sensory change, focal weakness and headaches. Visit Vitals  BP (!) 130/58   Pulse 64   Temp 98.7 °F (37.1 °C)   Resp 20   LMP  (LMP Unknown)   SpO2 98%       Physical Exam  Vitals and nursing note reviewed. Constitutional:       Appearance: Normal appearance. She is obese. Cardiovascular:      Rate and Rhythm: Normal rate and regular rhythm. Pulses: Normal pulses. Pulmonary:      Effort: Pulmonary effort is normal.      Breath sounds: Normal breath sounds. Abdominal:      General: Bowel sounds are normal.      Palpations: Abdomen is soft. Tenderness: There is no abdominal tenderness. There is no guarding. Musculoskeletal:      Right shoulder: Tenderness present. Normal range of motion. Left shoulder: Tenderness present. Normal range of motion. Right hand: Tenderness present. Left hand: Tenderness present. Right knee: Normal range of motion. Tenderness present. Left knee: Normal range of motion. Tenderness present. Right lower leg: No edema. Left lower leg: No edema. Skin:     General: Skin is warm and dry. Neurological:      Mental Status: She is alert and oriented to person, place, and time. Mental status is at baseline. Psychiatric:         Mood and Affect: Mood normal.         Behavior: Behavior normal.            1. Cold feeling    - CBC WITH AUTOMATED DIFF    2.  Anemia, unspecified type  Lab Results   Component Value Date/Time    WBC 2.9 (L) 08/02/2021 08:56 AM    HGB 10.6 (L) 08/02/2021 08:56 AM    HCT 33.4 (L) 08/02/2021 08:56 AM    PLATELET 893 77/87/7913 08:56 AM    MCV 93 08/02/2021 08:56 AM       3. Arthritis    Seems likely symptoms are due to weather changing as she has history of arthritis which is often worsened by cold. Will continue tylenol and topical voltaren prn   Will check cbc to ensure no changes and also has diabetes labs scheduled with Dr. Prabha Harvey in 2 days   Advised if she develops worsening symptoms, follow up immediately with provider.       Patient verbalizes understanding of plan of care as discussed above

## 2021-11-05 DIAGNOSIS — N18.31 STAGE 3A CHRONIC KIDNEY DISEASE (HCC): ICD-10-CM

## 2021-11-07 RX ORDER — LANOLIN ALCOHOL/MO/W.PET/CERES
CREAM (GRAM) TOPICAL
Qty: 90 TABLET | Refills: 1 | Status: SHIPPED | OUTPATIENT
Start: 2021-11-07 | End: 2022-06-24 | Stop reason: SDUPTHER

## 2021-12-11 ENCOUNTER — HOSPITAL ENCOUNTER (EMERGENCY)
Age: 61
Discharge: HOME | End: 2021-12-11
Attending: EMERGENCY MEDICINE
Payer: MEDICARE

## 2021-12-11 VITALS
OXYGEN SATURATION: 97 % | HEIGHT: 66 IN | BODY MASS INDEX: 47.09 KG/M2 | RESPIRATION RATE: 19 BRPM | TEMPERATURE: 97.5 F | SYSTOLIC BLOOD PRESSURE: 129 MMHG | HEART RATE: 71 BPM | DIASTOLIC BLOOD PRESSURE: 65 MMHG | WEIGHT: 293 LBS

## 2021-12-11 DIAGNOSIS — M79.604 PAIN OF RIGHT LOWER EXTREMITY: Primary | ICD-10-CM

## 2021-12-11 LAB
BASOPHILS # BLD: 0 K/UL (ref 0–0.2)
BASOPHILS NFR BLD: 1 % (ref 0–2.5)
EOSINOPHIL # BLD: 0.2 K/UL (ref 0–0.7)
EOSINOPHIL NFR BLD: 4 % (ref 0.9–2.9)
ERYTHROCYTE [DISTWIDTH] IN BLOOD BY AUTOMATED COUNT: 15.1 % (ref 11.5–14.5)
HCT VFR BLD AUTO: 35.8 % (ref 36–46)
HGB BLD-MCNC: 11.9 G/DL (ref 13.5–17.5)
INR PPP: 1 (ref 0.9–1.1)
LYMPHOCYTES # BLD: 1.2 K/UL (ref 1–4.8)
LYMPHOCYTES NFR BLD: 35 % (ref 20.5–51.1)
MCH RBC QN AUTO: 30.9 PG (ref 31–34)
MCHC RBC AUTO-ENTMCNC: 33.2 G/DL (ref 31–36)
MCV RBC AUTO: 93.1 FL (ref 80–100)
MONOCYTES # BLD: 0.2 K/UL (ref 0.2–2.4)
MONOCYTES NFR BLD: 7 % (ref 1.7–9.3)
NEUTS SEG # BLD: 1.9 K/UL (ref 1.8–7.7)
NEUTS SEG NFR BLD: 53 % (ref 42–75)
NRBC # BLD: 0.02 K/UL
NRBC BLD-RTO: 0.5 PER 100 WBC
PLATELET # BLD AUTO: 219 K/UL (ref 150–400)
PMV BLD AUTO: 7.4 FL (ref 6.5–11.5)
PROTHROMBIN TIME: 9.7 SEC (ref 9–11.1)
RBC # BLD AUTO: 3.84 M/UL (ref 4.5–5.9)
WBC # BLD AUTO: 3.5 K/UL (ref 4.4–11.3)

## 2021-12-11 PROCEDURE — 99283 EMERGENCY DEPT VISIT LOW MDM: CPT

## 2021-12-11 PROCEDURE — 36415 COLL VENOUS BLD VENIPUNCTURE: CPT

## 2021-12-11 PROCEDURE — 85025 COMPLETE CBC W/AUTO DIFF WBC: CPT

## 2021-12-11 PROCEDURE — 74011250637 HC RX REV CODE- 250/637: Performed by: EMERGENCY MEDICINE

## 2021-12-11 PROCEDURE — 85610 PROTHROMBIN TIME: CPT

## 2021-12-11 RX ADMIN — RIVAROXABAN 10 MG: 10 TABLET, FILM COATED ORAL at 12:40

## 2021-12-11 NOTE — ED NOTES
Pt given discharge instructions, pt educated on medications given/prescribed. Pt verbalized understanding. Pt has no further questions at this time.

## 2021-12-11 NOTE — ED TRIAGE NOTES
Pt reports \"stinging and burning in calf that started yesterday. Pt denies any known injury. Pt denies SOB at this time. Pt denies hx of blood clots.

## 2021-12-11 NOTE — ED PROVIDER NOTES
EMERGENCY DEPARTMENT HISTORY AND PHYSICAL EXAM      Date: 12/11/2021  Patient Name: Bonifacio Grimaldo    History of Presenting Illness     Chief Complaint   Patient presents with    Leg Pain       History Provided By: Patient    HPI: Bonifacio Grimaldo, 57 y.o. female with a past medical history significant hypertension, obesity and asthma presents to the ED with cc of right leg /calf pain that started yesterday. She denies SOB or chest pain. No hx of DVT or no family hx. She denies recent trauma. Patient took Tylenol with some relief. Pain scale 5/10. There are no other complaints, changes, or physical findings at this time. PCP: Duane Joyner MD    No current facility-administered medications on file prior to encounter. Current Outpatient Medications on File Prior to Encounter   Medication Sig Dispense Refill    magnesium oxide (MAG-OX) 400 mg tablet Take 1 tablet by mouth once daily 90 Tablet 1    pioglitazone (ACTOS) 15 mg tablet TAKE 1 TABLET EVERY DAY 90 Tablet 1    allopurinoL (ZYLOPRIM) 300 mg tablet TAKE 1 TABLET EVERY DAY 90 Tablet 1    lisinopriL (PRINIVIL, ZESTRIL) 10 mg tablet TAKE 1 TABLET EVERY DAY 90 Tablet 1    Accu-Chek Cherie Plus test strp strip USE TO CHECK SUGAR ONCE A  Strip 1    Accu-Chek Fastclix Lancet Drum misc TEST EVERY  Each 1    furosemide (LASIX) 40 mg tablet TAKE 1 TABLET ON MONDAY, WEDNESDAY AND FRIDAY 36 Tablet 1    meclizine (ANTIVERT) 25 mg tablet TAKE 1 TABLET BY MOUTH EVERY 8 HOURS AS NEEDED 30 Tablet 3    insulin NPH/insulin regular (NovoLIN 70/30 U-100 Insulin) 100 unit/mL (70-30) injection 20 Units by SubCUTAneous route as needed (Elevated sugar. ).  Indications: type 2 diabetes mellitus (Patient not taking: Reported on 8/4/2021) 10 mL 1    atorvastatin (LIPITOR) 10 mg tablet Take 1 tablet by mouth once daily 90 Tablet 1    glimepiride (AMARYL) 4 mg tablet Take 2 tablets by mouth once daily 180 Tablet 1    Lancing Device with Lancets (Accu-Chek FastClix Lancing Dev) kit 100 Lancet by Does Not Apply route daily. (Patient not taking: Reported on 9/22/2021) 1 Kit 0    Blood-Glucose Meter misc 1 m by Does Not Apply route daily. (Patient not taking: Reported on 9/22/2021) 1 Each 0    albuterol (PROVENTIL HFA, VENTOLIN HFA, PROAIR HFA) 90 mcg/actuation inhaler Take  by inhalation.  Insulin Syringe-Needle U-100 (BD Insulin Syringe Ultra-Fine) 0.3 mL 31 gauge x 5/16\" syrg by Does Not Apply route. (Patient not taking: Reported on 9/22/2021)      albuterol-ipratropium (DUO-NEB) 2.5 mg-0.5 mg/3 ml nebu 3 mL by Nebulization route.  ferrous sulfate (Iron) 325 mg (65 mg iron) tablet Take 325 mg by mouth every Monday, Wednesday, Friday. (Patient not taking: Reported on 8/4/2021)      multivitamin (ONE A DAY) tablet Take 1 Tab by mouth daily.  aspirin 81 mg chewable tablet Take 81 mg by mouth daily.  cholecalciferol (Vitamin D3) (1000 Units /25 mcg) tablet Take 1,000 Units by mouth daily.  acetaminophen (Tylenol Arthritis Pain) 650 mg TbER Take 650 mg by mouth every eight (8) hours.          Past History     Past Medical History:  Past Medical History:   Diagnosis Date    Arthritis     Asthma     Chronic kidney disease     stage 3    Chronic obstructive pulmonary disease (HCC)     Diabetes (HCC)     GERD (gastroesophageal reflux disease)     High cholesterol     Hypertension     Menopause     Morbid obesity (HCC)     Vertigo        Past Surgical History:  Past Surgical History:   Procedure Laterality Date    HX BREAST BIOPSY Left 2019    HX CHOLECYSTECTOMY      HX ENDOSCOPY  01/01/2019    HX HYSTERECTOMY      HX OOPHORECTOMY      HX ORTHOPAEDIC      right shoulder spur    HX ROTATOR CUFF REPAIR Left     by suture    HX TUBAL LIGATION Bilateral        Family History:  Family History   Problem Relation Age of Onset    Heart Disease Mother     Lung Disease Mother         lung ca    Lung Disease Father         PNA    Cancer Maternal Grandmother         gyn       Social History:  Social History     Tobacco Use    Smoking status: Never Smoker    Smokeless tobacco: Never Used   Vaping Use    Vaping Use: Never used   Substance Use Topics    Alcohol use: Never    Drug use: Never       Allergies: Allergies   Allergen Reactions    Celebrex [Celecoxib] Rash    Cleocin [Clindamycin Phosphate] Palpitations    Mushroom Swelling    Strawberry Rash    Ultracet [Tramadol-Acetaminophen] Rash    Zithromax [Azithromycin] Rash         Review of Systems     Review of Systems   Constitutional: Negative. Negative for diaphoresis and fever. HENT: Negative. Eyes: Negative. Respiratory: Negative. Cardiovascular: Negative. Gastrointestinal: Negative. Endocrine: Negative. Genitourinary: Negative. Musculoskeletal: Negative. Right calf pain with mild swelling   Skin: Negative. Allergic/Immunologic: Negative. Neurological: Negative. Hematological: Negative. Psychiatric/Behavioral: Negative. Physical Exam     Physical Exam  Vitals and nursing note reviewed. Constitutional:       Appearance: Normal appearance. She is obese. HENT:      Head: Normocephalic. Right Ear: Tympanic membrane normal.      Left Ear: Tympanic membrane normal.      Nose: Nose normal.      Mouth/Throat:      Mouth: Mucous membranes are moist.   Eyes:      Pupils: Pupils are equal, round, and reactive to light. Cardiovascular:      Rate and Rhythm: Normal rate. Pulses: Normal pulses. Pulmonary:      Effort: Pulmonary effort is normal.   Abdominal:      General: Abdomen is flat. Palpations: Abdomen is soft. Musculoskeletal:         General: Normal range of motion. Cervical back: Normal range of motion and neck supple. Comments: Right calf with mild swelling and warmth   Skin:     Capillary Refill: Capillary refill takes less than 2 seconds. Neurological:      General: No focal deficit present. Mental Status: She is alert. Psychiatric:         Mood and Affect: Mood normal.         Lab and Diagnostic Study Results     Labs -   No results found for this or any previous visit (from the past 12 hour(s)). Radiologic Studies -   @lastxrresult@  CT Results  (Last 48 hours)    None        CXR Results  (Last 48 hours)    None            Medical Decision Making   - I am the first provider for this patient. - I reviewed the vital signs, available nursing notes, past medical history, past surgical history, family history and social history. - Initial assessment performed. The patients presenting problems have been discussed, and they are in agreement with the care plan formulated and outlined with them. I have encouraged them to ask questions as they arise throughout their visit. Vital Signs-Reviewed the patient's vital signs. Patient Vitals for the past 12 hrs:   Temp Pulse Resp BP SpO2   12/11/21 1044    129/65    12/11/21 1042 97.5 °F (36.4 °C) 71 19  97 %       Records Reviewed: Nursing Notes    The patient presents with right calf pain and swelling with a differential diagnosis of DVT, cellulitis, contusion, DJD    ED Course:          Provider Notes (Medical Decision Making): MDM       Procedures   Medical Decision Makingedical Decision Making  Performed by: Vielka Becker MD  PROCEDURES:  Procedures       Disposition   Disposition: Condition stable        DISCHARGE PLAN:  1.    Current Discharge Medication List      CONTINUE these medications which have NOT CHANGED    Details   magnesium oxide (MAG-OX) 400 mg tablet Take 1 tablet by mouth once daily  Qty: 90 Tablet, Refills: 1    Associated Diagnoses: Stage 3a chronic kidney disease (HCC)      pioglitazone (ACTOS) 15 mg tablet TAKE 1 TABLET EVERY DAY  Qty: 90 Tablet, Refills: 1    Associated Diagnoses: Type 2 diabetes mellitus with other specified complication, without long-term current use of insulin (HCC)      allopurinoL (ZYLOPRIM) 300 mg tablet TAKE 1 TABLET EVERY DAY  Qty: 90 Tablet, Refills: 1    Associated Diagnoses: Acute gout of multiple sites, unspecified cause      lisinopriL (PRINIVIL, ZESTRIL) 10 mg tablet TAKE 1 TABLET EVERY DAY  Qty: 90 Tablet, Refills: 1    Associated Diagnoses: Essential hypertension      Accu-Chek Cherie Plus test strp strip USE TO CHECK SUGAR ONCE A DAY  Qty: 100 Strip, Refills: 1    Associated Diagnoses: Type 2 diabetes mellitus with other specified complication, without long-term current use of insulin ([facility])      Accu-Chek Fastclix Lancet Drum misc TEST EVERY DAY  Qty: 102 Each, Refills: 1      furosemide (LASIX) 40 mg tablet TAKE 1 TABLET ON MONDAY, WEDNESDAY AND FRIDAY  Qty: 36 Tablet, Refills: 1    Associated Diagnoses: Chronic congestive heart failure, unspecified heart failure type ([facility])      meclizine (ANTIVERT) 25 mg tablet TAKE 1 TABLET BY MOUTH EVERY 8 HOURS AS NEEDED  Qty: 30 Tablet, Refills: 3    Associated Diagnoses: Dizziness      insulin NPH/insulin regular (NovoLIN 70/30 U-100 Insulin) 100 unit/mL (70-30) injection 20 Units by SubCUTAneous route as needed (Elevated sugar. ). Indications: type 2 diabetes mellitus  Qty: 10 mL, Refills: 1    Associated Diagnoses: Type 2 diabetes mellitus without complication, without long-term current use of insulin ([facility])      atorvastatin (LIPITOR) 10 mg tablet Take 1 tablet by mouth once daily  Qty: 90 Tablet, Refills: 1    Associated Diagnoses: Mixed hyperlipidemia due to type 2 diabetes mellitus ([facility])      glimepiride (AMARYL) 4 mg tablet Take 2 tablets by mouth once daily  Qty: 180 Tablet, Refills: 1    Associated Diagnoses: Type 2 diabetes mellitus with other specified complication, without long-term current use of insulin ([facility])      Lancing Device with Lancets (Accu-Chek FastClix Lancing Dev) kit 100 Lancet by Does Not Apply route daily.   Qty: 1 Kit, Refills: 0    Associated Diagnoses: Type 2 diabetes mellitus without complication, without long-term current use of insulin ([facility])      Blood-Glucose Meter misc 1 m by Does Not Apply route daily. Qty: 1 Each, Refills: 0    Comments: DX: E11.9 NPI [id number]  Associated Diagnoses: Type 2 diabetes mellitus with other specified complication, without long-term current use of insulin (HCC)      albuterol (PROVENTIL HFA, VENTOLIN HFA, PROAIR HFA) 90 mcg/actuation inhaler Take  by inhalation. Insulin Syringe-Needle U-100 (BD Insulin Syringe Ultra-Fine) 0.3 mL 31 gauge x 5/16\" syrg by Does Not Apply route. albuterol-ipratropium (DUO-NEB) 2.5 mg-0.5 mg/3 ml nebu 3 mL by Nebulization route. ferrous sulfate (Iron) 325 mg (65 mg iron) tablet Take 325 mg by mouth every Monday, Wednesday, Friday. multivitamin (ONE A DAY) tablet Take 1 Tab by mouth daily. aspirin 81 mg chewable tablet Take 81 mg by mouth daily. cholecalciferol (Vitamin D3) (1000 Units /25 mcg) tablet Take 1,000 Units by mouth daily. acetaminophen (Tylenol Arthritis Pain) 650 mg TbER Take 650 mg by mouth every eight (8) hours. 2.   Follow-up Information    None       3. Return to ED if worse   4. Current Discharge Medication List            Diagnosis     Clinical Impression:     ICD-10-CM ICD-9-CM    1. Pain of right lower extremity  M79.604 729.5     r/o DVT     Tyrone Díaz MD    Please note that this dictation was completed with [employer], the computer voice recognition software. Quite often unanticipated grammatical, syntax, homophones, and other interpretive errors are inadvertently transcribed by the computer software. Please disregard these errors. Please excuse any errors that have escaped final proofreading. Thank you.

## 2021-12-11 NOTE — DISCHARGE INSTRUCTIONS
Patient is to follow-up on Monday at  [facility] for Venous Duplex dopple study of right lower leg at 9:00am

## 2021-12-13 ENCOUNTER — HOSPITAL ENCOUNTER (OUTPATIENT)
Dept: VASCULAR SURGERY | Age: 61
Discharge: HOME | End: 2021-12-13
Payer: MEDICARE

## 2021-12-13 ENCOUNTER — TRANSCRIBE ORDER (OUTPATIENT)
Dept: REGISTRATION | Age: 61
End: 2021-12-13

## 2021-12-13 DIAGNOSIS — R52 PAIN: Primary | ICD-10-CM

## 2021-12-13 DIAGNOSIS — R52 PAIN: ICD-10-CM

## 2021-12-13 DIAGNOSIS — R60.9 SWELLING: ICD-10-CM

## 2021-12-13 PROCEDURE — 93971 EXTREMITY STUDY: CPT

## 2021-12-14 ENCOUNTER — OFFICE VISIT (OUTPATIENT)
Dept: PRIMARY CARE CLINIC | Age: 61
End: 2021-12-14
Payer: MEDICARE

## 2021-12-14 VITALS
WEIGHT: 293 LBS | SYSTOLIC BLOOD PRESSURE: 142 MMHG | RESPIRATION RATE: 18 BRPM | BODY MASS INDEX: 47.09 KG/M2 | HEART RATE: 60 BPM | TEMPERATURE: 97.6 F | OXYGEN SATURATION: 99 % | DIASTOLIC BLOOD PRESSURE: 61 MMHG | HEIGHT: 66 IN

## 2021-12-14 DIAGNOSIS — M79.604 RIGHT LEG PAIN: ICD-10-CM

## 2021-12-14 DIAGNOSIS — N18.31 STAGE 3A CHRONIC KIDNEY DISEASE (HCC): ICD-10-CM

## 2021-12-14 DIAGNOSIS — D50.8 IRON DEFICIENCY ANEMIA SECONDARY TO INADEQUATE DIETARY IRON INTAKE: ICD-10-CM

## 2021-12-14 DIAGNOSIS — M1A.09X0 CHRONIC GOUT OF MULTIPLE SITES, UNSPECIFIED CAUSE: ICD-10-CM

## 2021-12-14 DIAGNOSIS — E11.69 TYPE 2 DIABETES MELLITUS WITH OTHER SPECIFIED COMPLICATION, WITHOUT LONG-TERM CURRENT USE OF INSULIN (HCC): Primary | ICD-10-CM

## 2021-12-14 DIAGNOSIS — E78.2 MIXED HYPERLIPIDEMIA DUE TO TYPE 2 DIABETES MELLITUS (HCC): ICD-10-CM

## 2021-12-14 DIAGNOSIS — M19.90 OSTEOARTHRITIS, UNSPECIFIED OSTEOARTHRITIS TYPE, UNSPECIFIED SITE: ICD-10-CM

## 2021-12-14 DIAGNOSIS — E78.2 MIXED HYPERLIPIDEMIA: ICD-10-CM

## 2021-12-14 DIAGNOSIS — I10 ESSENTIAL HYPERTENSION: ICD-10-CM

## 2021-12-14 DIAGNOSIS — K21.9 GASTROESOPHAGEAL REFLUX DISEASE WITHOUT ESOPHAGITIS: ICD-10-CM

## 2021-12-14 DIAGNOSIS — E66.01 MORBID OBESITY WITH BMI OF 60.0-69.9, ADULT (HCC): ICD-10-CM

## 2021-12-14 DIAGNOSIS — E11.69 MIXED HYPERLIPIDEMIA DUE TO TYPE 2 DIABETES MELLITUS (HCC): ICD-10-CM

## 2021-12-14 DIAGNOSIS — Z23 NEEDS FLU SHOT: ICD-10-CM

## 2021-12-14 DIAGNOSIS — J44.9 CHRONIC OBSTRUCTIVE PULMONARY DISEASE, UNSPECIFIED COPD TYPE (HCC): ICD-10-CM

## 2021-12-14 LAB — HBA1C MFR BLD HPLC: 7 %

## 2021-12-14 PROCEDURE — G8510 SCR DEP NEG, NO PLAN REQD: HCPCS | Performed by: FAMILY MEDICINE

## 2021-12-14 PROCEDURE — G8754 DIAS BP LESS 90: HCPCS | Performed by: FAMILY MEDICINE

## 2021-12-14 PROCEDURE — G8417 CALC BMI ABV UP PARAM F/U: HCPCS | Performed by: FAMILY MEDICINE

## 2021-12-14 PROCEDURE — 3051F HG A1C>EQUAL 7.0%<8.0%: CPT | Performed by: FAMILY MEDICINE

## 2021-12-14 PROCEDURE — 90674 CCIIV4 VAC NO PRSV 0.5 ML IM: CPT | Performed by: FAMILY MEDICINE

## 2021-12-14 PROCEDURE — G8427 DOCREV CUR MEDS BY ELIG CLIN: HCPCS | Performed by: FAMILY MEDICINE

## 2021-12-14 PROCEDURE — 3017F COLORECTAL CA SCREEN DOC REV: CPT | Performed by: FAMILY MEDICINE

## 2021-12-14 PROCEDURE — 83036 HEMOGLOBIN GLYCOSYLATED A1C: CPT | Performed by: FAMILY MEDICINE

## 2021-12-14 PROCEDURE — G0008 ADMIN INFLUENZA VIRUS VAC: HCPCS | Performed by: FAMILY MEDICINE

## 2021-12-14 PROCEDURE — 2022F DILAT RTA XM EVC RTNOPTHY: CPT | Performed by: FAMILY MEDICINE

## 2021-12-14 PROCEDURE — G9899 SCRN MAM PERF RSLTS DOC: HCPCS | Performed by: FAMILY MEDICINE

## 2021-12-14 PROCEDURE — 99214 OFFICE O/P EST MOD 30 MIN: CPT | Performed by: FAMILY MEDICINE

## 2021-12-14 PROCEDURE — G8753 SYS BP > OR = 140: HCPCS | Performed by: FAMILY MEDICINE

## 2021-12-14 NOTE — PROGRESS NOTES
Chief Complaint   Patient presents with    Hypertension     follow up     Diabetes     follow up    [facility] Follow Up     Seen in ER on Saturday for right leg pain     1. Have you been to the ER, urgent care clinic since your last visit? Hospitalized since your last visit? Yes Where: Emergency Room     2. Have you seen or consulted any other health care providers outside of the [address] [place] [address] since your last visit? Include any pap smears or colon screening. No   Visit Vitals  BP (!) 142/61 (BP 1 Location: Right arm, BP Patient Position: Sitting, BP Cuff Size: Adult)   Pulse 60   Temp 97.6 °F (36.4 °C) (Oral)   Resp 18   Ht 5' 6\" (1.676 m)   Wt (!) 420 lb 6.4 oz (190.7 kg)   LMP  (LMP Unknown)   SpO2 99%   BMI 67.85 kg/m²     Patient getting flu vaccine today. Will get Booster soon. Jo-Ann Sullivan (: 1960) is a 57 y.o. female, established patient, here for evaluation of the following chief complaint(s):  Hypertension (follow up ), Diabetes (follow up ), and Hospital Follow Up (Seen in ER on Saturday for right leg pain)       ASSESSMENT/PLAN:  Below is the assessment and plan developed based on review of pertinent history, physical exam, labs, studies, and medications. 1. Type 2 diabetes mellitus with other specified complication, without long-term current use of insulin (HCC)  -     AMB POC HEMOGLOBIN A1C  -     MICROALBUMIN, UR, RAND W/ MICROALB/CREAT RATIO  2. Needs flu shot  -     INFLUENZA, INJECTABLE, MDCK, PRESERVATIVE FREE, QUADRIVALENT  3. Essential hypertension  -     METABOLIC PANEL, COMPREHENSIVE  -     URINALYSIS W/ RFLX MICROSCOPIC  4. Gastroesophageal reflux disease without esophagitis  5. Mixed hyperlipidemia due to type 2 diabetes mellitus ([address]  6. Chronic obstructive pulmonary disease, unspecified COPD type ([facility]  7. Osteoarthritis, unspecified osteoarthritis type, unspecified site  8. Stage 3a chronic kidney disease ([facility]  9.  Morbid obesity with BMI of 60.0-69.9, adult ([facility]  10. Iron deficiency anemia secondary to inadequate dietary iron intake  11. Mixed hyperlipidemia  -     LIPID PANEL  12. Chronic gout of multiple sites, unspecified cause  -     URIC ACID  13. Right leg pain  -     REFERRAL TO VASCULAR SURGERY; Future      Return in about 6 months (around 6/14/2022) for Follow up of chronic medical conditions, Fasting Lab Appointment. SUBJECTIVE/OBJECTIVE:  This patient comes in for follow-up of her morbid obesity, hypertension, diabetes mellitus, GERD, history of obstructive sleep apnea, osteoarthritis, stage IIIa chronic kidney disease, history of gout and iron deficiency anemia. She recently was in the emergency room with pain in her right leg and it was determined that this was not a blood clot but that she did have varicose veins. It was recommended she refer to a vein specialist.  She is gained even more weight just in the last month or so. She was unable to do bariatric surgery due to insurance issues. She has an extreme difficulty in controlling what she eats and indicates she is drinking sodas with sugar I have had multiple conversations over the last several years with how dangerous this is and how these are going to continue to affect her overall health. Review of Systems   Constitutional: Negative. Respiratory: Negative. Cardiovascular: Negative. Gastrointestinal: Negative. Endocrine: Negative. Genitourinary: Negative. Musculoskeletal: Positive for arthralgias and myalgias. Allergic/Immunologic: Negative. Neurological: Negative. Hematological: Negative. Psychiatric/Behavioral: Negative. Physical Exam  Vitals and nursing note reviewed. Constitutional:       Appearance: Normal appearance. She is well-developed. She is morbidly obese. HENT:      Head: Normocephalic and atraumatic. Cardiovascular:      Rate and Rhythm: Normal rate and regular rhythm. Heart sounds: Normal heart sounds.    Pulmonary: Effort: Pulmonary effort is normal.      Breath sounds: Normal breath sounds. Abdominal:      General: Abdomen is flat. Bowel sounds are normal.      Palpations: Abdomen is soft. Musculoskeletal:         General: Normal range of motion. Neurological:      General: No focal deficit present. Mental Status: She is alert. Psychiatric:         Mood and Affect: Mood normal.         Behavior: Behavior normal. Behavior is cooperative. Thought Content: Thought content normal.         Judgment: Judgment normal.       Overall this patient's problems are mainly related to her morbid obesity. Again discussed ways that she can cut back her sweets and sugars as well as portion size. She does not seem to be motivated. We will refer her to a vascular surgeon although I do not think she has significant vascular disease at this time. We will check her labs to see how she is doing and recommend further treatment once I see what these are. An electronic signature was used to authenticate this note.   -- Roel Harding MD

## 2021-12-15 LAB
ALBUMIN SERPL-MCNC: 3.8 G/DL (ref 3.8–4.8)
ALBUMIN/CREAT UR: <2 MG/G CREAT (ref 0–29)
ALBUMIN/GLOB SERPL: 1.1 {RATIO} (ref 1.2–2.2)
ALP SERPL-CCNC: 110 IU/L (ref 44–121)
ALT SERPL-CCNC: 16 IU/L (ref 0–32)
APPEARANCE UR: CLEAR
AST SERPL-CCNC: 20 IU/L (ref 0–40)
BILIRUB SERPL-MCNC: 0.5 MG/DL (ref 0–1.2)
BILIRUB UR QL STRIP: NEGATIVE
BUN SERPL-MCNC: 23 MG/DL (ref 8–27)
BUN/CREAT SERPL: 17 (ref 12–28)
CALCIUM SERPL-MCNC: 9.9 MG/DL (ref 8.7–10.3)
CHLORIDE SERPL-SCNC: 105 MMOL/L (ref 96–106)
CHOLEST SERPL-MCNC: 170 MG/DL (ref 100–199)
CO2 SERPL-SCNC: 26 MMOL/L (ref 20–29)
COLOR UR: YELLOW
CREAT SERPL-MCNC: 1.39 MG/DL (ref 0.57–1)
CREAT UR-MCNC: 151 MG/DL
GLOBULIN SER CALC-MCNC: 3.4 G/DL (ref 1.5–4.5)
GLUCOSE SERPL-MCNC: 149 MG/DL (ref 65–99)
GLUCOSE UR QL: NEGATIVE
HDLC SERPL-MCNC: 59 MG/DL
HGB UR QL STRIP: NEGATIVE
KETONES UR QL STRIP: NEGATIVE
LDLC SERPL CALC-MCNC: 90 MG/DL (ref 0–99)
LEUKOCYTE ESTERASE UR QL STRIP: NEGATIVE
MICRO URNS: NORMAL
MICROALBUMIN UR-MCNC: <3 UG/ML
NITRITE UR QL STRIP: NEGATIVE
PH UR STRIP: 6.5 [PH] (ref 5–7.5)
POTASSIUM SERPL-SCNC: 4.8 MMOL/L (ref 3.5–5.2)
PROT SERPL-MCNC: 7.2 G/DL (ref 6–8.5)
PROT UR QL STRIP: NEGATIVE
SODIUM SERPL-SCNC: 144 MMOL/L (ref 134–144)
SP GR UR: 1.02 (ref 1–1.03)
TRIGL SERPL-MCNC: 121 MG/DL (ref 0–149)
URATE SERPL-MCNC: 4 MG/DL (ref 3–7.2)
UROBILINOGEN UR STRIP-MCNC: 1 MG/DL (ref 0.2–1)
VLDLC SERPL CALC-MCNC: 21 MG/DL (ref 5–40)

## 2021-12-16 DIAGNOSIS — E11.69 TYPE 2 DIABETES MELLITUS WITH OTHER SPECIFIED COMPLICATION, WITHOUT LONG-TERM CURRENT USE OF INSULIN (HCC): ICD-10-CM

## 2021-12-16 DIAGNOSIS — R42 DIZZINESS: ICD-10-CM

## 2021-12-16 RX ORDER — BLOOD SUGAR DIAGNOSTIC
STRIP MISCELLANEOUS
Qty: 100 STRIP | Refills: 1 | Status: SHIPPED | OUTPATIENT
Start: 2021-12-16 | End: 2022-03-09 | Stop reason: ALTCHOICE

## 2021-12-16 RX ORDER — MECLIZINE HYDROCHLORIDE 25 MG/1
TABLET ORAL
Qty: 30 TABLET | Refills: 3 | Status: SHIPPED | OUTPATIENT
Start: 2021-12-16 | End: 2022-04-28 | Stop reason: SDUPTHER

## 2021-12-27 ENCOUNTER — TELEPHONE (OUTPATIENT)
Dept: PRIMARY CARE CLINIC | Age: 61
End: 2021-12-27

## 2021-12-27 NOTE — TELEPHONE ENCOUNTER
Patient notified that her kidney level remains stable and BS. Need to work on weight loss. Not canidate for weight loss surgery. Need to work on portion control.

## 2021-12-27 NOTE — TELEPHONE ENCOUNTER
----- Message from Gwendolyn Cardoza MD sent at 12/27/2021 10:08 AM EST -----  Inform the patient that Your lab work was essentially normal except for your mild kidney failure which has been stable now for about a year as well as an elevated sugar. It is very important to work on weight loss as all of these conditions are getting get worse as you gain weight. Since you are not qualified for bariatric surgery you need to simply cut back on foods and portion sizes and make an effort to organize this for yourself. Continue the current medications and follow-up for fasting office visit in 3 months

## 2022-01-21 ENCOUNTER — OFFICE VISIT (OUTPATIENT)
Dept: SURGERY | Age: 62
End: 2022-01-21
Payer: MEDICARE

## 2022-01-21 VITALS
BODY MASS INDEX: 47.09 KG/M2 | SYSTOLIC BLOOD PRESSURE: 140 MMHG | WEIGHT: 293 LBS | HEART RATE: 79 BPM | RESPIRATION RATE: 22 BRPM | DIASTOLIC BLOOD PRESSURE: 65 MMHG | TEMPERATURE: 97.4 F | HEIGHT: 66 IN | OXYGEN SATURATION: 100 %

## 2022-01-21 DIAGNOSIS — E66.01 MORBID OBESITY WITH BMI OF 60.0-69.9, ADULT (HCC): ICD-10-CM

## 2022-01-21 DIAGNOSIS — M19.90 ARTHRITIS: ICD-10-CM

## 2022-01-21 DIAGNOSIS — I83.10 VARICOSE VEINS OF LOWER EXTREMITY WITH INFLAMMATION, UNSPECIFIED LATERALITY: Primary | ICD-10-CM

## 2022-01-21 PROCEDURE — G8427 DOCREV CUR MEDS BY ELIG CLIN: HCPCS | Performed by: SURGERY

## 2022-01-21 PROCEDURE — 3017F COLORECTAL CA SCREEN DOC REV: CPT | Performed by: SURGERY

## 2022-01-21 PROCEDURE — G8754 DIAS BP LESS 90: HCPCS | Performed by: SURGERY

## 2022-01-21 PROCEDURE — 99203 OFFICE O/P NEW LOW 30 MIN: CPT | Performed by: SURGERY

## 2022-01-21 PROCEDURE — G8753 SYS BP > OR = 140: HCPCS | Performed by: SURGERY

## 2022-01-21 PROCEDURE — G8417 CALC BMI ABV UP PARAM F/U: HCPCS | Performed by: SURGERY

## 2022-01-21 PROCEDURE — G9899 SCRN MAM PERF RSLTS DOC: HCPCS | Performed by: SURGERY

## 2022-01-21 PROCEDURE — G8510 SCR DEP NEG, NO PLAN REQD: HCPCS | Performed by: SURGERY

## 2022-01-21 NOTE — PROGRESS NOTES
Visit Vitals  BP (!) 140/65 (BP 1 Location: Left lower arm, BP Patient Position: Sitting, BP Cuff Size: Large adult)   Pulse 79   Temp 97.4 °F (36.3 °C) (Temporal)   Resp 22   Ht 5' 6\" (1.676 m)   Wt (!) 427 lb 12.8 oz (194 kg)   LMP  (LMP Unknown)   SpO2 100%   BMI 69.05 kg/m²     Chief Complaint   Patient presents with    New Patient     both legs hurt

## 2022-01-25 ENCOUNTER — TELEPHONE (OUTPATIENT)
Dept: SURGERY | Age: 62
End: 2022-01-25

## 2022-01-25 DIAGNOSIS — E11.69 MIXED HYPERLIPIDEMIA DUE TO TYPE 2 DIABETES MELLITUS (HCC): ICD-10-CM

## 2022-01-25 DIAGNOSIS — E78.2 MIXED HYPERLIPIDEMIA DUE TO TYPE 2 DIABETES MELLITUS (HCC): ICD-10-CM

## 2022-01-25 DIAGNOSIS — E11.69 TYPE 2 DIABETES MELLITUS WITH OTHER SPECIFIED COMPLICATION, WITHOUT LONG-TERM CURRENT USE OF INSULIN (HCC): ICD-10-CM

## 2022-01-25 RX ORDER — GLIMEPIRIDE 4 MG/1
TABLET ORAL
Qty: 180 TABLET | Refills: 1 | Status: SHIPPED | OUTPATIENT
Start: 2022-01-25 | End: 2022-02-02 | Stop reason: ALTCHOICE

## 2022-01-25 RX ORDER — ATORVASTATIN CALCIUM 10 MG/1
TABLET, FILM COATED ORAL
Qty: 90 TABLET | Refills: 1 | Status: SHIPPED | OUTPATIENT
Start: 2022-01-25 | End: 2022-07-07

## 2022-01-25 NOTE — TELEPHONE ENCOUNTER
Queen Lisa from the lymphedema clinic called stated needs an order for compression stockings for the patient that states \"evaluate and treat for lymphedema I89.0 stated cannot send electronically.  Fax number is [phone] for lymphedema clinic Questions please call Queen Lisa or Satish Vaca [phone]

## 2022-01-26 DIAGNOSIS — E11.69 TYPE 2 DIABETES MELLITUS WITH OTHER SPECIFIED COMPLICATION, WITHOUT LONG-TERM CURRENT USE OF INSULIN (HCC): Primary | ICD-10-CM

## 2022-01-26 RX ORDER — LANCETS
EACH MISCELLANEOUS
Qty: 100 EACH | Refills: 5 | Status: SHIPPED | OUTPATIENT
Start: 2022-01-26 | End: 2022-08-31 | Stop reason: SDUPTHER

## 2022-01-28 ENCOUNTER — TELEPHONE (OUTPATIENT)
Dept: PRIMARY CARE CLINIC | Age: 62
End: 2022-01-28

## 2022-01-28 DIAGNOSIS — E11.69 TYPE 2 DIABETES MELLITUS WITH OTHER SPECIFIED COMPLICATION, WITHOUT LONG-TERM CURRENT USE OF INSULIN (HCC): Primary | ICD-10-CM

## 2022-01-28 RX ORDER — ISOPROPYL ALCOHOL 70 ML/100ML
1 SWAB TOPICAL DAILY
Qty: 100 PAD | Refills: 3 | Status: SHIPPED | OUTPATIENT
Start: 2022-01-28 | End: 2022-08-31 | Stop reason: SDUPTHER

## 2022-01-28 RX ORDER — INSULIN PUMP SYRINGE, 3 ML
EACH MISCELLANEOUS
Qty: 1 KIT | Refills: 0 | Status: SHIPPED | OUTPATIENT
Start: 2022-01-28

## 2022-01-28 RX ORDER — BLOOD-GLUCOSE METER
1 EACH MISCELLANEOUS DAILY
Qty: 1 EACH | Refills: 3 | Status: SHIPPED | OUTPATIENT
Start: 2022-01-28

## 2022-01-28 NOTE — TELEPHONE ENCOUNTER
[facility] following up on request for True Metrix meter, control solution and alcohol swabs. They received order for test strips.

## 2022-01-30 NOTE — PROGRESS NOTES
VASCULAR FOLLOW UP      Subjective:   CHIEF COMPLAINTS:  Knee issues  PRESENTATION OF ILLNESS:  Bandar Fuller is a 57 y.o. very pleasant woman referred from primary care physician because of the knee pain and swelling. Patient has a history of bilateral arthritis. Patient denies recent trauma. Denies any history of deep vein thrombosis. Patient also complains of swelling as well. Patient currently not wearing compression stocking. Patient weight has been steady. Past Medical History:   Diagnosis Date    Arthritis     Asthma     Chronic kidney disease     stage 3    Chronic obstructive pulmonary disease (HCC)     Diabetes (HCC)     GERD (gastroesophageal reflux disease)     High cholesterol     Hypertension     Menopause     Morbid obesity (HCC)     Obstructive sleep apnea on CPAP     Vertigo       Past Surgical History:   Procedure Laterality Date    HX BREAST BIOPSY Left 2019    HX CHOLECYSTECTOMY      HX ENDOSCOPY  01/01/2019    HX HYSTERECTOMY      HX OOPHORECTOMY      HX ORTHOPAEDIC      right shoulder spur    HX ROTATOR CUFF REPAIR Left     by suture    HX TUBAL LIGATION Bilateral      Family History   Problem Relation Age of Onset    Heart Disease Mother     Lung Disease Mother         lung ca    Lung Disease Father         PNA    Cancer Maternal Grandmother         gyn      Social History     Tobacco Use    Smoking status: Never Smoker    Smokeless tobacco: Never Used   Substance Use Topics    Alcohol use: Never       Prior to Admission medications    Medication Sig Start Date End Date Taking? Authorizing Provider   cpap machine kit by Does Not Apply route.    Yes Provider, Historical   meclizine (ANTIVERT) 25 mg tablet TAKE 1 TABLET THREE TIMES DAILY AS NEEDED  FOR  DIZZINESS 12/16/21  Yes Damaris Avelar MD   magnesium oxide (MAG-OX) 400 mg tablet Take 1 tablet by mouth once daily 11/7/21  Yes Damaris Avelar MD   pioglitazone (ACTOS) 15 mg tablet TAKE 1 TABLET EVERY DAY 11/7/21  Yes Gael Abdullahi MD   allopurinoL (ZYLOPRIM) 300 mg tablet TAKE 1 TABLET EVERY DAY 11/7/21  Yes Gael Abdullahi MD   lisinopriL (PRINIVIL, ZESTRIL) 10 mg tablet TAKE 1 TABLET EVERY DAY 11/7/21  Yes Gael Abdullahi MD   furosemide (LASIX) 40 mg tablet TAKE 1 TABLET ON MONDAY, [facility] AND FRIDAY 8/28/21  Yes Gael Abdullahi MD   albuterol (PROVENTIL HFA, VENTOLIN HFA, PROAIR HFA) 90 mcg/actuation inhaler Take  by inhalation. Yes Provider, Historical   albuterol-ipratropium (DUO-NEB) 2.5 mg-0.5 mg/3 ml nebu 3 mL by Nebulization route. Yes Provider, Historical   multivitamin (ONE A DAY) tablet Take 1 Tab by mouth daily. Yes Other, MD Karen   aspirin 81 mg chewable tablet Take 81 mg by mouth daily. Yes Other, MD Karen   cholecalciferol (Vitamin D3) (1000 Units /25 mcg) tablet Take 1,000 Units by mouth daily. Yes Other, MD Karen   acetaminophen (Tylenol Arthritis Pain) 650 mg TbER Take 650 mg by mouth every eight (8) hours. Yes Jose, MD Karen   Blood-Glucose Meter (True Metrix Glucose Meter) monitoring kit Use to check glucose daily 1/28/22   Keon Pak NP   Blood Glucose Control, Low (True Metrix Level 1) soln 1 Bottle by Does Not Apply route daily. 1/28/22   Keon Pak NP   alcohol swabs padm 1 Swab by Apply Externally route daily. 1/28/22   Keon Pak NP   lancets misc Trueplus lancets 33g 1/26/22   Keon Pak NP   glimepiride (AMARYL) 4 mg tablet Take 2 tablets by mouth once daily 1/25/22   Keon Pak NP   atorvastatin (LIPITOR) 10 mg tablet Take 1 tablet by mouth once daily 1/25/22   Keon Pak NP   Accu-Chek Cherie Plus test strp strip TEST BLOOD SUGAR AS DIRECTED 12/16/21   Gael Abdullahi MD   rivaroxaban (Xarelto) 10 mg tablet Take 1 Tablet by mouth daily (with breakfast) for 2 days.  Indications: deep vein thrombosis prevention 12/11/21 12/13/21  Alyx Danielle MD   insulin NPH/insulin regular (NovoLIN 70/30 U-100 Insulin) 100 unit/mL (70-30) injection 20 Units by SubCUTAneous route as needed (Elevated sugar. ). Indications: type 2 diabetes mellitus  Patient not taking: Reported on 8/4/2021 6/15/21   Micki Argueta MD   ferrous sulfate (Iron) 325 mg (65 mg iron) tablet Take 325 mg by mouth every Monday, Wednesday, Friday. Patient not taking: Reported on 8/4/2021    Other, MD Karen     Allergies   Allergen Reactions    Celebrex [Celecoxib] Rash    Cleocin [Clindamycin Phosphate] Palpitations    Mushroom Swelling    Strawberry Rash    Ultracet [Tramadol-Acetaminophen] Rash    Zithromax [Azithromycin] Rash        Review of Systems:  I reviewed the rest of organ systems personally and they were negative signed by Dr. Phuong Martinez    Objective:     Visit Vitals  BP (!) 140/65 (BP 1 Location: Left lower arm, BP Patient Position: Sitting, BP Cuff Size: Large adult)   Pulse 79   Temp 97.4 °F (36.3 °C) (Temporal)   Resp 22   Ht 5' 6\" (1.676 m)   Wt (!) 427 lb 12.8 oz (194 kg)   LMP  (LMP Unknown)   SpO2 100%   BMI 69.05 kg/m²     VITAL SIGNS REVIEWED. Physical Exam:  Patient is well-nourished pleasant in conversation is appropriate. Head and neck examination atraumatic, normocephalic. Gaze appropriate. Conversation appropriate. Neck examination shows supple. No mass. No obvious carotid bruit. Chest examination shows lungs are clear bilaterally well-expanded, no crackles or wheezes. Cardiovascular system regular rate, no obvious murmur. Skin warm to touch  and moist, no skin lesions. Abdomen is soft ,not tender or distended bowel sounds present. No palpable mass. Neurological examinations, no focal neuro deficits moving all 4 extremities. Cranial nerves intact. Sensation is intact as well. Hematologic: No obvious bruise or swelling or obvious lymphadenopathy. Psychosocial: Appropriate. Has good effect. Musculoskeletal system: No muscle wasting, appropriate movements upper and lower extremity.   Vascular examination: Patient has a moderate swelling below the level. CEAP C3 venous disorder. Patient has a palpable pedal pulses. Data Review:   No visits with results within 1 Month(s) from this visit. Latest known visit with results is:   Office Visit on 12/14/2021   Component Date Value Ref Range Status    Hemoglobin A1c (POC) 12/14/2021 7.0  % Final    Glucose 12/14/2021 149* 65 - 99 mg/dL Final    BUN 12/14/2021 23  8 - 27 mg/dL Final    Creatinine 12/14/2021 1.39* 0.57 - 1.00 mg/dL Final    GFR est non-AA 12/14/2021 41* >59 mL/min/1.73 Final    GFR est AA 12/14/2021 47* >59 mL/min/1.73 Final    BUN/Creatinine ratio 12/14/2021 17  12 - 28 Final    Sodium 12/14/2021 144  134 - 144 mmol/L Final    Potassium 12/14/2021 4.8  3.5 - 5.2 mmol/L Final    Chloride 12/14/2021 105  96 - 106 mmol/L Final    CO2 12/14/2021 26  20 - 29 mmol/L Final    Calcium 12/14/2021 9.9  8.7 - 10.3 mg/dL Final    Protein, total 12/14/2021 7.2  6.0 - 8.5 g/dL Final    Albumin 12/14/2021 3.8  3.8 - 4.8 g/dL Final    GLOBULIN, TOTAL 12/14/2021 3.4  1.5 - 4.5 g/dL Final    A-G Ratio 12/14/2021 1.1* 1.2 - 2.2 Final    Bilirubin, total 12/14/2021 0.5  0.0 - 1.2 mg/dL Final    Alk.  phosphatase 12/14/2021 110  44 - 121 IU/L Final    AST (SGOT) 12/14/2021 20  0 - 40 IU/L Final    ALT (SGPT) 12/14/2021 16  0 - 32 IU/L Final    Cholesterol, total 12/14/2021 170  100 - 199 mg/dL Final    Triglyceride 12/14/2021 121  0 - 149 mg/dL Final    HDL Cholesterol 12/14/2021 59  >39 mg/dL Final    VLDL, calculated 12/14/2021 21  5 - 40 mg/dL Final    LDL, calculated 12/14/2021 90  0 - 99 mg/dL Final    Specific Gravity 12/14/2021 1.022  1.005 - 1.030 Final    pH (UA) 12/14/2021 6.5  5.0 - 7.5 Final    Color 12/14/2021 Yellow  Yellow Final    Appearance 12/14/2021 Clear  Clear Final    Leukocyte Esterase 12/14/2021 Negative  Negative Final    Protein 12/14/2021 Negative  Negative/Trace Final    Glucose 12/14/2021 Negative  Negative Final    Ketone 12/14/2021 Negative  Negative Final    Blood 12/14/2021 Negative  Negative Final    Bilirubin 12/14/2021 Negative  Negative Final    Urobilinogen 12/14/2021 1.0  0.2 - 1.0 mg/dL Final    Nitrites 12/14/2021 Negative  Negative Final    Microscopic Examination 12/14/2021 Comment   Final    Creatinine, urine 12/14/2021 151.0  Not Estab. mg/dL Final    Microalbumin, urine 12/14/2021 <3.0  Not Estab. ug/mL Final    Microalb/Creat ratio (ug/mg creat.) 12/14/2021 <2  0 - 29 mg/g creat Final    Uric acid 12/14/2021 4.0  3.0 - 7.2 mg/dL Final        Assessment:     Problem List Items Addressed This Visit        Circulatory    Varicose veins of lower extremities with inflammation - Primary       Skeletal    Arthritis              Plan:     Patient did not have previous recent x-rays. So we will order bilateral knee x-rays. Also we will order venous duplex ultrasound. Patient also prescribed mid thigh custom made compression stocking with a pressure system of 20 to 30 mmHg. Patient will be reassessed 1 month follow-up. Patient will be sent to lymphedema clinic at [facility] for custom made compression sock.         Mikhail Richardson MD

## 2022-02-02 ENCOUNTER — OFFICE VISIT (OUTPATIENT)
Dept: ENDOCRINOLOGY | Age: 62
End: 2022-02-02
Payer: MEDICARE

## 2022-02-02 ENCOUNTER — TELEPHONE (OUTPATIENT)
Dept: PRIMARY CARE CLINIC | Age: 62
End: 2022-02-02

## 2022-02-02 VITALS
TEMPERATURE: 98 F | HEIGHT: 66 IN | HEART RATE: 70 BPM | BODY MASS INDEX: 47.09 KG/M2 | OXYGEN SATURATION: 99 % | SYSTOLIC BLOOD PRESSURE: 129 MMHG | WEIGHT: 293 LBS | DIASTOLIC BLOOD PRESSURE: 73 MMHG

## 2022-02-02 DIAGNOSIS — E11.69 TYPE 2 DIABETES MELLITUS WITH OTHER SPECIFIED COMPLICATION, WITHOUT LONG-TERM CURRENT USE OF INSULIN (HCC): Primary | ICD-10-CM

## 2022-02-02 LAB — GLUCOSE POC: 114 MG/DL

## 2022-02-02 PROCEDURE — G8752 SYS BP LESS 140: HCPCS | Performed by: INTERNAL MEDICINE

## 2022-02-02 PROCEDURE — G8417 CALC BMI ABV UP PARAM F/U: HCPCS | Performed by: INTERNAL MEDICINE

## 2022-02-02 PROCEDURE — 99204 OFFICE O/P NEW MOD 45 MIN: CPT | Performed by: INTERNAL MEDICINE

## 2022-02-02 PROCEDURE — 2022F DILAT RTA XM EVC RTNOPTHY: CPT | Performed by: INTERNAL MEDICINE

## 2022-02-02 PROCEDURE — G8754 DIAS BP LESS 90: HCPCS | Performed by: INTERNAL MEDICINE

## 2022-02-02 PROCEDURE — G8510 SCR DEP NEG, NO PLAN REQD: HCPCS | Performed by: INTERNAL MEDICINE

## 2022-02-02 PROCEDURE — 3017F COLORECTAL CA SCREEN DOC REV: CPT | Performed by: INTERNAL MEDICINE

## 2022-02-02 PROCEDURE — G8427 DOCREV CUR MEDS BY ELIG CLIN: HCPCS | Performed by: INTERNAL MEDICINE

## 2022-02-02 PROCEDURE — G9899 SCRN MAM PERF RSLTS DOC: HCPCS | Performed by: INTERNAL MEDICINE

## 2022-02-02 PROCEDURE — 3046F HEMOGLOBIN A1C LEVEL >9.0%: CPT | Performed by: INTERNAL MEDICINE

## 2022-02-02 PROCEDURE — 82962 GLUCOSE BLOOD TEST: CPT | Performed by: INTERNAL MEDICINE

## 2022-02-02 RX ORDER — GLIMEPIRIDE 1 MG/1
1 TABLET ORAL 2 TIMES DAILY
Qty: 60 TABLET | Refills: 3 | Status: SHIPPED | OUTPATIENT
Start: 2022-02-02 | End: 2022-03-04

## 2022-02-02 RX ORDER — DULAGLUTIDE 0.75 MG/.5ML
0.75 INJECTION, SOLUTION SUBCUTANEOUS
Qty: 2 ML | Refills: 3 | Status: SHIPPED | OUTPATIENT
Start: 2022-02-02 | End: 2022-02-02

## 2022-02-02 RX ORDER — METFORMIN HYDROCHLORIDE 500 MG/1
TABLET, EXTENDED RELEASE ORAL
Qty: 60 TABLET | Refills: 3 | Status: SHIPPED | OUTPATIENT
Start: 2022-02-02 | End: 2022-05-18

## 2022-02-02 NOTE — TELEPHONE ENCOUNTER
Called pt back she said she cant afford to pay for the copay or deducatable for the jardiance. She said she may just have to go back on metformin?         ----- Message from Jesus Clemente sent at 2/2/2022  1:24 PM EST -----  Subject: Message to Provider    QUESTIONS  Information for Provider? PT has a prescription that is too expensive and   would like a call back to get something cheaper. ---------------------------------------------------------------------------  --------------  [place] [employer]  What is the best way for the office to contact you? OK to leave message on   voicemail  Preferred Call Back Phone Number? [phone]  ---------------------------------------------------------------------------  --------------  SCRIPT ANSWERS  Relationship to Patient?  Self

## 2022-02-02 NOTE — TELEPHONE ENCOUNTER
----- Message from Miladys Becker sent at 2/2/2022  2:10 PM EST -----  Subject: Medication Problem    QUESTIONS  Name of Medication? dulaglutide (Trulicity) 5.89 PE/0.1 mL sub-q pen  Patient-reported dosage and instructions? 0.5 mL by SubCUTAneous route   every seven (7) days for 30 days. What question or problem do you have with the medication? Rx ordered are   too expensive for pt. Pt is requesting new rx to be sent to pharmacy. Please contact pt once this is done. Preferred Pharmacy? [address], [address] phone number (if available)? [phone]  Additional Information for Provider?   ---------------------------------------------------------------------------  --------------  CALL BACK INFO  What is the best way for the office to contact you? OK to leave message on   voicemail  Preferred Call Back Phone Number? [phone]  ---------------------------------------------------------------------------  --------------  SCRIPT ANSWERS  Relationship to Patient?  Self

## 2022-02-02 NOTE — TELEPHONE ENCOUNTER
In that case I want to start her on Metformin  mg 1 tablet with dinner for 2 weeks and then increase to 2 tablets with dinner. Decrease glimepiride to 1 mg twice a day before meals. I am sending new prescriptions.

## 2022-02-02 NOTE — LETTER
2/2/2022    Patient: Dasha Gonzales   YOB: 1960   Date of Visit: 2/2/2022     Alexis Costa NP  [id number] [facility]  [address]    Dear Alexis Costa NP,      Thank you for referring Ms. Shaila Kitchen to [place] [address] for evaluation. My notes for this consultation are attached. If you have questions, please do not hesitate to call me. I look forward to following your patient along with you.       Sincerely,    Мария Olson MD

## 2022-02-02 NOTE — PROGRESS NOTES
History and Physical    Patient: Hilaria Tafoya MRN: [id number]  SSN: [id number]    YOB: 1960  Age: 57 y.o. Sex: female      Subjective:      Hilaria Tafoya is a 57 y.o. female past medical history of hypertension, hyperlipidemia, morbid obesity is sent to me by primary care provider Cathy Cunningham NP for type 2 diabetes mellitus. Patient was diagnosed with diabetes in 2006. She was on insulin at one point but she did not require it, so it was stopped. Currently she is on glimepiride 4 mg every day and pioglitazone 15 mg daily. She has a lot of fluid retention and lower extremity edema for which going to start using compression stockings soon. She is describing symptoms of hypoglycemia in the afternoons and then she craves for sweets. She eats 2-3 meals per day, overall her eating habits are not bad but she still feels like snacking on sweets etc. in between meals. Currently not able to exercise because of vascular issues. Up-to-date with diabetic eye exam.  No history of frequent urinary tract infections or yeast infections. Glucometer reading: Checking blood glucose once a day every day. Readings are ranging from 82 to 187 mg/dL    · Diagnosis: 2006  · Current treatment: glimepiride 4 mg every day, pioglitazone 15 mg  · Past treatment: Novolin 70/30 (not needed), metformin (kidneys)  · Glucose checks: once a day  · Hyperglycemia: no  · Hypoglycemia: yes afternoons  · Meals per day: 2-3, breakfast: yogurt and bread/ cereal or sausage, lunch: fruit, dinner: salad and baked meat, snacks: chips, soda   · Exercise: no   · DM related hospitalizations: no    Smoking: no  Family history of coronary artery disease/stroke: no    Complications of DM:  · CAD: no, ?  CHF non-obstructive  · CVA: no  · PVD: no  · Amputations: no   · Retinopathy: no; last exam was   · Gastropathy: no  · Nephropathy: ckd stage 3  · Neuropathy: yes   Sees podiatrist:    Medications:  · Statin: atorvastatin 10  · ACE-I: lisinopril  · ASA: yes    · Diabetes education: no    Past Medical History:   Diagnosis Date    Arthritis     Asthma     Chronic kidney disease     stage 3    Chronic obstructive pulmonary disease (HCC)     Diabetes (HCC)     GERD (gastroesophageal reflux disease)     High cholesterol     Hypertension     Menopause     Morbid obesity (HCC)     Obstructive sleep apnea on CPAP     Vertigo      Past Surgical History:   Procedure Laterality Date    HX BREAST BIOPSY Left 2019    HX CHOLECYSTECTOMY      HX ENDOSCOPY  01/01/2019    HX HYSTERECTOMY      HX OOPHORECTOMY      HX ORTHOPAEDIC      right shoulder spur    HX ROTATOR CUFF REPAIR Left     by suture    HX TUBAL LIGATION Bilateral       Family History   Problem Relation Age of Onset    Heart Disease Mother     Lung Disease Mother         lung ca    Lung Disease Father         PNA    Cancer Maternal Grandmother         gyn     Social History     Tobacco Use    Smoking status: Never Smoker    Smokeless tobacco: Never Used   Substance Use Topics    Alcohol use: Never      Prior to Admission medications    Medication Sig Start Date End Date Taking? Authorizing Provider   dulaglutide (Trulicity) 6.92 ZX/8.5 mL sub-q pen 0.5 mL by SubCUTAneous route every seven (7) days for 30 days. 2/2/22 3/4/22 Yes Claribel Pollard MD   empagliflozin (Jardiance) 10 mg tablet Take 1 Tablet by mouth daily for 30 days. 2/2/22 3/4/22 Yes Claribel Pollard MD   Blood-Glucose Meter (True Metrix Glucose Meter) monitoring kit Use to check glucose daily 1/28/22  Yes Ilan Mcguire NP   Blood Glucose Control, Low (True Metrix Level 1) soln 1 Bottle by Does Not Apply route daily. 1/28/22  Yes Ilan Mcguire NP   alcohol swabs padm 1 Swab by Apply Externally route daily.  1/28/22  Yes Ilan Mcguire NP   lancets misc Trueplus lancets 33g 1/26/22  Yes Ilan Mcguire NP   atorvastatin (LIPITOR) 10 mg tablet Take 1 tablet by mouth once daily 1/25/22  Yes Ilan Mcguire NP cpap machine kit by Does Not Apply route. Yes Provider, Historical   Accu-Chek Cherie Plus test strp strip TEST BLOOD SUGAR AS DIRECTED 12/16/21  Yes Nadege Pereira MD   meclizine (ANTIVERT) 25 mg tablet TAKE 1 TABLET THREE TIMES DAILY AS NEEDED  FOR  DIZZINESS 12/16/21  Yes Nadege Pereira MD   magnesium oxide (MAG-OX) 400 mg tablet Take 1 tablet by mouth once daily 11/7/21  Yes Nadege Pereira MD   allopurinoL (ZYLOPRIM) 300 mg tablet TAKE 1 TABLET EVERY DAY 11/7/21  Yes Nadege Pereira MD   lisinopriL (PRINIVIL, ZESTRIL) 10 mg tablet TAKE 1 TABLET EVERY DAY 11/7/21  Yes Nadege Pereira MD   furosemide (LASIX) 40 mg tablet TAKE 1 TABLET ON MONDAY, [facility] AND FRIDAY 8/28/21  Yes Nadege Pereira MD   albuterol (PROVENTIL HFA, VENTOLIN HFA, PROAIR HFA) 90 mcg/actuation inhaler Take  by inhalation. Yes Provider, Historical   albuterol-ipratropium (DUO-NEB) 2.5 mg-0.5 mg/3 ml nebu 3 mL by Nebulization route. Yes Provider, Historical   multivitamin (ONE A DAY) tablet Take 1 Tab by mouth daily. Yes Other, MD Karen   aspirin 81 mg chewable tablet Take 81 mg by mouth daily. Yes Other, MD Karen   cholecalciferol (Vitamin D3) (1000 Units /25 mcg) tablet Take 1,000 Units by mouth daily. Yes Jose, MD Karen   acetaminophen (Tylenol Arthritis Pain) 650 mg TbER Take 650 mg by mouth every eight (8) hours. Yes Jose, MD Karen   rivaroxaban (Xarelto) 10 mg tablet Take 1 Tablet by mouth daily (with breakfast) for 2 days. Indications: deep vein thrombosis prevention 12/11/21 12/13/21  Tabby Logan MD   insulin NPH/insulin regular (NovoLIN 70/30 U-100 Insulin) 100 unit/mL (70-30) injection 20 Units by SubCUTAneous route as needed (Elevated sugar. ). Indications: type 2 diabetes mellitus  Patient not taking: Reported on 8/4/2021 6/15/21   Nadege Pereira MD   ferrous sulfate (Iron) 325 mg (65 mg iron) tablet Take 325 mg by mouth every Monday, Wednesday, Friday.   Patient not taking: Reported on 8/4/2021    Karen Garcia MD        Allergies   Allergen Reactions    Celebrex [Celecoxib] Rash    Cleocin [Clindamycin Phosphate] Palpitations    Mushroom Swelling    Strawberry Rash    Ultracet [Tramadol-Acetaminophen] Rash    Zithromax [Azithromycin] Rash       Review of Systems:  ROS    A comprehensive review of systems was preformed and it is negative except mentioned in HPI    Objective:     Vitals:    02/02/22 1145   BP: 129/73   Pulse: 70   Temp: 98 °F (36.7 °C)   TempSrc: Temporal   SpO2: 99%   Weight: (!) 414 lb (187.8 kg)   Height: 5' 6\" (1.676 m)        Physical Exam:    Physical Exam  Vitals and nursing note reviewed. Constitutional:       Appearance: She is obese. HENT:      Head: Normocephalic and atraumatic. Cardiovascular:      Rate and Rhythm: Normal rate and regular rhythm. Pulmonary:      Effort: Pulmonary effort is normal.      Breath sounds: Normal breath sounds. Musculoskeletal:         General: Swelling present. Neurological:      General: No focal deficit present. Mental Status: She is alert and oriented to person, place, and time. Psychiatric:         Mood and Affect: Mood normal.         Behavior: Behavior normal.       diabetic foot exam:  Bilateral diabetic foot exam was performed today. Dorsalis pedis pulses 1+ bilaterally. Monofilament sensation decreased bilaterally. No ulcers or skin breakdown.      Labs and Imaging:    Last 3 Recorded Weights in this Encounter    02/02/22 1145   Weight: (!) 414 lb (187.8 kg)        Lab Results   Component Value Date/Time    Hemoglobin A1c 6.8 (H) 06/15/2021 12:37 PM    Hemoglobin A1c 6.7 (H) 12/18/2020 11:34 AM    Hemoglobin A1c, External 7.2 06/04/2020 12:00 AM        Assessment:     Patient Active Problem List   Diagnosis Code    Asthma J45.909    Chronic obstructive pulmonary disease ([facility] [address] J44.9    Diabetes mellitus ([facility] [address] E11.9    Diverticulitis K57.92    Essential hypertension I10    GERD (gastroesophageal reflux disease) K21.9    Gout M10.9    Hyperkalemia E87.5    Mixed hyperlipidemia due to type 2 diabetes mellitus (HCC) E11.69, E78.2    Arthritis E16.01    Helicobacter pylori gastritis K29.70, B96.81    Hyperlipidemia E78.5    Varicose veins of lower extremities with inflammation I83.10    Obstructive sleep apnea syndrome G47.33    Morbid obesity with BMI of 60.0-69.9, adult (HCC) E66.01, Z68.44    Iron deficiency anemia D50.9    Stage 3a chronic kidney disease (HCC) N18.31    History of hysterectomy Z90.710    Vaginal bleeding N93.9           Plan:     Type 2 diabetes mellitus, uncontrolled:  I reviewed progress note and labs from the referring provider's office. Hemoglobin A1c was 6.8% on 6/152021, 7% on 2021. Fingerstick blood glucose is 114 mg/dL in my office today. Up to date with diabetes related annual labs: 2021 except TSH  Up to date with diabetic eye exam: 2021  Plan:  Discussed with patient portance of controlling diabetes to prevent endorgan damage. Her meals are not bad but snacks appear to be a problem. Discussed with patient about healthy snacking. Stop pioglitazone because of obesity and lower extremity edema, stop glimepiride because of hypoglycemia symptoms in the afternoon. Start Jardiance 10 mg daily and Trulicity 9.51 mg weekly. Check blood glucose once a day every day and bring glucometer to next visit in 2 months. Essential hypertension:  Blood pressure well controlled on current medications. No microalbuminuria, last checked in 2021. Mixed hyperlipidemia:  2021: Total cholesterol 170, triglycerides 121, LDL 90. Continue atorvastatin 10 mg daily. CKD stage III:  GFR 47 in 2021. Orders Placed This Encounter    AMB POC GLUCOSE BLOOD, BY GLUCOSE MONITORING DEVICE    dulaglutide (Trulicity) 5.66 PX/8.3 mL sub-q pen     Si.5 mL by SubCUTAneous route every seven (7) days for 30 days.      Dispense:  2 mL Refill:  3     DC amaryl    empagliflozin (Jardiance) 10 mg tablet     Sig: Take 1 Tablet by mouth daily for 30 days.      Dispense:  30 Tablet     Refill:  3     DC pioglitazone        Signed By: Stephanie Denny MD     February 2, 2022      Return to clinic 2 months

## 2022-02-02 NOTE — TELEPHONE ENCOUNTER
You will need to contact Dr. Sheffield Read office as she just had appointment with her to see what she wants to change it to.

## 2022-02-15 ENCOUNTER — TELEPHONE (OUTPATIENT)
Dept: SURGERY | Age: 62
End: 2022-02-15

## 2022-02-15 NOTE — TELEPHONE ENCOUNTER
I called scheduling and patient is scheduled for 2/23/2022 at 1pm with an arrival time of 12:30pm. Patient needs to bring her insurance card, photo ID and wear a mask. There is no special prep. I called patient to give her the appointment information and advised her to get the xrays of her knees that Dr. Jaya Irizarry ordered while she was there since she does not need an appointment for that.

## 2022-02-15 NOTE — TELEPHONE ENCOUNTER
Spoke with patient regarding her Duplex needing to be scheduled. I offered to schedule it for her and she agreed. States mid day works best for her.

## 2022-02-17 DIAGNOSIS — I50.9 CHRONIC CONGESTIVE HEART FAILURE, UNSPECIFIED HEART FAILURE TYPE (HCC): ICD-10-CM

## 2022-02-17 RX ORDER — FUROSEMIDE 40 MG/1
TABLET ORAL
Qty: 36 TABLET | Refills: 1 | Status: SHIPPED | OUTPATIENT
Start: 2022-02-17 | End: 2022-09-15

## 2022-02-23 ENCOUNTER — HOSPITAL ENCOUNTER (OUTPATIENT)
Dept: GENERAL RADIOLOGY | Age: 62
Discharge: HOME | End: 2022-02-23
Attending: SURGERY
Payer: MEDICARE

## 2022-02-23 ENCOUNTER — HOSPITAL ENCOUNTER (OUTPATIENT)
Dept: VASCULAR SURGERY | Age: 62
Discharge: HOME | End: 2022-02-23
Attending: SURGERY
Payer: MEDICARE

## 2022-02-23 DIAGNOSIS — I83.10 VARICOSE VEINS OF LOWER EXTREMITY WITH INFLAMMATION, UNSPECIFIED LATERALITY: ICD-10-CM

## 2022-02-23 DIAGNOSIS — M19.90 ARTHRITIS: ICD-10-CM

## 2022-02-23 LAB
LEFT GSV THIGH DIST DIAM: 0.42 CM
LEFT GSV THIGH MID DIAM: 0.82 CM
LEFT GSV THIGH PROX DIAM: 0.83 CM
RIGHT GSV THIGH DIST DIAM: 0.58 CM
RIGHT GSV THIGH MID DIAM: 0.88 CM
RIGHT GSV THIGH PROX DIAM: 1.32 CM

## 2022-02-23 PROCEDURE — 73560 X-RAY EXAM OF KNEE 1 OR 2: CPT

## 2022-02-23 PROCEDURE — 93970 EXTREMITY STUDY: CPT

## 2022-03-09 DIAGNOSIS — E11.69 TYPE 2 DIABETES MELLITUS WITH OTHER SPECIFIED COMPLICATION, WITHOUT LONG-TERM CURRENT USE OF INSULIN (HCC): Primary | ICD-10-CM

## 2022-03-09 RX ORDER — CALCIUM CITRATE/VITAMIN D3 200MG-6.25
TABLET ORAL
Qty: 100 STRIP | Refills: 3 | Status: SHIPPED | OUTPATIENT
Start: 2022-03-09 | End: 2022-08-31 | Stop reason: SDUPTHER

## 2022-03-14 ENCOUNTER — OFFICE VISIT (OUTPATIENT)
Dept: PRIMARY CARE CLINIC | Age: 62
End: 2022-03-14
Payer: MEDICARE

## 2022-03-14 VITALS
BODY MASS INDEX: 47.09 KG/M2 | HEIGHT: 66 IN | DIASTOLIC BLOOD PRESSURE: 78 MMHG | RESPIRATION RATE: 18 BRPM | OXYGEN SATURATION: 99 % | SYSTOLIC BLOOD PRESSURE: 130 MMHG | HEART RATE: 61 BPM | TEMPERATURE: 98 F | WEIGHT: 293 LBS

## 2022-03-14 DIAGNOSIS — D63.1 ANEMIA DUE TO STAGE 3 CHRONIC KIDNEY DISEASE, UNSPECIFIED WHETHER STAGE 3A OR 3B CKD (HCC): ICD-10-CM

## 2022-03-14 DIAGNOSIS — N18.31 STAGE 3A CHRONIC KIDNEY DISEASE (HCC): ICD-10-CM

## 2022-03-14 DIAGNOSIS — E78.2 MIXED HYPERLIPIDEMIA DUE TO TYPE 2 DIABETES MELLITUS (HCC): ICD-10-CM

## 2022-03-14 DIAGNOSIS — K21.9 GASTROESOPHAGEAL REFLUX DISEASE WITHOUT ESOPHAGITIS: ICD-10-CM

## 2022-03-14 DIAGNOSIS — M1A.09X0 CHRONIC GOUT OF MULTIPLE SITES, UNSPECIFIED CAUSE: ICD-10-CM

## 2022-03-14 DIAGNOSIS — E66.01 MORBID OBESITY WITH BMI OF 60.0-69.9, ADULT (HCC): ICD-10-CM

## 2022-03-14 DIAGNOSIS — I87.2 VENOUS INSUFFICIENCY: ICD-10-CM

## 2022-03-14 DIAGNOSIS — G47.33 OBSTRUCTIVE SLEEP APNEA SYNDROME: ICD-10-CM

## 2022-03-14 DIAGNOSIS — N39.0 URINARY TRACT INFECTION WITHOUT HEMATURIA, SITE UNSPECIFIED: ICD-10-CM

## 2022-03-14 DIAGNOSIS — I10 ESSENTIAL HYPERTENSION: ICD-10-CM

## 2022-03-14 DIAGNOSIS — E11.69 MIXED HYPERLIPIDEMIA DUE TO TYPE 2 DIABETES MELLITUS (HCC): ICD-10-CM

## 2022-03-14 DIAGNOSIS — N18.30 ANEMIA DUE TO STAGE 3 CHRONIC KIDNEY DISEASE, UNSPECIFIED WHETHER STAGE 3A OR 3B CKD (HCC): ICD-10-CM

## 2022-03-14 DIAGNOSIS — J44.9 CHRONIC OBSTRUCTIVE PULMONARY DISEASE, UNSPECIFIED COPD TYPE (HCC): ICD-10-CM

## 2022-03-14 DIAGNOSIS — M17.0 PRIMARY OSTEOARTHRITIS OF BOTH KNEES: ICD-10-CM

## 2022-03-14 DIAGNOSIS — E11.69 TYPE 2 DIABETES MELLITUS WITH OTHER SPECIFIED COMPLICATION, WITHOUT LONG-TERM CURRENT USE OF INSULIN (HCC): Primary | ICD-10-CM

## 2022-03-14 PROBLEM — N93.9 VAGINAL BLEEDING: Status: RESOLVED | Noted: 2021-09-22 | Resolved: 2022-03-14

## 2022-03-14 LAB — HBA1C MFR BLD HPLC: 6.7 %

## 2022-03-14 PROCEDURE — G8754 DIAS BP LESS 90: HCPCS | Performed by: NURSE PRACTITIONER

## 2022-03-14 PROCEDURE — 2022F DILAT RTA XM EVC RTNOPTHY: CPT | Performed by: NURSE PRACTITIONER

## 2022-03-14 PROCEDURE — G8752 SYS BP LESS 140: HCPCS | Performed by: NURSE PRACTITIONER

## 2022-03-14 PROCEDURE — 3044F HG A1C LEVEL LT 7.0%: CPT | Performed by: NURSE PRACTITIONER

## 2022-03-14 PROCEDURE — G8417 CALC BMI ABV UP PARAM F/U: HCPCS | Performed by: NURSE PRACTITIONER

## 2022-03-14 PROCEDURE — 83036 HEMOGLOBIN GLYCOSYLATED A1C: CPT | Performed by: NURSE PRACTITIONER

## 2022-03-14 PROCEDURE — G8427 DOCREV CUR MEDS BY ELIG CLIN: HCPCS | Performed by: NURSE PRACTITIONER

## 2022-03-14 PROCEDURE — 3017F COLORECTAL CA SCREEN DOC REV: CPT | Performed by: NURSE PRACTITIONER

## 2022-03-14 PROCEDURE — G9899 SCRN MAM PERF RSLTS DOC: HCPCS | Performed by: NURSE PRACTITIONER

## 2022-03-14 PROCEDURE — 99214 OFFICE O/P EST MOD 30 MIN: CPT | Performed by: NURSE PRACTITIONER

## 2022-03-14 PROCEDURE — G8432 DEP SCR NOT DOC, RNG: HCPCS | Performed by: NURSE PRACTITIONER

## 2022-03-14 RX ORDER — PEN NEEDLE, DIABETIC 31 GX3/16"
NEEDLE, DISPOSABLE MISCELLANEOUS
Qty: 100 PEN NEEDLE | Refills: 11 | Status: SHIPPED | OUTPATIENT
Start: 2022-03-14

## 2022-03-14 RX ORDER — GLIMEPIRIDE 1 MG/1
1 TABLET ORAL 2 TIMES DAILY
COMMUNITY
End: 2022-05-18

## 2022-03-14 NOTE — PROGRESS NOTES
Chief Complaint   Patient presents with    Diabetes     fasting 3 month check up      1. Have you been to the ER, urgent care clinic since your last visit? Hospitalized since your last visit? No    2. Have you seen or consulted any other health care providers outside of the [address] [place] [address] since your last visit? Include any pap smears or colon screening.  No   Visit Vitals  BP (!) 168/73 (BP 1 Location: Left arm, BP Patient Position: Sitting, BP Cuff Size: Adult)   Pulse 61   Temp 98 °F (36.7 °C) (Oral)   Resp 18   Ht 5' 6\" (1.676 m)   Wt (!) 417 lb (189.1 kg)   LMP  (LMP Unknown)   SpO2 99%   BMI 67.31 kg/m²

## 2022-03-14 NOTE — PROGRESS NOTES
Estuardo Lopez is a 57 y.o. female who presents to the office today for the following:    Chief Complaint   Patient presents with    Diabetes     fasting 3 month check up        Past Medical History:   Diagnosis Date    Arthritis     Asthma     Chronic kidney disease     stage 3    Chronic obstructive pulmonary disease ([address]     Diabetes ([address]     GERD (gastroesophageal reflux disease)     High cholesterol     Hypertension     Menopause     Morbid obesity ([address]     Obstructive sleep apnea on CPAP     Vertigo        Past Surgical History:   Procedure Laterality Date    HX BREAST BIOPSY Left 2019    HX CHOLECYSTECTOMY      HX ENDOSCOPY  01/01/2019    HX HYSTERECTOMY      HX OOPHORECTOMY      HX ORTHOPAEDIC      right shoulder spur    HX ROTATOR CUFF REPAIR Left     by suture    HX TUBAL LIGATION Bilateral         Family History   Problem Relation Age of Onset    Heart Disease Mother     Lung Disease Mother         lung ca    Lung Disease Father         PNA    Cancer Maternal Grandmother         gyn        Social History     Tobacco Use    Smoking status: Never Smoker    Smokeless tobacco: Never Used   Vaping Use    Vaping Use: Never used   Substance Use Topics    Alcohol use: Never    Drug use: Never        HPI  Patient here today as new patient to provider with PMH of hypertension, hyperlipidemia, type 2 diabetes, venous insufficiency, osteoarthritis of knees, copd, CKD, morbid obesity, sleep apnea, anemia and GERD. States that she follows with multiple specialists including vascular, nephrology and endocrinology. Reports taking medicines as directed. Has felt well overall but does want to discuss referral back to bariatric surgeon. States that she has been interested in gastric sleeve and had completed required steps but then provider left. Noticed that they have a new provider in [place] and wants to see them.  Is still trying to make dietary changes and increase exercise but has not had as much success as she hoped. Wanted to start on trulicity to also help but this was too expensive. Current Outpatient Medications on File Prior to Visit   Medication Sig    glimepiride (AMARYL) 1 mg tablet Take 1 mg by mouth two (2) times a day.  glucose blood VI test strips (True Metrix Glucose Test Strip) strip Use one strip to check glucose twice a day    furosemide (LASIX) 40 mg tablet TAKE 1 TABLET ON MONDAY, WEDNESDAY AND FRIDAY    metFORMIN ER (GLUCOPHAGE XR) 500 mg tablet Take 1 tablet with dinner for 2 weeks and then increase to 2 tablets with dinner    Blood-Glucose Meter (True Metrix Glucose Meter) monitoring kit Use to check glucose daily    Blood Glucose Control, Low (True Metrix Level 1) soln 1 Bottle by Does Not Apply route daily.  alcohol swabs padm 1 Swab by Apply Externally route daily.  lancets misc Trueplus lancets 33g    atorvastatin (LIPITOR) 10 mg tablet Take 1 tablet by mouth once daily    cpap machine kit by Does Not Apply route.  meclizine (ANTIVERT) 25 mg tablet TAKE 1 TABLET THREE TIMES DAILY AS NEEDED  FOR  DIZZINESS    magnesium oxide (MAG-OX) 400 mg tablet Take 1 tablet by mouth once daily    allopurinoL (ZYLOPRIM) 300 mg tablet TAKE 1 TABLET EVERY DAY    lisinopriL (PRINIVIL, ZESTRIL) 10 mg tablet TAKE 1 TABLET EVERY DAY    albuterol (PROVENTIL HFA, VENTOLIN HFA, PROAIR HFA) 90 mcg/actuation inhaler Take  by inhalation.  albuterol-ipratropium (DUO-NEB) 2.5 mg-0.5 mg/3 ml nebu 3 mL by Nebulization route.  multivitamin (ONE A DAY) tablet Take 1 Tab by mouth daily.  aspirin 81 mg chewable tablet Take 81 mg by mouth daily.  cholecalciferol (Vitamin D3) (1000 Units /25 mcg) tablet Take 1,000 Units by mouth daily.  acetaminophen (Tylenol Arthritis Pain) 650 mg TbER Take 650 mg by mouth every eight (8) hours.  [DISCONTINUED] rivaroxaban (Xarelto) 10 mg tablet Take 1 Tablet by mouth daily (with breakfast) for 2 days.  Indications: deep vein thrombosis prevention    [DISCONTINUED] insulin NPH/insulin regular (NovoLIN 70/30 U-100 Insulin) 100 unit/mL (70-30) injection 20 Units by SubCUTAneous route as needed (Elevated sugar. ). Indications: type 2 diabetes mellitus (Patient not taking: Reported on 2021)    [DISCONTINUED] ferrous sulfate (Iron) 325 mg (65 mg iron) tablet Take 325 mg by mouth every Monday, Wednesday, Friday. (Patient not taking: Reported on 2021)     No current facility-administered medications on file prior to visit. Medications Ordered Today   Medications    semaglutide (OZEMPIC) 0.25 mg or 0.5 mg/dose (2 mg/1.5 ml) subq pen     Si.25 mg by SubCUTAneous route every seven (7) days. Dispense:  1 Box     Refill:  3    Insulin Needles, Disposable, 32 gauge x \" ndle     Sig: Use pen needle to inject medicine every 7 days     Dispense:  100 Pen Needle     Refill:  11     [facility]#[id number]        Review of Systems   Constitutional: Negative. Respiratory: Negative. Cardiovascular: Positive for leg swelling. Negative for chest pain, palpitations and orthopnea. Gastrointestinal: Negative. Genitourinary: Negative. Musculoskeletal: Positive for joint pain and myalgias. Negative for falls. Neurological: Negative. Visit Vitals  /78 (BP 1 Location: Left arm, BP Patient Position: Sitting, BP Cuff Size: Adult)   Pulse 61   Temp 98 °F (36.7 °C) (Oral)   Resp 18   Ht 5' 6\" (1.676 m)   Wt (!) 417 lb (189.1 kg)   LMP  (LMP Unknown)   SpO2 99%   BMI 67.31 kg/m²       Physical Exam  Vitals and nursing note reviewed. Constitutional:       Appearance: Normal appearance. She is obese. Eyes:      Pupils: Pupils are equal, round, and reactive to light. Neck:      Vascular: No carotid bruit. Cardiovascular:      Rate and Rhythm: Normal rate and regular rhythm. Pulses: Normal pulses. Heart sounds: Normal heart sounds.    Pulmonary:      Effort: Pulmonary effort is normal.      Breath sounds: Normal breath sounds. Abdominal:      General: Bowel sounds are normal.      Palpations: Abdomen is soft. Tenderness: There is no abdominal tenderness. There is no guarding. Musculoskeletal:      Right lower leg: Edema present. Left lower leg: Edema present. Lymphadenopathy:      Cervical: No cervical adenopathy. Skin:     General: Skin is warm and dry. Neurological:      Mental Status: She is alert. Gait: Gait abnormal.      Comments: Using walker            1. Type 2 diabetes mellitus with other specified complication, without long-term current use of insulin (LT[facility], located within [facility])  Lab Results   Component Value Date/Time    Hemoglobin A1c 6.8 (H) 06/15/2021 12:37 PM    Hemoglobin A1c (POC) 6.7 03/14/2022 11:20 AM    Hemoglobin A1c, External 7.2 06/04/2020 12:00 AM   Sugars are well controlled  On metformin and glimepiride  Did have trulicity sent but this was too expensive as well as jardiance  Will see if ozempic is covered and reviewed potential side effects/risks of medication  Continue to check fasting sugars and bring to next appointment  Continue annual eye exam  Encourage following diabetic diet and increasing regular exercise to 3-5 times weekly  She will continue care under Dr. Hu Diaz  - AMB POC HEMOGLOBIN A1C    2. Essential hypertension  Blood pressure is controlled and continue medication as directed    3. Chronic obstructive pulmonary disease, unspecified COPD type ([address]  Stable    4. Stage 3a chronic kidney disease ([address]  Lab Results   Component Value Date/Time    Creatinine 1.39 (H) 12/14/2021 11:36 AM   Stable and follows with nephrology    5. Mixed hyperlipidemia due to type 2 diabetes mellitus ([address]  Lab Results   Component Value Date/Time    LDL, calculated 90 12/14/2021 11:36 AM   Stable and continue statin medication as directed    6. Obstructive sleep apnea syndrome  Continue cpap therapy     7.  Chronic gout of multiple sites, unspecified cause  Lab Results   Component Value Date/Time    Uric acid 4.0 12/14/2021 11:36 AM   Stable and continue on allopurinol as directed    8. Morbid obesity with BMI of 60.0-69.9, adult [facility])  Patient reports she has been trying to make dietary changes over the last 6 months as well as increasing exercise as much as possible. Has had some weight loss but feels this has been difficult over lifetime  She was in bariatric program and had completed all steps. States that she was sent letter that he left and wants to see if she can be referred to new provider. Do feel that she is good candidate for bariatric surgery and referral sent. 9. Anemia  Lab Results   Component Value Date/Time    WBC 3.5 (L) 12/11/2021 12:08 PM    HGB 11.9 (L) 12/11/2021 12:08 PM    HCT 35.8 (L) 12/11/2021 12:08 PM    PLATELET 326 23/24/4689 12:08 PM    MCV 93.1 12/11/2021 12:08 PM   Stable and continue to monitor cbc    10. Gastroesophageal reflux disease without esophagitis  Stable    11. Primary osteoarthritis of both knees  Does report increased pain in both knees  Working on weight loss   Uses tylenol and topical prn    12. Venous insufficiency  Wears compression stocking   Following with Dr. Ruperto Steel      Patient verbalizes understanding of plan of care as discussed above    Follow-up and Dispositions    · Return in about 3 months (around 6/14/2022) for or sooner for worsening symptoms.

## 2022-03-15 LAB
ALBUMIN SERPL-MCNC: 4.3 G/DL (ref 3.8–4.8)
ALBUMIN/CREAT UR: 16 MG/G CREAT (ref 0–29)
ALBUMIN/GLOB SERPL: 1.6 {RATIO} (ref 1.2–2.2)
ALP SERPL-CCNC: 112 IU/L (ref 44–121)
ALT SERPL-CCNC: 19 IU/L (ref 0–32)
APPEARANCE UR: CLEAR
AST SERPL-CCNC: 25 IU/L (ref 0–40)
BACTERIA #/AREA URNS HPF: ABNORMAL /[HPF]
BASOPHILS # BLD AUTO: 0 X10E3/UL (ref 0–0.2)
BASOPHILS NFR BLD AUTO: 1 %
BILIRUB SERPL-MCNC: 0.4 MG/DL (ref 0–1.2)
BILIRUB UR QL STRIP: NEGATIVE
BUN SERPL-MCNC: 18 MG/DL (ref 8–27)
BUN/CREAT SERPL: 14 (ref 12–28)
CALCIUM SERPL-MCNC: 9.8 MG/DL (ref 8.7–10.3)
CASTS URNS QL MICRO: ABNORMAL /LPF
CHLORIDE SERPL-SCNC: 105 MMOL/L (ref 96–106)
CHOLEST SERPL-MCNC: 152 MG/DL (ref 100–199)
CO2 SERPL-SCNC: 22 MMOL/L (ref 20–29)
COLOR UR: YELLOW
CREAT SERPL-MCNC: 1.32 MG/DL (ref 0.57–1)
CREAT UR-MCNC: 142.8 MG/DL
EGFR: 46 ML/MIN/1.73
EOSINOPHIL # BLD AUTO: 0.1 X10E3/UL (ref 0–0.4)
EOSINOPHIL NFR BLD AUTO: 4 %
EPI CELLS #/AREA URNS HPF: ABNORMAL /HPF (ref 0–10)
ERYTHROCYTE [DISTWIDTH] IN BLOOD BY AUTOMATED COUNT: 14.1 % (ref 11.7–15.4)
GLOBULIN SER CALC-MCNC: 2.7 G/DL (ref 1.5–4.5)
GLUCOSE SERPL-MCNC: 117 MG/DL (ref 65–99)
GLUCOSE UR QL STRIP: NEGATIVE
HCT VFR BLD AUTO: 35.4 % (ref 34–46.6)
HDLC SERPL-MCNC: 59 MG/DL
HGB BLD-MCNC: 11.2 G/DL (ref 11.1–15.9)
HGB UR QL STRIP: NEGATIVE
IMM GRANULOCYTES # BLD AUTO: 0 X10E3/UL (ref 0–0.1)
IMM GRANULOCYTES NFR BLD AUTO: 0 %
KETONES UR QL STRIP: NEGATIVE
LDLC SERPL CALC-MCNC: 73 MG/DL (ref 0–99)
LEUKOCYTE ESTERASE UR QL STRIP: ABNORMAL
LYMPHOCYTES # BLD AUTO: 1.2 X10E3/UL (ref 0.7–3.1)
LYMPHOCYTES NFR BLD AUTO: 36 %
MCH RBC QN AUTO: 29.1 PG (ref 26.6–33)
MCHC RBC AUTO-ENTMCNC: 31.6 G/DL (ref 31.5–35.7)
MCV RBC AUTO: 92 FL (ref 79–97)
MICRO URNS: ABNORMAL
MICROALBUMIN UR-MCNC: 22.4 UG/ML
MONOCYTES # BLD AUTO: 0.3 X10E3/UL (ref 0.1–0.9)
MONOCYTES NFR BLD AUTO: 8 %
NEUTROPHILS # BLD AUTO: 1.7 X10E3/UL (ref 1.4–7)
NEUTROPHILS NFR BLD AUTO: 51 %
NITRITE UR QL STRIP: NEGATIVE
PH UR STRIP: 6 [PH] (ref 5–7.5)
PLATELET # BLD AUTO: 243 X10E3/UL (ref 150–450)
POTASSIUM SERPL-SCNC: 5.6 MMOL/L (ref 3.5–5.2)
PROT SERPL-MCNC: 7 G/DL (ref 6–8.5)
PROT UR QL STRIP: NEGATIVE
RBC # BLD AUTO: 3.85 X10E6/UL (ref 3.77–5.28)
RBC #/AREA URNS HPF: ABNORMAL /HPF (ref 0–2)
SODIUM SERPL-SCNC: 143 MMOL/L (ref 134–144)
SP GR UR STRIP: 1.02 (ref 1–1.03)
TRIGL SERPL-MCNC: 112 MG/DL (ref 0–149)
UROBILINOGEN UR STRIP-MCNC: 0.2 MG/DL (ref 0.2–1)
VLDLC SERPL CALC-MCNC: 20 MG/DL (ref 5–40)
WBC # BLD AUTO: 3.3 X10E3/UL (ref 3.4–10.8)
WBC #/AREA URNS HPF: >30 /HPF (ref 0–5)

## 2022-03-16 NOTE — PROGRESS NOTES
Please let patient know that urine shows large WBC. Is she having any urinary symptoms or vaginal discharge? Kidney functions abnormal but stable compared to baseline. Otherwise labs are normal. If any questions, let me know.

## 2022-03-18 ENCOUNTER — OFFICE VISIT (OUTPATIENT)
Dept: SURGERY | Age: 62
End: 2022-03-18
Payer: MEDICARE

## 2022-03-18 VITALS
TEMPERATURE: 97.5 F | HEART RATE: 60 BPM | DIASTOLIC BLOOD PRESSURE: 82 MMHG | WEIGHT: 293 LBS | RESPIRATION RATE: 18 BRPM | SYSTOLIC BLOOD PRESSURE: 138 MMHG | OXYGEN SATURATION: 97 % | HEIGHT: 66 IN | BODY MASS INDEX: 47.09 KG/M2

## 2022-03-18 DIAGNOSIS — I83.10 VARICOSE VEINS OF LOWER EXTREMITY WITH INFLAMMATION, UNSPECIFIED LATERALITY: Primary | ICD-10-CM

## 2022-03-18 PROBLEM — M10.9 GOUT: Status: ACTIVE | Noted: 2020-11-11

## 2022-03-18 PROBLEM — I10 ESSENTIAL HYPERTENSION: Status: ACTIVE | Noted: 2020-11-11

## 2022-03-18 PROBLEM — E11.9 DIABETES MELLITUS (HCC): Status: ACTIVE | Noted: 2020-11-11

## 2022-03-18 PROCEDURE — 99213 OFFICE O/P EST LOW 20 MIN: CPT | Performed by: SURGERY

## 2022-03-18 PROCEDURE — G8752 SYS BP LESS 140: HCPCS | Performed by: SURGERY

## 2022-03-18 PROCEDURE — G9899 SCRN MAM PERF RSLTS DOC: HCPCS | Performed by: SURGERY

## 2022-03-18 PROCEDURE — G8427 DOCREV CUR MEDS BY ELIG CLIN: HCPCS | Performed by: SURGERY

## 2022-03-18 PROCEDURE — G8417 CALC BMI ABV UP PARAM F/U: HCPCS | Performed by: SURGERY

## 2022-03-18 PROCEDURE — G8510 SCR DEP NEG, NO PLAN REQD: HCPCS | Performed by: SURGERY

## 2022-03-18 PROCEDURE — 3017F COLORECTAL CA SCREEN DOC REV: CPT | Performed by: SURGERY

## 2022-03-18 PROCEDURE — G8754 DIAS BP LESS 90: HCPCS | Performed by: SURGERY

## 2022-03-18 NOTE — PROGRESS NOTES
Chief Complaint   Patient presents with    Follow-up     Right and Left Leg. Patient states the right leg is worse. Visit Vitals  /82 (BP 1 Location: Left arm, BP Patient Position: Sitting)   Pulse 60   Temp 97.5 °F (36.4 °C) (Temporal)   Resp 18   Ht 5' 6\" (1.676 m)   Wt (!) 414 lb 6.4 oz (188 kg)   LMP  (LMP Unknown)   SpO2 97%   BMI 66.89 kg/m²     1. Have you been to the ER, urgent care clinic since your last visit? Hospitalized since your last visit? No    2. Have you seen or consulted any other health care providers outside of the [address] [place] [address] since your last visit? Include any pap smears or colon screening.  No

## 2022-03-19 PROBLEM — K57.92 DIVERTICULITIS: Status: ACTIVE | Noted: 2020-11-11

## 2022-03-19 PROBLEM — B96.81 HELICOBACTER PYLORI GASTRITIS: Status: ACTIVE | Noted: 2020-11-11

## 2022-03-19 PROBLEM — I83.10 VARICOSE VEINS OF LOWER EXTREMITIES WITH INFLAMMATION: Status: ACTIVE | Noted: 2020-11-11

## 2022-03-19 PROBLEM — J44.9 CHRONIC OBSTRUCTIVE PULMONARY DISEASE (HCC): Status: ACTIVE | Noted: 2020-11-11

## 2022-03-19 PROBLEM — K21.9 GERD (GASTROESOPHAGEAL REFLUX DISEASE): Status: ACTIVE | Noted: 2020-11-11

## 2022-03-19 PROBLEM — E87.5 HYPERKALEMIA: Status: ACTIVE | Noted: 2020-11-11

## 2022-03-19 PROBLEM — E78.2 MIXED HYPERLIPIDEMIA DUE TO TYPE 2 DIABETES MELLITUS (HCC): Status: ACTIVE | Noted: 2020-11-11

## 2022-03-19 PROBLEM — K29.70 HELICOBACTER PYLORI GASTRITIS: Status: ACTIVE | Noted: 2020-11-11

## 2022-03-19 PROBLEM — Z90.710 HISTORY OF HYSTERECTOMY: Status: ACTIVE | Noted: 2021-09-22

## 2022-03-19 PROBLEM — G47.33 OBSTRUCTIVE SLEEP APNEA SYNDROME: Status: ACTIVE | Noted: 2020-12-18

## 2022-03-19 PROBLEM — J45.909 ASTHMA: Status: ACTIVE | Noted: 2020-11-11

## 2022-03-19 PROBLEM — M19.90 ARTHRITIS: Status: ACTIVE | Noted: 2020-11-11

## 2022-03-19 PROBLEM — E11.69 MIXED HYPERLIPIDEMIA DUE TO TYPE 2 DIABETES MELLITUS (HCC): Status: ACTIVE | Noted: 2020-11-11

## 2022-03-19 PROBLEM — N18.31 STAGE 3A CHRONIC KIDNEY DISEASE (HCC): Status: ACTIVE | Noted: 2021-06-10

## 2022-03-20 PROBLEM — E78.5 HYPERLIPIDEMIA: Status: ACTIVE | Noted: 2020-11-11

## 2022-03-20 PROBLEM — D50.9 IRON DEFICIENCY ANEMIA: Status: ACTIVE | Noted: 2021-06-10

## 2022-03-20 PROBLEM — E66.01 MORBID OBESITY WITH BMI OF 60.0-69.9, ADULT (HCC): Status: ACTIVE | Noted: 2018-11-26

## 2022-03-21 NOTE — PROGRESS NOTES
VASCULAR FOLLOW UP      Subjective:   CHIEF COMPLAINTS:  Leg swelling  PRESENTATION OF ILLNESS:  Dennys Samuels is a 57 y.o. very pleasant woman is here today follow-up 1 month. Patient is complaining of right leg pain worse than the left side. Patient was has a swelling secondary to chronic venous hypertension. Patient has a venous ultrasound shows bilateral venous valvular reflux documented. This was informed about the patient. Patient currently waiting for custom made compression stocking from lymphedema clinic. Past Medical History:   Diagnosis Date    Arthritis     Asthma     Chronic kidney disease     stage 3    Chronic obstructive pulmonary disease (HCC)     Diabetes (HCC)     GERD (gastroesophageal reflux disease)     High cholesterol     Hypertension     Menopause     Morbid obesity (HCC)     Obstructive sleep apnea on CPAP     Vertigo       Past Surgical History:   Procedure Laterality Date    HX BREAST BIOPSY Left 2019    HX CHOLECYSTECTOMY      HX ENDOSCOPY  01/01/2019    HX HYSTERECTOMY      HX OOPHORECTOMY      HX ORTHOPAEDIC      right shoulder spur    HX ROTATOR CUFF REPAIR Left     by suture    HX TUBAL LIGATION Bilateral      Family History   Problem Relation Age of Onset    Heart Disease Mother     Lung Disease Mother         lung ca    Lung Disease Father         PNA    Cancer Maternal Grandmother         gyn      Social History     Tobacco Use    Smoking status: Never Smoker    Smokeless tobacco: Never Used   Substance Use Topics    Alcohol use: Never       Prior to Admission medications    Medication Sig Start Date End Date Taking? Authorizing Provider   glimepiride (AMARYL) 1 mg tablet Take 1 mg by mouth two (2) times a day. Yes Provider, Historical   semaglutide (OZEMPIC) 0.25 mg or 0.5 mg/dose (2 mg/1.5 ml) subq pen 0.25 mg by SubCUTAneous route every seven (7) days.  3/14/22  Yes Jackquelyn Holstein, NP   Insulin Needles, Disposable, 32 gauge x 5/32\" ndle Use pen needle to inject medicine every 7 days 3/14/22  Yes Lashell Velázquez NP   glucose blood VI test strips (True Metrix Glucose Test Strip) strip Use one strip to check glucose twice a day 3/9/22  Yes Lashell Velázquez NP   furosemide (LASIX) 40 mg tablet TAKE 1 TABLET ON MONDAY, [facility] AND FRIDAY 2/17/22  Yes Lashell Velázquez NP   metFORMIN ER (GLUCOPHAGE XR) 500 mg tablet Take 1 tablet with dinner for 2 weeks and then increase to 2 tablets with dinner 2/2/22  Yes Iftikhar Hoffman MD   Blood-Glucose Meter (True Metrix Glucose Meter) monitoring kit Use to check glucose daily 1/28/22  Yes Lashell Velázquez NP   Blood Glucose Control, Low (True Metrix Level 1) soln 1 Bottle by Does Not Apply route daily. 1/28/22  Yes Lashell Velázquez NP   alcohol swabs padm 1 Swab by Apply Externally route daily. 1/28/22  Yes Lashell Velázquez NP   lancets misc Trueplus lancets 33g 1/26/22  Yes Lashell Velázquez NP   atorvastatin (LIPITOR) 10 mg tablet Take 1 tablet by mouth once daily 1/25/22  Yes Lashell Velázquez NP   cpap machine kit by Does Not Apply route. Yes Provider, Historical   meclizine (ANTIVERT) 25 mg tablet TAKE 1 TABLET THREE TIMES DAILY AS NEEDED  FOR  DIZZINESS 12/16/21  Yes Pratik Felix MD   magnesium oxide (MAG-OX) 400 mg tablet Take 1 tablet by mouth once daily 11/7/21  Yes Pratik Felix MD   allopurinoL (ZYLOPRIM) 300 mg tablet TAKE 1 TABLET EVERY DAY 11/7/21  Yes Pratik Felix MD   lisinopriL (PRINIVIL, ZESTRIL) 10 mg tablet TAKE 1 TABLET EVERY DAY 11/7/21  Yes Pratik Felix MD   albuterol (PROVENTIL HFA, VENTOLIN HFA, PROAIR HFA) 90 mcg/actuation inhaler Take  by inhalation. Yes Provider, Historical   albuterol-ipratropium (DUO-NEB) 2.5 mg-0.5 mg/3 ml nebu 3 mL by Nebulization route. Yes Provider, Historical   multivitamin (ONE A DAY) tablet Take 1 Tab by mouth daily. Yes Other, MD Karen   aspirin 81 mg chewable tablet Take 81 mg by mouth daily.    Yes Other, MD Karen   cholecalciferol (Vitamin D3) (1000 Units /25 mcg) tablet Take 1,000 Units by mouth daily. Yes Other, MD Karen   acetaminophen (Tylenol Arthritis Pain) 650 mg TbER Take 650 mg by mouth every eight (8) hours. Yes Other, MD Karen     Allergies   Allergen Reactions    Celebrex [Celecoxib] Rash    Cleocin [Clindamycin Phosphate] Palpitations    Mushroom Swelling    Strawberry Rash    Ultracet [Tramadol-Acetaminophen] Rash    Zithromax [Azithromycin] Rash        Review of Systems:  I reviewed the rest of organ systems personally and they were negative signed by Dr. Jenni Rodriguez    Objective:     Visit Vitals  /82 (BP 1 Location: Left arm, BP Patient Position: Sitting)   Pulse 60   Temp 97.5 °F (36.4 °C) (Temporal)   Resp 18   Ht 5' 6\" (1.676 m)   Wt (!) 414 lb 6.4 oz (188 kg)   LMP  (LMP Unknown)   SpO2 97%   BMI 66.89 kg/m²     VITAL SIGNS REVIEWED. Physical Exam:  Patient is well-nourished pleasant in conversation is appropriate. Head and neck examination atraumatic, normocephalic. Gaze appropriate. Conversation appropriate. Neck examination shows supple. No mass. No obvious carotid bruit. Chest examination shows lungs are clear bilaterally well-expanded, no crackles or wheezes. Cardiovascular system regular rate, no obvious murmur. Skin warm to touch  and moist, no skin lesions. Abdomen is soft ,not tender or distended bowel sounds present. No palpable mass. Neurological examinations, no focal neuro deficits moving all 4 extremities. Cranial nerves intact. Sensation is intact as well. Hematologic: No obvious bruise or swelling or obvious lymphadenopathy. Psychosocial: Appropriate. Has good effect. Musculoskeletal system: No muscle wasting, appropriate movements upper and lower extremity. Vascular examination: Patient is CEAP C3 venous disorder. Patient has palpable cords noted bilateral DP and PT.         Data Review:   Office Visit on 03/14/2022   Component Date Value Ref Range Status    Hemoglobin A1c (POC) 03/14/2022 6.7  % Final    WBC 03/14/2022 3.3* 3.4 - 10.8 x10E3/uL Final    RBC 03/14/2022 3.85  3.77 - 5.28 x10E6/uL Final    HGB 03/14/2022 11.2  11.1 - 15.9 g/dL Final    HCT 03/14/2022 35.4  34.0 - 46.6 % Final    MCV 03/14/2022 92  79 - 97 fL Final    MCH 03/14/2022 29.1  26.6 - 33.0 pg Final    MCHC 03/14/2022 31.6  31.5 - 35.7 g/dL Final    RDW 03/14/2022 14.1  11.7 - 15.4 % Final    PLATELET 52/32/0276 023  150 - 450 x10E3/uL Final    NEUTROPHILS 03/14/2022 51  Not Estab. % Final    Lymphocytes 03/14/2022 36  Not Estab. % Final    MONOCYTES 03/14/2022 8  Not Estab. % Final    EOSINOPHILS 03/14/2022 4  Not Estab. % Final    BASOPHILS 03/14/2022 1  Not Estab. % Final    ABS. NEUTROPHILS 03/14/2022 1.7  1.4 - 7.0 x10E3/uL Final    Abs Lymphocytes 03/14/2022 1.2  0.7 - 3.1 x10E3/uL Final    ABS. MONOCYTES 03/14/2022 0.3  0.1 - 0.9 x10E3/uL Final    ABS. EOSINOPHILS 03/14/2022 0.1  0.0 - 0.4 x10E3/uL Final    ABS. BASOPHILS 03/14/2022 0.0  0.0 - 0.2 x10E3/uL Final    IMMATURE GRANULOCYTES 03/14/2022 0  Not Estab. % Final    ABS. IMM. GRANS. 03/14/2022 0.0  0.0 - 0.1 x10E3/uL Final    Glucose 03/14/2022 117* 65 - 99 mg/dL Final    BUN 03/14/2022 18  8 - 27 mg/dL Final    Creatinine 03/14/2022 1.32* 0.57 - 1.00 mg/dL Final    eGFR 03/14/2022 46* >59 mL/min/1.73 Final    BUN/Creatinine ratio 03/14/2022 14  12 - 28 Final    Sodium 03/14/2022 143  134 - 144 mmol/L Final    Potassium 03/14/2022 5.6* 3.5 - 5.2 mmol/L Final    Chloride 03/14/2022 105  96 - 106 mmol/L Final    CO2 03/14/2022 22  20 - 29 mmol/L Final    Calcium 03/14/2022 9.8  8.7 - 10.3 mg/dL Final    Protein, total 03/14/2022 7.0  6.0 - 8.5 g/dL Final    Albumin 03/14/2022 4.3  3.8 - 4.8 g/dL Final    GLOBULIN, TOTAL 03/14/2022 2.7  1.5 - 4.5 g/dL Final    A-G Ratio 03/14/2022 1.6  1.2 - 2.2 Final    Bilirubin, total 03/14/2022 0.4  0.0 - 1.2 mg/dL Final    Alk.  phosphatase 03/14/2022 112  44 - 121 IU/L Final    AST (SGOT) 03/14/2022 25  0 - 40 IU/L Final    ALT (SGPT) 03/14/2022 19  0 - 32 IU/L Final    Specific Gravity 03/14/2022 1.020  1.005 - 1.030 Final    pH (UA) 03/14/2022 6.0  5.0 - 7.5 Final    Color 03/14/2022 Yellow  Yellow Final    Appearance 03/14/2022 Clear  Clear Final    Leukocyte Esterase 03/14/2022 2+* Negative Final    Protein 03/14/2022 Negative  Negative/Trace Final    Glucose 03/14/2022 Negative  Negative Final    Ketone 03/14/2022 Negative  Negative Final    Blood 03/14/2022 Negative  Negative Final    Bilirubin 03/14/2022 Negative  Negative Final    Urobilinogen 03/14/2022 0.2  0.2 - 1.0 mg/dL Final    Nitrites 03/14/2022 Negative  Negative Final    Microscopic Examination 03/14/2022 See additional order   Final    Creatinine, urine 03/14/2022 142.8  Not Estab. mg/dL Final    Microalbumin, urine 03/14/2022 22.4  Not Estab. ug/mL Final    Microalb/Creat ratio (ug/mg creat.) 03/14/2022 16  0 - 29 mg/g creat Final    Cholesterol, total 03/14/2022 152  100 - 199 mg/dL Final    Triglyceride 03/14/2022 112  0 - 149 mg/dL Final    HDL Cholesterol 03/14/2022 59  >39 mg/dL Final    VLDL, calculated 03/14/2022 20  5 - 40 mg/dL Final    LDL, calculated 03/14/2022 73  0 - 99 mg/dL Final    WBC 03/14/2022 >30* 0 - 5 /hpf Final    RBC 03/14/2022 None seen  0 - 2 /hpf Final    Epithelial cells 03/14/2022 0-10  0 - 10 /hpf Final    Casts 03/14/2022 None seen  None seen /lpf Final    Bacteria 03/14/2022 Many* None seen/Few Final   Hospital Outpatient Visit on 02/23/2022   Component Date Value Ref Range Status    Left GSV Thigh Dist Diam 02/23/2022 0.42  cm Final    Left GSV Thigh Mid Diam 02/23/2022 0.82  cm Final    Left GSV Thigh Prox Diam 02/23/2022 0.83  cm Final    Right GSV Thigh Dist Diam 02/23/2022 0.58  cm Final    Right GSV Thigh Mid Diam 02/23/2022 0.88  cm Final    Right GSV Thigh Prox Diam 02/23/2022 1.32  cm Final        Assessment:     Problem List Items Addressed This Visit        Circulatory    Varicose veins of lower extremities with inflammation - Primary              Plan:     Patient was advised to continue her compression stocking once she gets the custom made compression stocking from lymphedema clinic. Patient has a bilateral knee x-ray which shows degenerative joint disease. Patient was also informed about venous reflux testing which confirms venous valvular reflux disorder which causing her significant swelling both legs. Patient was encouraged to continue her compression stocking with current compression gradient. Patient will be reassessed 8-month follow-up.         Nargis Stoner MD

## 2022-03-22 ENCOUNTER — NURSE TRIAGE (OUTPATIENT)
Dept: OTHER | Facility: CLINIC | Age: 62
End: 2022-03-22

## 2022-03-22 NOTE — TELEPHONE ENCOUNTER
Received call from Liat Wright at [facility] with Red Flag Complaint. Subjective: Caller states \"started feeling bad Sunday night and Monday morning, with Covid like symptoms. Headache and body aches are her worst symptoms\"     Current Symptoms: see Travel screen, wheezing    Onset: 2 day ago; sudden, rapid, unchanged    Associated Symptoms: reduced activity    Pain Severity: 7/10; aching and pain; constant, moderate    Temperature: 100.4 yesterday. Not checked today orally, nebulizer at home, needs nebulizer refills    What has been tried: tylenol    LMP: NA Pregnant: NA    Recommended disposition: Discuss with PCP and callback by nurse in 1 hour. Care advice provided, patient verbalizes understanding; denies any other questions or concerns; instructed to call back for any new or worsening symptoms. Patient/Caller agrees with recommended disposition; writer provided warm transfer to [employer] at [facility] for appointment scheduling    Attention Provider: Thank you for allowing me to participate in the care of your patient. The patient was connected to triage in response to information provided to the [facility]. Please do not respond through this encounter as the response is not directed to a shared pool.         Reason for Disposition   MILD difficulty breathing (e.g., minimal/no SOB at rest, SOB with walking, pulse <100)    Protocols used: CORONAVIRUS (COVID-19) DIAGNOSED OR SUSPECTED-ADULT-[place]

## 2022-03-24 RX ORDER — NITROFURANTOIN 25; 75 MG/1; MG/1
100 CAPSULE ORAL 2 TIMES DAILY
Qty: 10 CAPSULE | Refills: 0 | Status: SHIPPED | OUTPATIENT
Start: 2022-03-24 | End: 2022-03-29

## 2022-03-24 NOTE — PROGRESS NOTES
Informed patient  that urine shows large WBC. Is she having any urinary symptoms or vaginal discharge? Kidney functions abnormal but stable compared to baseline. Otherwise labs are normal. If any questions, let me know per ANICETO Bhatt NP. Patient states she always has to urinate a lot and her urine does have an odor to it but no other symptoms and no vaginal discharge. She stated if she needs a RX, to send it to [facility].

## 2022-03-25 ENCOUNTER — TELEPHONE (OUTPATIENT)
Dept: PRIMARY CARE CLINIC | Age: 62
End: 2022-03-25

## 2022-03-25 NOTE — TELEPHONE ENCOUNTER
----- Message from Lynsey Torres sent at 3/25/2022 12:00 PM EDT -----  Subject: Message to Provider    QUESTIONS  Information for Provider? pt calling to find out when Rogerio Harvey would   like to see her again, went over blood work with pt on 3/24 on phone but   never gave information about when to be seen again Please call with   information  ---------------------------------------------------------------------------  --------------  [address]  What is the best way for the office to contact you? OK to leave message on   voicemail  Preferred Call Back Phone Number?  [phone]  ---------------------------------------------------------------------------  --------------  SCRIPT ANSWERS  undefined

## 2022-04-28 DIAGNOSIS — I10 ESSENTIAL HYPERTENSION: ICD-10-CM

## 2022-04-28 DIAGNOSIS — R42 DIZZINESS: ICD-10-CM

## 2022-04-28 RX ORDER — LISINOPRIL 10 MG/1
TABLET ORAL
Qty: 90 TABLET | Refills: 1 | Status: SHIPPED | OUTPATIENT
Start: 2022-04-28 | End: 2022-08-02 | Stop reason: ALTCHOICE

## 2022-04-28 RX ORDER — MECLIZINE HYDROCHLORIDE 25 MG/1
TABLET ORAL
Qty: 90 TABLET | Refills: 1 | Status: SHIPPED | OUTPATIENT
Start: 2022-04-28 | End: 2022-10-05 | Stop reason: SDUPTHER

## 2022-05-10 ENCOUNTER — TELEPHONE (OUTPATIENT)
Dept: PRIMARY CARE CLINIC | Age: 62
End: 2022-05-10

## 2022-05-10 NOTE — TELEPHONE ENCOUNTER
Patient complains of chills, headache, body aches since Sunday. She was feverish for 2 days. No fever today.

## 2022-05-11 ENCOUNTER — OFFICE VISIT (OUTPATIENT)
Dept: PRIMARY CARE CLINIC | Age: 62
End: 2022-05-11
Payer: MEDICARE

## 2022-05-11 VITALS
OXYGEN SATURATION: 98 % | HEART RATE: 71 BPM | TEMPERATURE: 98 F | BODY MASS INDEX: 47.09 KG/M2 | HEIGHT: 66 IN | SYSTOLIC BLOOD PRESSURE: 139 MMHG | RESPIRATION RATE: 20 BRPM | DIASTOLIC BLOOD PRESSURE: 72 MMHG | WEIGHT: 293 LBS

## 2022-05-11 DIAGNOSIS — J44.9 CHRONIC OBSTRUCTIVE PULMONARY DISEASE, UNSPECIFIED COPD TYPE (HCC): ICD-10-CM

## 2022-05-11 DIAGNOSIS — J06.9 VIRAL UPPER RESPIRATORY ILLNESS: Primary | ICD-10-CM

## 2022-05-11 PROCEDURE — 99214 OFFICE O/P EST MOD 30 MIN: CPT | Performed by: NURSE PRACTITIONER

## 2022-05-11 PROCEDURE — G9899 SCRN MAM PERF RSLTS DOC: HCPCS | Performed by: NURSE PRACTITIONER

## 2022-05-11 PROCEDURE — G8417 CALC BMI ABV UP PARAM F/U: HCPCS | Performed by: NURSE PRACTITIONER

## 2022-05-11 PROCEDURE — G8754 DIAS BP LESS 90: HCPCS | Performed by: NURSE PRACTITIONER

## 2022-05-11 PROCEDURE — G8427 DOCREV CUR MEDS BY ELIG CLIN: HCPCS | Performed by: NURSE PRACTITIONER

## 2022-05-11 PROCEDURE — 3017F COLORECTAL CA SCREEN DOC REV: CPT | Performed by: NURSE PRACTITIONER

## 2022-05-11 PROCEDURE — G8432 DEP SCR NOT DOC, RNG: HCPCS | Performed by: NURSE PRACTITIONER

## 2022-05-11 PROCEDURE — G8752 SYS BP LESS 140: HCPCS | Performed by: NURSE PRACTITIONER

## 2022-05-11 RX ORDER — ALBUTEROL SULFATE 90 UG/1
1 AEROSOL, METERED RESPIRATORY (INHALATION)
Qty: 18 G | Refills: 1 | Status: SHIPPED | OUTPATIENT
Start: 2022-05-11

## 2022-05-11 RX ORDER — BENZONATATE 200 MG/1
200 CAPSULE ORAL
Qty: 30 CAPSULE | Refills: 0 | Status: SHIPPED | OUTPATIENT
Start: 2022-05-11 | End: 2022-05-18

## 2022-05-11 NOTE — PROGRESS NOTES
Do Kennedy is a 57 y.o. female who presents to the office today for the following:    Chief Complaint   Patient presents with    Chills    Cough    Headache    Wheezing    Sneezing       Past Medical History:   Diagnosis Date    Arthritis     Asthma     Chronic kidney disease     stage 3    Chronic obstructive pulmonary disease ([address]     Diabetes ([facility] [address]     GERD (gastroesophageal reflux disease)     High cholesterol     Hypertension     Menopause     Morbid obesity ([address]     Obstructive sleep apnea on CPAP     Vertigo        Past Surgical History:   Procedure Laterality Date    HX BREAST BIOPSY Left 2019    HX CHOLECYSTECTOMY      HX ENDOSCOPY  01/01/2019    HX HYSTERECTOMY      HX OOPHORECTOMY      HX ORTHOPAEDIC      right shoulder spur    HX ROTATOR CUFF REPAIR Left     by suture    HX TUBAL LIGATION Bilateral         Family History   Problem Relation Age of Onset    Heart Disease Mother     Lung Disease Mother         lung ca    Lung Disease Father         PNA    Cancer Maternal Grandmother         gyn        Social History     Tobacco Use    Smoking status: Never Smoker    Smokeless tobacco: Never Used   Vaping Use    Vaping Use: Never used   Substance Use Topics    Alcohol use: Never    Drug use: Never        HPI  Patient here today with complaint of cough and congestion that started about 4 days ago. No fever, chest pain or breathing difficulty. Has had some intermittent wheezing use albuterol a couple of times. Took tylenol also. No household sick contacts. No known exposure to covid 19. Current Outpatient Medications on File Prior to Visit   Medication Sig    lisinopriL (PRINIVIL, ZESTRIL) 10 mg tablet TAKE 1 TABLET EVERY DAY    meclizine (ANTIVERT) 25 mg tablet TAKE 1 TABLET THREE TIMES DAILY AS NEEDED  FOR  DIZZINESS    glimepiride (AMARYL) 1 mg tablet Take 1 mg by mouth two (2) times a day.     semaglutide (OZEMPIC) 0.25 mg or 0.5 mg/dose (2 mg/1.5 ml) subq pen 0.25 mg by SubCUTAneous route every seven (7) days.  Insulin Needles, Disposable, 32 gauge x 5/32\" ndle Use pen needle to inject medicine every 7 days    glucose blood VI test strips (True Metrix Glucose Test Strip) strip Use one strip to check glucose twice a day    furosemide (LASIX) 40 mg tablet TAKE 1 TABLET ON MONDAY, WEDNESDAY AND FRIDAY    metFORMIN ER (GLUCOPHAGE XR) 500 mg tablet Take 1 tablet with dinner for 2 weeks and then increase to 2 tablets with dinner    Blood-Glucose Meter (True Metrix Glucose Meter) monitoring kit Use to check glucose daily    Blood Glucose Control, Low (True Metrix Level 1) soln 1 Bottle by Does Not Apply route daily.  alcohol swabs padm 1 Swab by Apply Externally route daily.  lancets misc Trueplus lancets 33g    atorvastatin (LIPITOR) 10 mg tablet Take 1 tablet by mouth once daily    cpap machine kit by Does Not Apply route.  magnesium oxide (MAG-OX) 400 mg tablet Take 1 tablet by mouth once daily    allopurinoL (ZYLOPRIM) 300 mg tablet TAKE 1 TABLET EVERY DAY    albuterol-ipratropium (DUO-NEB) 2.5 mg-0.5 mg/3 ml nebu 3 mL by Nebulization route.  multivitamin (ONE A DAY) tablet Take 1 Tab by mouth daily.  aspirin 81 mg chewable tablet Take 81 mg by mouth daily.  cholecalciferol (Vitamin D3) (1000 Units /25 mcg) tablet Take 1,000 Units by mouth daily.  acetaminophen (Tylenol Arthritis Pain) 650 mg TbER Take 650 mg by mouth every eight (8) hours. No current facility-administered medications on file prior to visit. Medications Ordered Today   Medications    albuterol (PROVENTIL HFA, VENTOLIN HFA, PROAIR HFA) 90 mcg/actuation inhaler     Sig: Take 1 Puff by inhalation every four (4) hours as needed for Wheezing. Dispense:  18 g     Refill:  1    benzonatate (TESSALON) 200 mg capsule     Sig: Take 1 Capsule by mouth three (3) times daily as needed for Cough for up to 7 days.      Dispense:  30 Capsule     Refill:  0        Review of Systems   Constitutional: Positive for malaise/fatigue. Negative for chills, fever and weight loss. HENT: Positive for congestion. Negative for ear pain, sinus pain and sore throat. Respiratory: Positive for cough, sputum production and wheezing. Cardiovascular: Negative. Gastrointestinal: Negative. Genitourinary: Negative. Musculoskeletal: Positive for myalgias. Neurological: Positive for headaches. Negative for dizziness. Visit Vitals  /72 (BP 1 Location: Left upper arm, BP Patient Position: Sitting, BP Cuff Size: Large adult)   Pulse 71   Temp 98 °F (36.7 °C) (Oral)   Resp 20   Ht 5' 6\" (1.676 m)   Wt (!) 402 lb 9.6 oz (182.6 kg)   LMP  (LMP Unknown)   SpO2 98%   BMI 64.98 kg/m²       Physical Exam  Vitals and nursing note reviewed. Constitutional:       Appearance: Normal appearance. She is obese. HENT:      Right Ear: Tympanic membrane normal.      Left Ear: Tympanic membrane normal.      Mouth/Throat:      Pharynx: No oropharyngeal exudate or posterior oropharyngeal erythema. Cardiovascular:      Rate and Rhythm: Normal rate. Pulmonary:      Breath sounds: Normal breath sounds. Abdominal:      General: Bowel sounds are normal.      Palpations: Abdomen is soft. Tenderness: There is no abdominal tenderness. There is no guarding. Musculoskeletal:         General: Normal range of motion. Skin:     General: Skin is warm and dry. Neurological:      Mental Status: She is alert and oriented to person, place, and time. Mental status is at baseline. 1. Viral upper respiratory illness    - benzonatate (TESSALON) 200 mg capsule; Take 1 Capsule by mouth three (3) times daily as needed for Cough for up to 7 days. Dispense: 30 Capsule; Refill: 0    2. Chronic obstructive pulmonary disease, unspecified COPD type ([facility])    - albuterol (PROVENTIL HFA, VENTOLIN HFA, PROAIR HFA) 90 mcg/actuation inhaler;  Take 1 Puff by inhalation every four (4) hours as needed for Wheezing. Dispense: 18 g; Refill: 1    Advised that symptoms are consistent with viral URI  She is diabetic and would like to avoid steroids  Has albuterol to use prn for wheezing  Continue supportive care with fluids, mucinex prn, cool mist humidifier and tylenol prn  Advised if develops fever or other worsening condition, follow up immediately or seek emergency care  Also follow up if symptoms not resolving over next 3-4 days      Patient verbalizes understanding of plan of care as discussed above    Follow-up and Dispositions    · Return if symptoms worsen or fail to improve.

## 2022-05-11 NOTE — PROGRESS NOTES
Chief Complaint   Patient presents with    Chills    Cough    Headache    Wheezing    Sneezing     Started Saturday. 1. Have you been to the ER, urgent care clinic since your last visit? Hospitalized since your last visit? No    2. Have you seen or consulted any other health care providers outside of the [address] [place] [address] since your last visit? Include any pap smears or colon screening.  No

## 2022-05-20 DIAGNOSIS — M10.9 ACUTE GOUT OF MULTIPLE SITES, UNSPECIFIED CAUSE: ICD-10-CM

## 2022-05-20 RX ORDER — ALLOPURINOL 300 MG/1
TABLET ORAL
Qty: 90 TABLET | Refills: 0 | Status: SHIPPED | OUTPATIENT
Start: 2022-05-20 | End: 2022-09-01

## 2022-06-01 ENCOUNTER — OFFICE VISIT (OUTPATIENT)
Dept: ENDOCRINOLOGY | Age: 62
End: 2022-06-01
Payer: MEDICARE

## 2022-06-01 VITALS
OXYGEN SATURATION: 95 % | HEIGHT: 66 IN | BODY MASS INDEX: 47.09 KG/M2 | WEIGHT: 293 LBS | SYSTOLIC BLOOD PRESSURE: 137 MMHG | DIASTOLIC BLOOD PRESSURE: 67 MMHG | HEART RATE: 67 BPM | TEMPERATURE: 98.2 F

## 2022-06-01 DIAGNOSIS — E11.69 TYPE 2 DIABETES MELLITUS WITH OTHER SPECIFIED COMPLICATION, WITHOUT LONG-TERM CURRENT USE OF INSULIN (HCC): Primary | ICD-10-CM

## 2022-06-01 LAB
GLUCOSE POC: 170 MG/DL
HBA1C MFR BLD HPLC: 7.3 %

## 2022-06-01 PROCEDURE — G8752 SYS BP LESS 140: HCPCS | Performed by: INTERNAL MEDICINE

## 2022-06-01 PROCEDURE — 3017F COLORECTAL CA SCREEN DOC REV: CPT | Performed by: INTERNAL MEDICINE

## 2022-06-01 PROCEDURE — G8417 CALC BMI ABV UP PARAM F/U: HCPCS | Performed by: INTERNAL MEDICINE

## 2022-06-01 PROCEDURE — G8754 DIAS BP LESS 90: HCPCS | Performed by: INTERNAL MEDICINE

## 2022-06-01 PROCEDURE — 83036 HEMOGLOBIN GLYCOSYLATED A1C: CPT | Performed by: INTERNAL MEDICINE

## 2022-06-01 PROCEDURE — G8510 SCR DEP NEG, NO PLAN REQD: HCPCS | Performed by: INTERNAL MEDICINE

## 2022-06-01 PROCEDURE — 3051F HG A1C>EQUAL 7.0%<8.0%: CPT | Performed by: INTERNAL MEDICINE

## 2022-06-01 PROCEDURE — G8427 DOCREV CUR MEDS BY ELIG CLIN: HCPCS | Performed by: INTERNAL MEDICINE

## 2022-06-01 PROCEDURE — 99214 OFFICE O/P EST MOD 30 MIN: CPT | Performed by: INTERNAL MEDICINE

## 2022-06-01 PROCEDURE — 2022F DILAT RTA XM EVC RTNOPTHY: CPT | Performed by: INTERNAL MEDICINE

## 2022-06-01 PROCEDURE — 82962 GLUCOSE BLOOD TEST: CPT | Performed by: INTERNAL MEDICINE

## 2022-06-01 PROCEDURE — G9899 SCRN MAM PERF RSLTS DOC: HCPCS | Performed by: INTERNAL MEDICINE

## 2022-06-01 RX ORDER — GLIMEPIRIDE 1 MG/1
TABLET ORAL
Qty: 270 TABLET | Refills: 2 | Status: SHIPPED | OUTPATIENT
Start: 2022-06-01

## 2022-06-01 RX ORDER — METFORMIN HYDROCHLORIDE 500 MG/1
TABLET, EXTENDED RELEASE ORAL
Qty: 180 TABLET | Refills: 2 | Status: SHIPPED | OUTPATIENT
Start: 2022-06-01

## 2022-06-01 NOTE — PROGRESS NOTES
History and Physical    Patient: Tomás Vicente MRN: [id number]  SSN: [id number]    YOB: 1960  Age: 57 y.o. Sex: female      Subjective:      Tomás Vicente is a 57 y.o. female past medical history of hypertension, hyperlipidemia, morbid obesity is here for follow-up of type 2 diabetes mellitus. She was sent to me by primary care provider Bri No NP. Patient was diagnosed with diabetes in 2006. She was on insulin at one point but she did not require it, so it was stopped. At the last visit she was on glimepiride 4 mg daily and pioglitazone 15 mg daily. These medications were stopped, glimepiride because she was having low blood glucose symptoms in the afternoon and pioglitazone was stopped because of water retention. I try to start her on Jardiance and Trulicity but she has a high deductible, so she could not afford it. So she was put back on glimepiride but we only did 1 mg twice a day and metformin  mg 2 tablets with dinner. She has made several changes in her eating habits, she is trying to avoid junk food, eating smaller portions. She has lost 14 pounds since last visit. Checking blood glucose few times a week. Denies any more hypoglycemia. Currently not able to exercise because of vascular issues. Up-to-date with diabetic eye exam.    Glucometer reading: Checking blood glucose few times a week.   Readings are ranging from 116 to 160 mg/dL    Updated diabetes history:  · Diagnosis: 2006  · Current treatment:  Glimepiride 1 mg twice a day, metformin  mg 2 tablets with dinner  · Past treatment: Novolin 70/30 (not needed), metformin (kidneys), pioglitazone (lower extremity edema)  · Glucose checks: once a day  · Hyperglycemia: no  · Hypoglycemia: yes afternoons  · Meals per day: 2-3, breakfast: yogurt and bread/ cereal or sausage, lunch: fruit, dinner: salad and baked meat, snacks: chips, soda   · Exercise: no   · DM related hospitalizations: no    Smoking: no  Family history of coronary artery disease/stroke: no    Complications of DM:  · CAD: no, ? CHF non-obstructive  · CVA: no  · PVD: no  · Amputations: no   · Retinopathy: no; last exam was   · Gastropathy: no  · Nephropathy: ckd stage 3  · Neuropathy: yes   Sees podiatrist:    Medications:  · Statin: atorvastatin 10  · ACE-I: lisinopril  · ASA: yes    · Diabetes education: no    Past Medical History:   Diagnosis Date    Arthritis     Asthma     Chronic kidney disease     stage 3    Chronic obstructive pulmonary disease (HCC)     Diabetes (HCC)     GERD (gastroesophageal reflux disease)     High cholesterol     Hypertension     Menopause     Morbid obesity ([facility] [address]     Obstructive sleep apnea on CPAP     Vertigo      Past Surgical History:   Procedure Laterality Date    HX BREAST BIOPSY Left 2019    HX CHOLECYSTECTOMY      HX ENDOSCOPY  01/01/2019    HX HYSTERECTOMY      HX OOPHORECTOMY      HX ORTHOPAEDIC      right shoulder spur    HX ROTATOR CUFF REPAIR Left     by suture    HX TUBAL LIGATION Bilateral       Family History   Problem Relation Age of Onset    Heart Disease Mother     Lung Disease Mother         lung ca    Lung Disease Father         PNA    Cancer Maternal Grandmother         gyn     Social History     Tobacco Use    Smoking status: Never Smoker    Smokeless tobacco: Never Used   Substance Use Topics    Alcohol use: Never      Prior to Admission medications    Medication Sig Start Date End Date Taking?  Authorizing Provider   glimepiride (AMARYL) 1 mg tablet 1 tab before breakfast and 2 tabs before dinner, 90 days 6/1/22  Yes Maco Escobar MD   metFORMIN ER (GLUCOPHAGE XR) 500 mg tablet TAKE 1 TABLET WITH DINNER FOR 2 WEEKS, THEN TAKE 2 TABLETS WITH DINNER THEREAFTER 6/1/22  Yes Maco Escobar MD   allopurinoL (ZYLOPRIM) 300 mg tablet TAKE 1 TABLET EVERY DAY 5/20/22  Yes Matthieu Soto NP   albuterol (PROVENTIL HFA, VENTOLIN HFA, PROAIR HFA) 90 mcg/actuation inhaler Take 1 Puff by inhalation every four (4) hours as needed for Wheezing. 5/11/22  Yes Alayna Benavidez NP   lisinopriL (PRINIVIL, ZESTRIL) 10 mg tablet TAKE 1 TABLET EVERY DAY 4/28/22  Yes Alayna Benavidez NP   meclizine (ANTIVERT) 25 mg tablet TAKE 1 TABLET THREE TIMES DAILY AS NEEDED  FOR  DIZZINESS 4/28/22  Yes Alayna Benavidez NP   Insulin Needles, Disposable, 32 gauge x 5/32\" ndle Use pen needle to inject medicine every 7 days 3/14/22  Yes Alayna Benavidez NP   glucose blood VI test strips (True Metrix Glucose Test Strip) strip Use one strip to check glucose twice a day 3/9/22  Yes Alayna Benavidez NP   furosemide (LASIX) 40 mg tablet TAKE 1 TABLET ON MONDAY, [facility] AND FRIDAY 2/17/22  Yes Alayna Benavidez NP   Blood-Glucose Meter (True Metrix Glucose Meter) monitoring kit Use to check glucose daily 1/28/22  Yes Alayna Benavidez NP   Blood Glucose Control, Low (True Metrix Level 1) soln 1 Bottle by Does Not Apply route daily. 1/28/22  Yes Alayna Benavidez NP   alcohol swabs padm 1 Swab by Apply Externally route daily. 1/28/22  Yes Alayna Benavidez NP   lancets misc Trueplus lancets 33g 1/26/22  Yes Alayna Benavidez NP   atorvastatin (LIPITOR) 10 mg tablet Take 1 tablet by mouth once daily 1/25/22  Yes Alayna Benavidez NP   cpap machine kit by Does Not Apply route. Yes Provider, Historical   magnesium oxide (MAG-OX) 400 mg tablet Take 1 tablet by mouth once daily 11/7/21  Yes Buffy Menon MD   albuterol-ipratropium (DUO-NEB) 2.5 mg-0.5 mg/3 ml nebu 3 mL by Nebulization route. Yes Provider, Historical   multivitamin (ONE A DAY) tablet Take 1 Tab by mouth daily. Yes Other, MD Karen   aspirin 81 mg chewable tablet Take 81 mg by mouth daily. Yes Jose, MD Karen   cholecalciferol (Vitamin D3) (1000 Units /25 mcg) tablet Take 1,000 Units by mouth daily. Yes Jose, MD Karen   acetaminophen (Tylenol Arthritis Pain) 650 mg TbER Take 650 mg by mouth every eight (8) hours.    Yes Other, MD Karen   semaglutide (OZEMPIC) 0.25 mg or 0.5 mg/dose (2 mg/1.5 ml) subq pen 0.25 mg by SubCUTAneous route every seven (7) days. Patient not taking: Reported on 6/1/2022 3/14/22   Jude Posada NP        Allergies   Allergen Reactions    Celebrex [Celecoxib] Rash    Cleocin [Clindamycin Phosphate] Palpitations    Mushroom Swelling    Strawberry Rash    Ultracet [Tramadol-Acetaminophen] Rash    Zithromax [Azithromycin] Rash       Review of Systems:  ROS    A comprehensive review of systems was preformed and it is negative except mentioned in HPI    Objective:     Vitals:    06/01/22 1320   BP: 137/67   Pulse: 67   Temp: 98.2 °F (36.8 °C)   TempSrc: Temporal   SpO2: 95%   Weight: (!) 400 lb 9.6 oz (181.7 kg)   Height: 5' 6\" (1.676 m)        Physical Exam:    Physical Exam  Vitals and nursing note reviewed. Constitutional:       Appearance: She is obese. HENT:      Head: Normocephalic and atraumatic. Cardiovascular:      Rate and Rhythm: Normal rate and regular rhythm. Pulmonary:      Effort: Pulmonary effort is normal.      Breath sounds: Normal breath sounds. Musculoskeletal:         General: Swelling present. Neurological:      Mental Status: She is alert. 2-2-2022:  diabetic foot exam:  Bilateral diabetic foot exam was performed today. Dorsalis pedis pulses 1+ bilaterally. Monofilament sensation decreased bilaterally. No ulcers or skin breakdown.      Labs and Imaging:    Last 3 Recorded Weights in this Encounter    06/01/22 1320   Weight: (!) 400 lb 9.6 oz (181.7 kg)        Lab Results   Component Value Date/Time    Hemoglobin A1c 6.8 (H) 06/15/2021 12:37 PM    Hemoglobin A1c 6.7 (H) 12/18/2020 11:34 AM    Hemoglobin A1c, External 7.2 06/04/2020 12:00 AM        Assessment:     Patient Active Problem List   Diagnosis Code    Asthma J45.909    Chronic obstructive pulmonary disease ([facility] [address] J44.9    Diabetes mellitus ([facility] [address] E11.9    Diverticulitis K57.92    Essential hypertension I10    GERD (gastroesophageal reflux disease) K21.9    Gout M10.9    Hyperkalemia E87.5    Mixed hyperlipidemia due to type 2 diabetes mellitus (HCC) E11.69, E78.2    Arthritis P19.10    Helicobacter pylori gastritis K29.70, B96.81    Hyperlipidemia E78.5    Varicose veins of lower extremities with inflammation I83.10    Obstructive sleep apnea syndrome G47.33    Morbid obesity with BMI of 60.0-69.9, adult (HCC) E66.01, Z68.44    Iron deficiency anemia D50.9    Stage 3a chronic kidney disease (HCC) N18.31    History of hysterectomy Z90.710           Plan:     Type 2 diabetes mellitus, uncontrolled:  Hemoglobin A1c was 6.8% on 6/152021, 7% on 12-, 7.3% today. Fingerstick blood glucose is 170 mg/dL in my office today. Up to date with diabetes related annual labs: December 2021 except TSH  Up to date with diabetic eye exam: October 2021  Plan:  Discussed with patient portance of controlling diabetes to prevent endorgan damage. Her meals are not bad but snacks appear to be a problem. Discussed with patient about healthy snacking. Although hemoglobin A1c has worsened but overall she appears to be doing better because she has lost weight and does not have any more hypoglycemia. Continue metformin  mg 2 tablets with dinner, not increasing the dose because of her GFR. Continue glimepiride 1 mg before breakfast, increase to 2 mg before dinner which is her biggest meal.  Check blood glucose once a day every day and follow-up with PCP in 3 months. Essential hypertension:  Blood pressure well controlled on current medications. No microalbuminuria, last checked in December 2021. Mixed hyperlipidemia:  12-: Total cholesterol 170, triglycerides 121, LDL 90. Continue atorvastatin 10 mg daily. CKD stage III:  GFR 47 in December 2021, 46 in March 2022. Morbid obesity:  Encourage patient to go back to see the bariatric surgeon. She has lost 14 pounds since last visit.     Orders Placed This Encounter    AMB POC GLUCOSE BLOOD, BY GLUCOSE MONITORING DEVICE    AMB POC HEMOGLOBIN A1C    glimepiride (AMARYL) 1 mg tablet     Si tab before breakfast and 2 tabs before dinner, 90 days     Dispense:  270 Tablet     Refill:  2    metFORMIN ER (GLUCOPHAGE XR) 500 mg tablet     Sig: TAKE 1 TABLET WITH DINNER FOR 2 WEEKS, THEN TAKE 2 TABLETS WITH DINNER THEREAFTER     Dispense:  180 Tablet     Refill:  2        Signed By: Prema Ortiz MD     2022      Return to clinic

## 2022-06-01 NOTE — LETTER
6/1/2022    Patient: Tresa Godfrey   YOB: 1960   Date of Visit: 6/1/2022     Ashley Cruz NP  [id number] [facility]  Via In Basket    Dear Ashley Cruz NP,      Thank you for referring Ms. Marcela Campa to [place] [address] for evaluation. My notes for this consultation are attached. If you have questions, please do not hesitate to call me. I look forward to following your patient along with you.       Sincerely,    Ginette Siegel MD

## 2022-06-07 ENCOUNTER — HOSPITAL ENCOUNTER (EMERGENCY)
Age: 62
Discharge: HOME | End: 2022-06-07
Attending: EMERGENCY MEDICINE
Payer: MEDICARE

## 2022-06-07 VITALS
DIASTOLIC BLOOD PRESSURE: 93 MMHG | SYSTOLIC BLOOD PRESSURE: 157 MMHG | BODY MASS INDEX: 47.09 KG/M2 | OXYGEN SATURATION: 97 % | RESPIRATION RATE: 19 BRPM | TEMPERATURE: 99.2 F | WEIGHT: 293 LBS | HEART RATE: 87 BPM | HEIGHT: 66 IN

## 2022-06-07 DIAGNOSIS — U07.1 COVID-19: Primary | ICD-10-CM

## 2022-06-07 LAB
FLUAV RNA SPEC QL NAA+PROBE: NOT DETECTED
FLUBV RNA SPEC QL NAA+PROBE: NOT DETECTED
SARS-COV-2, COV2: DETECTED

## 2022-06-07 PROCEDURE — 99282 EMERGENCY DEPT VISIT SF MDM: CPT

## 2022-06-07 PROCEDURE — 74011250637 HC RX REV CODE- 250/637: Performed by: EMERGENCY MEDICINE

## 2022-06-07 PROCEDURE — 87636 SARSCOV2 & INF A&B AMP PRB: CPT

## 2022-06-07 RX ORDER — HYDRALAZINE HYDROCHLORIDE 25 MG/1
50 TABLET, FILM COATED ORAL
Status: DISCONTINUED | OUTPATIENT
Start: 2022-06-07 | End: 2022-06-07 | Stop reason: HOSPADM

## 2022-06-07 NOTE — ED PROVIDER NOTES
Patient presents with complaint of fever chills and congestion since last night. She denies chest pain or shortness of breath. Patient was vaccinated for Covid.             Past Medical History:   Diagnosis Date    Arthritis     Asthma     Chronic kidney disease     stage 3    Chronic obstructive pulmonary disease (HCC)     Diabetes (HCC)     GERD (gastroesophageal reflux disease)     High cholesterol     Hypertension     Menopause     Morbid obesity (HCC)     Obstructive sleep apnea on CPAP     Vertigo        Past Surgical History:   Procedure Laterality Date    HX BREAST BIOPSY Left 2019    HX CHOLECYSTECTOMY      HX ENDOSCOPY  01/01/2019    HX HYSTERECTOMY      HX OOPHORECTOMY      HX ORTHOPAEDIC      right shoulder spur    HX ROTATOR CUFF REPAIR Left     by suture    HX TUBAL LIGATION Bilateral          Family History:   Problem Relation Age of Onset    Heart Disease Mother     Lung Disease Mother         lung ca    Lung Disease Father         PNA    Cancer Maternal Grandmother         gyn       Social History     Socioeconomic History    Marital status:      Spouse name: Not on file    Number of children: Not on file    Years of education: Not on file    Highest education level: Some college, no degree   Occupational History    Not on file   Tobacco Use    Smoking status: Never Smoker    Smokeless tobacco: Never Used   Vaping Use    Vaping Use: Never used   Substance and Sexual Activity    Alcohol use: Never    Drug use: Never    Sexual activity: Not Currently     Partners: Male     Birth control/protection: Surgical   Other Topics Concern     Service Not Asked    Blood Transfusions Not Asked    Caffeine Concern Not Asked    Occupational Exposure Not Asked    Hobby Hazards Not Asked    Sleep Concern Not Asked    Stress Concern Not Asked    Weight Concern Not Asked    Special Diet Not Asked    Back Care Not Asked    Exercise Not Asked    Bike Helmet Not Asked   Morgan Not Asked    Self-Exams Not Asked   Social History Narrative    Not on file     Social Determinants of Health     Financial Resource Strain:     Difficulty of Paying Living Expenses: Not on file   Food Insecurity:     Worried About Running Out of Food in the Last Year: Not on file    Kaden of Food in the Last Year: Not on file   Transportation Needs:     Lack of Transportation (Medical): Not on file    Lack of Transportation (Non-Medical): Not on file   Physical Activity:     Days of Exercise per Week: Not on file    Minutes of Exercise per Session: Not on file   Stress:     Feeling of Stress : Not on file   Social Connections:     Frequency of Communication with Friends and Family: Not on file    Frequency of Social Gatherings with Friends and Family: Not on file    Attends Buddhism Services: Not on file    Active Member of [address] [place] [address] [email or web address] or Organizations: Not on file    Attends Club or Organization Meetings: Not on file    Marital Status: Not on file   Intimate Partner Violence:     Fear of Current or Ex-Partner: Not on file    Emotionally Abused: Not on file    Physically Abused: Not on file    Sexually Abused: Not on file   Housing Stability:     Unable to Pay for Housing in the Last Year: Not on file    Number of [place] in the Last Year: Not on file    Unstable Housing in the Last Year: Not on file         ALLERGIES: Celebrex [celecoxib], Cleocin [clindamycin phosphate], Mushroom, Strawberry, Ultracet [tramadol-acetaminophen], and Zithromax [azithromycin]    Review of Systems   Constitutional: Positive for fever. HENT: Positive for congestion. Eyes: Negative. Respiratory: Negative. Cardiovascular: Negative. Gastrointestinal: Negative. Endocrine: Negative. Genitourinary: Negative. Skin: Negative. Allergic/Immunologic: Negative. Neurological: Negative. Hematological: Negative. Psychiatric/Behavioral: Negative.     All other systems reviewed and are negative. Vitals:    06/07/22 0221 06/07/22 0225   BP: (!) 199/114    Pulse: (!) 107    Resp: 19    Temp: 99.2 °F (37.3 °C)    SpO2: 97%    Weight:  (!) 181.4 kg (400 lb)   Height:  5' 6\" (1.676 m)            Physical Exam  Vitals and nursing note reviewed. Constitutional:       Appearance: She is well-developed. HENT:      Head: Normocephalic and atraumatic. Nose: Congestion and rhinorrhea present. Cardiovascular:      Rate and Rhythm: Normal rate and regular rhythm. Heart sounds: Normal heart sounds. Pulmonary:      Breath sounds: Normal breath sounds. Abdominal:      General: Bowel sounds are normal.      Palpations: Abdomen is soft. Musculoskeletal:         General: Normal range of motion. Cervical back: Normal range of motion and neck supple. Neurological:      General: No focal deficit present. Mental Status: She is alert.    Psychiatric:         Mood and Affect: Mood normal.         Behavior: Behavior normal.          MDM       Procedures

## 2022-06-07 NOTE — ED TRIAGE NOTES
Pt states that around 2100 last night she began to experience chills and a slight fever of 101 in which she took tylenol. Pt states that she continued to have chills and when she took her temp at home a little bit ago it read 102. 3.  Pt is currently 99.2 per hospital.

## 2022-06-07 NOTE — Clinical Note
[address]  [facility] EMERGENCY DEPT  [address] [facility] [address]  [place] [address]  [phone]    Work/School Note    Date: 6/7/2022     To Whom It May concern:    Tonie Pacheco was evaluated by the following provider(s):  Attending Provider: Clau Vela Fairbanks Matty virus is suspected. Per the CDC guidelines we recommend home isolation until the following conditions are all met:    1. At least five days have passed since symptoms first appeared and/or had a close exposure,   2. After home isolation for five days, wearing a mask around others for the next five days,  3. At least 24 have passed since last fever without the use of fever-reducing medications and  4.  Symptoms (eg cough, shortness of breath) have improved      Sincerely,          Nikunj Jin MD

## 2022-06-07 NOTE — Clinical Note
[address]  [facility] EMERGENCY DEPT  [address] [facility] [address]  [place]ita Rosenbaum [id number] [phone]    Work/School Note    Date: 6/7/2022     To Whom It May concern:    Sabra Oden was evaluated by the following provider(s):  Attending Provider: Opal Garcia [address] [place] [address] virus is suspected. Per the CDC guidelines we recommend home isolation until the following conditions are all met:    1. At least five days have passed since symptoms first appeared and/or had a close exposure,   2. After home isolation for five days, wearing a mask around others for the next five days,  3. At least 24 have passed since last fever without the use of fever-reducing medications and  4.  Symptoms (eg cough, shortness of breath) have improved      Sincerely,          Faviola Modi RN

## 2022-06-08 ENCOUNTER — PATIENT OUTREACH (OUTPATIENT)
Dept: CASE MANAGEMENT | Age: 62
End: 2022-06-08

## 2022-06-08 NOTE — PROGRESS NOTES
Patient contacted regarding COVID-19 diagnosis. Discussed COVID-19 related testing which was available at this time. Test results were positive. Patient informed of results, if available? yes. LPN Care Coordinator contacted the patient by telephone to perform post discharge assessment. Call within 2 business days of discharge: Yes Verified name and  with patient as identifiers. Provided introduction to self, and explanation of the CTN/ACM role, and reason for call due to risk factors for infection and/or exposure to COVID-19. Symptoms reviewed with patient who verbalized the following symptoms: no new symptoms and no worsening symptoms      Due to no new or worsening symptoms encounter was not routed to provider for escalation. Discussed follow-up appointments. If no appointment was previously scheduled, appointment scheduling offered:  no. [facility] follow up appointment(s):   Future Appointments   Date Time Provider Braden [place]   2022 10:30 AM Enrique Barrios NP SPCPE BS AMB   2022 11:00 AM Berhane Rubio MD SSSE BS AMB     Non-[facility] follow up appointment(s): NA    Interventions to address risk factors: Obtained and reviewed discharge summary and/or continuity of care documents  Reviewed discharge instructions, medical action plan and red flags with patient who verbalized understanding. Advance Care Planning:   Does patient have an Advance Directive: decision makers updated. Educated patient about risk for severe COVID-19 due to risk factors according to CDC guidelines. LPN CC reviewed discharge instructions, medical action plan and red flag symptoms with the patient who verbalized understanding. Discussed COVID vaccination status: no. Education provided on COVID-19 vaccination as appropriate. Discussed exposure protocols and quarantine with CDC Guidelines.  Patient was given an opportunity to verbalize any questions and concerns and agrees to contact LPN CC or health care provider for questions related to their healthcare. Reviewed and educated patient on any new and changed medications related to discharge diagnosis     Was patient discharged with a pulse oximeter? no .    LPN CC provided contact information. Plan for follow-up call in 5-7 days based on severity of symptoms and risk factors.

## 2022-06-15 ENCOUNTER — PATIENT OUTREACH (OUTPATIENT)
Dept: CASE MANAGEMENT | Age: 62
End: 2022-06-15

## 2022-06-15 NOTE — PROGRESS NOTES
Patient resolved from [address] Transitions episode on 6/15/22. Discussed COVID-19 related testing which was available at this time. Test results were positive. Patient informed of results, if available? yes     Patient/family has been provided the following resources and education related to COVID-19:                         Signs, symptoms and red flags related to COVID-19            [facility] exposure and quarantine guidelines            [employer] exposure contact - [phone]            Contact for their local Department of Health                 Patient currently reports that the following symptoms have improved:  no new symptoms and no worsening symptoms. No further outreach scheduled with this CTN/ACM/LPN/HC/ MA. Episode of Care resolved. Patient has this CTN/ACM/LPN/HC/MA contact information if future needs arise.

## 2022-06-24 DIAGNOSIS — N18.31 STAGE 3A CHRONIC KIDNEY DISEASE (HCC): ICD-10-CM

## 2022-06-24 RX ORDER — LANOLIN ALCOHOL/MO/W.PET/CERES
CREAM (GRAM) TOPICAL
Qty: 90 TABLET | Refills: 1 | Status: SHIPPED | OUTPATIENT
Start: 2022-06-24

## 2022-07-07 DIAGNOSIS — E78.2 MIXED HYPERLIPIDEMIA DUE TO TYPE 2 DIABETES MELLITUS (HCC): ICD-10-CM

## 2022-07-07 DIAGNOSIS — E11.69 MIXED HYPERLIPIDEMIA DUE TO TYPE 2 DIABETES MELLITUS (HCC): ICD-10-CM

## 2022-07-07 RX ORDER — ATORVASTATIN CALCIUM 10 MG/1
TABLET, FILM COATED ORAL
Qty: 90 TABLET | Refills: 1 | Status: SHIPPED | OUTPATIENT
Start: 2022-07-07

## 2022-08-02 ENCOUNTER — OFFICE VISIT (OUTPATIENT)
Dept: PRIMARY CARE CLINIC | Age: 62
End: 2022-08-02
Payer: MEDICARE

## 2022-08-02 VITALS
TEMPERATURE: 97.6 F | DIASTOLIC BLOOD PRESSURE: 75 MMHG | RESPIRATION RATE: 18 BRPM | OXYGEN SATURATION: 98 % | HEIGHT: 66 IN | WEIGHT: 293 LBS | HEART RATE: 77 BPM | SYSTOLIC BLOOD PRESSURE: 164 MMHG | BODY MASS INDEX: 47.09 KG/M2

## 2022-08-02 DIAGNOSIS — G47.33 OBSTRUCTIVE SLEEP APNEA SYNDROME: ICD-10-CM

## 2022-08-02 DIAGNOSIS — E66.01 MORBID OBESITY WITH BMI OF 60.0-69.9, ADULT (HCC): ICD-10-CM

## 2022-08-02 DIAGNOSIS — Z12.31 SCREENING MAMMOGRAM, ENCOUNTER FOR: ICD-10-CM

## 2022-08-02 DIAGNOSIS — K21.9 GASTROESOPHAGEAL REFLUX DISEASE WITHOUT ESOPHAGITIS: ICD-10-CM

## 2022-08-02 DIAGNOSIS — N18.30 ANEMIA DUE TO STAGE 3 CHRONIC KIDNEY DISEASE, UNSPECIFIED WHETHER STAGE 3A OR 3B CKD (HCC): ICD-10-CM

## 2022-08-02 DIAGNOSIS — I10 ESSENTIAL HYPERTENSION: ICD-10-CM

## 2022-08-02 DIAGNOSIS — J44.9 CHRONIC OBSTRUCTIVE PULMONARY DISEASE, UNSPECIFIED COPD TYPE (HCC): ICD-10-CM

## 2022-08-02 DIAGNOSIS — E11.69 MIXED HYPERLIPIDEMIA DUE TO TYPE 2 DIABETES MELLITUS (HCC): ICD-10-CM

## 2022-08-02 DIAGNOSIS — M17.0 PRIMARY OSTEOARTHRITIS OF BOTH KNEES: ICD-10-CM

## 2022-08-02 DIAGNOSIS — E11.69 TYPE 2 DIABETES MELLITUS WITH OTHER SPECIFIED COMPLICATION, WITHOUT LONG-TERM CURRENT USE OF INSULIN (HCC): ICD-10-CM

## 2022-08-02 DIAGNOSIS — I87.2 VENOUS INSUFFICIENCY: ICD-10-CM

## 2022-08-02 DIAGNOSIS — M1A.09X0 CHRONIC GOUT OF MULTIPLE SITES, UNSPECIFIED CAUSE: ICD-10-CM

## 2022-08-02 DIAGNOSIS — N18.31 STAGE 3A CHRONIC KIDNEY DISEASE (HCC): Primary | ICD-10-CM

## 2022-08-02 DIAGNOSIS — E78.2 MIXED HYPERLIPIDEMIA DUE TO TYPE 2 DIABETES MELLITUS (HCC): ICD-10-CM

## 2022-08-02 DIAGNOSIS — D63.1 ANEMIA DUE TO STAGE 3 CHRONIC KIDNEY DISEASE, UNSPECIFIED WHETHER STAGE 3A OR 3B CKD (HCC): ICD-10-CM

## 2022-08-02 PROCEDURE — 3017F COLORECTAL CA SCREEN DOC REV: CPT | Performed by: NURSE PRACTITIONER

## 2022-08-02 PROCEDURE — 2022F DILAT RTA XM EVC RTNOPTHY: CPT | Performed by: NURSE PRACTITIONER

## 2022-08-02 PROCEDURE — G8754 DIAS BP LESS 90: HCPCS | Performed by: NURSE PRACTITIONER

## 2022-08-02 PROCEDURE — G8432 DEP SCR NOT DOC, RNG: HCPCS | Performed by: NURSE PRACTITIONER

## 2022-08-02 PROCEDURE — G8753 SYS BP > OR = 140: HCPCS | Performed by: NURSE PRACTITIONER

## 2022-08-02 PROCEDURE — 99214 OFFICE O/P EST MOD 30 MIN: CPT | Performed by: NURSE PRACTITIONER

## 2022-08-02 PROCEDURE — G9899 SCRN MAM PERF RSLTS DOC: HCPCS | Performed by: NURSE PRACTITIONER

## 2022-08-02 PROCEDURE — G8417 CALC BMI ABV UP PARAM F/U: HCPCS | Performed by: NURSE PRACTITIONER

## 2022-08-02 PROCEDURE — G8427 DOCREV CUR MEDS BY ELIG CLIN: HCPCS | Performed by: NURSE PRACTITIONER

## 2022-08-02 PROCEDURE — 3051F HG A1C>EQUAL 7.0%<8.0%: CPT | Performed by: NURSE PRACTITIONER

## 2022-08-02 RX ORDER — LISINOPRIL 10 MG/1
10 TABLET ORAL 2 TIMES DAILY
Qty: 180 TABLET | Refills: 1 | Status: SHIPPED | OUTPATIENT
Start: 2022-08-02 | End: 2022-10-31

## 2022-08-02 NOTE — PROGRESS NOTES
Carmel Valdez is a 57 y.o. female who presents to the office today for the following:    Chief Complaint   Patient presents with    Hypertension       Past Medical History:   Diagnosis Date    Arthritis     Asthma     Chronic kidney disease     stage 3    Chronic obstructive pulmonary disease ([address]     Diabetes ([address]     GERD (gastroesophageal reflux disease)     High cholesterol     Hypertension     Menopause     Morbid obesity ([address]     Obstructive sleep apnea on CPAP     Vertigo        Past Surgical History:   Procedure Laterality Date    HX BREAST BIOPSY Left 2019    HX CHOLECYSTECTOMY      HX ENDOSCOPY  01/01/2019    HX HYSTERECTOMY      HX OOPHORECTOMY      HX ORTHOPAEDIC      right shoulder spur    HX ROTATOR CUFF REPAIR Left     by suture    HX TUBAL LIGATION Bilateral         Family History   Problem Relation Age of Onset    Heart Disease Mother     Lung Disease Mother         lung ca    Lung Disease Father         PNA    Cancer Maternal Grandmother         gyn        Social History     Tobacco Use    Smoking status: Never    Smokeless tobacco: Never   Vaping Use    Vaping Use: Never used   Substance Use Topics    Alcohol use: Never    Drug use: Never        HPI  Patient here today for follow up of chronic conditions with PMH of hypertension, hyperlipidemia, type 2 diabetes, venous insufficiency, osteoarthritis of knees, copd, CKD, morbid obesity, sleep apnea, anemia and GERD. States that she follows with multiple specialists including vascular, nephrology and endocrinology. Reports taking medicines as directed. Only concern has been regarding some diarrhea after taking her evening metformin. Was increased to 2 tablets nightly from 1 tablet and now getting stomach cramping and diarrhea at night. Does not occur all throughout day. No vomiting associated.      Current Outpatient Medications on File Prior to Visit   Medication Sig    atorvastatin (LIPITOR) 10 mg tablet TAKE 1 TABLET EVERY DAY    magnesium oxide (MAG-OX) 400 mg tablet Take 1 tablet by mouth once daily    glimepiride (AMARYL) 1 mg tablet 1 tab before breakfast and 2 tabs before dinner, 90 days    metFORMIN ER (GLUCOPHAGE XR) 500 mg tablet TAKE 1 TABLET WITH DINNER FOR 2 WEEKS, THEN TAKE 2 TABLETS WITH DINNER THEREAFTER    allopurinoL (ZYLOPRIM) 300 mg tablet TAKE 1 TABLET EVERY DAY    albuterol (PROVENTIL HFA, VENTOLIN HFA, PROAIR HFA) 90 mcg/actuation inhaler Take 1 Puff by inhalation every four (4) hours as needed for Wheezing. meclizine (ANTIVERT) 25 mg tablet TAKE 1 TABLET THREE TIMES DAILY AS NEEDED  FOR  DIZZINESS    [DISCONTINUED] lisinopriL (PRINIVIL, ZESTRIL) 10 mg tablet TAKE 1 TABLET EVERY DAY    Insulin Needles, Disposable, 32 gauge x 5/32\" ndle Use pen needle to inject medicine every 7 days    [DISCONTINUED] semaglutide (OZEMPIC) 0.25 mg or 0.5 mg/dose (2 mg/1.5 ml) subq pen 0.25 mg by SubCUTAneous route every seven (7) days. (Patient not taking: Reported on 6/1/2022)    glucose blood VI test strips (True Metrix Glucose Test Strip) strip Use one strip to check glucose twice a day    furosemide (LASIX) 40 mg tablet TAKE 1 TABLET ON MONDAY, WEDNESDAY AND FRIDAY    Blood-Glucose Meter (True Metrix Glucose Meter) monitoring kit Use to check glucose daily    Blood Glucose Control, Low (True Metrix Level 1) soln 1 Bottle by Does Not Apply route daily. alcohol swabs padm 1 Swab by Apply Externally route daily. lancets misc Trueplus lancets 33g    cpap machine kit by Does Not Apply route. albuterol-ipratropium (DUO-NEB) 2.5 mg-0.5 mg/3 ml nebu 3 mL by Nebulization route. multivitamin (ONE A DAY) tablet Take 1 Tab by mouth daily. aspirin 81 mg chewable tablet Take 81 mg by mouth daily. cholecalciferol (Vitamin D3) (1000 Units /25 mcg) tablet Take 1,000 Units by mouth daily. acetaminophen (Tylenol Arthritis Pain) 650 mg TbER Take 650 mg by mouth every eight (8) hours.      No current facility-administered medications on file prior to visit. Medications Ordered Today   Medications    lisinopriL (PRINIVIL, ZESTRIL) 10 mg tablet     Sig: Take 1 Tablet by mouth two (2) times a day for 90 days. Dispense:  180 Tablet     Refill:  1        Review of Systems   Constitutional: Negative. Respiratory: Negative. Cardiovascular:  Positive for leg swelling. Negative for chest pain, palpitations and orthopnea. Gastrointestinal:  Positive for diarrhea. Negative for abdominal pain, constipation, heartburn, nausea and vomiting. Genitourinary: Negative. Musculoskeletal:  Positive for joint pain and myalgias. Negative for falls. Neurological: Negative. Visit Vitals  BP (!) 164/75 (BP 1 Location: Left upper arm, BP Patient Position: Sitting, BP Cuff Size: Small adult)   Pulse 77   Temp 97.6 °F (36.4 °C) (Temporal)   Resp 18   Ht 5' 6\" (1.676 m)   Wt (!) 392 lb (177.8 kg)   LMP  (LMP Unknown)   SpO2 98%   BMI 63.27 kg/m²       Physical Exam  Vitals and nursing note reviewed. Constitutional:       Appearance: Normal appearance. She is obese. Eyes:      Pupils: Pupils are equal, round, and reactive to light. Neck:      Vascular: No carotid bruit. Cardiovascular:      Rate and Rhythm: Normal rate and regular rhythm. Pulses: Normal pulses. Heart sounds: Normal heart sounds. Pulmonary:      Effort: Pulmonary effort is normal.      Breath sounds: Normal breath sounds. Abdominal:      General: Bowel sounds are normal.      Palpations: Abdomen is soft. Tenderness: There is no abdominal tenderness. There is no guarding. Musculoskeletal:      Right lower leg: Edema present. Left lower leg: Edema present. Lymphadenopathy:      Cervical: No cervical adenopathy. Skin:     General: Skin is warm and dry. Neurological:      Mental Status: She is alert. Gait: Gait abnormal.      Comments: Using walker          1.  Type 2 diabetes mellitus with other specified complication, without long-term current use of insulin ([facility]  Lab Results   Component Value Date/Time    Hemoglobin A1c 6.8 (H) 06/15/2021 12:37 PM    Hemoglobin A1c (POC) 7.3 06/01/2022 01:27 PM    Hemoglobin A1c, External 7.2 06/04/2020 12:00 AM   Sugars are well controlled  On metformin and glimepiride  Try changing his metformin to 500mg in the am and 500mg in the pm due to diarrhea with increased to 2 at night  Did have trulicity sent but this was too expensive as well as jardiance  Will see if ozempic is covered and reviewed potential side effects/risks of medication  Continue to check fasting sugars and bring to next appointment  Continue annual eye exam  Encourage following diabetic diet and increasing regular exercise to 3-5 times weekly  She will continue care under Dr. Zaira Muñoz  - AMB POC HEMOGLOBIN A1C    2. Essential hypertension  Blood pressure is elevated today on exam  Reports home readings also elevated above 938 systolic  Going to increase the lisinopril to 10mg twice daily   Check readings at home and bring to nurse visit in 2-4 weeks    3. Chronic obstructive pulmonary disease, unspecified COPD type ([facility]  Stable    4. Stage 3a chronic kidney disease ([facility]  Lab Results   Component Value Date/Time    Creatinine 1.32 (H) 03/14/2022 12:06 PM   Stable and follows with nephrology    5. Mixed hyperlipidemia due to type 2 diabetes mellitus ([facility]  Lab Results   Component Value Date/Time    LDL, calculated 73 03/14/2022 12:06 PM   Stable and continue statin medication as directed    6. Obstructive sleep apnea syndrome  Continue cpap therapy     7. Chronic gout of multiple sites, unspecified cause  Lab Results   Component Value Date/Time    Uric acid 4.0 12/14/2021 11:36 AM   Stable and continue on allopurinol as directed    8. Morbid obesity with BMI of 60.0-69.9, adult ([facility]  About 10lb weight loss in past 4 months  Patient reports she has been continuing to try  to make dietary changes  as well as increasing exercise as much as possible.        9. Anemia  Lab Results   Component Value Date/Time    WBC 3.3 (L) 03/14/2022 12:06 PM    HGB 11.2 03/14/2022 12:06 PM    HCT 35.4 03/14/2022 12:06 PM    PLATELET 992 27/29/9840 12:06 PM    MCV 92 03/14/2022 12:06 PM   Stable and continue to monitor cbc    10. Gastroesophageal reflux disease without esophagitis  Stable    11. Primary osteoarthritis of both knees  Does report increased pain in both knees  Working on weight loss   Uses tylenol and topical prn    12. Venous insufficiency  Wears compression stocking   Following with Dr. Akhil Parks      Patient verbalizes understanding of plan of care as discussed above    Follow-up and Dispositions    Return in about 3 months (around 11/2/2022), or 4 weeks repeat blood pressure, for or sooner for worsening symptoms.

## 2022-08-02 NOTE — PROGRESS NOTES
Chief Complaint   Patient presents with    Hypertension     Follow up     1. \"Have you been to the ER, urgent care clinic since your last visit? Hospitalized since your last visit? \"  In chart     2. \"Have you seen or consulted any other health care providers outside of the [address] [place] [address] since your last visit? \"  In chart       3. For patients aged 39-70: Has the patient had a colonoscopy / FIT/ Cologuard? Yes - no Care Gap present      If the patient is female:    4. For patients aged 41-77: Has the patient had a mammogram within the past 2 years? Yes - no Care Gap present      5. For patients aged 21-65: Has the patient had a pap smear?  Yes - no Care Gap present

## 2022-08-03 LAB
ALBUMIN SERPL-MCNC: 4.1 G/DL (ref 3.8–4.8)
ALBUMIN/GLOB SERPL: 1.3 {RATIO} (ref 1.2–2.2)
ALP SERPL-CCNC: 144 IU/L (ref 44–121)
ALT SERPL-CCNC: 19 IU/L (ref 0–32)
AST SERPL-CCNC: 16 IU/L (ref 0–40)
BASOPHILS # BLD AUTO: 0 X10E3/UL (ref 0–0.2)
BASOPHILS NFR BLD AUTO: 0 %
BILIRUB SERPL-MCNC: 0.5 MG/DL (ref 0–1.2)
BUN SERPL-MCNC: 23 MG/DL (ref 8–27)
BUN/CREAT SERPL: 16 (ref 12–28)
CALCIUM SERPL-MCNC: 10.2 MG/DL (ref 8.7–10.3)
CHLORIDE SERPL-SCNC: 97 MMOL/L (ref 96–106)
CHOLEST SERPL-MCNC: 166 MG/DL (ref 100–199)
CO2 SERPL-SCNC: 24 MMOL/L (ref 20–29)
CREAT SERPL-MCNC: 1.48 MG/DL (ref 0.57–1)
EGFR: 40 ML/MIN/1.73
EOSINOPHIL # BLD AUTO: 0.2 X10E3/UL (ref 0–0.4)
EOSINOPHIL NFR BLD AUTO: 4 %
ERYTHROCYTE [DISTWIDTH] IN BLOOD BY AUTOMATED COUNT: 14.1 % (ref 11.7–15.4)
GLOBULIN SER CALC-MCNC: 3.1 G/DL (ref 1.5–4.5)
GLUCOSE SERPL-MCNC: 286 MG/DL (ref 65–99)
HCT VFR BLD AUTO: 39.1 % (ref 34–46.6)
HDLC SERPL-MCNC: 59 MG/DL
HGB BLD-MCNC: 12.5 G/DL (ref 11.1–15.9)
IMM GRANULOCYTES # BLD AUTO: 0 X10E3/UL (ref 0–0.1)
IMM GRANULOCYTES NFR BLD AUTO: 0 %
LDLC SERPL CALC-MCNC: 81 MG/DL (ref 0–99)
LYMPHOCYTES # BLD AUTO: 1.4 X10E3/UL (ref 0.7–3.1)
LYMPHOCYTES NFR BLD AUTO: 33 %
MCH RBC QN AUTO: 29 PG (ref 26.6–33)
MCHC RBC AUTO-ENTMCNC: 32 G/DL (ref 31.5–35.7)
MCV RBC AUTO: 91 FL (ref 79–97)
MONOCYTES # BLD AUTO: 0.4 X10E3/UL (ref 0.1–0.9)
MONOCYTES NFR BLD AUTO: 10 %
NEUTROPHILS # BLD AUTO: 2.2 X10E3/UL (ref 1.4–7)
NEUTROPHILS NFR BLD AUTO: 53 %
PLATELET # BLD AUTO: 255 X10E3/UL (ref 150–450)
POTASSIUM SERPL-SCNC: 5 MMOL/L (ref 3.5–5.2)
PROT SERPL-MCNC: 7.2 G/DL (ref 6–8.5)
RBC # BLD AUTO: 4.31 X10E6/UL (ref 3.77–5.28)
SODIUM SERPL-SCNC: 136 MMOL/L (ref 134–144)
TRIGL SERPL-MCNC: 154 MG/DL (ref 0–149)
TSH SERPL DL<=0.005 MIU/L-ACNC: 3.83 UIU/ML (ref 0.45–4.5)
VLDLC SERPL CALC-MCNC: 26 MG/DL (ref 5–40)
WBC # BLD AUTO: 4.2 X10E3/UL (ref 3.4–10.8)

## 2022-08-04 NOTE — PROGRESS NOTES
Please let patient know that kidney functions do remain decreased but are similar to baseline. Otherwise labwork is essentially in range. If any questions, let me know. Please get updated home bp readings.

## 2022-08-04 NOTE — PROGRESS NOTES
Informed patient of lab results and recommendations per ANICETO White NP. Patient stated understanding and had no questions at this time. Stated she will take some BP's at home and call the numbers in to us.

## 2022-08-11 LAB
APPEARANCE UR: CLEAR
BACTERIA #/AREA URNS HPF: ABNORMAL /[HPF]
BILIRUB UR QL STRIP: NEGATIVE
CASTS URNS QL MICRO: ABNORMAL /LPF
COLOR UR: YELLOW
EPI CELLS #/AREA URNS HPF: ABNORMAL /HPF (ref 0–10)
GLUCOSE UR QL STRIP: ABNORMAL
HGB UR QL STRIP: NEGATIVE
KETONES UR QL STRIP: NEGATIVE
LEUKOCYTE ESTERASE UR QL STRIP: ABNORMAL
MICRO URNS: ABNORMAL
NITRITE UR QL STRIP: NEGATIVE
PH UR STRIP: 5.5 [PH] (ref 5–7.5)
PROT UR QL STRIP: ABNORMAL
RBC #/AREA URNS HPF: ABNORMAL /HPF (ref 0–2)
SP GR UR STRIP: 1.02 (ref 1–1.03)
UROBILINOGEN UR STRIP-MCNC: 0.2 MG/DL (ref 0.2–1)
WBC #/AREA URNS HPF: ABNORMAL /HPF (ref 0–5)

## 2022-08-14 NOTE — PROGRESS NOTES
Repeat urine is improved with just few WBC. If no urinary symptoms then feel no concern but if she has any let me know.

## 2022-08-22 ENCOUNTER — HOSPITAL ENCOUNTER (OUTPATIENT)
Dept: MAMMOGRAPHY | Age: 62
Discharge: HOME | End: 2022-08-22
Attending: NURSE PRACTITIONER
Payer: MEDICARE

## 2022-08-22 DIAGNOSIS — Z12.31 SCREENING MAMMOGRAM, ENCOUNTER FOR: ICD-10-CM

## 2022-08-22 PROCEDURE — 77063 BREAST TOMOSYNTHESIS BI: CPT

## 2022-08-22 NOTE — PROGRESS NOTES
FINDINGS: Bilateral digital screening mammography was performed and is  interpreted in conjunction with a computer assisted detection (CAD) system. Additionally, tomosynthesis of both breasts in the CC and MLO projections was  performed. No suspicious masses or calcifications are identified. There has been  no significant change.     IMPRESSION  BI-RADS 1: Negative. No mammographic evidence of malignancy.     RECOMMENDATIONS:  Next screening mammogram is recommended in one year. If any changes noted in breast sooner, notify provider.

## 2022-08-23 NOTE — PROGRESS NOTES
Letter to patient. IMPRESSION  BI-RADS 1: Negative. No mammographic evidence of malignancy.     RECOMMENDATIONS:  Next screening mammogram is recommended in one year. If any changes noted in breast sooner, notify provider.

## 2022-08-25 ENCOUNTER — TELEPHONE (OUTPATIENT)
Dept: PRIMARY CARE CLINIC | Age: 62
End: 2022-08-25

## 2022-08-25 VITALS — SYSTOLIC BLOOD PRESSURE: 120 MMHG | DIASTOLIC BLOOD PRESSURE: 60 MMHG

## 2022-08-25 DIAGNOSIS — E11.69 TYPE 2 DIABETES MELLITUS WITH OTHER SPECIFIED COMPLICATION, WITHOUT LONG-TERM CURRENT USE OF INSULIN (HCC): Primary | ICD-10-CM

## 2022-08-25 NOTE — TELEPHONE ENCOUNTER
Patient stated her BS's are going up and she feels like she needs to be on Insulin again She was taking Novolin 70/30 20 units \"whenever she felt like sugar was up\" per patient. I asked her about sliding scale and she agreed that is what she went by. For the past few days her sugar has been as high as 475 and no lower than in the 260's. Her BP is 120/60 today. Has F/U with you 11/2022.

## 2022-08-26 ENCOUNTER — TELEPHONE (OUTPATIENT)
Dept: PRIMARY CARE CLINIC | Age: 62
End: 2022-08-26

## 2022-08-26 NOTE — TELEPHONE ENCOUNTER
Informed patient of new RX and recommendations per ANICETO Clark NP. Patient stated understanding. Patient asked if RX could be sent for the $25 vial of Reli On Insulin that [facility] has because the one sent in will cost the patient over $100. [facility] has Reli On N, R and 70/30 for $25 per vial per Ginny Short

## 2022-08-29 ENCOUNTER — TELEPHONE (OUTPATIENT)
Dept: PRIMARY CARE CLINIC | Age: 62
End: 2022-08-29

## 2022-08-29 RX ORDER — HUMAN INSULIN 100 [IU]/ML
10 INJECTION, SUSPENSION SUBCUTANEOUS
Qty: 18 ML | Refills: 0 | Status: SHIPPED | OUTPATIENT
Start: 2022-08-29 | End: 2022-09-26 | Stop reason: SDUPTHER

## 2022-08-29 NOTE — TELEPHONE ENCOUNTER
Informed patient that BP readings are good. She stated she had gotten Insulin and started taking it. She will call us the readings next week.

## 2022-08-30 ENCOUNTER — TELEPHONE (OUTPATIENT)
Dept: PRIMARY CARE CLINIC | Age: 62
End: 2022-08-30

## 2022-08-31 DIAGNOSIS — Z76.89 ENCOUNTER FOR WEIGHT MANAGEMENT: Primary | ICD-10-CM

## 2022-08-31 DIAGNOSIS — M10.9 ACUTE GOUT OF MULTIPLE SITES, UNSPECIFIED CAUSE: ICD-10-CM

## 2022-08-31 DIAGNOSIS — E66.01 MORBID OBESITY (HCC): ICD-10-CM

## 2022-08-31 DIAGNOSIS — E11.69 TYPE 2 DIABETES MELLITUS WITH OTHER SPECIFIED COMPLICATION, WITHOUT LONG-TERM CURRENT USE OF INSULIN (HCC): ICD-10-CM

## 2022-08-31 NOTE — TELEPHONE ENCOUNTER
Informed patient that she could do 20 units BID as requested per A. Phoenix, NP. Informed patient that Insulin was started at lower dose because Provider wanted to make sure her sugars would not go to low because she was going back on Insulin. Patient stated understanding.

## 2022-08-31 NOTE — TELEPHONE ENCOUNTER
Patient stated the Insulin is working but her sugars are only staying in the low 200's such as from 209-220's. She stated she took 20 units last PM and this morning her sugar was 188. She asked if she could take 20 units twice daily instead of the 10 units because it brings her sugar down better.

## 2022-09-01 ENCOUNTER — TELEPHONE (OUTPATIENT)
Dept: PRIMARY CARE CLINIC | Age: 62
End: 2022-09-01

## 2022-09-01 RX ORDER — LANCETS
EACH MISCELLANEOUS
Qty: 100 EACH | Refills: 5 | Status: SHIPPED | OUTPATIENT
Start: 2022-09-01

## 2022-09-01 RX ORDER — CALCIUM CITRATE/VITAMIN D3 200MG-6.25
TABLET ORAL
Qty: 100 STRIP | Refills: 5 | Status: SHIPPED | OUTPATIENT
Start: 2022-09-01

## 2022-09-01 RX ORDER — ALLOPURINOL 300 MG/1
TABLET ORAL
Qty: 90 TABLET | Refills: 0 | Status: SHIPPED | OUTPATIENT
Start: 2022-09-01

## 2022-09-01 RX ORDER — ISOPROPYL ALCOHOL 70 ML/100ML
1 SWAB TOPICAL DAILY
Qty: 100 PAD | Refills: 5 | Status: SHIPPED | OUTPATIENT
Start: 2022-09-01

## 2022-09-01 NOTE — TELEPHONE ENCOUNTER
Patient called in additional BP readings:     AM  PM    8/29 122/68  118/70    8/30 119/75  125/66    8/31 127/62  98/68    09/01 113/79

## 2022-09-13 ENCOUNTER — TELEPHONE (OUTPATIENT)
Dept: PRIMARY CARE CLINIC | Age: 62
End: 2022-09-13

## 2022-09-13 NOTE — TELEPHONE ENCOUNTER
Patient wanted to let you know that her blood sugar levels have come down with the addition of the insulin. Her lowest reading has been 93 and highest around 118.

## 2022-09-25 ENCOUNTER — HOSPITAL ENCOUNTER (EMERGENCY)
Age: 62
Discharge: HOME | End: 2022-09-25
Attending: EMERGENCY MEDICINE
Payer: MEDICARE

## 2022-09-25 ENCOUNTER — APPOINTMENT (OUTPATIENT)
Dept: CT IMAGING | Age: 62
End: 2022-09-25
Attending: EMERGENCY MEDICINE
Payer: MEDICARE

## 2022-09-25 VITALS
TEMPERATURE: 98.2 F | HEIGHT: 66 IN | SYSTOLIC BLOOD PRESSURE: 129 MMHG | DIASTOLIC BLOOD PRESSURE: 74 MMHG | WEIGHT: 293 LBS | OXYGEN SATURATION: 99 % | RESPIRATION RATE: 22 BRPM | HEART RATE: 98 BPM | BODY MASS INDEX: 47.09 KG/M2

## 2022-09-25 DIAGNOSIS — K21.9 GASTROESOPHAGEAL REFLUX DISEASE WITHOUT ESOPHAGITIS: Primary | ICD-10-CM

## 2022-09-25 LAB
ALBUMIN SERPL-MCNC: 3.6 G/DL (ref 3.5–5)
ALBUMIN/GLOB SERPL: 1 {RATIO} (ref 1.1–2.2)
ALP SERPL-CCNC: 104 U/L (ref 45–117)
ALT SERPL-CCNC: 20 U/L (ref 12–78)
ANION GAP SERPL CALC-SCNC: 11 MMOL/L (ref 5–15)
AST SERPL W P-5'-P-CCNC: 24 U/L (ref 15–37)
BASOPHILS # BLD: 0 K/UL (ref 0–0.2)
BASOPHILS NFR BLD: 1 % (ref 0–2.5)
BILIRUB SERPL-MCNC: 0.6 MG/DL (ref 0.2–1)
BUN SERPL-MCNC: 19 MG/DL (ref 6–20)
BUN/CREAT SERPL: 9 (ref 12–20)
CA-I BLD-MCNC: 9.6 MG/DL (ref 8.5–10.1)
CHLORIDE SERPL-SCNC: 104 MMOL/L (ref 97–108)
CO2 SERPL-SCNC: 27 MMOL/L (ref 21–32)
CREAT SERPL-MCNC: 2.02 MG/DL (ref 0.55–1.02)
EOSINOPHIL # BLD: 0.1 K/UL (ref 0–0.7)
EOSINOPHIL NFR BLD: 3 % (ref 0.9–2.9)
ERYTHROCYTE [DISTWIDTH] IN BLOOD BY AUTOMATED COUNT: 16.9 % (ref 11.5–14.5)
GLOBULIN SER CALC-MCNC: 3.6 G/DL (ref 2–4)
GLUCOSE SERPL-MCNC: 253 MG/DL (ref 65–100)
HCT VFR BLD AUTO: 36 % (ref 36–46)
HGB BLD-MCNC: 11.6 G/DL (ref 13.5–17.5)
LACTATE SERPL-SCNC: 2.2 MMOL/L (ref 0.4–2)
LIPASE SERPL-CCNC: 71 U/L (ref 73–393)
LYMPHOCYTES # BLD: 1.2 K/UL (ref 1–4.8)
LYMPHOCYTES NFR BLD: 31 % (ref 20.5–51.1)
MCH RBC QN AUTO: 29.6 PG (ref 31–34)
MCHC RBC AUTO-ENTMCNC: 32.3 G/DL (ref 31–36)
MCV RBC AUTO: 91.4 FL (ref 80–100)
MONOCYTES # BLD: 0.2 K/UL (ref 0.2–2.4)
MONOCYTES NFR BLD: 6 % (ref 1.7–9.3)
NEUTS SEG # BLD: 2.4 K/UL (ref 1.8–7.7)
NEUTS SEG NFR BLD: 59 % (ref 42–75)
NRBC # BLD: 0.01 K/UL
NRBC BLD-RTO: 0.2 PER 100 WBC
PLATELET # BLD AUTO: 298 K/UL (ref 150–400)
PMV BLD AUTO: 8 FL (ref 6.5–11.5)
POTASSIUM SERPL-SCNC: 4.5 MMOL/L (ref 3.5–5.1)
PROT SERPL-MCNC: 7.2 G/DL (ref 6.4–8.2)
RBC # BLD AUTO: 3.93 M/UL (ref 4.5–5.9)
SODIUM SERPL-SCNC: 142 MMOL/L (ref 136–145)
WBC # BLD AUTO: 4.1 K/UL (ref 4.4–11.3)

## 2022-09-25 PROCEDURE — 99284 EMERGENCY DEPT VISIT MOD MDM: CPT

## 2022-09-25 PROCEDURE — 85025 COMPLETE CBC W/AUTO DIFF WBC: CPT

## 2022-09-25 PROCEDURE — 74011250636 HC RX REV CODE- 250/636: Performed by: EMERGENCY MEDICINE

## 2022-09-25 PROCEDURE — 96374 THER/PROPH/DIAG INJ IV PUSH: CPT

## 2022-09-25 PROCEDURE — 80053 COMPREHEN METABOLIC PANEL: CPT

## 2022-09-25 PROCEDURE — 96375 TX/PRO/DX INJ NEW DRUG ADDON: CPT

## 2022-09-25 PROCEDURE — 74176 CT ABD & PELVIS W/O CONTRAST: CPT

## 2022-09-25 PROCEDURE — 36415 COLL VENOUS BLD VENIPUNCTURE: CPT

## 2022-09-25 PROCEDURE — 83690 ASSAY OF LIPASE: CPT

## 2022-09-25 PROCEDURE — 83605 ASSAY OF LACTIC ACID: CPT

## 2022-09-25 RX ORDER — ONDANSETRON 2 MG/ML
4 INJECTION INTRAMUSCULAR; INTRAVENOUS
Status: COMPLETED | OUTPATIENT
Start: 2022-09-25 | End: 2022-09-25

## 2022-09-25 RX ORDER — FENTANYL CITRATE 50 UG/ML
50 INJECTION, SOLUTION INTRAMUSCULAR; INTRAVENOUS
Status: COMPLETED | OUTPATIENT
Start: 2022-09-25 | End: 2022-09-25

## 2022-09-25 RX ORDER — SODIUM CHLORIDE 9 MG/ML
125 INJECTION, SOLUTION INTRAVENOUS ONCE
Status: COMPLETED | OUTPATIENT
Start: 2022-09-25 | End: 2022-09-25

## 2022-09-25 RX ADMIN — ONDANSETRON 4 MG: 2 INJECTION INTRAMUSCULAR; INTRAVENOUS at 20:33

## 2022-09-25 RX ADMIN — FENTANYL CITRATE 50 MCG: 50 INJECTION, SOLUTION INTRAMUSCULAR; INTRAVENOUS at 20:32

## 2022-09-25 RX ADMIN — SODIUM CHLORIDE 125 ML/HR: 9 INJECTION, SOLUTION INTRAVENOUS at 20:32

## 2022-09-26 ENCOUNTER — OFFICE VISIT (OUTPATIENT)
Dept: GASTROENTEROLOGY | Age: 62
End: 2022-09-26
Payer: MEDICARE

## 2022-09-26 VITALS
WEIGHT: 293 LBS | DIASTOLIC BLOOD PRESSURE: 73 MMHG | HEART RATE: 78 BPM | HEIGHT: 66 IN | RESPIRATION RATE: 18 BRPM | SYSTOLIC BLOOD PRESSURE: 136 MMHG | TEMPERATURE: 98 F | OXYGEN SATURATION: 97 % | BODY MASS INDEX: 47.09 KG/M2

## 2022-09-26 DIAGNOSIS — K21.00 GASTROESOPHAGEAL REFLUX DISEASE WITH ESOPHAGITIS WITHOUT HEMORRHAGE: ICD-10-CM

## 2022-09-26 DIAGNOSIS — K57.92 DIVERTICULITIS: ICD-10-CM

## 2022-09-26 DIAGNOSIS — N18.4 CKD (CHRONIC KIDNEY DISEASE) STAGE 4, GFR 15-29 ML/MIN (HCC): ICD-10-CM

## 2022-09-26 DIAGNOSIS — R10.30 LOWER ABDOMINAL PAIN: Primary | ICD-10-CM

## 2022-09-26 DIAGNOSIS — B96.81 HELICOBACTER PYLORI GASTRITIS: ICD-10-CM

## 2022-09-26 DIAGNOSIS — K57.30 DIVERTICULAR DISEASE OF COLON: ICD-10-CM

## 2022-09-26 DIAGNOSIS — K29.70 HELICOBACTER PYLORI GASTRITIS: ICD-10-CM

## 2022-09-26 DIAGNOSIS — E11.69 TYPE 2 DIABETES MELLITUS WITH OTHER SPECIFIED COMPLICATION, WITHOUT LONG-TERM CURRENT USE OF INSULIN (HCC): ICD-10-CM

## 2022-09-26 DIAGNOSIS — E66.01 MORBID OBESITY WITH BMI OF 60.0-69.9, ADULT (HCC): ICD-10-CM

## 2022-09-26 PROCEDURE — 99214 OFFICE O/P EST MOD 30 MIN: CPT | Performed by: INTERNAL MEDICINE

## 2022-09-26 PROCEDURE — G8754 DIAS BP LESS 90: HCPCS | Performed by: INTERNAL MEDICINE

## 2022-09-26 PROCEDURE — G8510 SCR DEP NEG, NO PLAN REQD: HCPCS | Performed by: INTERNAL MEDICINE

## 2022-09-26 PROCEDURE — G9899 SCRN MAM PERF RSLTS DOC: HCPCS | Performed by: INTERNAL MEDICINE

## 2022-09-26 PROCEDURE — G8427 DOCREV CUR MEDS BY ELIG CLIN: HCPCS | Performed by: INTERNAL MEDICINE

## 2022-09-26 PROCEDURE — G8417 CALC BMI ABV UP PARAM F/U: HCPCS | Performed by: INTERNAL MEDICINE

## 2022-09-26 PROCEDURE — 3017F COLORECTAL CA SCREEN DOC REV: CPT | Performed by: INTERNAL MEDICINE

## 2022-09-26 PROCEDURE — G8752 SYS BP LESS 140: HCPCS | Performed by: INTERNAL MEDICINE

## 2022-09-26 RX ORDER — METRONIDAZOLE 500 MG/1
500 TABLET ORAL 3 TIMES DAILY
Qty: 42 TABLET | Refills: 0 | Status: SHIPPED | OUTPATIENT
Start: 2022-09-26

## 2022-09-26 RX ORDER — HUMAN INSULIN 100 [IU]/ML
10 INJECTION, SUSPENSION SUBCUTANEOUS
Qty: 18 ML | Refills: 2 | Status: SHIPPED | OUTPATIENT
Start: 2022-09-26 | End: 2022-12-25

## 2022-09-26 NOTE — PROGRESS NOTES
Chief Complaint   Patient presents with    Abdominal Pain     Seen in ER last night, severe abd pain. After eating, mostly. 1. Have you been to the ER, urgent care clinic since your last visit? Hospitalized since your last visit? Yes Reason for visit: abd pain    2. Have you seen or consulted any other health care providers outside of the [address] [place] [address] since your last visit? Include any pap smears or colon screening.  No  Visit Vitals  /73 (BP 1 Location: Right arm, BP Patient Position: Sitting, BP Cuff Size: Adult)   Pulse 78   Temp 98 °F (36.7 °C) (Oral)   Resp 18   Ht 5' 6\" (1.676 m)   Wt (!) 181.9 kg (401 lb)   LMP  (LMP Unknown)   SpO2 97%   BMI 64.72 kg/m²

## 2022-09-26 NOTE — ED TRIAGE NOTES
Pt states that she has been having abdominal pain and bowel movements every time she eats. She not loose stool like diarrhea just lots of stool after eating. Pt states that this has been going on for a few months, and she has seen her provider about it but not GI provider.

## 2022-09-26 NOTE — ED PROVIDER NOTES
Patient presents with epigastric and lower abdominal pain for past 2 months. Pain is moderate. Pain is worse with food or pressure. She has a history of GERD and sees Dr Jeet Batista. No fever or vomiting. Social History : No Tobacco or alcohol.         Past Medical History:   Diagnosis Date    Arthritis     Asthma     Chronic kidney disease     stage 3    Chronic obstructive pulmonary disease (HCC)     Diabetes (HCC)     GERD (gastroesophageal reflux disease)     High cholesterol     Hypertension     Menopause     Morbid obesity ([address]     Obstructive sleep apnea on CPAP     Vertigo        Past Surgical History:   Procedure Laterality Date    HX BREAST BIOPSY Left 2019    HX CHOLECYSTECTOMY      HX ENDOSCOPY  01/01/2019    HX HYSTERECTOMY      HX OOPHORECTOMY      HX ORTHOPAEDIC      right shoulder spur    HX ROTATOR CUFF REPAIR Left     by suture    HX TUBAL LIGATION Bilateral          Family History:   Problem Relation Age of Onset    Heart Disease Mother     Lung Disease Mother         lung ca    Lung Disease Father         PNA    Cancer Maternal Grandmother         gyn       Social History     Socioeconomic History    Marital status:      Spouse name: Not on file    Number of children: Not on file    Years of education: Not on file    Highest education level: Some college, no degree   Occupational History    Not on file   Tobacco Use    Smoking status: Never    Smokeless tobacco: Never   Vaping Use    Vaping Use: Never used   Substance and Sexual Activity    Alcohol use: Never    Drug use: Never    Sexual activity: Not Currently     Partners: Male     Birth control/protection: Surgical   Other Topics Concern     Service Not Asked    Blood Transfusions Not Asked    Caffeine Concern Not Asked    Occupational Exposure Not Asked    Hobby Hazards Not Asked    Sleep Concern Not Asked    Stress Concern Not Asked    Weight Concern Not Asked    Special Diet Not Asked    Back Care Not Asked    Exercise Not Asked    Bike Helmet Not Asked    Seat Belt Not Asked    Self-Exams Not Asked   Social History Narrative    Not on file     Social Determinants of Health     Financial Resource Strain: Not on file   Food Insecurity: Not on file   Transportation Needs: Not on file   Physical Activity: Not on file   Stress: Not on file   Social Connections: Not on file   Intimate Partner Violence: Not on file   Housing Stability: Not on file         ALLERGIES: Celebrex [celecoxib], Cleocin [clindamycin phosphate], Mushroom, Strawberry, Ultracet [tramadol-acetaminophen], and Zithromax [azithromycin]    Review of Systems   Constitutional: Negative. Negative for chills and fever. HENT: Negative. Eyes: Negative. Respiratory: Negative. Cardiovascular: Negative. Gastrointestinal:  Positive for abdominal pain and diarrhea. Negative for blood in stool, constipation, rectal pain and vomiting. Endocrine: Negative. Genitourinary: Negative. Skin: Negative. Allergic/Immunologic: Negative. Neurological: Negative. Hematological: Negative. Psychiatric/Behavioral: Negative. All other systems reviewed and are negative. Vitals:    09/25/22 2010   BP: 129/74   Pulse: 98   Resp: 22   Temp: 98.2 °F (36.8 °C)   SpO2: 99%   Weight: (!) 182.4 kg (402 lb 1.6 oz)   Height: 5' 6\" (1.676 m)            Physical Exam  Vitals and nursing note reviewed. Constitutional:       General: She is not in acute distress. Appearance: She is well-developed. She is obese. She is not ill-appearing. HENT:      Head: Normocephalic and atraumatic. Cardiovascular:      Rate and Rhythm: Normal rate and regular rhythm. Heart sounds: Normal heart sounds. Pulmonary:      Breath sounds: Normal breath sounds. Abdominal:      General: Bowel sounds are normal.      Palpations: Abdomen is soft. Tenderness: There is abdominal tenderness in the epigastric area. There is no guarding or rebound.    Musculoskeletal: General: Normal range of motion. Cervical back: Normal range of motion and neck supple. Neurological:      General: No focal deficit present. Mental Status: She is alert.    Psychiatric:         Mood and Affect: Mood normal.         Behavior: Behavior normal.        MDM         Procedures

## 2022-09-28 ENCOUNTER — TELEPHONE (OUTPATIENT)
Dept: GASTROENTEROLOGY | Age: 62
End: 2022-09-28

## 2022-09-30 ENCOUNTER — TELEPHONE (OUTPATIENT)
Dept: GASTROENTEROLOGY | Age: 62
End: 2022-09-30

## 2022-09-30 DIAGNOSIS — K57.92 DIVERTICULITIS: Primary | ICD-10-CM

## 2022-10-01 NOTE — PROGRESS NOTES
Jo-Ann Sullivan is a 60 y.o. female who presents today for the following:  Chief Complaint   Patient presents with    Abdominal Pain     Seen in ER last night, severe abd pain. After eating, mostly. Allergies   Allergen Reactions    Celebrex [Celecoxib] Rash    Cleocin [Clindamycin Phosphate] Palpitations    Mushroom Swelling    Strawberry Rash    Ultracet [Tramadol-Acetaminophen] Rash    Zithromax [Azithromycin] Rash       Current Outpatient Medications   Medication Sig    metroNIDAZOLE (FLAGYL) 500 mg tablet Take 1 Tablet by mouth three (3) times daily. Indications: diverticulitis    furosemide (LASIX) 40 mg tablet TAKE 1 TABLET ON MONDAY, WEDNESDAY AND FRIDAY    alcohol swabs padm 1 Swab by Apply Externally route daily. lancets misc Trueplus lancets 33g    glucose blood VI test strips (True Metrix Glucose Test Strip) strip Use one strip to check glucose twice a day    allopurinoL (ZYLOPRIM) 300 mg tablet TAKE 1 TABLET EVERY DAY    lisinopriL (PRINIVIL, ZESTRIL) 10 mg tablet Take 1 Tablet by mouth two (2) times a day for 90 days. atorvastatin (LIPITOR) 10 mg tablet TAKE 1 TABLET EVERY DAY    magnesium oxide (MAG-OX) 400 mg tablet Take 1 tablet by mouth once daily    glimepiride (AMARYL) 1 mg tablet 1 tab before breakfast and 2 tabs before dinner, 90 days    metFORMIN ER (GLUCOPHAGE XR) 500 mg tablet TAKE 1 TABLET WITH DINNER FOR 2 WEEKS, THEN TAKE 2 TABLETS WITH DINNER THEREAFTER    albuterol (PROVENTIL HFA, VENTOLIN HFA, PROAIR HFA) 90 mcg/actuation inhaler Take 1 Puff by inhalation every four (4) hours as needed for Wheezing.     meclizine (ANTIVERT) 25 mg tablet TAKE 1 TABLET THREE TIMES DAILY AS NEEDED  FOR  DIZZINESS    Insulin Needles, Disposable, 32 gauge x 5/32\" ndle Use pen needle to inject medicine every 7 days    Blood-Glucose Meter (True Metrix Glucose Meter) monitoring kit Use to check glucose daily    Blood Glucose Control, Low (True Metrix Level 1) soln 1 Bottle by Does Not Apply route daily.    cpap machine kit by Does Not Apply route. albuterol-ipratropium (DUO-NEB) 2.5 mg-0.5 mg/3 ml nebu 3 mL by Nebulization route. multivitamin (ONE A DAY) tablet Take 1 Tab by mouth daily. aspirin 81 mg chewable tablet Take 81 mg by mouth daily. cholecalciferol (VITAMIN D3) (1000 Units /25 mcg) tablet Take 1,000 Units by mouth daily. acetaminophen (TYLENOL) 650 mg TbER Take 650 mg by mouth every eight (8) hours. insulin NPH (NovoLIN N Flexpen) 100 unit/mL (3 mL) inpn 10 Units by SubCUTAneous route Before breakfast and dinner for 90 days. No current facility-administered medications for this visit.        Past Medical History:   Diagnosis Date    Arthritis     Asthma     Chronic kidney disease     stage 3    Chronic obstructive pulmonary disease (HCC)     Diabetes (HCC)     GERD (gastroesophageal reflux disease)     High cholesterol     Hypertension     Menopause     Morbid obesity ([address]     Obstructive sleep apnea on CPAP     Vertigo        Past Surgical History:   Procedure Laterality Date    HX BREAST BIOPSY Left 2019    HX CHOLECYSTECTOMY      HX ENDOSCOPY  01/01/2019    HX HYSTERECTOMY      HX OOPHORECTOMY      HX ORTHOPAEDIC      right shoulder spur    HX ROTATOR CUFF REPAIR Left     by suture    HX TUBAL LIGATION Bilateral        Family History   Problem Relation Age of Onset    Heart Disease Mother     Lung Disease Mother         lung ca    Lung Disease Father         PNA    Cancer Maternal Grandmother         gyn       Social History     Socioeconomic History    Marital status:      Spouse name: Not on file    Number of children: Not on file    Years of education: Not on file    Highest education level: Some college, no degree   Occupational History    Not on file   Tobacco Use    Smoking status: Never    Smokeless tobacco: Never   Vaping Use    Vaping Use: Never used   Substance and Sexual Activity    Alcohol use: Never    Drug use: Never    Sexual activity: Not Currently Partners: Male     Birth control/protection: Surgical   Other Topics Concern     Service Not Asked    Blood Transfusions Not Asked    Caffeine Concern Not Asked    Occupational Exposure Not Asked    Hobby Hazards Not Asked    Sleep Concern Not Asked    Stress Concern Not Asked    Weight Concern Not Asked    Special Diet Not Asked    Back Care Not Asked    Exercise Not Asked    Bike Helmet Not Asked    Seat Belt Not Asked    Self-Exams Not Asked   Social History Narrative    Not on file     Social Determinants of Health     Financial Resource Strain: Not on file   Food Insecurity: Not on file   Transportation Needs: Not on file   Physical Activity: Not on file   Stress: Not on file   Social Connections: Not on file   Intimate Partner Violence: Not on file   Housing Stability: Not on file         HPI  50-year-old female with history of hypertension, hyperlipidemia, gastroesophageal reflux disease, Helicobacter pylori gastritis, diabetes mellitus type 2, asthma, COPD, obstructive sleep apnea, arthritis, chronic kidney disease stage III, diverticular disease, and iron deficiency anemia who comes in for evaluation of abdominal pain. Patient states she has a long history of problems with her abdomen. Patient states when she eats the that she has a bowel movement or pain in her abdomen. The pain is located lower abdomen. No improvement after bowel movement. No vomiting but she does have some nausea. Bowel movements are formed but still moves after each meal.  Pain is crampy in character. No gross GI bleeding. Stools may be dark at times. Last colonoscopy was in 2017 or 18. Does have a history of diverticular disease. She states she went to the emergency room last night and had a CT scan of the abdomen which did show diverticulosis. Review of Systems   Constitutional: Negative. HENT: Negative. Negative for nosebleeds. Eyes: Negative. Respiratory: Negative. Cardiovascular: Negative. Gastrointestinal:  Positive for abdominal pain and nausea. Negative for blood in stool, constipation, diarrhea, heartburn, melena and vomiting. Genitourinary: Negative. Musculoskeletal:  Positive for joint pain. Skin: Negative. Neurological: Negative. Endo/Heme/Allergies: Negative. Psychiatric/Behavioral: Negative. All other systems reviewed and are negative. Visit Vitals  /73 (BP 1 Location: Right arm, BP Patient Position: Sitting, BP Cuff Size: Adult)   Pulse 78   Temp 98 °F (36.7 °C) (Oral)   Resp 18   Ht 5' 6\" (1.676 m)   Wt (!) 181.9 kg (401 lb)   LMP  (LMP Unknown)   SpO2 97%   BMI 64.72 kg/m²     Physical Exam  Vitals and nursing note reviewed. Constitutional:       Appearance: Normal appearance. She is obese. HENT:      Head: Normocephalic and atraumatic. Nose: Nose normal.      Mouth/Throat:      Mouth: Mucous membranes are moist.      Pharynx: Oropharynx is clear. Eyes:      General: No scleral icterus. Conjunctiva/sclera: Conjunctivae normal.      Pupils: Pupils are equal, round, and reactive to light. Cardiovascular:      Rate and Rhythm: Normal rate and regular rhythm. Pulses: Normal pulses. Heart sounds: Normal heart sounds. Pulmonary:      Effort: Pulmonary effort is normal.      Breath sounds: Normal breath sounds. Abdominal:      General: Bowel sounds are normal. There is no distension. Palpations: Abdomen is soft. There is no mass. Tenderness: There is abdominal tenderness. There is no right CVA tenderness, left CVA tenderness, guarding or rebound. Hernia: No hernia is present. Musculoskeletal:         General: Normal range of motion. Cervical back: Normal range of motion and neck supple. Skin:     General: Skin is warm and dry. Coloration: Skin is not jaundiced. Neurological:      General: No focal deficit present. Mental Status: She is alert and oriented to person, place, and time.    Psychiatric: Mood and Affect: Mood normal.         Behavior: Behavior normal.         Thought Content: Thought content normal.         Judgment: Judgment normal.          1. Lower abdominal pain  Probably secondary to diverticular disease. Will give patient a course of antibiotic therapy with metronidazole. - metroNIDAZOLE (FLAGYL) 500 mg tablet; Take 1 Tablet by mouth three (3) times daily. Indications: diverticulitis  Dispense: 42 Tablet; Refill: 0    2. CKD (chronic kidney disease) stage 4, GFR 15-29 ml/min ([facility])      3. Morbid obesity with BMI of 60.0-69.9, adult ([facility]      4. Diverticulitis    - metroNIDAZOLE (FLAGYL) 500 mg tablet; Take 1 Tablet by mouth three (3) times daily. Indications: diverticulitis  Dispense: 42 Tablet; Refill: 0    5. Diverticular disease of colon    - metroNIDAZOLE (FLAGYL) 500 mg tablet; Take 1 Tablet by mouth three (3) times daily. Indications: diverticulitis  Dispense: 42 Tablet; Refill: 0    6. Gastroesophageal reflux disease with esophagitis without hemorrhage      7.  Helicobacter pylori gastritis

## 2022-10-04 ENCOUNTER — TELEPHONE (OUTPATIENT)
Dept: PRIMARY CARE CLINIC | Age: 62
End: 2022-10-04

## 2022-10-05 DIAGNOSIS — R42 DIZZINESS: ICD-10-CM

## 2022-10-05 RX ORDER — MECLIZINE HYDROCHLORIDE 25 MG/1
TABLET ORAL
Qty: 90 TABLET | Refills: 1 | Status: SHIPPED | OUTPATIENT
Start: 2022-10-05

## 2022-10-05 RX ORDER — MECLIZINE HYDROCHLORIDE 25 MG/1
TABLET ORAL
Qty: 90 TABLET | Refills: 1 | Status: SHIPPED | OUTPATIENT
Start: 2022-10-05 | End: 2022-10-05

## 2022-10-06 RX ORDER — CIPROFLOXACIN 500 MG/1
500 TABLET ORAL 2 TIMES DAILY
Qty: 30 TABLET | Refills: 0 | Status: SHIPPED | OUTPATIENT
Start: 2022-10-06

## 2022-11-09 ENCOUNTER — OFFICE VISIT (OUTPATIENT)
Dept: GASTROENTEROLOGY | Age: 62
End: 2022-11-09
Payer: MEDICARE

## 2022-11-09 VITALS
DIASTOLIC BLOOD PRESSURE: 70 MMHG | RESPIRATION RATE: 14 BRPM | SYSTOLIC BLOOD PRESSURE: 140 MMHG | HEIGHT: 66 IN | OXYGEN SATURATION: 98 % | WEIGHT: 293 LBS | TEMPERATURE: 97.6 F | BODY MASS INDEX: 47.09 KG/M2

## 2022-11-09 DIAGNOSIS — R19.7 DIARRHEA OF PRESUMED INFECTIOUS ORIGIN: ICD-10-CM

## 2022-11-09 DIAGNOSIS — N18.4 CKD (CHRONIC KIDNEY DISEASE) STAGE 4, GFR 15-29 ML/MIN (HCC): ICD-10-CM

## 2022-11-09 DIAGNOSIS — K29.70 HELICOBACTER PYLORI GASTRITIS: ICD-10-CM

## 2022-11-09 DIAGNOSIS — K21.00 GASTROESOPHAGEAL REFLUX DISEASE WITH ESOPHAGITIS WITHOUT HEMORRHAGE: ICD-10-CM

## 2022-11-09 DIAGNOSIS — B96.81 HELICOBACTER PYLORI GASTRITIS: ICD-10-CM

## 2022-11-09 DIAGNOSIS — R19.7 DIARRHEA OF PRESUMED INFECTIOUS ORIGIN: Primary | ICD-10-CM

## 2022-11-09 DIAGNOSIS — D50.9 IRON DEFICIENCY ANEMIA, UNSPECIFIED IRON DEFICIENCY ANEMIA TYPE: ICD-10-CM

## 2022-11-09 DIAGNOSIS — R10.30 LOWER ABDOMINAL PAIN: ICD-10-CM

## 2022-11-09 DIAGNOSIS — K57.30 DIVERTICULAR DISEASE OF COLON: ICD-10-CM

## 2022-11-09 DIAGNOSIS — I10 ESSENTIAL HYPERTENSION: ICD-10-CM

## 2022-11-09 PROCEDURE — G8754 DIAS BP LESS 90: HCPCS | Performed by: INTERNAL MEDICINE

## 2022-11-09 PROCEDURE — 3074F SYST BP LT 130 MM HG: CPT | Performed by: INTERNAL MEDICINE

## 2022-11-09 PROCEDURE — G9899 SCRN MAM PERF RSLTS DOC: HCPCS | Performed by: INTERNAL MEDICINE

## 2022-11-09 PROCEDURE — 3078F DIAST BP <80 MM HG: CPT | Performed by: INTERNAL MEDICINE

## 2022-11-09 PROCEDURE — G8417 CALC BMI ABV UP PARAM F/U: HCPCS | Performed by: INTERNAL MEDICINE

## 2022-11-09 PROCEDURE — G8753 SYS BP > OR = 140: HCPCS | Performed by: INTERNAL MEDICINE

## 2022-11-09 PROCEDURE — G8427 DOCREV CUR MEDS BY ELIG CLIN: HCPCS | Performed by: INTERNAL MEDICINE

## 2022-11-09 PROCEDURE — G8510 SCR DEP NEG, NO PLAN REQD: HCPCS | Performed by: INTERNAL MEDICINE

## 2022-11-09 PROCEDURE — 3017F COLORECTAL CA SCREEN DOC REV: CPT | Performed by: INTERNAL MEDICINE

## 2022-11-09 PROCEDURE — 99214 OFFICE O/P EST MOD 30 MIN: CPT | Performed by: INTERNAL MEDICINE

## 2022-11-09 RX ORDER — CIPROFLOXACIN 500 MG/1
500 TABLET ORAL 2 TIMES DAILY
Qty: 30 TABLET | Refills: 0 | Status: SHIPPED | OUTPATIENT
Start: 2022-11-09

## 2022-11-09 NOTE — PROGRESS NOTES
1. Have you been to the ER, urgent care clinic since your last visit? Hospitalized since your last visit? No     2. Have you seen or consulted any other health care providers outside of the [address] [place] [address] since your last visit? Include any pap smears or colon screening.   No   Chief Complaint   Patient presents with    Follow-up     1 mo follow up     Visit Vitals  BP (!) 140/70 (BP 1 Location: Right upper arm, BP Patient Position: Sitting, BP Cuff Size: Large adult)   Temp 97.6 °F (36.4 °C) (Temporal)   Resp 14   Ht 5' 6\" (1.676 m)   Wt (!) 174.5 kg (384 lb 9.6 oz)   LMP  (LMP Unknown)   SpO2 98% Comment: room air   BMI 62.08 kg/m²

## 2022-11-12 PROBLEM — I10 ESSENTIAL HYPERTENSION: Status: RESOLVED | Noted: 2020-11-11 | Resolved: 2022-11-12

## 2022-11-13 NOTE — PROGRESS NOTES
Juan F Senior is a 60 y.o. female who presents today for the following:  Chief Complaint   Patient presents with    Follow-up     1 mo follow up    Abdominal Pain    Diarrhea         Allergies   Allergen Reactions    Celebrex [Celecoxib] Rash    Cleocin [Clindamycin Phosphate] Palpitations    Mushroom Swelling    Strawberry Rash    Ultracet [Tramadol-Acetaminophen] Rash    Zithromax [Azithromycin] Rash       Current Outpatient Medications   Medication Sig    ciprofloxacin HCl (CIPRO) 500 mg tablet Take 1 Tablet by mouth two (2) times a day. Indications: diverticulitis    meclizine (ANTIVERT) 25 mg tablet TAKE 1 TABLET THREE TIMES DAILY AS NEEDED  FOR  DIZZINESS    insulin NPH (NovoLIN N Flexpen) 100 unit/mL (3 mL) inpn 10 Units by SubCUTAneous route Before breakfast and dinner for 90 days. furosemide (LASIX) 40 mg tablet TAKE 1 TABLET ON MONDAY, WEDNESDAY AND FRIDAY    alcohol swabs padm 1 Swab by Apply Externally route daily. lancets misc Trueplus lancets 33g    glucose blood VI test strips (True Metrix Glucose Test Strip) strip Use one strip to check glucose twice a day    allopurinoL (ZYLOPRIM) 300 mg tablet TAKE 1 TABLET EVERY DAY    atorvastatin (LIPITOR) 10 mg tablet TAKE 1 TABLET EVERY DAY    magnesium oxide (MAG-OX) 400 mg tablet Take 1 tablet by mouth once daily    glimepiride (AMARYL) 1 mg tablet 1 tab before breakfast and 2 tabs before dinner, 90 days    metFORMIN ER (GLUCOPHAGE XR) 500 mg tablet TAKE 1 TABLET WITH DINNER FOR 2 WEEKS, THEN TAKE 2 TABLETS WITH DINNER THEREAFTER    Insulin Needles, Disposable, 32 gauge x 5/32\" ndle Use pen needle to inject medicine every 7 days    Blood-Glucose Meter (True Metrix Glucose Meter) monitoring kit Use to check glucose daily    Blood Glucose Control, Low (True Metrix Level 1) soln 1 Bottle by Does Not Apply route daily. cpap machine kit by Does Not Apply route. multivitamin (ONE A DAY) tablet Take 1 Tab by mouth daily.     aspirin 81 mg chewable tablet Take 81 mg by mouth daily. cholecalciferol (VITAMIN D3) (1000 Units /25 mcg) tablet Take 1,000 Units by mouth daily. albuterol (PROVENTIL HFA, VENTOLIN HFA, PROAIR HFA) 90 mcg/actuation inhaler Take 1 Puff by inhalation every four (4) hours as needed for Wheezing. albuterol-ipratropium (DUO-NEB) 2.5 mg-0.5 mg/3 ml nebu 3 mL by Nebulization route. acetaminophen (TYLENOL) 650 mg TbER Take 650 mg by mouth every eight (8) hours. No current facility-administered medications for this visit.        Past Medical History:   Diagnosis Date    Arthritis     Asthma     Chronic kidney disease     stage 3    Chronic obstructive pulmonary disease (HCC)     Diabetes (HCC)     GERD (gastroesophageal reflux disease)     High cholesterol     Hypertension     Menopause     Morbid obesity ([address]     Obstructive sleep apnea on CPAP     Vertigo        Past Surgical History:   Procedure Laterality Date    HX BREAST BIOPSY Left 2019    HX CHOLECYSTECTOMY      HX ENDOSCOPY  01/01/2019    HX HYSTERECTOMY      HX OOPHORECTOMY      HX ORTHOPAEDIC      right shoulder spur    HX ROTATOR CUFF REPAIR Left     by suture    HX TUBAL LIGATION Bilateral        Family History   Problem Relation Age of Onset    Heart Disease Mother     Lung Disease Mother         lung ca    Lung Disease Father         PNA    Cancer Maternal Grandmother         gyn       Social History     Socioeconomic History    Marital status:      Spouse name: Not on file    Number of children: Not on file    Years of education: Not on file    Highest education level: Some college, no degree   Occupational History    Not on file   Tobacco Use    Smoking status: Never    Smokeless tobacco: Never   Vaping Use    Vaping Use: Never used   Substance and Sexual Activity    Alcohol use: Never    Drug use: Never    Sexual activity: Not Currently     Partners: Male     Birth control/protection: Surgical   Other Topics Concern     Service Not Asked    Blood Transfusions Not Asked    Caffeine Concern Not Asked    Occupational Exposure Not Asked    Hobby Hazards Not Asked    Sleep Concern Not Asked    Stress Concern Not Asked    Weight Concern Not Asked    Special Diet Not Asked    Back Care Not Asked    Exercise Not Asked    Bike Helmet Not Asked    Seat Belt Not Asked    Self-Exams Not Asked   Social History Narrative    Not on file     Social Determinants of Health     Financial Resource Strain: Not on file   Food Insecurity: Not on file   Transportation Needs: Not on file   Physical Activity: Not on file   Stress: Not on file   Social Connections: Not on file   Intimate Partner Violence: Not on file   Housing Stability: Not on file         HPI  65-year-old female with a history of hyperlipidemia, diabetes mellitus type 2, gout, gastroesophageal reflux disease, asthma, COPD, obstructive sleep apnea, chronic kidney disease stage IV, morbid obesity, and diverticular disease who comes in for evaluation of diarrhea and abdominal pain. Patient states that the antibiotic therapy was completed and the diarrhea resolved actually became constipated. She states now she has had increased abdominal pain and the diarrhea has returned. The diarrhea is not as bad as it was in the past.  She has crampy abdominal pain in the suprapubic region. Her bowel movements and then about twice a day. Eating causes of bowel movements now. Took 2 courses of antibiotic therapy with metronidazole. Review of Systems   Constitutional: Negative. HENT: Negative. Negative for nosebleeds. Eyes: Negative. Respiratory: Negative. Cardiovascular: Negative. Gastrointestinal:  Positive for abdominal pain and diarrhea. Negative for blood in stool, constipation, heartburn, melena, nausea and vomiting. Genitourinary: Negative. Musculoskeletal:  Positive for joint pain. Skin: Negative. Neurological: Negative. Endo/Heme/Allergies: Negative. Psychiatric/Behavioral: Negative. All other systems reviewed and are negative. Visit Vitals  BP (!) 140/70 (BP 1 Location: Right upper arm, BP Patient Position: Sitting, BP Cuff Size: Large adult)   Temp 97.6 °F (36.4 °C) (Temporal)   Resp 14   Ht 5' 6\" (1.676 m)   Wt (!) 174.5 kg (384 lb 9.6 oz)   LMP  (LMP Unknown)   SpO2 98% Comment: room air   BMI 62.08 kg/m²     Physical Exam  Vitals and nursing note reviewed. Constitutional:       Appearance: Normal appearance. She is obese. HENT:      Head: Normocephalic and atraumatic. Nose: Nose normal.      Mouth/Throat:      Mouth: Mucous membranes are moist.      Pharynx: Oropharynx is clear. Eyes:      General: No scleral icterus. Conjunctiva/sclera: Conjunctivae normal.      Pupils: Pupils are equal, round, and reactive to light. Cardiovascular:      Rate and Rhythm: Normal rate and regular rhythm. Pulses: Normal pulses. Heart sounds: Normal heart sounds. Pulmonary:      Effort: Pulmonary effort is normal.      Breath sounds: Normal breath sounds. Abdominal:      General: Bowel sounds are normal. There is no distension. Palpations: Abdomen is soft. There is no mass. Tenderness: There is abdominal tenderness. There is no right CVA tenderness, left CVA tenderness, guarding or rebound. Hernia: No hernia is present. Musculoskeletal:         General: Normal range of motion. Cervical back: Normal range of motion and neck supple. Right lower leg: Edema present. Left lower leg: Edema present. Skin:     General: Skin is warm and dry. Coloration: Skin is not jaundiced. Neurological:      General: No focal deficit present. Mental Status: She is alert and oriented to person, place, and time. Psychiatric:         Mood and Affect: Mood normal.         Behavior: Behavior normal.         Thought Content:  Thought content normal.         Judgment: Judgment normal.          1. Diarrhea of presumed infectious origin  Will give another course of antibiotic therapy, but change the antibiotic to ciprofloxacin. She responded well but the symptoms restarted. - GASTROINTESTINAL PROFILE, STOOL, PCR; Future  - ciprofloxacin HCl (CIPRO) 500 mg tablet; Take 1 Tablet by mouth two (2) times a day. Indications: diverticulitis  Dispense: 30 Tablet; Refill: 0    2. Lower abdominal pain  Probably secondary to diverticular disease. 3. Diverticular disease of colon      4. Iron deficiency anemia, unspecified iron deficiency anemia type      5. CKD (chronic kidney disease) stage 4, GFR 15-29 ml/min ([facility])      6. Essential hypertension      7. Gastroesophageal reflux disease with esophagitis without hemorrhage      8.  Helicobacter pylori gastritis

## 2022-11-14 ENCOUNTER — APPOINTMENT (OUTPATIENT)
Dept: CT IMAGING | Age: 62
End: 2022-11-14
Attending: EMERGENCY MEDICINE
Payer: MEDICARE

## 2022-11-14 ENCOUNTER — HOSPITAL ENCOUNTER (EMERGENCY)
Age: 62
Discharge: HOME | End: 2022-11-14
Attending: EMERGENCY MEDICINE
Payer: MEDICARE

## 2022-11-14 VITALS
HEIGHT: 66 IN | BODY MASS INDEX: 47.09 KG/M2 | OXYGEN SATURATION: 99 % | SYSTOLIC BLOOD PRESSURE: 108 MMHG | TEMPERATURE: 98.1 F | WEIGHT: 293 LBS | RESPIRATION RATE: 18 BRPM | DIASTOLIC BLOOD PRESSURE: 62 MMHG | HEART RATE: 60 BPM

## 2022-11-14 DIAGNOSIS — T78.3XXA ANGIOEDEMA, INITIAL ENCOUNTER: Primary | ICD-10-CM

## 2022-11-14 PROCEDURE — 99284 EMERGENCY DEPT VISIT MOD MDM: CPT

## 2022-11-14 PROCEDURE — 70450 CT HEAD/BRAIN W/O DYE: CPT

## 2022-11-14 NOTE — DISCHARGE INSTRUCTIONS
Stop your lisinopril and follow-up with your primary care doctor to discuss a new blood pressure medication

## 2022-11-14 NOTE — ED PROVIDER NOTES
EMERGENCY DEPARTMENT HISTORY AND PHYSICAL EXAM      Date: 11/14/2022  Patient Name: Valdez Case    History of Presenting Illness     Chief Complaint   Patient presents with    Facial Swelling       History Provided By: Patient    HPI: Valdez Case, 60 y.o. female presenting with left-sided facial swelling. Patient states she had woken up from a short nap about 1 hour ago. When she woke up she noticed the left side of her face left side of her lips were swollen. She states she feels like numbness in her left cheek. She did not notice any weakness to her face, arms, legs. No headache or vision changes. Patient is on lisinopril    There are no other complaints, changes, or physical findings at this time. PCP: Dorys Gonzalez NP    No current facility-administered medications on file prior to encounter. Current Outpatient Medications on File Prior to Encounter   Medication Sig Dispense Refill    ciprofloxacin HCl (CIPRO) 500 mg tablet Take 1 Tablet by mouth two (2) times a day. Indications: diverticulitis 30 Tablet 0    meclizine (ANTIVERT) 25 mg tablet TAKE 1 TABLET THREE TIMES DAILY AS NEEDED  FOR  DIZZINESS 90 Tablet 1    insulin NPH (NovoLIN N Flexpen) 100 unit/mL (3 mL) inpn 10 Units by SubCUTAneous route Before breakfast and dinner for 90 days. 18 mL 2    furosemide (LASIX) 40 mg tablet TAKE 1 TABLET ON MONDAY, WEDNESDAY AND FRIDAY 36 Tablet 1    alcohol swabs padm 1 Swab by Apply Externally route daily.  100 Pad 5    lancets misc Trueplus lancets 33g 100 Each 5    glucose blood VI test strips (True Metrix Glucose Test Strip) strip Use one strip to check glucose twice a day 100 Strip 5    allopurinoL (ZYLOPRIM) 300 mg tablet TAKE 1 TABLET EVERY DAY 90 Tablet 0    atorvastatin (LIPITOR) 10 mg tablet TAKE 1 TABLET EVERY DAY 90 Tablet 1    magnesium oxide (MAG-OX) 400 mg tablet Take 1 tablet by mouth once daily 90 Tablet 1    glimepiride (AMARYL) 1 mg tablet 1 tab before breakfast and 2 tabs before dinner, 90 days 270 Tablet 2    metFORMIN ER (GLUCOPHAGE XR) 500 mg tablet TAKE 1 TABLET WITH DINNER FOR 2 WEEKS, THEN TAKE 2 TABLETS WITH DINNER THEREAFTER 180 Tablet 2    albuterol (PROVENTIL HFA, VENTOLIN HFA, PROAIR HFA) 90 mcg/actuation inhaler Take 1 Puff by inhalation every four (4) hours as needed for Wheezing. 18 g 1    Insulin Needles, Disposable, 32 gauge x 5/32\" ndle Use pen needle to inject medicine every 7 days 100 Pen Needle 11    Blood-Glucose Meter (True Metrix Glucose Meter) monitoring kit Use to check glucose daily 1 Kit 0    Blood Glucose Control, Low (True Metrix Level 1) soln 1 Bottle by Does Not Apply route daily. 1 Each 3    cpap machine kit by Does Not Apply route. albuterol-ipratropium (DUO-NEB) 2.5 mg-0.5 mg/3 ml nebu 3 mL by Nebulization route. multivitamin (ONE A DAY) tablet Take 1 Tab by mouth daily. aspirin 81 mg chewable tablet Take 81 mg by mouth daily. cholecalciferol (VITAMIN D3) (1000 Units /25 mcg) tablet Take 1,000 Units by mouth daily. acetaminophen (TYLENOL) 650 mg TbER Take 650 mg by mouth every eight (8) hours.          Past History     Past Medical History:  Past Medical History:   Diagnosis Date    Arthritis     Asthma     Chronic kidney disease     stage 3    Chronic obstructive pulmonary disease (HCC)     Diabetes (HCC)     GERD (gastroesophageal reflux disease)     High cholesterol     Hypertension     Menopause     Morbid obesity ([address]     Obstructive sleep apnea on CPAP     Vertigo        Past Surgical History:  Past Surgical History:   Procedure Laterality Date    HX BREAST BIOPSY Left 2019    HX CHOLECYSTECTOMY      HX ENDOSCOPY  01/01/2019    HX HYSTERECTOMY      HX OOPHORECTOMY      HX ORTHOPAEDIC      right shoulder spur    HX ROTATOR CUFF REPAIR Left     by suture    HX TUBAL LIGATION Bilateral        Family History:  Family History   Problem Relation Age of Onset    Heart Disease Mother     Lung Disease Mother         lung ca    Lung Disease Father         PNA    Cancer Maternal Grandmother         gyn       Social History:  Social History     Tobacco Use    Smoking status: Never    Smokeless tobacco: Never   Vaping Use    Vaping Use: Never used   Substance Use Topics    Alcohol use: Never    Drug use: Never       Allergies: Allergies   Allergen Reactions    Celebrex [Celecoxib] Rash    Cleocin [Clindamycin Phosphate] Palpitations    Mushroom Swelling    Strawberry Rash    Ultracet [Tramadol-Acetaminophen] Rash    Zithromax [Azithromycin] Rash       Review of Systems   Review of Systems   Constitutional:  Negative for chills and fever. HENT:  Positive for facial swelling. Negative for sore throat. Eyes:  Negative for redness. Respiratory:  Negative for shortness of breath. Cardiovascular:  Negative for chest pain. Gastrointestinal:  Negative for abdominal pain, nausea and vomiting. Genitourinary:  Negative for flank pain. Musculoskeletal:  Negative for myalgias. Skin:  Negative for rash. Neurological:  Positive for numbness (facial). Negative for weakness and headaches. Physical Exam   Physical Exam  Vitals and nursing note reviewed. Constitutional:       General: She is not in acute distress. Appearance: Normal appearance. HENT:      Head: Normocephalic and atraumatic. Mouth/Throat:      Mouth: Mucous membranes are moist.   Eyes:      Extraocular Movements: Extraocular movements intact. Conjunctiva/sclera: Conjunctivae normal.   Cardiovascular:      Rate and Rhythm: Normal rate and regular rhythm. Pulmonary:      Effort: Pulmonary effort is normal. No respiratory distress. Breath sounds: Normal breath sounds. No wheezing, rhonchi or rales. Abdominal:      General: There is no distension. Palpations: Abdomen is soft. Tenderness: There is no abdominal tenderness. Musculoskeletal:         General: Normal range of motion. Cervical back: Normal range of motion.    Skin:     General: Skin is warm and dry. Neurological:      General: No focal deficit present. Mental Status: She is alert and oriented to person, place, and time. Mental status is at baseline. Comments: 5/5 strength upper and lower extremities. Very mild left lip swelling and left cheek swelling  No appreciable facial droop       Lab and Diagnostic Study Results   Labs -   No results found for this or any previous visit (from the past 12 hour(s)). Radiologic Studies -   @lastxrresult@  CT Results  (Last 48 hours)      None          CXR Results  (Last 48 hours)      None            Medical Decision Making and ED Course   Differential Diagnosis & Medical Decision Making Provider Note:    60 y.o. female presenting with left-sided facial swelling. She states this started after a nap. There was a there was a question of weakness initially however she does not appear to have any appreciable facial droop or numbness. She was monitored in the emergency department for 2 hours with no change in symptoms. Low suspicion for stroke/Bell's palsy. Possible concern for very mild angioedema in this patient who is on lisinopril. She was advised to discontinue her lisinopril and follow-up with her primary care doctor to discuss alternate blood pressure medication    - I am the first provider for this patient. I reviewed the vital signs, available nursing notes, past medical history, past surgical history, family history and social history. The patients presenting problems have been discussed, and they are in agreement with the care plan formulated and outlined with them. I have encouraged them to ask questions as they arise throughout their visit. Vital Signs-Reviewed the patient's vital signs. Patient Vitals for the past 12 hrs:   Temp Pulse Resp BP SpO2   11/14/22 1524 97.9 °F (36.6 °C) 68 18 (!) 154/75 100 %       ED Course:   ED Course as of 11/18/22 0846   Mon Nov 14, 2022   1822 No change in facial swelling.   Still no significant appreciable facial droop [KK]      ED Course User Index  [KK] Ruthy Kurtz MD            Disposition   Disposition: DC- Adult Discharges: All of the diagnostic tests were reviewed and questions answered. Diagnosis, care plan and treatment options were discussed. The patient understands the instructions and will follow up as directed. The patients results have been reviewed with them. They have been counseled regarding their diagnosis. The patient verbally convey understanding and agreement of the signs, symptoms, diagnosis, treatment and prognosis and additionally agrees to follow up as recommended with their PCP in 24 - 48 hours. They also agree with the care-plan and convey that all of their questions have been answered. I have also put together some discharge instructions for them that include: 1) educational information regarding their diagnosis, 2) how to care for their diagnosis at home, as well a 3) list of reasons why they would want to return to the ED prior to their follow-up appointment, should their condition change. DISCHARGE PLAN:  1. Current Discharge Medication List        CONTINUE these medications which have NOT CHANGED    Details   ciprofloxacin HCl (CIPRO) 500 mg tablet Take 1 Tablet by mouth two (2) times a day. Indications: diverticulitis  Qty: 30 Tablet, Refills: 0    Associated Diagnoses: Diarrhea of presumed infectious origin      meclizine (ANTIVERT) 25 mg tablet TAKE 1 TABLET THREE TIMES DAILY AS NEEDED  FOR  DIZZINESS  Qty: 90 Tablet, Refills: 1    Associated Diagnoses: Dizziness      insulin NPH (NovoLIN N Flexpen) 100 unit/mL (3 mL) inpn 10 Units by SubCUTAneous route Before breakfast and dinner for 90 days.   Qty: 18 mL, Refills: 2    Associated Diagnoses: Type 2 diabetes mellitus with other specified complication, without long-term current use of insulin ([facility])      furosemide (LASIX) 40 mg tablet TAKE 1 TABLET ON MONDAY, WEDNESDAY AND FRIDAY  Qty: 36 Tablet, Refills: 1    Associated Diagnoses: Chronic congestive heart failure, unspecified heart failure type ([facility])      alcohol swabs padm 1 Swab by Apply Externally route daily.   Qty: 100 Pad, Refills: 5    Associated Diagnoses: Type 2 diabetes mellitus with other specified complication, without long-term current use of insulin ([facility])      lancets misc Trueplus lancets 33g  Qty: 100 Each, Refills: 5    Associated Diagnoses: Type 2 diabetes mellitus with other specified complication, without long-term current use of insulin ([facility])      glucose blood VI test strips (True Metrix Glucose Test Strip) strip Use one strip to check glucose twice a day  Qty: 100 Strip, Refills: 5    Associated Diagnoses: Type 2 diabetes mellitus with other specified complication, without long-term current use of insulin ([facility])      allopurinoL (ZYLOPRIM) 300 mg tablet TAKE 1 TABLET EVERY DAY  Qty: 90 Tablet, Refills: 0    Associated Diagnoses: Acute gout of multiple sites, unspecified cause      atorvastatin (LIPITOR) 10 mg tablet TAKE 1 TABLET EVERY DAY  Qty: 90 Tablet, Refills: 1    Associated Diagnoses: Mixed hyperlipidemia due to type 2 diabetes mellitus ([facility])      magnesium oxide (MAG-OX) 400 mg tablet Take 1 tablet by mouth once daily  Qty: 90 Tablet, Refills: 1    Associated Diagnoses: Stage 3a chronic kidney disease ([facility])      glimepiride (AMARYL) 1 mg tablet 1 tab before breakfast and 2 tabs before dinner, 90 days  Qty: 270 Tablet, Refills: 2    Associated Diagnoses: Type 2 diabetes mellitus with other specified complication, without long-term current use of insulin ([facility])      metFORMIN ER (GLUCOPHAGE XR) 500 mg tablet TAKE 1 TABLET WITH DINNER FOR 2 WEEKS, THEN TAKE 2 TABLETS WITH DINNER THEREAFTER  Qty: 180 Tablet, Refills: 2    Associated Diagnoses: Type 2 diabetes mellitus with other specified complication, without long-term current use of insulin ([facility])      albuterol (PROVENTIL HFA, VENTOLIN HFA, PROAIR HFA) 90 mcg/actuation inhaler Take 1 Puff by inhalation every four (4) hours as needed for Wheezing. Qty: 18 g, Refills: 1    Associated Diagnoses: Chronic obstructive pulmonary disease, unspecified COPD type ([facility])      Insulin Needles, Disposable, 32 gauge x 5/32\" ndle Use pen needle to inject medicine every 7 days  Qty: 100 Pen Needle, Refills: 11    Comments: [id number]  Associated Diagnoses: Type 2 diabetes mellitus with other specified complication, without long-term current use of insulin ([facility])      Blood-Glucose Meter (True Metrix Glucose Meter) monitoring kit Use to check glucose daily  Qty: 1 Kit, Refills: 0    Associated Diagnoses: Type 2 diabetes mellitus with other specified complication, without long-term current use of insulin ([facility])      Blood Glucose Control, Low (True Metrix Level 1) soln 1 Bottle by Does Not Apply route daily. Qty: 1 Each, Refills: 3    Associated Diagnoses: Type 2 diabetes mellitus with other specified complication, without long-term current use of insulin ([facility])      cpap machine kit by Does Not Apply route. albuterol-ipratropium (DUO-NEB) 2.5 mg-0.5 mg/3 ml nebu 3 mL by Nebulization route. multivitamin (ONE A DAY) tablet Take 1 Tab by mouth daily. aspirin 81 mg chewable tablet Take 81 mg by mouth daily. cholecalciferol (VITAMIN D3) (1000 Units /25 mcg) tablet Take 1,000 Units by mouth daily. acetaminophen (TYLENOL) 650 mg TbER Take 650 mg by mouth every eight (8) hours. 2.   Follow-up Information    None       3. Return to ED if worse   4. Current Discharge Medication List             Diagnosis/Clinical Impression     Clinical Impression:   1. Angioedema, initial encounter        Attestations: Raúl MAURICIO MD, am the primary clinician of record. Please note that this dictation was completed with [employer], the [employer] voice recognition software.   Quite often unanticipated grammatical, syntax, homophones, and other interpretive errors are inadvertently transcribed by the computer software. Please disregard these errors. Please excuse any errors that have escaped final proofreading. Thank you.

## 2022-11-15 ENCOUNTER — TELEPHONE (OUTPATIENT)
Dept: GASTROENTEROLOGY | Age: 62
End: 2022-11-15

## 2022-11-15 ENCOUNTER — TELEPHONE (OUTPATIENT)
Dept: PRIMARY CARE CLINIC | Age: 62
End: 2022-11-15

## 2022-11-16 ENCOUNTER — OFFICE VISIT (OUTPATIENT)
Dept: PRIMARY CARE CLINIC | Age: 62
End: 2022-11-16
Payer: MEDICARE

## 2022-11-16 ENCOUNTER — TELEPHONE (OUTPATIENT)
Dept: GASTROENTEROLOGY | Age: 62
End: 2022-11-16

## 2022-11-16 ENCOUNTER — TELEPHONE (OUTPATIENT)
Dept: PRIMARY CARE CLINIC | Age: 62
End: 2022-11-16

## 2022-11-16 VITALS
RESPIRATION RATE: 18 BRPM | BODY MASS INDEX: 47.09 KG/M2 | HEIGHT: 66 IN | DIASTOLIC BLOOD PRESSURE: 85 MMHG | OXYGEN SATURATION: 97 % | TEMPERATURE: 97.5 F | WEIGHT: 293 LBS | SYSTOLIC BLOOD PRESSURE: 159 MMHG | HEART RATE: 95 BPM

## 2022-11-16 DIAGNOSIS — M17.0 PRIMARY OSTEOARTHRITIS OF BOTH KNEES: ICD-10-CM

## 2022-11-16 DIAGNOSIS — E78.2 MIXED HYPERLIPIDEMIA DUE TO TYPE 2 DIABETES MELLITUS (HCC): ICD-10-CM

## 2022-11-16 DIAGNOSIS — D63.1 ANEMIA DUE TO STAGE 3 CHRONIC KIDNEY DISEASE, UNSPECIFIED WHETHER STAGE 3A OR 3B CKD (HCC): ICD-10-CM

## 2022-11-16 DIAGNOSIS — K57.90 DIVERTICULAR DISEASE: ICD-10-CM

## 2022-11-16 DIAGNOSIS — N18.30 ANEMIA DUE TO STAGE 3 CHRONIC KIDNEY DISEASE, UNSPECIFIED WHETHER STAGE 3A OR 3B CKD (HCC): ICD-10-CM

## 2022-11-16 DIAGNOSIS — Z00.00 MEDICARE ANNUAL WELLNESS VISIT, SUBSEQUENT: ICD-10-CM

## 2022-11-16 DIAGNOSIS — I87.2 VENOUS INSUFFICIENCY: ICD-10-CM

## 2022-11-16 DIAGNOSIS — J44.9 CHRONIC OBSTRUCTIVE PULMONARY DISEASE, UNSPECIFIED COPD TYPE (HCC): ICD-10-CM

## 2022-11-16 DIAGNOSIS — T78.40XA ALLERGY, INITIAL ENCOUNTER: ICD-10-CM

## 2022-11-16 DIAGNOSIS — N18.31 STAGE 3A CHRONIC KIDNEY DISEASE (HCC): ICD-10-CM

## 2022-11-16 DIAGNOSIS — K21.9 GASTROESOPHAGEAL REFLUX DISEASE WITHOUT ESOPHAGITIS: ICD-10-CM

## 2022-11-16 DIAGNOSIS — E11.69 TYPE 2 DIABETES MELLITUS WITH OTHER SPECIFIED COMPLICATION, WITHOUT LONG-TERM CURRENT USE OF INSULIN (HCC): Primary | ICD-10-CM

## 2022-11-16 DIAGNOSIS — E11.69 MIXED HYPERLIPIDEMIA DUE TO TYPE 2 DIABETES MELLITUS (HCC): ICD-10-CM

## 2022-11-16 DIAGNOSIS — G47.33 OBSTRUCTIVE SLEEP APNEA SYNDROME: ICD-10-CM

## 2022-11-16 DIAGNOSIS — T78.3XXA ANGIOEDEMA, INITIAL ENCOUNTER: ICD-10-CM

## 2022-11-16 DIAGNOSIS — E66.01 MORBID OBESITY WITH BMI OF 60.0-69.9, ADULT (HCC): ICD-10-CM

## 2022-11-16 DIAGNOSIS — M1A.09X0 CHRONIC GOUT OF MULTIPLE SITES, UNSPECIFIED CAUSE: ICD-10-CM

## 2022-11-16 DIAGNOSIS — I10 ESSENTIAL HYPERTENSION: ICD-10-CM

## 2022-11-16 LAB — HBA1C MFR BLD HPLC: 8.1 %

## 2022-11-16 PROCEDURE — 99214 OFFICE O/P EST MOD 30 MIN: CPT | Performed by: NURSE PRACTITIONER

## 2022-11-16 PROCEDURE — G0439 PPPS, SUBSEQ VISIT: HCPCS | Performed by: NURSE PRACTITIONER

## 2022-11-16 PROCEDURE — G8753 SYS BP > OR = 140: HCPCS | Performed by: NURSE PRACTITIONER

## 2022-11-16 PROCEDURE — 3074F SYST BP LT 130 MM HG: CPT | Performed by: NURSE PRACTITIONER

## 2022-11-16 PROCEDURE — 3052F HG A1C>EQUAL 8.0%<EQUAL 9.0%: CPT | Performed by: NURSE PRACTITIONER

## 2022-11-16 PROCEDURE — G8432 DEP SCR NOT DOC, RNG: HCPCS | Performed by: NURSE PRACTITIONER

## 2022-11-16 PROCEDURE — G8427 DOCREV CUR MEDS BY ELIG CLIN: HCPCS | Performed by: NURSE PRACTITIONER

## 2022-11-16 PROCEDURE — 83036 HEMOGLOBIN GLYCOSYLATED A1C: CPT | Performed by: NURSE PRACTITIONER

## 2022-11-16 PROCEDURE — 2022F DILAT RTA XM EVC RTNOPTHY: CPT | Performed by: NURSE PRACTITIONER

## 2022-11-16 PROCEDURE — 3017F COLORECTAL CA SCREEN DOC REV: CPT | Performed by: NURSE PRACTITIONER

## 2022-11-16 PROCEDURE — G9899 SCRN MAM PERF RSLTS DOC: HCPCS | Performed by: NURSE PRACTITIONER

## 2022-11-16 PROCEDURE — G8417 CALC BMI ABV UP PARAM F/U: HCPCS | Performed by: NURSE PRACTITIONER

## 2022-11-16 PROCEDURE — 3078F DIAST BP <80 MM HG: CPT | Performed by: NURSE PRACTITIONER

## 2022-11-16 PROCEDURE — G8754 DIAS BP LESS 90: HCPCS | Performed by: NURSE PRACTITIONER

## 2022-11-16 RX ORDER — AMLODIPINE BESYLATE 5 MG/1
5 TABLET ORAL DAILY
Qty: 30 TABLET | Refills: 1 | Status: SHIPPED | OUTPATIENT
Start: 2022-11-16 | End: 2023-02-14

## 2022-11-16 RX ORDER — PEN NEEDLE, DIABETIC 31 GX3/16"
NEEDLE, DISPOSABLE MISCELLANEOUS
Qty: 100 PEN NEEDLE | Refills: 11 | Status: SHIPPED | OUTPATIENT
Start: 2022-11-16

## 2022-11-16 RX ORDER — HUMAN INSULIN 100 [IU]/ML
25 INJECTION, SUSPENSION SUBCUTANEOUS
Qty: 45 ML | Refills: 1 | Status: SHIPPED | OUTPATIENT
Start: 2022-11-16 | End: 2023-02-14

## 2022-11-16 RX ORDER — CETIRIZINE HYDROCHLORIDE 5 MG/1
5 TABLET ORAL DAILY
Qty: 30 TABLET | Refills: 0 | Status: SHIPPED | OUTPATIENT
Start: 2022-11-16

## 2022-11-16 RX ORDER — HUMAN INSULIN 100 [IU]/ML
25 INJECTION, SUSPENSION SUBCUTANEOUS
Qty: 45 ML | Refills: 1 | Status: SHIPPED | OUTPATIENT
Start: 2022-11-16 | End: 2022-11-16 | Stop reason: SDUPTHER

## 2022-11-16 NOTE — PROGRESS NOTES
Melani Fisher is a 60 y.o. female who presents to the office today for the following:    Chief Complaint   Patient presents with    Diabetes    Annual Wellness Visit       Past Medical History:   Diagnosis Date    Arthritis     Asthma     Chronic kidney disease     stage 3    Chronic obstructive pulmonary disease ([address]     Diabetes ([address]     GERD (gastroesophageal reflux disease)     High cholesterol     Hypertension     Menopause     Morbid obesity ([address]     Obstructive sleep apnea on CPAP     Vertigo        Past Surgical History:   Procedure Laterality Date    HX BREAST BIOPSY Left 2019    HX CHOLECYSTECTOMY      HX ENDOSCOPY  01/01/2019    HX HYSTERECTOMY      HX OOPHORECTOMY      HX ORTHOPAEDIC      right shoulder spur    HX ROTATOR CUFF REPAIR Left     by suture    HX TUBAL LIGATION Bilateral         Family History   Problem Relation Age of Onset    Heart Disease Mother     Lung Disease Mother         lung ca    Lung Disease Father         PNA    Cancer Maternal Grandmother         gyn        Social History     Tobacco Use    Smoking status: Never    Smokeless tobacco: Never   Vaping Use    Vaping Use: Never used   Substance Use Topics    Alcohol use: Never    Drug use: Never        HPI  Patient here today for follow up of chronic conditions with PMH of hypertension, hyperlipidemia, type 2 diabetes, venous insufficiency, osteoarthritis of knees, copd, CKD, morbid obesity, sleep apnea, anemia and GERD. States that she follows with multiple specialists including vascular, GI and nephrology . Reports taking medicines as directed with exception of blood pressure medicine. States that she had to go to ED 2 days ago due to left lip and face swelling. Told this likely was due to lisinopril and told to stop. Swelling has nearly resolved today. Was also recently seen by Dr. Kaylah Olvera due to diarrhea and stomach cramping. Put on course of cipro again for diverticular disease.  Does feel symptoms improved with cipro and is scheduled to follow up with him again. Current Outpatient Medications on File Prior to Visit   Medication Sig    ciprofloxacin HCl (CIPRO) 500 mg tablet Take 1 Tablet by mouth two (2) times a day. Indications: diverticulitis    [DISCONTINUED] meclizine (ANTIVERT) 25 mg tablet TAKE 1 TABLET THREE TIMES DAILY AS NEEDED  FOR  DIZZINESS    [DISCONTINUED] insulin NPH (NovoLIN N Flexpen) 100 unit/mL (3 mL) inpn 10 Units by SubCUTAneous route Before breakfast and dinner for 90 days. furosemide (LASIX) 40 mg tablet TAKE 1 TABLET ON MONDAY, WEDNESDAY AND FRIDAY    alcohol swabs padm 1 Swab by Apply Externally route daily. lancets misc Trueplus lancets 33g    glucose blood VI test strips (True Metrix Glucose Test Strip) strip Use one strip to check glucose twice a day    allopurinoL (ZYLOPRIM) 300 mg tablet TAKE 1 TABLET EVERY DAY    atorvastatin (LIPITOR) 10 mg tablet TAKE 1 TABLET EVERY DAY    magnesium oxide (MAG-OX) 400 mg tablet Take 1 tablet by mouth once daily    glimepiride (AMARYL) 1 mg tablet 1 tab before breakfast and 2 tabs before dinner, 90 days    metFORMIN ER (GLUCOPHAGE XR) 500 mg tablet TAKE 1 TABLET WITH DINNER FOR 2 WEEKS, THEN TAKE 2 TABLETS WITH DINNER THEREAFTER    albuterol (PROVENTIL HFA, VENTOLIN HFA, PROAIR HFA) 90 mcg/actuation inhaler Take 1 Puff by inhalation every four (4) hours as needed for Wheezing. [DISCONTINUED] Insulin Needles, Disposable, 32 gauge x 5/32\" ndle Use pen needle to inject medicine every 7 days    Blood-Glucose Meter (True Metrix Glucose Meter) monitoring kit Use to check glucose daily    Blood Glucose Control, Low (True Metrix Level 1) soln 1 Bottle by Does Not Apply route daily. cpap machine kit by Does Not Apply route. albuterol-ipratropium (DUO-NEB) 2.5 mg-0.5 mg/3 ml nebu 3 mL by Nebulization route. multivitamin (ONE A DAY) tablet Take 1 Tab by mouth daily. aspirin 81 mg chewable tablet Take 81 mg by mouth daily.     cholecalciferol (VITAMIN D3) (1000 Units /25 mcg) tablet Take 1,000 Units by mouth daily. acetaminophen (TYLENOL) 650 mg TbER Take 650 mg by mouth every eight (8) hours. No current facility-administered medications on file prior to visit. Medications Ordered Today   Medications    DISCONTD: insulin NPH (NovoLIN N Flexpen) 100 unit/mL (3 mL) inpn     Si Units by SubCUTAneous route Before breakfast and dinner for 90 days. Dispense:  45 mL     Refill:  1    insulin NPH (NovoLIN N Flexpen) 100 unit/mL (3 mL) inpn     Si Units by SubCUTAneous route Before breakfast and dinner for 90 days. Dispense:  45 mL     Refill:  1    Insulin Needles, Disposable, 32 gauge x \" ndle     Sig: Use pen needle to inject medicine every 7 days     Dispense:  100 Pen Needle     Refill:  11     [facility]#[id number]    amLODIPine (NORVASC) 5 mg tablet     Sig: Take 1 Tablet by mouth daily for 90 days. Dispense:  30 Tablet     Refill:  1    cetirizine (ZYRTEC) 5 mg tablet     Sig: Take 1 Tablet by mouth daily. Dispense:  30 Tablet     Refill:  0          Review of Systems   Constitutional: Negative. HENT: Negative. Eyes:  Negative for pain, discharge and redness. Respiratory: Negative. Cardiovascular:  Negative for chest pain, palpitations, orthopnea and leg swelling. Gastrointestinal:  Positive for diarrhea. Negative for abdominal pain, constipation, heartburn, nausea and vomiting. Genitourinary: Negative. Musculoskeletal:  Positive for joint pain and myalgias. Negative for falls. Skin: Negative. Neurological: Negative. Psychiatric/Behavioral: Negative. Visit Vitals  BP (!) 159/85 (BP 1 Location: Left upper arm, BP Patient Position: Sitting, BP Cuff Size: Large adult)   Pulse 95   Temp 97.5 °F (36.4 °C) (Temporal)   Resp 18   Ht 5' 6\" (1.676 m)   Wt (!) 382 lb (173.3 kg)   LMP  (LMP Unknown)   SpO2 97%   BMI 61.66 kg/m²       Physical Exam  Vitals and nursing note reviewed.    Constitutional: Appearance: Normal appearance. She is obese. Eyes:      Pupils: Pupils are equal, round, and reactive to light. Neck:      Vascular: No carotid bruit. Cardiovascular:      Rate and Rhythm: Normal rate and regular rhythm. Pulses: Normal pulses. Heart sounds: Normal heart sounds. Pulmonary:      Effort: Pulmonary effort is normal.      Breath sounds: Normal breath sounds. Abdominal:      General: Bowel sounds are normal.      Palpations: Abdomen is soft. Tenderness: There is no abdominal tenderness. There is no guarding. Musculoskeletal:      Right lower leg: No edema. Left lower leg: No edema. Lymphadenopathy:      Cervical: No cervical adenopathy. Skin:     General: Skin is warm and dry. Neurological:      Mental Status: She is alert. Gait: Gait abnormal.      Comments: Using walker          1. Type 2 diabetes mellitus with other specified complication, without long-term current use of insulin ([facility])  Lab Results   Component Value Date/Time    Hemoglobin A1c 6.8 (H) 06/15/2021 12:37 PM    Hemoglobin A1c (POC) 8.1 11/16/2022 09:22 AM    Hemoglobin A1c, External 7.2 06/04/2020 12:00 AM   Glucose control worsened despite she has had some weight loss. Did have to reduce metformin due to GI sx. On metformin 500mg XR twice dailly  and glimepiride 1mg in am and 2mg in evening, novolin N 20 units twice daily   Fasting glucose is now under 200   GLP-1 and SGL2 not affordable  Will increase the novolin n to 25 units twice daily   Continue to check fasting glucose and bring to next appointment  Continue annual eye exam and is seen at [facility]  Encourage following diabetic diet and increasing regular exercise to 3-5 times weekly  Dr. Hu Diaz has left now and will hold on new referral for now as we will work on getting down with aforementioned adjustments  - AMB POC HEMOGLOBIN A1C    2.  Essential hypertension  Blood pressure is elevated today on exam but stopped on lisinopril due to angioedema  Will start on amlodipine 5mg daily and reviewed use/side effects  Check readings at home and bring to nurse visit in 4 weeks    3. Chronic obstructive pulmonary disease, unspecified COPD type ([facility]  Stable    4. Stage 3a chronic kidney disease ([facility]  Lab Results   Component Value Date/Time    Creatinine 2.02 (H) 09/25/2022 08:40 PM   Stable and follows with nephrology who manages  Reviewed last notes from visit 10/25/22      5. Mixed hyperlipidemia due to type 2 diabetes mellitus ([facility]  Lab Results   Component Value Date/Time    LDL, calculated 81 08/02/2022 11:33 AM   Stable and continue statin medication as directed    6. Obstructive sleep apnea syndrome  Continue cpap therapy     7. Chronic gout of multiple sites, unspecified cause  Lab Results   Component Value Date/Time    Uric acid 4.0 12/14/2021 11:36 AM   Stable and continue on allopurinol as directed    8. Morbid obesity with BMI of 60.0-69.9, adult ([facility]  About 4lb weight loss since last visit  Continue to encourage weight loss with diet and exercise as discussed above    9. Anemia  Lab Results   Component Value Date/Time    WBC 4.1 (L) 09/25/2022 08:40 PM    HGB 11.6 (L) 09/25/2022 08:40 PM    HCT 36.0 09/25/2022 08:40 PM    PLATELET 725 68/57/4453 08:40 PM    MCV 91.4 09/25/2022 08:40 PM   Stable and continue to monitor cbc    10. Gastroesophageal reflux disease without esophagitis  Stable    11. Primary osteoarthritis of both knees  Does report increased pain in both knees  Working on weight loss   Uses tylenol and topical prn    12. Venous insufficiency  Wears compression stocking   Following with Dr. Adrianne Goldman    13. Diverticular disease  Completing course  of cipro and sx improved  Stool studies were negative  She will follow up with Dr. Alexa Bocanegra as scheduled    14.  Angioedema, initial encounter  Symptoms essentially resolved and off lisinopril  Is on antibiotics currently but she has taken in past without problem and seems likely this is due to lisinopril. She will let us know if any reoccurrence or change. Patient verbalizes understanding of plan of care as discussed above    Follow-up and Dispositions    Return in about 4 weeks (around 12/14/2022) for or sooner for worsening symptoms. This is the Subsequent Medicare Annual Wellness Exam, performed 12 months or more after the Initial AWV or the last Subsequent AWV    I have reviewed the patient's medical history in detail and updated the computerized patient record. Assessment/Plan   Education and counseling provided:  Are appropriate based on today's review and evaluation  End-of-Life planning (with patient's consent)  Pneumococcal Vaccine  Influenza Vaccine  Hepatitis B Vaccine  Screening Mammography  Screening Pap and pelvic (covered once every 2 years)  Colorectal cancer screening tests  Cardiovascular screening blood test  Bone mass measurement (DEXA)  Screening for glaucoma  Diabetes screening test  Medical nutrition therapy for individuals with diabetes or renal disease    1. Type 2 diabetes mellitus with other specified complication, without long-term current use of insulin (HCC)  -     AMB POC HEMOGLOBIN A1C  -     insulin NPH (NovoLIN N Flexpen) 100 unit/mL (3 mL) inpn; 25 Units by SubCUTAneous route Before breakfast and dinner for 90 days. , Normal, Disp-45 mL, R-1  -     Insulin Needles, Disposable, 32 gauge x 5/32\" ndle; Use pen needle to inject medicine every 7 days, Normal, Disp-100 Pen Needle, [id number]  2. Essential hypertension  -     amLODIPine (NORVASC) 5 mg tablet; Take 1 Tablet by mouth daily for 90 days. , Normal, Disp-30 Tablet, R-1  3. Chronic obstructive pulmonary disease, unspecified COPD type ([address]  4. Stage 3a chronic kidney disease ([address]  5. Mixed hyperlipidemia due to type 2 diabetes mellitus ([address]  6. Obstructive sleep apnea syndrome  7. Chronic gout of multiple sites, unspecified cause  8.  Morbid obesity with BMI of 60.0-69.9, adult ([facility]  9. Anemia due to stage 3 chronic kidney disease, unspecified whether stage 3a or 3b CKD ([facility]  10. Gastroesophageal reflux disease without esophagitis  11. Primary osteoarthritis of both knees  12. Venous insufficiency  13. Diverticular disease  14. Angioedema, initial encounter  15. Allergy, initial encounter  -     cetirizine (ZYRTEC) 5 mg tablet; Take 1 Tablet by mouth daily. , Normal, Disp-30 Tablet, R-0  16. Medicare annual wellness visit, subsequent       Depression Risk Factor Screening     3 most recent PHQ Screens 11/16/2022   Little interest or pleasure in doing things Not at all   Feeling down, depressed, irritable, or hopeless Not at all   Total Score PHQ 2 0       Alcohol & Drug Abuse Risk Screen    Do you average more than 1 drink per night or more than 7 drinks a week:  No    On any one occasion in the past three months have you have had more than 3 drinks containing alcohol:  No          Functional Ability and Level of Safety    Hearing: Hearing is good. Activities of Daily Living: The home contains: handrails  Patient needs help with:  walking      Ambulation: with difficulty, uses a walker     Fall Risk:  Fall Risk Assessment, last 12 mths 11/16/2022   Able to walk? Yes   Fall in past 12 months? 1   Do you feel unsteady? 1   Are you worried about falling 1   Is TUG test greater than 12 seconds? 0   Is the gait abnormal? 0   Number of falls in past 12 months 1   Fall with injury?  1      Abuse Screen:  Patient is not abused       Cognitive Screening    Has your family/caregiver stated any concerns about your memory: no     Cognitive Screening: Normal - Clock Drawing Test, Mini Cog Test    Health Maintenance Due     Health Maintenance Due   Topic Date Due    Shingrix Vaccine Age 50> (1 of 2) Never done    DTaP/Tdap/Td series (1 - Tdap) Never done    Pneumococcal 0-64 years (2 - PPSV23 if available, else PCV20) 04/02/2019    COVID-19 Vaccine (3 - Booster for Moderna series) 06/15/2021    Eye Exam Retinal or Dilated  Never done    Foot Exam Q1  08/19/2022     Reviewed recommended vaccines an d screenings  for age. She will obtain at local pharmacy if desires.   Patient Care Team   Patient Care Team:  Rolemargi Bean NP as PCP - General (Physician Assistant)  Rolemargi Bean NP as PCP - [facility]  Anabell Armstrong MD as Consulting Provider (Pulmonary Disease)    History     Patient Active Problem List   Diagnosis Code    Asthma J45.909    Chronic obstructive pulmonary disease ([facility] J44.9    Diabetes mellitus ([facility] E11.9    Diverticulitis K57.92    GERD (gastroesophageal reflux disease) K21.9    Gout M10.9    Hyperkalemia E87.5    Mixed hyperlipidemia due to type 2 diabetes mellitus ([facility] E11.69, E78.2    Arthritis J61.89    Helicobacter pylori gastritis K29.70, B96.81    Hyperlipidemia E78.5    Varicose veins of lower extremities with inflammation I83.10    Obstructive sleep apnea syndrome G47.33    Morbid obesity with BMI of 60.0-69.9, adult ([facility] E66.01, Z68.44    Iron deficiency anemia D50.9    Stage 3a chronic kidney disease ([facility] N18.31    History of hysterectomy Z90.710    CKD (chronic kidney disease) stage 4, GFR 15-29 ml/min ([facility] N18.4     Past Medical History:   Diagnosis Date    Arthritis     Asthma     Chronic kidney disease     stage 3    Chronic obstructive pulmonary disease (HCC)     Diabetes ([facility]     GERD (gastroesophageal reflux disease)     High cholesterol     Hypertension     Menopause     Morbid obesity ([facility]     Obstructive sleep apnea on CPAP     Vertigo       Past Surgical History:   Procedure Laterality Date    HX BREAST BIOPSY Left 2019    HX CHOLECYSTECTOMY      HX ENDOSCOPY  01/01/2019    HX HYSTERECTOMY      HX OOPHORECTOMY      HX ORTHOPAEDIC      right shoulder spur    HX ROTATOR CUFF REPAIR Left     by suture    HX TUBAL LIGATION Bilateral      Current Outpatient Medications   Medication Sig Dispense Refill    insulin NPH (NovoLIN N Flexpen) 100 unit/mL (3 mL) inpn 25 Units by SubCUTAneous route Before breakfast and dinner for 90 days. 45 mL 1    Insulin Needles, Disposable, 32 gauge x 5/32\" ndle Use pen needle to inject medicine every 7 days 100 Pen Needle 11    amLODIPine (NORVASC) 5 mg tablet Take 1 Tablet by mouth daily for 90 days. 30 Tablet 1    cetirizine (ZYRTEC) 5 mg tablet Take 1 Tablet by mouth daily. 30 Tablet 0    ciprofloxacin HCl (CIPRO) 500 mg tablet Take 1 Tablet by mouth two (2) times a day. Indications: diverticulitis 30 Tablet 0    furosemide (LASIX) 40 mg tablet TAKE 1 TABLET ON MONDAY, WEDNESDAY AND FRIDAY 36 Tablet 1    alcohol swabs padm 1 Swab by Apply Externally route daily. 100 Pad 5    lancets misc Trueplus lancets 33g 100 Each 5    glucose blood VI test strips (True Metrix Glucose Test Strip) strip Use one strip to check glucose twice a day 100 Strip 5    allopurinoL (ZYLOPRIM) 300 mg tablet TAKE 1 TABLET EVERY DAY 90 Tablet 0    atorvastatin (LIPITOR) 10 mg tablet TAKE 1 TABLET EVERY DAY 90 Tablet 1    magnesium oxide (MAG-OX) 400 mg tablet Take 1 tablet by mouth once daily 90 Tablet 1    glimepiride (AMARYL) 1 mg tablet 1 tab before breakfast and 2 tabs before dinner, 90 days 270 Tablet 2    metFORMIN ER (GLUCOPHAGE XR) 500 mg tablet TAKE 1 TABLET WITH DINNER FOR 2 WEEKS, THEN TAKE 2 TABLETS WITH DINNER THEREAFTER 180 Tablet 2    albuterol (PROVENTIL HFA, VENTOLIN HFA, PROAIR HFA) 90 mcg/actuation inhaler Take 1 Puff by inhalation every four (4) hours as needed for Wheezing. 18 g 1    Blood-Glucose Meter (True Metrix Glucose Meter) monitoring kit Use to check glucose daily 1 Kit 0    Blood Glucose Control, Low (True Metrix Level 1) soln 1 Bottle by Does Not Apply route daily. 1 Each 3    cpap machine kit by Does Not Apply route. albuterol-ipratropium (DUO-NEB) 2.5 mg-0.5 mg/3 ml nebu 3 mL by Nebulization route. multivitamin (ONE A DAY) tablet Take 1 Tab by mouth daily.       aspirin 81 mg chewable tablet Take 81 mg by mouth daily. cholecalciferol (VITAMIN D3) (1000 Units /25 mcg) tablet Take 1,000 Units by mouth daily. acetaminophen (TYLENOL) 650 mg TbER Take 650 mg by mouth every eight (8) hours.        Allergies   Allergen Reactions    Celebrex [Celecoxib] Rash    Cleocin [Clindamycin Phosphate] Palpitations    Mushroom Swelling    Strawberry Rash    Ultracet [Tramadol-Acetaminophen] Rash    Zithromax [Azithromycin] Rash    Lisinopril Swelling       Family History   Problem Relation Age of Onset    Heart Disease Mother     Lung Disease Mother         lung ca    Lung Disease Father         PNA    Cancer Maternal Grandmother         gyn     Social History     Tobacco Use    Smoking status: Never    Smokeless tobacco: Never   Substance Use Topics    Alcohol use: Never         Dariela Menjivar NP

## 2022-11-16 NOTE — PROGRESS NOTES
Pt is not taking any BP meds at this time she went to ER with a reaction and was told it was the medication     1. \"Have you been to the ER, urgent care clinic since your last visit? Hospitalized since your last visit? \" No    2. \"Have you seen or consulted any other health care providers outside of the [address] [place] [address] since your last visit? \" No     3. For patients aged 39-70: Has the patient had a colonoscopy / FIT/ Cologuard? Yes - no Care Gap present      If the patient is female:    4. For patients aged 41-77: Has the patient had a mammogram within the past 2 years? Yes - no Care Gap present      5. For patients aged 21-65: Has the patient had a pap smear?  Yes - no Care Gap present

## 2022-12-13 DIAGNOSIS — N18.31 STAGE 3A CHRONIC KIDNEY DISEASE (HCC): ICD-10-CM

## 2022-12-14 RX ORDER — LANOLIN ALCOHOL/MO/W.PET/CERES
CREAM (GRAM) TOPICAL
Qty: 90 TABLET | Refills: 1 | Status: SHIPPED | OUTPATIENT
Start: 2022-12-14

## 2022-12-16 ENCOUNTER — OFFICE VISIT (OUTPATIENT)
Dept: PRIMARY CARE CLINIC | Age: 62
End: 2022-12-16
Payer: MEDICARE

## 2022-12-16 ENCOUNTER — HOSPITAL ENCOUNTER (OUTPATIENT)
Dept: GENERAL RADIOLOGY | Age: 62
End: 2022-12-16
Attending: NURSE PRACTITIONER
Payer: MEDICARE

## 2022-12-16 ENCOUNTER — HOSPITAL ENCOUNTER (OUTPATIENT)
Dept: GENERAL RADIOLOGY | Age: 62
End: 2022-12-16
Payer: MEDICARE

## 2022-12-16 ENCOUNTER — HOSPITAL ENCOUNTER (EMERGENCY)
Age: 62
Discharge: STILL ADMITTED OR ADMINISTRATIVE | End: 2022-12-16

## 2022-12-16 VITALS
WEIGHT: 293 LBS | TEMPERATURE: 97.4 F | DIASTOLIC BLOOD PRESSURE: 71 MMHG | HEART RATE: 84 BPM | SYSTOLIC BLOOD PRESSURE: 139 MMHG | BODY MASS INDEX: 47.09 KG/M2 | OXYGEN SATURATION: 98 % | RESPIRATION RATE: 18 BRPM | HEIGHT: 66 IN

## 2022-12-16 DIAGNOSIS — M25.512 ACUTE PAIN OF LEFT SHOULDER: Primary | ICD-10-CM

## 2022-12-16 DIAGNOSIS — M25.512 ACUTE PAIN OF LEFT SHOULDER: ICD-10-CM

## 2022-12-16 DIAGNOSIS — I10 ESSENTIAL HYPERTENSION: ICD-10-CM

## 2022-12-16 DIAGNOSIS — Z23 ENCOUNTER FOR IMMUNIZATION: ICD-10-CM

## 2022-12-16 PROCEDURE — 73030 X-RAY EXAM OF SHOULDER: CPT

## 2022-12-16 PROCEDURE — G9899 SCRN MAM PERF RSLTS DOC: HCPCS | Performed by: NURSE PRACTITIONER

## 2022-12-16 PROCEDURE — 90732 PPSV23 VACC 2 YRS+ SUBQ/IM: CPT | Performed by: NURSE PRACTITIONER

## 2022-12-16 PROCEDURE — 3078F DIAST BP <80 MM HG: CPT | Performed by: NURSE PRACTITIONER

## 2022-12-16 PROCEDURE — G8417 CALC BMI ABV UP PARAM F/U: HCPCS | Performed by: NURSE PRACTITIONER

## 2022-12-16 PROCEDURE — 3017F COLORECTAL CA SCREEN DOC REV: CPT | Performed by: NURSE PRACTITIONER

## 2022-12-16 PROCEDURE — G0009 ADMIN PNEUMOCOCCAL VACCINE: HCPCS | Performed by: NURSE PRACTITIONER

## 2022-12-16 PROCEDURE — G8427 DOCREV CUR MEDS BY ELIG CLIN: HCPCS | Performed by: NURSE PRACTITIONER

## 2022-12-16 PROCEDURE — 3074F SYST BP LT 130 MM HG: CPT | Performed by: NURSE PRACTITIONER

## 2022-12-16 PROCEDURE — G8432 DEP SCR NOT DOC, RNG: HCPCS | Performed by: NURSE PRACTITIONER

## 2022-12-16 PROCEDURE — 99214 OFFICE O/P EST MOD 30 MIN: CPT | Performed by: NURSE PRACTITIONER

## 2022-12-16 RX ORDER — AMLODIPINE BESYLATE 5 MG/1
5 TABLET ORAL DAILY
Qty: 90 TABLET | Refills: 3 | Status: SHIPPED | OUTPATIENT
Start: 2022-12-16 | End: 2023-03-16

## 2022-12-16 NOTE — PROGRESS NOTES
Chief Complaint   Patient presents with    Fall    Shoulder Pain    Hypertension       Follow up. Pt had a fall back in October and left shoulder been bothering her    1. \"Have you been to the ER, urgent care clinic since your last visit? Hospitalized since your last visit? \" No    2. \"Have you seen or consulted any other health care providers outside of the [address] [place] [address] since your last visit? \" No     3. For patients aged 39-70: Has the patient had a colonoscopy / FIT/ Cologuard? Yes - no Care Gap present      If the patient is female:    4. For patients aged 41-77: Has the patient had a mammogram within the past 2 years? Yes - no Care Gap present      5. For patients aged 21-65: Has the patient had a pap smear?  Yes - no Care Gap present

## 2022-12-16 NOTE — PROGRESS NOTES
Dayanara Galicia is a 60 y.o. female who presents to the office today for the following:    Chief Complaint   Patient presents with    Fall    Shoulder Pain    Hypertension       Past Medical History:   Diagnosis Date    Arthritis     Asthma     Chronic kidney disease     stage 3    Chronic obstructive pulmonary disease (HCC)     Diabetes ([facility] [address]     GERD (gastroesophageal reflux disease)     High cholesterol     Hypertension     Menopause     Morbid obesity ([facility] [address]     Obstructive sleep apnea on CPAP     Vertigo        Past Surgical History:   Procedure Laterality Date    HX BREAST BIOPSY Left 2019    HX CHOLECYSTECTOMY      HX ENDOSCOPY  01/01/2019    HX HYSTERECTOMY      HX OOPHORECTOMY      HX ORTHOPAEDIC      right shoulder spur    HX ROTATOR CUFF REPAIR Left     by suture    HX TUBAL LIGATION Bilateral         Family History   Problem Relation Age of Onset    Heart Disease Mother     Lung Disease Mother         lung ca    Lung Disease Father         PNA    Cancer Maternal Grandmother         gyn        Social History     Tobacco Use    Smoking status: Never    Smokeless tobacco: Never   Vaping Use    Vaping Use: Never used   Substance Use Topics    Alcohol use: Never    Drug use: Never        HPI  Patient here today for follow up of hypertension as well as left shoulder pain with PMH of hypertension, hyperlipidemia, type 2 diabetes, venous insufficiency, osteoarthritis of knees, copd, CKD, morbid obesity, sleep apnea, anemia and GERD. States that she is doing well with new medication and blood pressure is improved. Does want to discuss pain in left shoulder. States that she has been having more pain in shoulder since October. [place] in front of a cabinet and hit shoulder against it. Thought that it was just bruised but pain has continued. Hurts when lifting about head. Did have rotator cuff repair on left shoulder in past in 2012. Denies any numbness or tingling in left arm.  Has not seen a provider for this previously. Current Outpatient Medications on File Prior to Visit   Medication Sig    magnesium oxide (MAG-OX) 400 mg tablet TAKE 1 TABLET EVERY DAY    insulin NPH (NovoLIN N Flexpen) 100 unit/mL (3 mL) inpn 25 Units by SubCUTAneous route Before breakfast and dinner for 90 days. Insulin Needles, Disposable, 32 gauge x 5/32\" ndle Use pen needle to inject medicine every 7 days    cetirizine (ZYRTEC) 5 mg tablet Take 1 Tablet by mouth daily. ciprofloxacin HCl (CIPRO) 500 mg tablet Take 1 Tablet by mouth two (2) times a day. Indications: diverticulitis    furosemide (LASIX) 40 mg tablet TAKE 1 TABLET ON MONDAY, WEDNESDAY AND FRIDAY    alcohol swabs padm 1 Swab by Apply Externally route daily. lancets misc Trueplus lancets 33g    glucose blood VI test strips (True Metrix Glucose Test Strip) strip Use one strip to check glucose twice a day    allopurinoL (ZYLOPRIM) 300 mg tablet TAKE 1 TABLET EVERY DAY    atorvastatin (LIPITOR) 10 mg tablet TAKE 1 TABLET EVERY DAY    glimepiride (AMARYL) 1 mg tablet 1 tab before breakfast and 2 tabs before dinner, 90 days    metFORMIN ER (GLUCOPHAGE XR) 500 mg tablet TAKE 1 TABLET WITH DINNER FOR 2 WEEKS, THEN TAKE 2 TABLETS WITH DINNER THEREAFTER    albuterol (PROVENTIL HFA, VENTOLIN HFA, PROAIR HFA) 90 mcg/actuation inhaler Take 1 Puff by inhalation every four (4) hours as needed for Wheezing. Blood-Glucose Meter (True Metrix Glucose Meter) monitoring kit Use to check glucose daily    Blood Glucose Control, Low (True Metrix Level 1) soln 1 Bottle by Does Not Apply route daily. cpap machine kit by Does Not Apply route. albuterol-ipratropium (DUO-NEB) 2.5 mg-0.5 mg/3 ml nebu 3 mL by Nebulization route. multivitamin (ONE A DAY) tablet Take 1 Tab by mouth daily. aspirin 81 mg chewable tablet Take 81 mg by mouth daily. cholecalciferol (VITAMIN D3) (1000 Units /25 mcg) tablet Take 1,000 Units by mouth daily.     acetaminophen (TYLENOL) 650 mg TbER Take 650 mg by mouth every eight (8) hours. No current facility-administered medications on file prior to visit. Medications Ordered Today   Medications    amLODIPine (NORVASC) 5 mg tablet     Sig: Take 1 Tablet by mouth daily for 90 days. Dispense:  90 Tablet     Refill:  3          Review of Systems   Constitutional: Negative. HENT: Negative. Eyes:  Negative for pain, discharge and redness. Respiratory: Negative. Cardiovascular:  Negative for chest pain, palpitations, orthopnea and leg swelling. Gastrointestinal:  Negative for abdominal pain, constipation, heartburn, nausea and vomiting. Genitourinary: Negative. Musculoskeletal:  Positive for joint pain and myalgias. Negative for falls. Skin: Negative. Neurological: Negative. Psychiatric/Behavioral: Negative. Visit Vitals  /71 (BP 1 Location: Left upper arm, BP Patient Position: Sitting, BP Cuff Size: Large adult)   Pulse 84   Temp 97.4 °F (36.3 °C) (Temporal)   Resp 18   Ht 5' 6\" (1.676 m)   Wt (!) 382 lb 12.8 oz (173.6 kg)   LMP  (LMP Unknown)   SpO2 98%   BMI 61.79 kg/m²       Physical Exam  Vitals and nursing note reviewed. Constitutional:       Appearance: Normal appearance. She is obese. Cardiovascular:      Rate and Rhythm: Normal rate and regular rhythm. Pulses: Normal pulses. Heart sounds: Normal heart sounds. Pulmonary:      Effort: Pulmonary effort is normal.      Breath sounds: Normal breath sounds. Abdominal:      General: Bowel sounds are normal.      Palpations: Abdomen is soft. Tenderness: There is no abdominal tenderness. Musculoskeletal:      Right shoulder: Normal.      Left shoulder: Tenderness present. Decreased range of motion. Right lower leg: No edema. Left lower leg: No edema. Skin:     General: Skin is warm and dry. Neurological:      Mental Status: She is alert. Gait: Gait abnormal.      Comments: Using walker            1.  Acute pain of left shoulder  Tenderness on exam and decreased range of motion  Reports hx of rotator cuff repair 2012 on left shoulder  Check xray left shoulder  May use topical lidocaine and tylenol prn  Encourage heat and range of motion exercises  Refer to orthopedic to evaluate and treat given duration of sx and complicated by hx of rotator cuff repair  - XR SHOULDER LT AP/LAT MIN 2 V; Future  - REFERRAL TO ORTHOPEDICS    2. Essential hypertension  Blood pressure is controlled  Continue the amlodipine as directed and refilled today  Encouraged to monitor blood pressure at home and notify provider if > 140/90  - amLODIPine (NORVASC) 5 mg tablet; Take 1 Tablet by mouth daily for 90 days. Dispense: 90 Tablet; Refill: 3    3. Encounter for immunization  Update pneumococcal vaccine  - PNEUMOCOCCAL, PPSV23, (AGE 2 YRS+), SC/IM         Patient verbalizes understanding of plan of care as discussed above    Follow-up and Dispositions    Return in about 3 months (around 3/16/2023), or if symptoms worsen or fail to improve.

## 2022-12-17 ENCOUNTER — HOSPITAL ENCOUNTER (EMERGENCY)
Age: 62
Discharge: HOME | End: 2022-12-17
Attending: EMERGENCY MEDICINE
Payer: MEDICARE

## 2022-12-17 ENCOUNTER — APPOINTMENT (OUTPATIENT)
Dept: GENERAL RADIOLOGY | Age: 62
End: 2022-12-17
Attending: EMERGENCY MEDICINE
Payer: MEDICARE

## 2022-12-17 VITALS
TEMPERATURE: 97.6 F | DIASTOLIC BLOOD PRESSURE: 89 MMHG | RESPIRATION RATE: 20 BRPM | OXYGEN SATURATION: 100 % | HEART RATE: 82 BPM | SYSTOLIC BLOOD PRESSURE: 138 MMHG | WEIGHT: 293 LBS | BODY MASS INDEX: 47.09 KG/M2 | HEIGHT: 66 IN

## 2022-12-17 DIAGNOSIS — J44.1 COPD EXACERBATION (HCC): ICD-10-CM

## 2022-12-17 DIAGNOSIS — J11.1 INFLUENZA: ICD-10-CM

## 2022-12-17 DIAGNOSIS — B34.9 VIRAL ILLNESS: Primary | ICD-10-CM

## 2022-12-17 LAB
FLUAV RNA SPEC QL NAA+PROBE: NOT DETECTED
FLUBV RNA SPEC QL NAA+PROBE: NOT DETECTED
SARS-COV-2, COV2: NOT DETECTED

## 2022-12-17 PROCEDURE — 99284 EMERGENCY DEPT VISIT MOD MDM: CPT

## 2022-12-17 PROCEDURE — 71045 X-RAY EXAM CHEST 1 VIEW: CPT

## 2022-12-17 PROCEDURE — 87636 SARSCOV2 & INF A&B AMP PRB: CPT

## 2022-12-17 RX ORDER — OSELTAMIVIR PHOSPHATE 75 MG/1
75 CAPSULE ORAL
Status: DISCONTINUED | OUTPATIENT
Start: 2022-12-17 | End: 2022-12-17

## 2022-12-17 RX ORDER — OSELTAMIVIR PHOSPHATE 6 MG/ML
30 FOR SUSPENSION ORAL 2 TIMES DAILY
Status: DISCONTINUED | OUTPATIENT
Start: 2022-12-17 | End: 2022-12-17 | Stop reason: HOSPADM

## 2022-12-17 RX ORDER — IPRATROPIUM BROMIDE AND ALBUTEROL SULFATE 2.5; .5 MG/3ML; MG/3ML
3 SOLUTION RESPIRATORY (INHALATION)
Qty: 25 EACH | Refills: 2 | Status: SHIPPED | OUTPATIENT
Start: 2022-12-17

## 2022-12-17 RX ORDER — OSELTAMIVIR PHOSPHATE 75 MG/1
75 CAPSULE ORAL 2 TIMES DAILY
Qty: 10 CAPSULE | Refills: 0 | Status: SHIPPED | OUTPATIENT
Start: 2022-12-17 | End: 2022-12-22

## 2022-12-17 NOTE — ED PROVIDER NOTES
HPI 79-year-old female with a history of COPD diabetes chronic renal insufficiency hypertension other medical problems listed below began with illness a few days ago mild scratchy throat. Began with cough and malaise myalgias last night. No sputum production.   Oral intake adequate patient is ambulatory with walker without difficulty oxygen saturation on arrival 100%    Past Medical History:   Diagnosis Date    Arthritis     Asthma     Chronic kidney disease     stage 3    Chronic obstructive pulmonary disease (HCC)     Diabetes (HCC)     GERD (gastroesophageal reflux disease)     High cholesterol     Hypertension     Menopause     Morbid obesity ([address]     Obstructive sleep apnea on CPAP     Vertigo        Past Surgical History:   Procedure Laterality Date    HX BREAST BIOPSY Left 2019    HX CHOLECYSTECTOMY      HX ENDOSCOPY  01/01/2019    HX HYSTERECTOMY      HX OOPHORECTOMY      HX ORTHOPAEDIC      right shoulder spur    HX ROTATOR CUFF REPAIR Left     by suture    HX TUBAL LIGATION Bilateral          Family History:   Problem Relation Age of Onset    Heart Disease Mother     Lung Disease Mother         lung ca    Lung Disease Father         PNA    Cancer Maternal Grandmother         gyn       Social History     Socioeconomic History    Marital status:      Spouse name: Not on file    Number of children: Not on file    Years of education: Not on file    Highest education level: Some college, no degree   Occupational History    Not on file   Tobacco Use    Smoking status: Never    Smokeless tobacco: Never   Vaping Use    Vaping Use: Never used   Substance and Sexual Activity    Alcohol use: Never    Drug use: Never    Sexual activity: Not Currently     Partners: Male     Birth control/protection: Surgical   Other Topics Concern     Service Not Asked    Blood Transfusions Not Asked    Caffeine Concern Not Asked    Occupational Exposure Not Asked    Hobby Hazards Not Asked    Sleep Concern Not Asked Stress Concern Not Asked    Weight Concern Not Asked    Special Diet Not Asked    Back Care Not Asked    Exercise Not Asked    Bike Helmet Not Asked    Seat Belt Not Asked    Self-Exams Not Asked   Social History Narrative    Not on file     Social Determinants of Health     Financial Resource Strain: Not on file   Food Insecurity: Not on file   Transportation Needs: Not on file   Physical Activity: Not on file   Stress: Not on file   Social Connections: Not on file   Intimate Partner Violence: Not on file   Housing Stability: Not on file         ALLERGIES: Celebrex [celecoxib], Cleocin [clindamycin phosphate], Mushroom, Strawberry, Ultracet [tramadol-acetaminophen], Zithromax [azithromycin], and Lisinopril    Review of Systems   Constitutional:  Positive for fever. HENT: Negative. Eyes: Negative. Respiratory:  Positive for cough. Negative for chest tightness, shortness of breath and wheezing. Cardiovascular:  Negative for chest pain, palpitations and leg swelling. Gastrointestinal:  Negative for abdominal distention, abdominal pain, blood in stool, constipation, diarrhea, nausea and vomiting. Endocrine: Negative. Genitourinary:  Negative for difficulty urinating, dysuria, flank pain, frequency and hematuria. Musculoskeletal:  Positive for myalgias. Neurological:  Negative for dizziness, seizures, speech difficulty, weakness and numbness. Hematological: Negative. Psychiatric/Behavioral: Negative. Vitals:    12/17/22 1409 12/17/22 1530 12/17/22 1545   BP: (!) 136/96  138/89   Pulse: 80  82   Resp: 22  20   Temp: 97.6 °F (36.4 °C)     SpO2: 99% 98% 100%   Weight: (!) 172.8 kg (381 lb)     Height: 5' 6\" (1.676 m)            [place] obese AA female no acute distress appears mildly ill  Physical Exam  Vitals and nursing note reviewed. Constitutional:       General: She is not in acute distress. Appearance: Normal appearance.  She is not ill-appearing, toxic-appearing or diaphoretic. HENT:      Head: Normocephalic and atraumatic. Nose: Nose normal.      Mouth/Throat:      Mouth: Mucous membranes are moist.   Eyes:      Extraocular Movements: Extraocular movements intact. Conjunctiva/sclera: Conjunctivae normal.   Cardiovascular:      Rate and Rhythm: Normal rate and regular rhythm. Pulses: Normal pulses. Heart sounds: Normal heart sounds. No murmur heard. Pulmonary:      Effort: Pulmonary effort is normal. No respiratory distress. Breath sounds: Normal breath sounds. No wheezing, rhonchi or rales. Comments: Bronchitic cough  Abdominal:      General: Bowel sounds are normal. There is no distension. Palpations: Abdomen is soft. There is no mass. Tenderness: There is no abdominal tenderness. There is no right CVA tenderness, left CVA tenderness, guarding or rebound. Hernia: No hernia is present. Musculoskeletal:         General: No swelling, tenderness, deformity or signs of injury. Normal range of motion. Cervical back: Normal range of motion and neck supple. No rigidity or tenderness. Right lower leg: No edema. Left lower leg: No edema. Lymphadenopathy:      Cervical: No cervical adenopathy. Skin:     General: Skin is warm and dry. Capillary Refill: Capillary refill takes less than 2 seconds. Findings: No erythema, lesion or rash. Neurological:      General: No focal deficit present. Mental Status: She is alert and oriented to person, place, and time. Mental status is at baseline. Cranial Nerves: No cranial nerve deficit. Sensory: No sensory deficit. Psychiatric:         Mood and Affect: Mood normal.         Behavior: Behavior normal.         Thought Content: Thought content normal.        MDM    Chest x-ray is clear COVID and influenza swabs are negative. Current community-acquired influenza A epidemic in light of underlying comorbidities will cover with Tamiflu.   Initial prescription for 75 mg twice daily callback from pharmacy reviewed recommends reducing to 30 mg twice daily due to renal insufficiency I have changed prescription.   Patient is almost out of her medication for nebulizer we will refill Briseyda  Struck to return to ED if symptoms worsen  Procedures

## 2022-12-19 NOTE — PROGRESS NOTES
Please let patient know that xray left shoulder shows mild degenerative changes. She is being referred to Dr. Tessie Fitzpatrick.

## 2023-01-16 ENCOUNTER — HOSPITAL ENCOUNTER (OUTPATIENT)
Dept: PHYSICAL THERAPY | Age: 63
Discharge: HOME | End: 2023-01-16
Payer: MEDICARE

## 2023-01-16 PROCEDURE — 97161 PT EVAL LOW COMPLEX 20 MIN: CPT

## 2023-01-16 NOTE — PROGRESS NOTES
[address]  [place], [address]  Ph: [phone]    Fax: [phone]    Plan of Care/Statement of Necessity for Physical Therapy Services  2-15    Patient name: Raoul Kenney  : 1960  Provider#: [id number]  Referral source: [facility]      Medical/Treatment Diagnosis: Rotator cuff arthropathy of left shoulder [M12.812]  Impingement syndrome of left shoulder [M75.42]  Arthritis of left acromioclavicular joint [M19.012]     Prior Hospitalization: see medical history     Comorbidities: see medical history  Prior Level of Function: Independent with all ADL's; no use of AD  Medications: Verified on Patient Summary List  Start of Care: 2023      Onset Date:    The Plan of Care and following information is based on the information from the initial evaluation. Assessment/ key information:   60year old female with rotator cuff repair of the left shoulder in  and right shoulder in 2016. Patient fell  and injured her left shoulder. She received a cortisone injection and x-rays findings were of  2 views of the left shoulder demonstrate no fracture, dislocation or other acute abnormality. Mild degenerative changes are seen in the glenohumeral and acromioclavicular joints. Patient has bilateral weakness of both shoulder/scapula. Active movements  is WFL is in both shoulder in supine position. Full range difficulty to achieve in sitting position with out assistance. Tenderness and pain are present at the [facility] joint and old incision sites. May have some possible impingement symptoms of the left shoulder with intermittent numbness. Patient will benefit from skilled Physical Therapy to increase strength and maintain active mobility in sitting and standing position to return to household and daily activities. Pain modalities will be provided as needed to decrease pain and inflammation as needed.  Patient will be provided a Home Exercise Program to increase strength to return to her maximal level of function. Evaluation Complexity History LOW Complexity : Zero comorbidities / personal factors that will impact the outcome / POC; Examination LOW Complexity : 1-2 Standardized tests and measures addressing body structure, function, activity limitation and / or participation in recreation  ;Presentation LOW Complexity : Stable, uncomplicated  ;Clinical Decision Making TUG Score:  14 secondsOverall Complexity Rating: LOW     Problem List: pain affecting function, decrease ROM, decrease strength, decrease ADL/ functional abilitiies, decrease activity tolerance, and decrease flexibility/ joint mobility   Treatment Plan may include any combination of the following: Therapeutic exercise, Neuromuscular reeducation, Manual therapy, Self care/home management, Electric stim unattended , Vasopneumatic device, and Ultrasound  Patient / Family readiness to learn indicated by: asking questions  Persons(s) to be included in education: patient (P)  Barriers to Learning/Limitations: None  Patient Goal (s): To increase shoulder movements without pain.   Patient Self Reported Health Status: fair  Rehabilitation Potential: fair    Short Term Goals: To be accomplished in 8 treatments. Patient will be able to perform HEP independently to increase left shoulder shoulder strength and ROM to maintain mobility. Patient will be able to cornelius and doff clothing with no difficulty or assistance with increase ROM. Patient will be able to lift light objects and place on shelf with no difficulty. Patient will be able to wash her back with towel with no difficulty with full range of motion in IR and ER. Long Term Goals: To be accomplished in 16 treatments. Patient will be able to comb and brush hair with no difficulty with increase strength to 4+/5.   Patient will be able to lift medium weights objects and place on shelf with no difficulty with 4+/5 strength. Patient will be able to lift her pots and pans on the stove to cook with no difficulty. Frequency / Duration: Patient to be seen 2 times per week for 8 weeks. Patient/ Caregiver education and instruction: exercises    [x]  Plan of care has been reviewed with PTA        Certification Period: 2023 to 2023    Dm Horan PT,  2023     ________________________________________________________________________    I certify that the above Therapy Services are being furnished while the patient is under my care. I agree with the treatment plan and certify that this therapy is necessary.     [de-identified] Signature:____________________  Date:____________Time: _________                                        JALEN Samayoa  Please sign and return to:   [facility]  [address] [place] [address], [facility] Abdias Coleman  Ph: [phone]    Fax: [phone]    Patient name: Dom Helms  : 1960  Provider#: [id number]

## 2023-01-16 NOTE — THERAPY EVALUATION
PT INITIAL EVALUATION NOTE - [facility] 2-15    Patient Name: Jazmin Stern  Date:2023  : 1960  [x]  Patient  Verified  Payor: Aleksandaryasmin Townsend / Plan: [address] [place] [address] HMO / Product Type: Managed Care Medicare /    In time: 3:00 PM  Out time: 3:45 PM  Total Treatment Time (min): 45  Visit #: 1    Treatment Area: Rotator cuff arthropathy of left shoulder [M12.812]  Impingement syndrome of left shoulder [M75.42]  Arthritis of left acromioclavicular joint [M19.012]    SUBJECTIVE  Pain Level (0-10 scale): 10/10  Any medication changes, allergies to medications, adverse drug reactions, diagnosis change, or new procedure performed?: [] No    [x] Yes (see summary sheet for update)  Subjective:  60year old female who stated that she fell in  and injured her left shoulder. It continued to be painful so she mentioned pain in the shoulder to her PCP. X- rays were taken and referred to ortho. Patient had a left RTC repair in  and right RTC repair in . However, she did not complete therapy. Patient stated that she do not want to have another surgery to her left shoulder. Ambulates with rolling walker due to knee pre-existing pain. Chief complaint: nagging, constant pain with increasing pain with abduction, pain at night, shooting pain to wrist. Patient stated that her right arm has to help her left arm with heavy objects, but she manages with daily activities. PLOF: Independent with all ADL's; no use of AD  Mechanism of Injury: Fall in   Previous Treatment/Compliance: Cortisone injection  Radiographs: X-rays -FINDINGS: 2 views of the left shoulder demonstrate no fracture, dislocation or  other acute abnormality. Mild degenerative changes are seen in the glenohumeral  and acromioclavicular joints.   What increases symptoms: reaching behind my back    What decreases symptoms: left hand across stomach decreases pain  Living Situation: Retired and living at home with family   Pt Goals:  To increase shoulder movements without pain  Barriers: none  Motivation: Good  Substance use: None reported  Cognition: A&O x 4  Fall Assessment: TUG Test: 14 seconds  Past Medical History:  Past Medical History:   Diagnosis Date    Arthritis     Asthma     Chronic kidney disease     stage 3    Chronic obstructive pulmonary disease (HCC)     Diabetes (HCC)     GERD (gastroesophageal reflux disease)     High cholesterol     Hypertension     Menopause     Morbid obesity ([address]     Obstructive sleep apnea on CPAP     Vertigo      Past Surgical History:  Past Surgical History:   Procedure Laterality Date    HX BREAST BIOPSY Left 2019    HX CHOLECYSTECTOMY      HX ENDOSCOPY  01/01/2019    HX HYSTERECTOMY      HX OOPHORECTOMY      HX ORTHOPAEDIC      right shoulder spur    HX ROTATOR CUFF REPAIR Left     by suture    HX TUBAL LIGATION Bilateral      Current Medications:  Current Outpatient Medications   Medication Instructions    acetaminophen (TYLENOL) 650 mg, Oral, EVERY 8 HOURS    albuterol (PROVENTIL HFA, VENTOLIN HFA, PROAIR HFA) 90 mcg/actuation inhaler 1 Puff, Inhalation, EVERY 4 HOURS AS NEEDED    albuterol-ipratropium (DUO-NEB) 2.5 mg-0.5 mg/3 ml nebu 3 mL, Nebulization    albuterol-ipratropium (DUO-NEB) 2.5 mg-0.5 mg/3 ml nebu 3 mL, Nebulization, EVERY 4 HOURS AS NEEDED    alcohol swabs padm 1 Swab, Apply Externally, DAILY    allopurinoL (ZYLOPRIM) 300 mg tablet TAKE 1 TABLET EVERY DAY    amLODIPine (NORVASC) 5 mg, Oral, DAILY    aspirin 81 mg, Oral, DAILY    atorvastatin (LIPITOR) 10 mg tablet TAKE 1 TABLET EVERY DAY    Blood Glucose Control, Low (True Metrix Level 1) soln 1 Bottle, Does Not Apply, DAILY    Blood-Glucose Meter (True Metrix Glucose Meter) monitoring kit Use to check glucose daily    cetirizine (ZYRTEC) 5 mg, Oral, DAILY    cholecalciferol (VITAMIN D3) 1,000 Units, Oral, DAILY    ciprofloxacin HCl (CIPRO) 500 mg, Oral, 2 TIMES DAILY    cpap machine kit Does Not Apply    furosemide (LASIX) 40 mg tablet TAKE 1 TABLET ON MONDAY, WEDNESDAY AND FRIDAY    glimepiride (AMARYL) 1 mg tablet 1 tab before breakfast and 2 tabs before dinner, 90 days    glucose blood VI test strips (True Metrix Glucose Test Strip) strip Use one strip to check glucose twice a day    Insulin Needles, Disposable, 32 gauge x 5/32\" ndle Use pen needle to inject medicine every 7 days    lancets misc Trueplus lancets 33g    magnesium oxide (MAG-OX) 400 mg tablet TAKE 1 TABLET EVERY DAY    metFORMIN ER (GLUCOPHAGE XR) 500 mg tablet TAKE 1 TABLET WITH DINNER FOR 2 WEEKS, THEN TAKE 2 TABLETS WITH DINNER THEREAFTER    multivitamin (ONE A DAY) tablet 1 Tablet, Oral, DAILY    NovoLIN N FlexPen 25 Units, SubCUTAneous, 2 TIMES DAILY BEFORE MEALS        Allergies: Allergies   Allergen Reactions    Celebrex [Celecoxib] Rash    Cleocin [Clindamycin Phosphate] Palpitations    Mushroom Swelling    Strawberry Rash    Ultracet [Tramadol-Acetaminophen] Rash    Zithromax [Azithromycin] Rash    Lisinopril Swelling       OBJECTIVE/EXAMINATION    Posture:  rounded shoulder and forward head  Other Observations: Decreased strength of both UE,  Gait with rolling walker independently due to knees  Functional : right 45 lbs and left: 45 lbs  Palpation: Pain of the anterior shoulder and posterior left shoulder    Shoulder:  Strength AROM     Right Left Right Left    Flexion 3/5 3+/5 180 175    Abduction 3/5 3+/5 180 180    IR 3/5 3+/5 80 85    ER 3/5 3+/5 80 85   Elbow:  Strength AROM     Right Left Right Left    Flexion 4/5 4/5 90 90    Extension 4/5 4/5 0 0   Wrist:  Strength AROM     Right Left Right Left    Flexion 4/5 4/5 wfl wdl    Extension 4/5 4/5 wfl wfl    Pronation 4/5 4/5 wfl wfl    Supination 4/5 4/5 wfl wfl   *All strength measures are on a scale with 5 as a maximum, if a space is left blank it was not tested. All AROM measurements taken with patient in supine position.         Joint Mobility Assessment: Glenohumeral: pain      Acromioclavicular: pain         Neurological: Reflexes / Sensations: Intact - Intermittent numbness in left hand    Special Tests: Neer Impingement: positive  Vanegas-Edward: negative   Drop Arm Test: negative              Pain Level (0-10 scale) post treatment: 5/10    ASSESSMENT/Changes in Function:   [x]  See Plan of [facility], PT,  1/16/2023

## 2023-01-26 ENCOUNTER — HOSPITAL ENCOUNTER (OUTPATIENT)
Dept: PHYSICAL THERAPY | Age: 63
End: 2023-01-26
Payer: MEDICARE

## 2023-01-26 PROCEDURE — 97110 THERAPEUTIC EXERCISES: CPT

## 2023-01-26 PROCEDURE — 97014 ELECTRIC STIMULATION THERAPY: CPT

## 2023-01-26 NOTE — PROGRESS NOTES
PT DAILY TREATMENT NOTE - [facility] 2-15    Patient Name: Steve Palma  Date:2023  : 1960  [x]  Patient  Verified  Payor: Garrett Coulter / Plan: [address] [place] [address] HMO / Product Type: Managed Care Medicare /    In time:1333  Out time:1430  Total Treatment Time (min): 57  Total Timed Codes (min): 42  1:1 Treatment Time ( only): 42   Visit #:  2 of 10    Treatment Area: Rotator cuff arthropathy of left shoulder [M12.812]  Impingement syndrome of left shoulder [M75.42]  Arthritis of left acromioclavicular joint [M19.012]    SUBJECTIVE  Pain Level (0-10 scale): 9/10  Any medication changes, allergies to medications, adverse drug reactions, diagnosis change, or new procedure performed?: [x] No    [] Yes (see summary sheet for update)  Subjective functional status/changes:     \"I have been so sore today. \"    OBJECTIVE  Modality rationale: decrease edema, decrease inflammation, decrease pain, increase tissue extensibility, and increase muscle contraction/control to improve the patients ability to improve function   Min Type Additional Details   15 [x] Estim: []Att   [x]Unatt        []TENS instruct                  [x]IFC  []Premod   []NMES                     []Other:  []w/US   []w/ice   [x]w/heat  Position: seated  Location: left shoulder    []  Traction: [] Cervical       []Lumbar                       [] Prone          []Supine                       []Intermittent   []Continuous Lbs:  [] before manual  [] after manual  [] With heat  [] Simultaneously performed with E-Stim    []  Ultrasound: []Continuous   [] Pulsed                           []1MHz   []3MHz Location:  W/cm2:    []  Ice     []  heat  []  Ice massage Position:  Location:    []  Vasopneumatic Device  If using vaso (only need to measure limb vaso being performed on)      pre-treatment girth :       post-treatment girth :       measured at (landmark location)  Pressure:       [] lo [] med [] hi   Temperature: [] lo [] med [] Hi    [] With ice [] Simultaneously performed with Estim   [x] Skin assessment post-treatment:  [x]intact [x]redness- no adverse reaction       []redness - adverse reaction:     45 min Therapeutic Exercise:  [x] See flow sheet :   Rationale: increase ROM, increase strength, and improve coordination to improve the patients ability to improve function. With   [x] TE   [] TA   [] neuro   [] other: Patient Education: [x] Review HEP    [] Progressed/Changed HEP based on:   [] positioning   [] body mechanics   [] transfers   [] heat/ice application    [] other:      Other Objective/Functional Measures: addition of exercises    Pain Level (0-10 scale) post treatment: 7/10    ASSESSMENT/Changes in Function:   The pt tolerated treatment fair today with reports of increased pain throughout session. Pt able to complete all exercises as asked of her, however. HEP provided today with pictures to facilitate compliance. Patient will continue to benefit from skilled PT services to modify and progress therapeutic interventions, address functional mobility deficits, address ROM deficits, address strength deficits, analyze and address soft tissue restrictions, analyze and cue movement patterns, and analyze and modify body mechanics/ergonomics to attain remaining goals. [x]  See Plan of Care  []  See progress note/recertification  []  See Discharge Summary         Progress towards goals / Updated goals:  Short Term Goals: To be accomplished in 8 treatments. Patient will be able to perform HEP independently to increase left shoulder shoulder strength and ROM to maintain mobility. Patient will be able to cornelius and doff clothing with no difficulty or assistance with increase ROM. Patient will be able to lift light objects and place on shelf with no difficulty. Patient will be able to wash her back with towel with no difficulty with full range of motion in IR and ER. Long Term Goals: To be accomplished in 16 treatments.   Patient will be able to comb and brush hair with no difficulty with increase strength to 4+/5. Patient will be able to lift medium weights objects and place on shelf with no difficulty with 4+/5 strength. Patient will be able to lift her pots and pans on the stove to cook with no difficulty.       PLAN  [x]  Upgrade activities as tolerated     [x]  Continue plan of care  []  Update interventions per flow sheet       []  Discharge due to:_  []  Other:_      Lea Palmer, PT, DPT  1/26/2023

## 2023-01-30 ENCOUNTER — APPOINTMENT (OUTPATIENT)
Dept: PHYSICAL THERAPY | Age: 63
End: 2023-01-30
Payer: MEDICARE

## 2023-01-30 PROCEDURE — 97014 ELECTRIC STIMULATION THERAPY: CPT

## 2023-01-30 PROCEDURE — 97110 THERAPEUTIC EXERCISES: CPT

## 2023-01-30 PROCEDURE — 97150 GROUP THERAPEUTIC PROCEDURES: CPT

## 2023-01-30 NOTE — PROGRESS NOTES
PT DAILY TREATMENT NOTE - [facility] -15    Patient Name: Viola Squires  Date:2023  : 1960  [x]  Patient  Verified  Payor: Shavonjuani Kaufmantiffany / Plan: [address] [place] [address] HMO / Product Type: Managed Care Medicare /    In FDTH:[id number]  Out time:1434  Total Treatment Time (min): 62  Total Timed Codes (min): 30  1:1 Treatment Time ( only): 30   Visit #:  3 of 10/(30 days) total 16    Treatment Area: Rotator cuff arthropathy of left shoulder [M12.812]  Impingement syndrome of left shoulder [M75.42]  Arthritis of left acromioclavicular joint [M19.012]    SUBJECTIVE  Pain Level (0-10 scale): 7/10  Any medication changes, allergies to medications, adverse drug reactions, diagnosis change, or new procedure performed?: [x] No    [] Yes (see summary sheet for update)  Subjective functional status/changes: \"Pt reports she is still having pain . \"    OBJECTIVE  Modality rationale: decrease pain, increase tissue extensibility, and increase muscle contraction/control to improve the patients ability to increase shoulder motion    Min Type Additional Details   12 [x] Estim: []Att   [x]Unatt        []TENS instruct                  [x]IFC  []Premod   []NMES                     []Other:  []w/US   []w/ice   [x]w/heat  Position: seated  Location: L shoulder    []  Traction: [] Cervical       []Lumbar                       [] Prone          []Supine                       []Intermittent   []Continuous Lbs:  [] before manual  [] after manual  [] With heat  [] Simultaneously performed with E-Stim    []  Ultrasound: []Continuous   [] Pulsed                           []1MHz   []3MHz Location:  W/cm2:    []  Ice     []  heat  []  Ice massage Position:  Location:    []  Vasopneumatic Device  If using vaso (only need to measure limb vaso being performed on)      pre-treatment girth :       post-treatment girth :       measured at (landmark location)  Pressure:       [] lo [] med [] hi   Temperature: [] lo [] med [] Hi    [] With ice [] Simultaneously performed with Estim   [x] Skin assessment post-treatment:  [x]intact [x]redness- no adverse reaction       []redness - adverse reaction:     30 min Therapeutic Exercise:  [x] See flow sheet :   Rationale: increase ROM, increase strength, and improve coordination to improve the patients ability to increase shoulder motion and activity levels with no c/o      22 min Group Therapy:  [x] See flow sheet :   Billed while completing ex with another patient present during session with therapist.    With   [x] TE   [] TA   [] neuro   [] other: Patient Education: [x] Review HEP    [] Progressed/Changed HEP based on:   [] positioning   [] body mechanics   [] transfers   [] heat/ice application    [] other:      Pain Level (0-10 scale) post treatment: 5/10    ASSESSMENT/Changes in Function:   The pt tolerated treatment working on ROM and flexibility. Pt working on AA and PROM stretching with light resistance . Estim and MHP to shoulder post ex with reduction in c/o. Patient will continue to benefit from skilled PT services to modify and progress therapeutic interventions, address functional mobility deficits, address ROM deficits, address strength deficits, and analyze and address soft tissue restrictions to attain remaining goals. [x]  See Plan of Care  []  See progress note/recertification  []  See Discharge Summary         Progress towards goals / Updated goals:  Short Term Goals: To be accomplished in 8 treatments. Patient will be able to perform HEP independently to increase left shoulder shoulder strength and ROM to maintain mobility. Patient will be able to cornelius and doff clothing with no difficulty or assistance with increase ROM. Patient will be able to lift light objects and place on shelf with no difficulty. Patient will be able to wash her back with towel with no difficulty with full range of motion in IR and ER. Long Term Goals: To be accomplished in 16 treatments.   Patient will be able to comb and brush hair with no difficulty with increase strength to 4+/5. Patient will be able to lift medium weights objects and place on shelf with no difficulty with 4+/5 strength. Patient will be able to lift her pots and pans on the stove to cook with no difficulty.       PLAN  [x]  Upgrade activities as tolerated     [x]  Continue plan of care  []  Update interventions per flow sheet       []  Discharge due to:_  []  Other:_      Brianna Michele PTA, [facility] 1/30/2023

## 2023-02-02 DIAGNOSIS — E78.2 MIXED HYPERLIPIDEMIA DUE TO TYPE 2 DIABETES MELLITUS (HCC): ICD-10-CM

## 2023-02-02 DIAGNOSIS — E11.69 MIXED HYPERLIPIDEMIA DUE TO TYPE 2 DIABETES MELLITUS (HCC): ICD-10-CM

## 2023-02-02 RX ORDER — ATORVASTATIN CALCIUM 10 MG/1
TABLET, FILM COATED ORAL
Qty: 90 TABLET | Refills: 1 | Status: SHIPPED | OUTPATIENT
Start: 2023-02-02

## 2023-02-03 ENCOUNTER — HOSPITAL ENCOUNTER (OUTPATIENT)
Dept: PHYSICAL THERAPY | Age: 63
Discharge: HOME | End: 2023-02-03
Payer: MEDICARE

## 2023-02-03 PROCEDURE — 97110 THERAPEUTIC EXERCISES: CPT

## 2023-02-03 PROCEDURE — 97014 ELECTRIC STIMULATION THERAPY: CPT

## 2023-02-03 NOTE — PROGRESS NOTES
PT DAILY TREATMENT NOTE - [facility] 2-15    Patient Name: Rikki Lomax  Date:2/3/2023  : 1960  [x]  Patient  Verified  Payor: Sherly Zheng / Plan: [address] [place] [address] HMO / Product Type: Managed Care Medicare /    In time:2:06  Out time:3:00  Total Treatment Time (min): 54  Total Timed Codes (min): 42  1:1 Treatment Time ( only): 42   Visit #:  4 of 10    Treatment Area: Rotator cuff arthropathy of left shoulder [M12.812]  Impingement syndrome of left shoulder [M75.42]  Arthritis of left acromioclavicular joint [M19.012]    SUBJECTIVE  Pain Level (0-10 scale): 8/10  Any medication changes, allergies to medications, adverse drug reactions, diagnosis change, or new procedure performed?: [x] No    [] Yes (see summary sheet for update)  Subjective functional status/changes: \"Since I have been in so much pain the MD prescribed me a TENS unit for home which should come in today. \"    OBJECTIVE  Modality rationale: decrease inflammation, decrease pain, and increase tissue extensibility to improve the patients ability to perform daily tasks with less irritation.     Min Type Additional Details   12 [x] Estim: []Att   [x]Unatt        []TENS instruct                  [x]IFC  []Premod   []NMES                     []Other:  []w/US   []w/ice   [x]w/heat  Position: seated  Location: L shoulder    []  Traction: [] Cervical       []Lumbar                       [] Prone          []Supine                       []Intermittent   []Continuous Lbs:  [] before manual  [] after manual  [] With heat  [] Simultaneously performed with E-Stim    []  Ultrasound: []Continuous   [] Pulsed                           []1MHz   []3MHz Location:  W/cm2:    []  Ice     []  heat  []  Ice massage Position:  Location:    []  Vasopneumatic Device  If using vaso (only need to measure limb vaso being performed on)      pre-treatment girth :       post-treatment girth :       measured at (landmark location)  Pressure:       [] lo [] med [] hi Temperature: [] lo [] med [] Hi    [] With ice    [] Simultaneously performed with Estim   [x] Skin assessment post-treatment:  [x]intact [x]redness- no adverse reaction       []redness - adverse reaction:     42 min Therapeutic Exercise:  [x] See flow sheet :   Rationale: increase ROM, increase strength, and improve coordination to improve the patients ability to perform reaching tasks with less irritation in shoulder. With   [x] TE   [] TA   [] neuro   [] other: Patient Education: [x] Review HEP    [] Progressed/Changed HEP based on:   [] positioning   [] body mechanics   [] transfers   [] heat/ice application    [] other:      Pain Level (0-10 scale) post treatment: 0/10    ASSESSMENT/Changes in Function:   The pt tolerated treatment well today. She is still in higher levels of pain, but she is maintaining good ROM. She does have some weakness and fatigues with endurance type tasks we will transition to more strengthening tasks in upcoming visits. We reviewed how to use her home TENS unit at the end of the session. Patient will continue to benefit from skilled PT services to modify and progress therapeutic interventions, address functional mobility deficits, address ROM deficits, address strength deficits, analyze and address soft tissue restrictions, analyze and cue movement patterns, analyze and modify body mechanics/ergonomics, and assess and modify postural abnormalities to attain remaining goals. [x]  See Plan of Care  []  See progress note/recertification  []  See Discharge Summary         Progress towards goals / Updated goals:  Short Term Goals: To be accomplished in 8 treatments. Patient will be able to perform HEP independently to increase left shoulder shoulder strength and ROM to maintain mobility. Patient will be able to cornelius and doff clothing with no difficulty or assistance with increase ROM. Patient will be able to lift light objects and place on shelf with no difficulty.   Patient will be able to wash her back with towel with no difficulty with full range of motion in IR and ER. Long Term Goals: To be accomplished in 16 treatments. Patient will be able to comb and brush hair with no difficulty with increase strength to 4+/5. Patient will be able to lift medium weights objects and place on shelf with no difficulty with 4+/5 strength. Patient will be able to lift her pots and pans on the stove to cook with no difficulty.       PLAN  [x]  Upgrade activities as tolerated     [x]  Continue plan of care  []  Update interventions per flow sheet       []  Discharge due to:_  []  Other:_      Marychuy Overton, PT, DPT 2/3/2023

## 2023-02-06 ENCOUNTER — HOSPITAL ENCOUNTER (OUTPATIENT)
Dept: PHYSICAL THERAPY | Age: 63
Discharge: HOME | End: 2023-02-06
Payer: MEDICARE

## 2023-02-06 PROCEDURE — 97110 THERAPEUTIC EXERCISES: CPT

## 2023-02-06 PROCEDURE — 97014 ELECTRIC STIMULATION THERAPY: CPT

## 2023-02-06 PROCEDURE — 97150 GROUP THERAPEUTIC PROCEDURES: CPT

## 2023-02-06 NOTE — PROGRESS NOTES
PT DAILY TREATMENT NOTE - [facility] 2-15    Patient Name: Katlin Galeano  Date:2023  : 1960  [x]  Patient  Verified  Payor: Dat Rowe / Plan: [address] [place] [address] HMO / Product Type: Managed Care Medicare /    In time: 2:03  Out time: 3:09  Total Treatment Time (min): 66  Total Timed Codes (min): 37  1:1 Treatment Time ( [address] only): 37   Visit #:  5 of 10    Treatment Area: Rotator cuff arthropathy of left shoulder [M12.812]  Impingement syndrome of left shoulder [M75.42]  Arthritis of left acromioclavicular joint [M19.012]    SUBJECTIVE  Pain Level (0-10 scale): 4/10  Any medication changes, allergies to medications, adverse drug reactions, diagnosis change, or new procedure performed?: [x] No    [] Yes (see summary sheet for update)  Subjective functional status/changes:     \"It's not too bad today. \"    OBJECTIVE  Modality rationale: decrease pain and increase tissue extensibility to improve the patients ability to be able to perform AROM   Min Type Additional Details     10 [x] Estim: []Att   [x]Unatt        []TENS instruct                  [x]IFC  []Premod   []NMES                     []Other:  []w/US   []w/ice   [x]w/heat  Position: seated  Location: L shoulder    []  Traction: [] Cervical       []Lumbar                       [] Prone          []Supine                       []Intermittent   []Continuous Lbs:  [] before manual  [] after manual  [] With heat  [] Simultaneously performed with E-Stim    []  Ultrasound: []Continuous   [] Pulsed                           []1MHz   []3MHz Location:  W/cm2:    []  Ice     []  heat  []  Ice massage Position:  Location:    []  Vasopneumatic Device  If using vaso (only need to measure limb vaso being performed on)      pre-treatment girth :       post-treatment girth :       measured at (landmark location)  Pressure:       [] lo [] med [] hi   Temperature: [] lo [] med [] Hi    [] With ice    [] Simultaneously performed with Estim   [x] Skin assessment post-treatment:  [x]intact [x]redness- no adverse reaction       []redness - adverse reaction:     37 min Therapeutic Exercise:  [x] See flow sheet :   Rationale: increase ROM and increase strength to improve the patients ability to improve functional mobility          19 min Group Therapy:  [x] See flow sheet :   Billed while completing TE with another pt at beginning of session    With   [x] TE   [] TA   [] neuro   [] other: Patient Education: [x] Review HEP    [] Progressed/Changed HEP based on:   [] positioning   [] body mechanics   [] transfers   [] heat/ice application    [] other:      Pain Level (0-10 scale) post treatment: 1/10    ASSESSMENT/Changes in Function: The pt tolerated treatment fairly well. Pt continues to have difficulty with AAROM exercises. Most exercises performed in sitting. Patient will continue to benefit from skilled PT services to address functional mobility deficits, address ROM deficits, and address strength deficits to attain remaining goals. [x]  See Plan of Care  []  See progress note/recertification  []  See Discharge Summary         Progress towards goals / Updated goals:  Short Term Goals: To be accomplished in 8 treatments. Patient will be able to perform HEP independently to increase left shoulder shoulder strength and ROM to maintain mobility. Patient will be able to cornelius and doff clothing with no difficulty or assistance with increase ROM. Patient will be able to lift light objects and place on shelf with no difficulty. Patient will be able to wash her back with towel with no difficulty with full range of motion in IR and ER. Long Term Goals: To be accomplished in 16 treatments. Patient will be able to comb and brush hair with no difficulty with increase strength to 4+/5. Patient will be able to lift medium weights objects and place on shelf with no difficulty with 4+/5 strength.   Patient will be able to lift her pots and pans on the stove to cook with no difficulty.     PLAN  [x]  Upgrade activities as tolerated     [x]  Continue plan of care  []  Update interventions per flow sheet       []  Discharge due to:_  []  Other:_      Jazmine Tellez PTA, [facility] 2/6/2023

## 2023-02-07 DIAGNOSIS — M10.9 ACUTE GOUT OF MULTIPLE SITES, UNSPECIFIED CAUSE: ICD-10-CM

## 2023-02-07 RX ORDER — ALLOPURINOL 300 MG/1
TABLET ORAL
Qty: 90 TABLET | Refills: 0 | Status: SHIPPED | OUTPATIENT
Start: 2023-02-07

## 2023-02-09 ENCOUNTER — OFFICE VISIT (OUTPATIENT)
Dept: GASTROENTEROLOGY | Age: 63
End: 2023-02-09
Payer: MEDICARE

## 2023-02-09 VITALS
SYSTOLIC BLOOD PRESSURE: 140 MMHG | HEART RATE: 69 BPM | DIASTOLIC BLOOD PRESSURE: 80 MMHG | HEIGHT: 66 IN | RESPIRATION RATE: 20 BRPM | TEMPERATURE: 98 F | OXYGEN SATURATION: 98 % | WEIGHT: 293 LBS | BODY MASS INDEX: 47.09 KG/M2

## 2023-02-09 DIAGNOSIS — N18.4 CKD (CHRONIC KIDNEY DISEASE) STAGE 4, GFR 15-29 ML/MIN (HCC): ICD-10-CM

## 2023-02-09 DIAGNOSIS — K21.00 GASTROESOPHAGEAL REFLUX DISEASE WITH ESOPHAGITIS WITHOUT HEMORRHAGE: ICD-10-CM

## 2023-02-09 DIAGNOSIS — E66.01 MORBID OBESITY WITH BMI OF 60.0-69.9, ADULT (HCC): ICD-10-CM

## 2023-02-09 DIAGNOSIS — B96.81 HELICOBACTER PYLORI GASTRITIS: ICD-10-CM

## 2023-02-09 DIAGNOSIS — K57.30 DIVERTICULAR DISEASE OF COLON: ICD-10-CM

## 2023-02-09 DIAGNOSIS — K58.2 IRRITABLE BOWEL SYNDROME WITH BOTH CONSTIPATION AND DIARRHEA: Primary | ICD-10-CM

## 2023-02-09 DIAGNOSIS — K29.70 HELICOBACTER PYLORI GASTRITIS: ICD-10-CM

## 2023-02-09 DIAGNOSIS — D50.9 IRON DEFICIENCY ANEMIA, UNSPECIFIED IRON DEFICIENCY ANEMIA TYPE: ICD-10-CM

## 2023-02-09 RX ORDER — DICYCLOMINE HYDROCHLORIDE 10 MG/1
10 CAPSULE ORAL
Qty: 90 CAPSULE | Refills: 3 | Status: SHIPPED | OUTPATIENT
Start: 2023-02-09

## 2023-02-09 NOTE — PROGRESS NOTES
1. Have you been to the ER, urgent care clinic since your last visit? Hospitalized since your last visit? no    2. Have you seen or consulted any other health care providers outside of the [address] [place] [address] since your last visit? Include any pap smears or colon screening.   No   Chief Complaint   Patient presents with    Follow-up     3 mo follow up from OV     Visit Vitals  BP (!) 140/80 (BP 1 Location: Right upper arm, BP Patient Position: Sitting, BP Cuff Size: Large adult)   Pulse 69   Temp 98 °F (36.7 °C) (Temporal)   Resp 20   Ht 5' 6\" (1.676 m)   Wt (!) 175.5 kg (387 lb)   LMP  (LMP Unknown)   SpO2 98% Comment: room air   BMI 62.46 kg/m²

## 2023-02-10 ENCOUNTER — HOSPITAL ENCOUNTER (OUTPATIENT)
Dept: PHYSICAL THERAPY | Age: 63
End: 2023-02-10
Payer: MEDICARE

## 2023-02-10 PROCEDURE — 97110 THERAPEUTIC EXERCISES: CPT

## 2023-02-10 PROCEDURE — 97014 ELECTRIC STIMULATION THERAPY: CPT

## 2023-02-10 NOTE — PROGRESS NOTES
PT DAILY TREATMENT NOTE - [facility] 2-15    Patient Name: Althea Carpio  Date:2/10/2023  : 1960  [x]  Patient  Verified  Payor: Zaida Carrillokatie / Plan: [address] [place] [address] HMO / Product Type: Managed Care Medicare /    In time: 2:01  Out time: 3:02  Total Treatment Time (min): 61  Total Timed Codes (min): 51  1:1 Treatment Time ( [address] only): 51   Visit #:  6 of 10    Treatment Area: Rotator cuff arthropathy of left shoulder [M12.812]  Impingement syndrome of left shoulder [M75.42]  Arthritis of left acromioclavicular joint [M19.012]    SUBJECTIVE  Pain Level (0-10 scale): 6/10  Any medication changes, allergies to medications, adverse drug reactions, diagnosis change, or new procedure performed?: [x] No    [] Yes (see summary sheet for update)  Subjective functional status/changes: \"My whole body hurts. \"    OBJECTIVE  Modality rationale: decrease pain and increase tissue extensibility to improve the patients ability to be able to perform AROM   Min Type Additional Details     10 [x] Estim: []Att   []Unatt        []TENS instruct                  [x]IFC  []Premod   []NMES                     []Other:  []w/US   []w/ice   [x]w/heat  Position: seated  Location: L shoulder    []  Traction: [] Cervical       []Lumbar                       [] Prone          []Supine                       []Intermittent   []Continuous Lbs:  [] before manual  [] after manual  [] With heat  [] Simultaneously performed with E-Stim    []  Ultrasound: []Continuous   [] Pulsed                           []1MHz   []3MHz Location:  W/cm2:    []  Ice     []  heat  []  Ice massage Position:  Location:    []  Vasopneumatic Device  If using vaso (only need to measure limb vaso being performed on)      pre-treatment girth :       post-treatment girth :       measured at (landmark location)  Pressure:       [] lo [] med [] hi   Temperature: [] lo [] med [] Hi    [] With ice    [] Simultaneously performed with Estim   [x] Skin assessment post-treatment:  [x]intact [x]redness- no adverse reaction       []redness - adverse reaction:     51 min Therapeutic Exercise:  [x] See flow sheet :   Rationale: increase ROM and increase strength to improve the patients ability to improve functional mobility    With   [x] TE   [] TA   [] neuro   [] other: Patient Education: [x] Review HEP    [] Progressed/Changed HEP based on:   [] positioning   [] body mechanics   [] transfers   [] heat/ice application    [] other:      Pain Level (0-10 scale) post treatment: 6/10    ASSESSMENT/Changes in Function: The pt tolerated treatment fairly well. No c/o increased pain today, only fatigue during exercises. Patient will continue to benefit from skilled PT services to address functional mobility deficits, address ROM deficits, and address strength deficits to attain remaining goals. [x]  See Plan of Care  []  See progress note/recertification  []  See Discharge Summary         Progress towards goals / Updated goals:  Short Term Goals: To be accomplished in 8 treatments. Patient will be able to perform HEP independently to increase left shoulder shoulder strength and ROM to maintain mobility. Patient will be able to cornelius and doff clothing with no difficulty or assistance with increase ROM. Patient will be able to lift light objects and place on shelf with no difficulty. Patient will be able to wash her back with towel with no difficulty with full range of motion in IR and ER. Long Term Goals: To be accomplished in 16 treatments. Patient will be able to comb and brush hair with no difficulty with increase strength to 4+/5. Patient will be able to lift medium weights objects and place on shelf with no difficulty with 4+/5 strength. Patient will be able to lift her pots and pans on the stove to cook with no difficulty.     PLAN  [x]  Upgrade activities as tolerated     [x]  Continue plan of care  []  Update interventions per flow sheet       []  Discharge due to:_  []  Other:_      Chance Gill, VIKA, LPTA 2/10/2023

## 2023-02-13 ENCOUNTER — APPOINTMENT (OUTPATIENT)
Dept: PHYSICAL THERAPY | Age: 63
End: 2023-02-13
Payer: MEDICARE

## 2023-02-13 ENCOUNTER — TELEPHONE (OUTPATIENT)
Dept: PRIMARY CARE CLINIC | Age: 63
End: 2023-02-13

## 2023-02-13 DIAGNOSIS — R42 DIZZINESS: Primary | ICD-10-CM

## 2023-02-13 DIAGNOSIS — M10.9 ACUTE GOUT OF MULTIPLE SITES, UNSPECIFIED CAUSE: ICD-10-CM

## 2023-02-13 PROCEDURE — 97110 THERAPEUTIC EXERCISES: CPT

## 2023-02-13 PROCEDURE — 97014 ELECTRIC STIMULATION THERAPY: CPT

## 2023-02-13 RX ORDER — MECLIZINE HYDROCHLORIDE 25 MG/1
25 TABLET ORAL 2 TIMES DAILY
Qty: 180 TABLET | Refills: 0 | Status: SHIPPED | OUTPATIENT
Start: 2023-02-13

## 2023-02-13 RX ORDER — ALLOPURINOL 300 MG/1
300 TABLET ORAL DAILY
Qty: 90 TABLET | Refills: 1 | Status: SHIPPED | OUTPATIENT
Start: 2023-02-13

## 2023-02-13 NOTE — PROGRESS NOTES
Theresa Richard is a 60 y.o. female who presents today for the following:  Chief Complaint   Patient presents with    Follow-up     3 mo follow up from [address] Still has problems with bowels, sometimes  constipation, sometimes diarrhea and mid abd pain         Allergies   Allergen Reactions    Celebrex [Celecoxib] Rash    Cleocin [Clindamycin Phosphate] Palpitations    Mushroom Swelling    Strawberry Rash    Ultracet [Tramadol-Acetaminophen] Rash    Zithromax [Azithromycin] Rash    Lisinopril Swelling       Current Outpatient Medications   Medication Sig    dicyclomine (BENTYL) 10 mg capsule Take 1 Capsule by mouth three (3) times daily as needed for Abdominal Cramps. Indications: irritable colon    allopurinoL (ZYLOPRIM) 300 mg tablet TAKE 1 TABLET EVERY DAY    atorvastatin (LIPITOR) 10 mg tablet TAKE 1 TABLET EVERY DAY    albuterol-ipratropium (DUO-NEB) 2.5 mg-0.5 mg/3 ml nebu 3 mL by Nebulization route every four (4) hours as needed for Wheezing. amLODIPine (NORVASC) 5 mg tablet Take 1 Tablet by mouth daily for 90 days. magnesium oxide (MAG-OX) 400 mg tablet TAKE 1 TABLET EVERY DAY    insulin NPH (NovoLIN N Flexpen) 100 unit/mL (3 mL) inpn 25 Units by SubCUTAneous route Before breakfast and dinner for 90 days. Insulin Needles, Disposable, 32 gauge x 5/32\" ndle Use pen needle to inject medicine every 7 days    furosemide (LASIX) 40 mg tablet TAKE 1 TABLET ON MONDAY, WEDNESDAY AND FRIDAY    alcohol swabs padm 1 Swab by Apply Externally route daily.     lancets misc Trueplus lancets 33g    glucose blood VI test strips (True Metrix Glucose Test Strip) strip Use one strip to check glucose twice a day    glimepiride (AMARYL) 1 mg tablet 1 tab before breakfast and 2 tabs before dinner, 90 days    metFORMIN ER (GLUCOPHAGE XR) 500 mg tablet TAKE 1 TABLET WITH DINNER FOR 2 WEEKS, THEN TAKE 2 TABLETS WITH DINNER THEREAFTER    albuterol (PROVENTIL HFA, VENTOLIN HFA, PROAIR HFA) 90 mcg/actuation inhaler Take 1 Puff by inhalation every four (4) hours as needed for Wheezing. Blood-Glucose Meter (True Metrix Glucose Meter) monitoring kit Use to check glucose daily    Blood Glucose Control, Low (True Metrix Level 1) soln 1 Bottle by Does Not Apply route daily. albuterol-ipratropium (DUO-NEB) 2.5 mg-0.5 mg/3 ml nebu 3 mL by Nebulization route. multivitamin (ONE A DAY) tablet Take 1 Tab by mouth daily. aspirin 81 mg chewable tablet Take 81 mg by mouth daily. cholecalciferol (VITAMIN D3) (1000 Units /25 mcg) tablet Take 1,000 Units by mouth daily. acetaminophen (TYLENOL) 650 mg TbER Take 650 mg by mouth every eight (8) hours. cetirizine (ZYRTEC) 5 mg tablet Take 1 Tablet by mouth daily. (Patient not taking: Reported on 2/9/2023)    ciprofloxacin HCl (CIPRO) 500 mg tablet Take 1 Tablet by mouth two (2) times a day. Indications: diverticulitis (Patient not taking: Reported on 2/9/2023)    cpap machine kit by Does Not Apply route. No current facility-administered medications for this visit.        Past Medical History:   Diagnosis Date    Arthritis     Asthma     Chronic kidney disease     stage 3    Chronic obstructive pulmonary disease (HCC)     Diabetes (HCC)     GERD (gastroesophageal reflux disease)     High cholesterol     Hypertension     Menopause     Morbid obesity ([address]     Obstructive sleep apnea on CPAP     Vertigo        Past Surgical History:   Procedure Laterality Date    HX BREAST BIOPSY Left 2019    HX CHOLECYSTECTOMY      HX ENDOSCOPY  01/01/2019    HX HYSTERECTOMY      HX OOPHORECTOMY      HX ORTHOPAEDIC      right shoulder spur    HX ROTATOR CUFF REPAIR Left     by suture    HX TUBAL LIGATION Bilateral        Family History   Problem Relation Age of Onset    Heart Disease Mother     Lung Disease Mother         lung ca    Lung Disease Father         PNA    Cancer Maternal Grandmother         gyn       Social History     Socioeconomic History    Marital status:      Spouse name: Not on file Number of children: Not on file    Years of education: Not on file    Highest education level: Some college, no degree   Occupational History    Not on file   Tobacco Use    Smoking status: Never    Smokeless tobacco: Never   Vaping Use    Vaping Use: Never used   Substance and Sexual Activity    Alcohol use: Never    Drug use: Never    Sexual activity: Not Currently     Partners: Male     Birth control/protection: Surgical   Other Topics Concern     Service Not Asked    Blood Transfusions Not Asked    Caffeine Concern Not Asked    Occupational Exposure Not Asked    Hobby Hazards Not Asked    Sleep Concern Not Asked    Stress Concern Not Asked    Weight Concern Not Asked    Special Diet Not Asked    Back Care Not Asked    Exercise Not Asked    Bike Helmet Not Asked    Seat Belt Not Asked    Self-Exams Not Asked   Social History Narrative    Not on file     Social Determinants of Health     Financial Resource Strain: Not on file   Food Insecurity: Not on file   Transportation Needs: Not on file   Physical Activity: Not on file   Stress: Not on file   Social Connections: Not on file   Intimate Partner Violence: Not on file   Housing Stability: Not on file         HPI  58-year-old female with history of hyperlipidemia, diabetes mellitus type 2, gout, GERD, asthma, COPD, obstructive sleep apnea, chronic kidney disease stage IV, morbid obesity, diverticular disease, and IBS who comes in for follow-up visit. Patient states her bowels are still acting up at times. She has bouts of diarrhea and may go 2-5 times daily and other times she is constipated. She generally has more constipation. She has crampy lower abdominal pain prior to the diarrhea. It does not matter what she eats. Review of Systems   Constitutional: Negative. HENT: Negative. Negative for nosebleeds. Eyes: Negative. Respiratory: Negative. Cardiovascular: Negative.     Gastrointestinal:  Positive for abdominal pain, constipation and diarrhea. Negative for blood in stool, melena, nausea and vomiting. Genitourinary: Negative. Musculoskeletal:  Positive for back pain and joint pain. Skin: Negative. Neurological: Negative. Endo/Heme/Allergies: Negative. Psychiatric/Behavioral: Negative. All other systems reviewed and are negative. Visit Vitals  BP (!) 140/80 (BP 1 Location: Right upper arm, BP Patient Position: Sitting, BP Cuff Size: Large adult)   Pulse 69   Temp 98 °F (36.7 °C) (Temporal)   Resp 20   Ht 5' 6\" (1.676 m)   Wt (!) 175.5 kg (387 lb)   LMP  (LMP Unknown)   SpO2 98% Comment: room air   BMI 62.46 kg/m²     Physical Exam  Vitals and nursing note reviewed. Constitutional:       Appearance: Normal appearance. She is obese. HENT:      Head: Normocephalic and atraumatic. Nose: Nose normal.      Mouth/Throat:      Mouth: Mucous membranes are moist.      Pharynx: Oropharynx is clear. Eyes:      General: No scleral icterus. Conjunctiva/sclera: Conjunctivae normal.      Pupils: Pupils are equal, round, and reactive to light. Cardiovascular:      Rate and Rhythm: Normal rate and regular rhythm. Pulses: Normal pulses. Heart sounds: Normal heart sounds. Pulmonary:      Effort: Pulmonary effort is normal.      Breath sounds: Normal breath sounds. Abdominal:      General: Bowel sounds are normal. There is no distension. Palpations: Abdomen is soft. There is no mass. Tenderness: There is abdominal tenderness. There is no right CVA tenderness, left CVA tenderness, guarding or rebound. Hernia: No hernia is present. Musculoskeletal:         General: Normal range of motion. Cervical back: Normal range of motion and neck supple. Skin:     General: Skin is warm and dry. Coloration: Skin is not jaundiced. Neurological:      General: No focal deficit present. Mental Status: She is alert and oriented to person, place, and time.    Psychiatric:         Mood and Affect: Mood normal.         Behavior: Behavior normal.         Thought Content: Thought content normal.         Judgment: Judgment normal.          1. Irritable bowel syndrome with both constipation and diarrhea  Give patient a trial of dicyclomine to take on as-needed basis. - dicyclomine (BENTYL) 10 mg capsule; Take 1 Capsule by mouth three (3) times daily as needed for Abdominal Cramps. Indications: irritable colon  Dispense: 90 Capsule; Refill: 3    2. Morbid obesity with BMI of 60.0-69.9, adult ([facility]      3. Diverticular disease of colon      4. Iron deficiency anemia, unspecified iron deficiency anemia type      5. CKD (chronic kidney disease) stage 4, GFR 15-29 ml/min ([facility])      6. Gastroesophageal reflux disease with esophagitis without hemorrhage      7.  Helicobacter pylori gastritis

## 2023-02-13 NOTE — PROGRESS NOTES
PT DAILY TREATMENT NOTE - [facility] -15    Patient Name: Anish Ji  Date:2023  : 1960  [x]  Patient  Verified  Payor: Dre Brown / Plan: [address] [place] [address] HMO / Product Type: Managed Care Medicare /    In time: 2:05 PM  Out time:3:00 PM  Total Treatment Time (min): 55  Total Timed Codes (min): 40  1:1 Treatment Time ( [address] only): 40   Visit #:  7 of 16    Treatment Area: Rotator cuff arthropathy of left shoulder [M12.812]  Impingement syndrome of left shoulder [M75.42]  Arthritis of left acromioclavicular joint [M19.012]    SUBJECTIVE  Pain Level (0-10 scale): 0/10  Any medication changes, allergies to medications, adverse drug reactions, diagnosis change, or new procedure performed?: [x] No    [] Yes (see summary sheet for update)  Subjective functional status/changes:     \"I am doing well with lifting light objects. Not anything heavy. I have bought me a TENS unit. And now I sleep better at night. .\"    OBJECTIVE  Modality rationale: decrease edema, decrease pain, and increase tissue extensibility to improve the patients ability to comb and brush hair with no difficulty with increase strength to 4+/5.    Min Type Additional Details   15 [x] Estim: []Att   [x]Unatt        []TENS instruct                  [x]IFC  []Premod   []NMES                     []Other:  []w/US   []w/ice   [x]w/heat  Position: sitting  Location: left Shoulder    []  Traction: [] Cervical       []Lumbar                       [] Prone          []Supine                       []Intermittent   []Continuous Lbs:  [] before manual  [] after manual  [] With heat  [] Simultaneously performed with E-Stim    []  Ultrasound: []Continuous   [] Pulsed                           []1MHz   []3MHz Location:  W/cm2:    []  Ice     []  heat  []  Ice massage Position:  Location:    []  Vasopneumatic Device  If using vaso (only need to measure limb vaso being performed on)      pre-treatment girth :       post-treatment girth :       measured at (landmark location)  Pressure:       [] lo [] med [] hi   Temperature: [] lo [] med [] Hi    [] With ice    [] Simultaneously performed with Estim   [x] Skin assessment post-treatment:  [x]intact [x]redness- no adverse reaction       []redness - adverse reaction:     40 min Therapeutic Exercise:  [x] See flow sheet :   Rationale: increase ROM and increase strength to improve the patients ability to will be able to comb and brush hair with no   difficulty with increase strength to 4+/5. With   [x] TE   [] TA   [] neuro   [] other: Patient Education: [x] Review HEP    [] Progressed/Changed HEP based on:   [] positioning   [] body mechanics   [] transfers   [] heat/ice application    [] other:      Other Objective/Functional Measures:              Shoulder:   Strength AROM       Right Left Right Left     Flexion 3+/5 4/5 180 175     Abduction 3+/5 4/5 180 180     IR 3+/5 3+/5 80 85     ER 3/5 3+/5 80 85   Elbow:   Strength AROM       Right Left Right Left     Flexion 4/5 4/5 90 90     Extension 4/5 4/5 0 0   Wrist:   Strength AROM       Right Left Right Left     Flexion 4/5 4/5 wfl wfl     Extension 4/5 4/5 wfl wfl     Pronation 4/5 4/5 wfl wfl     Supination 4/5 4/5 wfl wfl     Pain Level (0-10 scale) post treatment: /10    ASSESSMENT/Changes in Function:   The pt tolerated treatment. Patient has shown improvement with increasing her strength in flexion. Patient continues to have strength deficit in abduction and IR/ER. Pain level is decreasing and patient has a TENS Unit, so now she can self treat at home. Strength continues to be the chief problem. Patient will continue to benefit from skilled PT services to address functional mobility deficits, address ROM deficits, address strength deficits, analyze and address soft tissue restrictions, and analyze and cue movement patterns to attain remaining goals.      [x]  See Plan of Care  []  See progress note/recertification  []  See Discharge Summary         Progress towards goals / Updated goals:  Short Term Goals: To be accomplished in 8 treatments. Patient will be able to perform HEP independently to increase left shoulder shoulder strength and ROM to maintain mobility. MET  Patient will be able to cornelius and doff clothing with no difficulty or assistance with increase ROM. MET  Patient will be able to lift light objects and place on shelf with no difficulty. MET  Patient will be able to wash her back with towel with no difficulty with full range of motion in IR and ER. Partially MET     Long Term Goals: To be accomplished in 16 treatments. Patient will be able to comb and brush hair with no difficulty with increase strength to 4+/5. Patient will be able to lift medium weights objects and place on shelf with no difficulty with 4+/5 strength. Patient will be able to lift her pots and pans on the stove to cook with no difficulty.     PLAN  [x]  Upgrade activities as tolerated     [x]  Continue plan of care  []  Update interventions per flow sheet       []  Discharge due to:_  []  Other:_      Rachana Mariel, PT,  2/13/2023

## 2023-02-13 NOTE — PROGRESS NOTES
[address] [address] [facility]  Ph: [phone]    Fax: [phone]    Progress Note    Name: Otilia Cross   : 1960   MD: Jack Ny       Treatment Diagnosis: Rotator cuff arthropathy of left shoulder [M12.812]  Impingement syndrome of left shoulder [M75.42]  Arthritis of left acromioclavicular joint [M19.012]  Start of Care: 23    Visits from Start of Care: 7   Missed Visits:     Summary of Care / Assessment / Recommendations:   60year old female with rotator cuff repair of the left shoulder in  and right shoulder in 2016. Patient fell  and injured her left shoulder. She received a cortisone injection and x-rays findings were of  2 views of the left shoulder demonstrate no fracture, dislocation or other acute abnormality. Mild degenerative changes are seen in the glenohumeral and acromioclavicular joints. Patient has bilateral weakness of both shoulder/scapula. Active movements  is WFL is in both shoulder in supine position. Full range difficulty to achieve in sitting position with out assistance. Patient has completed 7 Physical Therapy visit. Patient continues have weakness with overhead mobility. Pain has decreased and patient is self treating with TENS. Physical Therapy will continue to increase strength in each plane. Patient will benefit from skilled Physical Therapy to increase strength and maintain active mobility in sitting and standing position to return to household and daily activities. Pain modalities will be provided as needed to decrease pain and inflammation as needed. Patient will be provided a Home Exercise Program to increase strength to return to her maximal level of function. Progress Toward Goals:  Short Term Goals: To be accomplished in 8 treatments. Patient will be able to perform HEP independently to increase left shoulder shoulder strength and ROM to maintain mobility.  MET  Patient will be able to cornelius and doff clothing with no difficulty or assistance with increase ROM. MET  Patient will be able to lift light objects and place on shelf with no difficulty. MET  Patient will be able to wash her back with towel with no difficulty with full range of motion in IR and ER. Partially MET     Long Term Goals: To be accomplished in 16 treatments. Patient will be able to comb and brush hair with no difficulty with increase strength to 4+/5. Patient will be able to lift medium weights objects and place on shelf with no difficulty with 4+/5 strength. Patient will be able to lift her pots and pans on the stove to cook with no difficulty. Recertification Period: 1/16/23 to April 13, 2023    Frequency/Duration:  2 treatments per week, for 16 treatments. Edmond Michel, PT,  2/13/2023     ________________________________________________________________________  NOTE TO PHYSICIAN:  Please complete the following and fax to:  [facility]:   Fax: [phone]  . Retain this original for your records. If you are unable to process this request in 24 hours, please contact our office.        ____ I have read the above report and request that my patient continue therapy with the following changes/special instructions:  ____ I have read the above report and request that my patient be discharged from therapy    Physician's Signature:_________________ Date:___________Time:__________

## 2023-02-17 ENCOUNTER — APPOINTMENT (OUTPATIENT)
Dept: PHYSICAL THERAPY | Age: 63
End: 2023-02-17
Payer: MEDICARE

## 2023-02-17 NOTE — TELEPHONE ENCOUNTER
Impression: S/P Cataract Extraction by phacoemulsification with IOL placement; ORA; LRI (Limbal Relaxing Incision) OD - 8 Days. Encounter for surgical aftercare following surgery on a sense organ  Z48.810. Plan: RTC for 2nd eye Cataract Sx. Stop ofloxacin --Advised patient to use artificial tears for comfort. Rx for this was sent to [facility]. Patient called and wants this to go to [facility]. Thanks.

## 2023-02-20 ENCOUNTER — HOSPITAL ENCOUNTER (OUTPATIENT)
Dept: PHYSICAL THERAPY | Age: 63
Discharge: HOME | End: 2023-02-20
Payer: MEDICARE

## 2023-02-20 PROCEDURE — 97014 ELECTRIC STIMULATION THERAPY: CPT

## 2023-02-20 PROCEDURE — 97110 THERAPEUTIC EXERCISES: CPT

## 2023-02-21 ENCOUNTER — TELEPHONE (OUTPATIENT)
Dept: GASTROENTEROLOGY | Age: 63
End: 2023-02-21

## 2023-02-21 DIAGNOSIS — R10.13 EPIGASTRIC PAIN: Primary | ICD-10-CM

## 2023-02-24 ENCOUNTER — APPOINTMENT (OUTPATIENT)
Dept: PHYSICAL THERAPY | Age: 63
End: 2023-02-24
Payer: MEDICARE

## 2023-02-27 ENCOUNTER — HOSPITAL ENCOUNTER (OUTPATIENT)
Dept: PHYSICAL THERAPY | Age: 63
Discharge: HOME | End: 2023-02-27
Payer: MEDICARE

## 2023-02-27 PROCEDURE — 97014 ELECTRIC STIMULATION THERAPY: CPT

## 2023-02-27 PROCEDURE — 97110 THERAPEUTIC EXERCISES: CPT

## 2023-02-27 PROCEDURE — 97150 GROUP THERAPEUTIC PROCEDURES: CPT

## 2023-02-27 NOTE — PROGRESS NOTES
PT DAILY TREATMENT NOTE - [facility] 2-15    Patient Name: Fide Mason  Date:2023  : 1960  [x]  Patient  Verified  Payor: Vicente Parcel / Plan: [address] [place] [address] HMO / Product Type: Managed Care Medicare /    In time:208 pm  Out time:315 pm  Total Treatment Time (min): 67  Total Timed Codes (min): 55  1:1 Treatment Time ( [address] only): 54   Visit #:  9 of 16 (30 days)    Treatment Area: Rotator cuff arthropathy of left shoulder [M12.812]  Impingement syndrome of left shoulder [M75.42]  Arthritis of left acromioclavicular joint [M19.012]    SUBJECTIVE  Pain Level (0-10 scale): 0/10  Any medication changes, allergies to medications, adverse drug reactions, diagnosis change, or new procedure performed?: [x] No    [] Yes (see summary sheet for update)  Subjective functional status/changes: \"Pt reports no pain upon arrival however as soon as she started with ex she noted the discomfort increased some. \"    OBJECTIVE  Modality rationale: decrease pain, increase tissue extensibility, and increase muscle contraction/control to improve the patients ability to increase shoulder motion and activity levels   Min Type Additional Details   12 [x] Estim: []Att   [x]Unatt        []TENS instruct                  [x]IFC  []Premod   []NMES                     []Other:  []w/US   []w/ice   [x]w/heat  Position: seated  Location: L shoulder     []  Traction: [] Cervical       []Lumbar                       [] Prone          []Supine                       []Intermittent   []Continuous Lbs:  [] before manual  [] after manual  [] With heat  [] Simultaneously performed with E-Stim    []  Ultrasound: []Continuous   [] Pulsed                           []1MHz   []3MHz Location:  W/cm2:    []  Ice     []  heat  []  Ice massage Position:  Location:    []  Vasopneumatic Device  If using vaso (only need to measure limb vaso being performed on)      pre-treatment girth :       post-treatment girth :       measured at (landmark location) Pressure:       [] lo [] med [] hi   Temperature: [] lo [] med [] Hi    [] With ice    [] Simultaneously performed with Estim   [x] Skin assessment post-treatment:  [x]intact [x]redness- no adverse reaction       []redness - adverse reaction:     23 min Therapeutic Exercise:  [x] See flow sheet :   Rationale: increase ROM, increase strength, and improve coordination to improve the patients ability to increase shoulder motion and functional strenght              20 min Group Therapy:  [x] See flow sheet :   Billed while completing ex with another patient present during session with therapist.    With   [x] TE   [] TA   [] neuro   [] other: Patient Education: [x] Review HEP    [] Progressed/Changed HEP based on:   [] positioning   [] body mechanics   [] transfers   [] heat/ice application    [] other:      Pain Level (0-10 scale) post treatment: 0/10    ASSESSMENT/Changes in Function:   The pt tolerated treatment working on seated and limited standing ex due to use of AD for mobility. Pt was able to perform over head press with light dumbbells and still doing well with thera band ex. Pt does have issue with seated cane ex and limited flex. Estim and MHP to L shoulder to ease discomfort. Patient will continue to benefit from skilled PT services to modify and progress therapeutic interventions, address functional mobility deficits, address ROM deficits, address strength deficits, and analyze and address soft tissue restrictions to attain remaining goals. [x]  See Plan of Care  []  See progress note/recertification  []  See Discharge Summary         Progress towards goals / Updated goals:  Short Term Goals: To be accomplished in 8 treatments. Patient will be able to perform HEP independently to increase left shoulder shoulder strength and ROM to maintain mobility. MET  Patient will be able to cornelius and doff clothing with no difficulty or assistance with increase ROM.  MET  Patient will be able to lift light objects and place on shelf with no difficulty. MET  Patient will be able to wash her back with towel with no difficulty with full range of motion in IR and ER. Partially MET     Long Term Goals: To be accomplished in 16 treatments. Patient will be able to comb and brush hair with no difficulty with increase strength to 4+/5. Patient will be able to lift medium weights objects and place on shelf with no difficulty with 4+/5 strength. Patient will be able to lift her pots and pans on the stove to cook with no difficulty.     PLAN  [x]  Upgrade activities as tolerated     [x]  Continue plan of care  []  Update interventions per flow sheet       []  Discharge due to:_  []  Other:_      Julee Burkett PTA, HERMES 2/27/2023

## 2023-03-03 ENCOUNTER — HOSPITAL ENCOUNTER (OUTPATIENT)
Dept: PHYSICAL THERAPY | Age: 63
Discharge: HOME | End: 2023-03-03
Payer: MEDICARE

## 2023-03-03 PROCEDURE — 97110 THERAPEUTIC EXERCISES: CPT

## 2023-03-03 PROCEDURE — 97014 ELECTRIC STIMULATION THERAPY: CPT

## 2023-03-03 NOTE — PROGRESS NOTES
PT DAILY TREATMENT NOTE - [facility] 2-15    Patient Name: Sheyla Keller  Date:3/3/2023  : 1960  [x]  Patient  Verified  Payor: Luis Armando Lazo / Plan: [address] [place] [address] HMO / Product Type: Managed Care Medicare /    In time:151 pm  Out time:259  Total Treatment Time (min): 68  Total Timed Codes (min): 56  1:1 Treatment Time ([facility] only): 64   Visit #:  10 of 16    Treatment Area: Rotator cuff arthropathy of left shoulder [M12.812]  Impingement syndrome of left shoulder [M75.42]  Arthritis of left acromioclavicular joint [M19.012]    SUBJECTIVE  Pain Level (0-10 scale): 0/10  Any medication changes, allergies to medications, adverse drug reactions, diagnosis change, or new procedure performed?: [x] No    [] Yes (see summary sheet for update)  Subjective functional status/changes: \"Pt reports that she is doing better and has no real pain to c/o today. \"    OBJECTIVE  Modality rationale: decrease pain, increase tissue extensibility, and increase muscle contraction/control to improve the patients ability to increase shoulder active motion with decreased c/o   Min Type Additional Details   12 [x] Estim: []Att   [x]Unatt        []TENS instruct                  [x]IFC  []Premod   []NMES                     []Other:  []w/US   []w/ice   [x]w/heat  Position: seated  Location: L shoulder     []  Traction: [] Cervical       []Lumbar                       [] Prone          []Supine                       []Intermittent   []Continuous Lbs:  [] before manual  [] after manual  [] With heat  [] Simultaneously performed with E-Stim    []  Ultrasound: []Continuous   [] Pulsed                           []1MHz   []3MHz Location:  W/cm2:    []  Ice     []  heat  []  Ice massage Position:  Location:    []  Vasopneumatic Device  If using vaso (only need to measure limb vaso being performed on)      pre-treatment girth :       post-treatment girth :       measured at (landmark location)  Pressure:       [] lo [] med [] hi Temperature: [] lo [] med [] Hi    [] With ice    [] Simultaneously performed with Estim   [x] Skin assessment post-treatment:  [x]intact [x]redness- no adverse reaction       []redness - adverse reaction:     58 min Therapeutic Exercise:  [x] See flow sheet :   Rationale: increase ROM, increase strength, and improve coordination to improve the patients ability to increase active motion and functional strength to perform daily tasks. With   [x] TE   [] TA   [] neuro   [] other: Patient Education: [x] Review HEP    [] Progressed/Changed HEP based on:   [] positioning   [] body mechanics   [] transfers   [] heat/ice application    [] other:      Pain Level (0-10 scale) post treatment: 0/10    ASSESSMENT/Changes in Function:   The pt tolerated treatment working on general ROM Active , AA and passive. Pt also working on strengthening within tolerated ranges and some assistance with tasks to push motion . Estim and MHP post ex for c/o issues. Patient will continue to benefit from skilled PT services to modify and progress therapeutic interventions, address functional mobility deficits, address ROM deficits, address strength deficits, and analyze and address soft tissue restrictions to attain remaining goals. [x]  See Plan of Care  []  See progress note/recertification  []  See Discharge Summary         Progress towards goals / Updated goals:  Short Term Goals: To be accomplished in 8 treatments. Patient will be able to perform HEP independently to increase left shoulder shoulder strength and ROM to maintain mobility. MET  Patient will be able to cornelius and doff clothing with no difficulty or assistance with increase ROM. MET  Patient will be able to lift light objects and place on shelf with no difficulty. MET  Patient will be able to wash her back with towel with no difficulty with full range of motion in IR and ER. Partially MET     Long Term Goals: To be accomplished in 16 treatments.   Patient will be able to comb and brush hair with no difficulty with increase strength to 4+/5. Patient will be able to lift medium weights objects and place on shelf with no difficulty with 4+/5 strength. Patient will be able to lift her pots and pans on the stove to cook with no difficulty.     PLAN  [x]  Upgrade activities as tolerated     [x]  Continue plan of care  []  Update interventions per flow sheet       []  Discharge due to:_  []  Other:_      Read VIKA Cedillo LPTA 3/3/2023

## 2023-03-06 ENCOUNTER — HOSPITAL ENCOUNTER (OUTPATIENT)
Dept: PHYSICAL THERAPY | Age: 63
Discharge: HOME | End: 2023-03-06
Payer: MEDICARE

## 2023-03-06 PROCEDURE — 97110 THERAPEUTIC EXERCISES: CPT

## 2023-03-06 PROCEDURE — 97014 ELECTRIC STIMULATION THERAPY: CPT

## 2023-03-06 RX ORDER — PANTOPRAZOLE SODIUM 40 MG/1
40 TABLET, DELAYED RELEASE ORAL DAILY
Qty: 30 TABLET | Refills: 3 | Status: SHIPPED | OUTPATIENT
Start: 2023-03-06

## 2023-03-06 NOTE — PROGRESS NOTES
PT DAILY TREATMENT NOTE - [facility] 2-15    Patient Name: Tatyana Duran  Date:3/6/2023  : 1960  [x]  Patient  Verified  Payor: Jerrell Enrique / Plan: [address] [place] [address] HMO / Product Type: Managed Care Medicare /    In time: 1:48  Out time: 2:45  Total Treatment Time (min): 57  Total Timed Codes (min): 47  1:1 Treatment Time ([facility] only): 52   Visit #:  11 of 16    Treatment Area: Rotator cuff arthropathy of left shoulder [M12.812]  Impingement syndrome of left shoulder [M75.42]  Arthritis of left acromioclavicular joint [M19.012]    SUBJECTIVE  Pain Level (0-10 scale): 0/10  Any medication changes, allergies to medications, adverse drug reactions, diagnosis change, or new procedure performed?: [x] No    [] Yes (see summary sheet for update)  Subjective functional status/changes:     \"I don't have any right now. \"    OBJECTIVE  Modality rationale: decrease pain and increase tissue extensibility to improve the patients ability to be able to perform AROM   Min Type Additional Details     10 [x] Estim: []Att   [x]Unatt        []TENS instruct                  [x]IFC  []Premod   []NMES                     []Other:  []w/US   []w/ice   [x]w/heat  Position: seated  Location: L shoulder    []  Traction: [] Cervical       []Lumbar                       [] Prone          []Supine                       []Intermittent   []Continuous Lbs:  [] before manual  [] after manual  [] With heat  [] Simultaneously performed with E-Stim    []  Ultrasound: []Continuous   [] Pulsed                           []1MHz   []3MHz Location:  W/cm2:    []  Ice     []  heat  []  Ice massage Position:  Location:    []  Vasopneumatic Device  If using vaso (only need to measure limb vaso being performed on)      pre-treatment girth :       post-treatment girth :       measured at (landmark location)  Pressure:       [] lo [] med [] hi   Temperature: [] lo [] med [] Hi    [] With ice    [] Simultaneously performed with Estim   [x] Skin assessment post-treatment:  [x]intact [x]redness- no adverse reaction       []redness - adverse reaction:     47 min Therapeutic Exercise:  [x] See flow sheet :   Rationale: increase ROM and increase strength to improve the patients ability to improve functional mobility    With   [x] TE   [] TA   [] neuro   [] other: Patient Education: [x] Review HEP    [] Progressed/Changed HEP based on:   [] positioning   [] body mechanics   [] transfers   [] heat/ice application    [] other:      Other Objective/Functional Measures: Added clean wall w/ towel flexion/ abduction    Pain Level (0-10 scale) post treatment: 0/10    ASSESSMENT/Changes in Function: The pt tolerated treatment fair. Pt stated finger ladder makes her shoulder hurt worse and doesn't want to do them anymore. Pt stated she is ok with ball on wall and towel on wall. Patient will continue to benefit from skilled PT services to address functional mobility deficits, address ROM deficits, and address strength deficits to attain remaining goals. [x]  See Plan of Care  []  See progress note/recertification  []  See Discharge Summary         Progress towards goals / Updated goals:  Short Term Goals: To be accomplished in 8 treatments. Patient will be able to perform HEP independently to increase left shoulder shoulder strength and ROM to maintain mobility. MET  Patient will be able to cornelius and doff clothing with no difficulty or assistance with increase ROM. MET  Patient will be able to lift light objects and place on shelf with no difficulty. MET  Patient will be able to wash her back with towel with no difficulty with full range of motion in IR and ER. Partially MET     Long Term Goals: To be accomplished in 16 treatments. Patient will be able to comb and brush hair with no difficulty with increase strength to 4+/5. Patient will be able to lift medium weights objects and place on shelf with no difficulty with 4+/5 strength.   Patient will be able to lift her pots and pans on the stove to cook with no difficulty.     PLAN  [x]  Upgrade activities as tolerated     [x]  Continue plan of care  []  Update interventions per flow sheet       []  Discharge due to:_  []  Other:_      Luigi Costa PTA, [facility] 3/6/2023

## 2023-03-08 NOTE — TELEPHONE ENCOUNTER
I called and explained to Lauren Brown that Dr Azam Navarrete sent in protonix to her  pharmacy. She said ok.

## 2023-03-10 ENCOUNTER — HOSPITAL ENCOUNTER (OUTPATIENT)
Dept: PHYSICAL THERAPY | Age: 63
Discharge: HOME | End: 2023-03-10
Payer: MEDICARE

## 2023-03-10 PROCEDURE — 97110 THERAPEUTIC EXERCISES: CPT

## 2023-03-10 PROCEDURE — 97150 GROUP THERAPEUTIC PROCEDURES: CPT

## 2023-03-10 PROCEDURE — 97014 ELECTRIC STIMULATION THERAPY: CPT

## 2023-03-10 NOTE — PROGRESS NOTES
PT DAILY TREATMENT NOTE - [facility] 2-15    Patient Name: Aviva Sanderson  Date:3/10/2023  : 1960  [x]  Patient  Verified  Payor: Collette Patterson / Plan: [address] [place] [address] HMO / Product Type: Managed Care Medicare /    In time: 1:57  Out time: 2:57  Total Treatment Time (min): 60  Total Timed Codes (min): 40  1:1 Treatment Time ([facility] only): 40   Visit #:  12 of 16    Treatment Area: Rotator cuff arthropathy of left shoulder [M12.812]  Impingement syndrome of left shoulder [M75.42]  Arthritis of left acromioclavicular joint [M19.012]    SUBJECTIVE  Pain Level (0-10 scale): 810  Any medication changes, allergies to medications, adverse drug reactions, diagnosis change, or new procedure performed?: [x] No    [] Yes (see summary sheet for update)  Subjective functional status/changes:     \"It's aching all the way down my arm today. Maybe it's the weather. \"    OBJECTIVE  Modality rationale: decrease pain and increase tissue extensibility to improve the patients ability to be able to perform AROM   Min Type Additional Details     15 [x] Estim: []Att   [x]Unatt        []TENS instruct                  [x]IFC  []Premod   []NMES                     []Other:  []w/US   []w/ice   [x]w/heat  Position: seated  Location: L shoulder    []  Traction: [] Cervical       []Lumbar                       [] Prone          []Supine                       []Intermittent   []Continuous Lbs:  [] before manual  [] after manual  [] With heat  [] Simultaneously performed with E-Stim    []  Ultrasound: []Continuous   [] Pulsed                           []1MHz   []3MHz Location:  W/cm2:    []  Ice     []  heat  []  Ice massage Position:  Location:    []  Vasopneumatic Device  If using vaso (only need to measure limb vaso being performed on)      pre-treatment girth :       post-treatment girth :       measured at (landmark location)  Pressure:       [] lo [] med [] hi   Temperature: [] lo [] med [] Hi    [] With ice    [] Simultaneously performed with Estim   [x] Skin assessment post-treatment:  [x]intact [x]redness- no adverse reaction       []redness - adverse reaction:     40 min Therapeutic Exercise:  [x] See flow sheet :   Rationale: increase ROM and increase strength to improve the patients ability to improve functional mobility          5 min Group Therapy:  [x] See flow sheet :   Billed while completing TE with another pt towards end of session    With   [x] TE   [] TA   [] neuro   [] other: Patient Education: [x] Review HEP    [] Progressed/Changed HEP based on:   [] positioning   [] body mechanics   [] transfers   [] heat/ice application    [] other:      Pain Level (0-10 scale) post treatment: 4/10    ASSESSMENT/Changes in Function: The pt tolerated treatment fair. Pt c/o increased pain in her L shoulder today with exercises today. Longer time on ES/heat today. Patient will continue to benefit from skilled PT services to address functional mobility deficits, address ROM deficits, and address strength deficits to attain remaining goals. [x]  See Plan of Care  []  See progress note/recertification  []  See Discharge Summary         Progress towards goals / Updated goals:  Short Term Goals: To be accomplished in 8 treatments. Patient will be able to perform HEP independently to increase left shoulder shoulder strength and ROM to maintain mobility. MET  Patient will be able to cornelius and doff clothing with no difficulty or assistance with increase ROM. MET  Patient will be able to lift light objects and place on shelf with no difficulty. MET  Patient will be able to wash her back with towel with no difficulty with full range of motion in IR and ER. Partially MET     Long Term Goals: To be accomplished in 16 treatments. Patient will be able to comb and brush hair with no difficulty with increase strength to 4+/5. Patient will be able to lift medium weights objects and place on shelf with no difficulty with 4+/5 strength.   Patient will be able to lift her pots and pans on the stove to cook with no difficulty.     PLAN  [x]  Upgrade activities as tolerated     [x]  Continue plan of care  []  Update interventions per flow sheet       []  Discharge due to:_  []  Other:_      Livier Kovacs PTA, LPEMIR 3/10/2023

## 2023-03-13 ENCOUNTER — APPOINTMENT (OUTPATIENT)
Dept: PHYSICAL THERAPY | Age: 63
End: 2023-03-13
Payer: MEDICARE

## 2023-03-17 ENCOUNTER — HOSPITAL ENCOUNTER (OUTPATIENT)
Dept: PHYSICAL THERAPY | Age: 63
Discharge: HOME | End: 2023-03-17
Payer: MEDICARE

## 2023-03-17 PROCEDURE — 97014 ELECTRIC STIMULATION THERAPY: CPT

## 2023-03-17 PROCEDURE — 97110 THERAPEUTIC EXERCISES: CPT

## 2023-03-17 NOTE — PROGRESS NOTES
PT DAILY TREATMENT NOTE - [facility] 2-15    Patient Name: Fide Mason  Date:3/17/2023  : 1960  [x]  Patient  Verified  Payor: Vicente Parcel / Plan: [address] [place] [address] HMO / Product Type: Managed Care Medicare /    In time:200 pm  Out time:310 pm  Total Treatment Time (min): 70  Total Timed Codes (min): 58  1:1 Treatment Time ( only): 62   Visit #:  13 of 16    Treatment Area: Rotator cuff arthropathy of left shoulder [M12.812]  Impingement syndrome of left shoulder [M75.42]  Arthritis of left acromioclavicular joint [M19.012]    SUBJECTIVE  Pain Level (0-10 scale): 1/10  Any medication changes, allergies to medications, adverse drug reactions, diagnosis change, or new procedure performed?: [x] No    [] Yes (see summary sheet for update)  Subjective functional status/changes:     \"PT reports shoulder still hurting until she uses her electric machine at night. .\"    OBJECTIVE  Modality rationale: decrease pain, increase tissue extensibility, and increase muscle contraction/control to improve the patients ability to increase shoulder motion with decreased c/o.    Min Type Additional Details   12 [x] Estim: []Att   [x]Unatt        []TENS instruct                  [x]IFC  []Premod   []NMES                     []Other:  []w/US   []w/ice   [x]w/heat  Position: seated   Location: anterior shoulder     []  Traction: [] Cervical       []Lumbar                       [] Prone          []Supine                       []Intermittent   []Continuous Lbs:  [] before manual  [] after manual  [] With heat  [] Simultaneously performed with E-Stim    []  Ultrasound: []Continuous   [] Pulsed                           []1MHz   []3MHz Location:  W/cm2:    []  Ice     []  heat  []  Ice massage Position:  Location:    []  Vasopneumatic Device  If using vaso (only need to measure limb vaso being performed on)      pre-treatment girth :       post-treatment girth :       measured at (landmark location)  Pressure:       [] lo [] med [] hi   Temperature: [] lo [] med [] Hi    [] With ice    [] Simultaneously performed with Estim   [x] Skin assessment post-treatment:  [x]intact [x]redness- no adverse reaction       []redness - adverse reaction:     58 min Therapeutic Exercise:  [x] See flow sheet :   Rationale: increase ROM, increase strength, and improve coordination to improve the patients ability to increase functional shoulder motion             With   [x] TE   [] TA   [] neuro   [] other: Patient Education: [x] Review HEP    [] Progressed/Changed HEP based on:   [] positioning   [] body mechanics   [] transfers   [] heat/ice application    [] other:      Other Objective/Functional Measures:                    Shoulder:   Strength AROM       Right Left Right Left     Flexion 3+/5 ** 4/5** 80 110     Abduction 3+/5** 4/5 ** 75 100     IR 3+/5 3+/5 80 85     ER 3/5 3+/5 80 85   Elbow:   Strength AROM       Right Left Right Left     Flexion 4/5 4/5 90 90     Extension 4/5 4/5 0 0   Wrist:   Strength AROM       Right Left Right Left     Flexion 4/5 4/5 wfl wfl     Extension 4/5 4/5 wfl wfl     Pronation 4/5 4/5 wfl wfl     Supination 4/5 4/5 wfl wfl     Pain Level (0-10 scale) post treatment: 5/10    ASSESSMENT/Changes in Function:   The pt tolerated treatment working on ROM and strengthening ex to increase active motion and daily activity levels. Pt is not able to perform active motion ex even with lighter resistance to previous levels noting increased pain with attempts. ROM , MMT and goals checked , physical therapist discussed pt level and continuation of services at this time. .   Patient will continue to benefit from skilled PT services to modify and progress therapeutic interventions, address functional mobility deficits, address ROM deficits, address strength deficits, and analyze and address soft tissue restrictions to attain remaining goals.      [x]  See Plan of Care  [x]  See progress note/recertification  []  See Discharge Summary Progress towards goals / Updated goals:  Short Term Goals: To be accomplished in 8 treatments. Patient will be able to perform HEP independently to increase left shoulder shoulder strength and ROM to maintain mobility. MET  Patient will be able to cornelius and doff clothing with no difficulty or assistance with increase ROM. MET  Patient will be able to lift light objects and place on shelf with no difficulty. MET  Patient will be able to wash her back with towel with no difficulty with full range of motion in IR and ER. Partially MET (rotation no lift)     Long Term Goals: To be accomplished in 16 treatments. Patient will be able to comb and brush hair with no difficulty with increase strength to 4+/5. Progressing( has to compensate with posture correction)  Patient will be able to lift medium weights objects and place on shelf with no difficulty with 4+/5 strength. Progressing (~ 4 lb max)  Patient will be able to lift her pots and pans on the stove to cook with no difficulty.  Progressing (empty only)    PLAN  [x]  Upgrade activities as tolerated     [x]  Continue plan of care  [x]  Update interventions per flow sheet       []  Discharge due to:_  []  Other:_      Angelica Gibson PTA, HERMES 3/17/2023

## 2023-03-20 ENCOUNTER — HOSPITAL ENCOUNTER (OUTPATIENT)
Dept: PHYSICAL THERAPY | Age: 63
Discharge: HOME | End: 2023-03-20
Payer: MEDICARE

## 2023-03-20 PROCEDURE — 97014 ELECTRIC STIMULATION THERAPY: CPT

## 2023-03-20 PROCEDURE — 97110 THERAPEUTIC EXERCISES: CPT

## 2023-03-20 NOTE — PROGRESS NOTES
[address], [address]  Ph: [phone]    Fax: [phone]    Progress Note    Name: Raoul Kenney   : 1960   MD: Nanda Trevino, [address]       Treatment Diagnosis: Rotator cuff arthropathy of left shoulder [M12.812]  Impingement syndrome of left shoulder [M75.42]  Arthritis of left acromioclavicular joint [M19.012]  Start of Care: 2023    Visits from Start of Care: 13   Missed Visits:     Summary of Care / Assessment / Recommendations:   60year old female with rotator cuff repair of the left shoulder in  and right shoulder in 2016. Patient fell  and injured her left shoulder. She received a cortisone injection and x-rays findings were of  2 views of the left shoulder demonstrate no fracture, dislocation or other acute abnormality. Mild degenerative changes are seen in the glenohumeral and acromioclavicular joints. Patient has bilateral weakness of both shoulder/scapula. Active movements  is WFL is in both shoulder in supine position. Full range difficulty to achieve in sitting position with out assistance. Patient has completed 13  Physical Therapy visits. Patient continues have weakness with overhead mobility. Pain has decreased and patient is self treating with TENS. Physical Therapy will continue to increase strength in each plane until she completed her plan of care. Then patient will be allowed to perform her HEP independently. Patient will benefit from skilled Physical Therapy to increase strength and maintain active mobility in sitting and standing position to return to household and daily activities. Pain modalities will continue as needed to decrease pain and inflammation as needed. Patient will be provided a Home Exercise Program to increase strength to return to her maximal level of function. Progress Toward Goals:  Short Term Goals: To be accomplished in 8 treatments.   Patient will be able to perform HEP independently to increase left shoulder  strength and ROM to maintain mobility. MET  Patient will be able to cornelius and doff clothing with no difficulty or assistance with increase ROM. MET  Patient will be able to lift light objects and place on shelf with no difficulty. MET  Patient will be able to wash her back with towel with no difficulty with full range of motion in IR and ER. Partially MET (rotation no lift)     Long Term Goals: To be accomplished in 16 treatments. Patient will be able to comb and brush hair with no difficulty with increase strength to 4+/5. Progressing( has to compensate with posture correction)  Patient will be able to lift medium weights objects and place on shelf with no difficulty with 4+/5 strength. Progressing (~ 4 lb max)  Patient will be able to lift her pots and pans on the stove to cook with no difficulty. Progressing (empty only)       Recertification Period: 1/16/2023 to 4/13/2023    Frequency/Duration:  2 treatments per week, for 16 treatments. Laurie Sampson, PT,  3/20/2023     ________________________________________________________________________  NOTE TO PHYSICIAN:  Please complete the following and fax to:  [facility]:   Fax: [phone]  . Retain this original for your records. If you are unable to process this request in 24 hours, please contact our office.        ____ I have read the above report and request that my patient continue therapy with the following changes/special instructions:  ____ I have read the above report and request that my patient be discharged from therapy    Physician's Signature:_________________ Date:___________Time:__________

## 2023-03-20 NOTE — PROGRESS NOTES
PT DAILY TREATMENT NOTE - [facility] 2-15    Patient Name: Maggie Dowell  Date:3/20/2023  : 1960  [x]  Patient  Verified  Payor: Shawn Lala / Plan: [address] [place] [address] HMO / Product Type: Managed Care Medicare /    In time: 2:02  Out time: 3:04  Total Treatment Time (min): 62  Total Timed Codes (min): 52  1:1 Treatment Time ( [address] only): 52   Visit #:  14 of 16    Treatment Area: Rotator cuff arthropathy of left shoulder [M12.812]  Impingement syndrome of left shoulder [M75.42]  Arthritis of left acromioclavicular joint [M19.012]    SUBJECTIVE  Pain Level (0-10 scale): 6/10  Any medication changes, allergies to medications, adverse drug reactions, diagnosis change, or new procedure performed?: [x] No    [] Yes (see summary sheet for update)  Subjective functional status/changes: \"My stomach has been bothering me. \"    OBJECTIVE  Modality rationale: decrease pain and increase tissue extensibility to improve the patients ability to be able to perform AROM   Min Type Additional Details     10 [x] Estim: []Att   [x]Unatt        []TENS instruct                  [x]IFC  []Premod   []NMES                     []Other:  []w/US   []w/ice   [x]w/heat  Position: seated  Location: L shoulder    []  Traction: [] Cervical       []Lumbar                       [] Prone          []Supine                       []Intermittent   []Continuous Lbs:  [] before manual  [] after manual  [] With heat  [] Simultaneously performed with E-Stim    []  Ultrasound: []Continuous   [] Pulsed                           []1MHz   []3MHz Location:  W/cm2:    []  Ice     []  heat  []  Ice massage Position:  Location:    []  Vasopneumatic Device  If using vaso (only need to measure limb vaso being performed on)      pre-treatment girth :       post-treatment girth :       measured at (landmark location)  Pressure:       [] lo [] med [] hi   Temperature: [] lo [] med [] Hi    [] With ice    [] Simultaneously performed with Estim   [x] Skin assessment post-treatment:  [x]intact [x]redness- no adverse reaction       []redness - adverse reaction:     52 min Therapeutic Exercise:  [x] See flow sheet :   Rationale: increase ROM and increase strength to improve the patients ability to improve functional mobility    With   [x] TE   [] TA   [] neuro   [] other: Patient Education: [x] Review HEP    [] Progressed/Changed HEP based on:   [] positioning   [] body mechanics   [] transfers   [] heat/ice application    [] other:      Pain Level (0-10 scale) post treatment: 0/10    ASSESSMENT/Changes in Function: The pt tolerated treatment fair. Pt c/o some discomfort in shoulder with abduction wall slides, but able to complete. Patient will continue to benefit from skilled PT services to address functional mobility deficits, address ROM deficits, and address strength deficits to attain remaining goals. [x]  See Plan of Care  []  See progress note/recertification  []  See Discharge Summary         Progress towards goals / Updated goals:  Short Term Goals: To be accomplished in 8 treatments. Patient will be able to perform HEP independently to increase left shoulder  strength and ROM to maintain mobility. MET  Patient will be able to cornelius and doff clothing with no difficulty or assistance with increase ROM. MET  Patient will be able to lift light objects and place on shelf with no difficulty. MET  Patient will be able to wash her back with towel with no difficulty with full range of motion in IR and ER. Partially MET (rotation no lift)     Long Term Goals: To be accomplished in 16 treatments. Patient will be able to comb and brush hair with no difficulty with increase strength to 4+/5. Progressing( has to compensate with posture correction)  Patient will be able to lift medium weights objects and place on shelf with no difficulty with 4+/5 strength. Progressing (~ 4 lb max)  Patient will be able to lift her pots and pans on the stove to cook with no difficulty. Progressing (empty only)    PLAN  [x]  Upgrade activities as tolerated     [x]  Continue plan of care  []  Update interventions per flow sheet       []  Discharge due to:_  []  Other:_      Tita Thomason PTA, HERMES 3/20/2023

## 2023-03-23 ENCOUNTER — OFFICE VISIT (OUTPATIENT)
Dept: GASTROENTEROLOGY | Age: 63
End: 2023-03-23

## 2023-03-23 VITALS
HEART RATE: 73 BPM | RESPIRATION RATE: 14 BRPM | OXYGEN SATURATION: 97 % | DIASTOLIC BLOOD PRESSURE: 80 MMHG | TEMPERATURE: 97.9 F | SYSTOLIC BLOOD PRESSURE: 140 MMHG

## 2023-03-23 DIAGNOSIS — K29.70 HELICOBACTER PYLORI GASTRITIS: ICD-10-CM

## 2023-03-23 DIAGNOSIS — E66.01 MORBID OBESITY WITH BMI OF 60.0-69.9, ADULT (HCC): ICD-10-CM

## 2023-03-23 DIAGNOSIS — N39.0 URINARY TRACT INFECTION WITHOUT HEMATURIA, SITE UNSPECIFIED: ICD-10-CM

## 2023-03-23 DIAGNOSIS — K57.30 DIVERTICULAR DISEASE OF COLON: ICD-10-CM

## 2023-03-23 DIAGNOSIS — K21.00 GASTROESOPHAGEAL REFLUX DISEASE WITH ESOPHAGITIS WITHOUT HEMORRHAGE: ICD-10-CM

## 2023-03-23 DIAGNOSIS — K57.92 DIVERTICULITIS: ICD-10-CM

## 2023-03-23 DIAGNOSIS — B96.81 HELICOBACTER PYLORI GASTRITIS: ICD-10-CM

## 2023-03-23 DIAGNOSIS — N39.0 URINARY TRACT INFECTION WITHOUT HEMATURIA, SITE UNSPECIFIED: Primary | ICD-10-CM

## 2023-03-23 RX ORDER — METRONIDAZOLE 500 MG/1
500 TABLET ORAL 3 TIMES DAILY
Qty: 42 TABLET | Refills: 0 | Status: SHIPPED | OUTPATIENT
Start: 2023-03-23

## 2023-03-23 RX ORDER — CIPROFLOXACIN 500 MG/1
500 TABLET ORAL 2 TIMES DAILY
Qty: 20 TABLET | Refills: 0 | Status: SHIPPED | OUTPATIENT
Start: 2023-03-23

## 2023-03-23 NOTE — PROGRESS NOTES
1. Have you been to the ER, urgent care clinic since your last visit? Hospitalized since your last visit?no     2. Have you seen or consulted any other health care providers outside of the [address] [place] [address] since your last visit? Include any pap smears or colon screening. No   Chief Complaint   Patient presents with    Follow-up    Abdominal Pain     Problems with bowels, have abd pain before bowels move.    Has 2-3 Bms a day     Visit Vitals  BP (!) 140/80 (BP 1 Location: Right upper arm, BP Patient Position: Sitting, BP Cuff Size: Large adult)   Pulse 73   Temp 97.9 °F (36.6 °C) (Temporal)   Resp 14   Ht (P) 5' 6\" (1.676 m)   Wt (!) (P) 174.5 kg (384 lb 9.6 oz)   LMP  (LMP Unknown)   SpO2 97%   BMI (P) 62.08 kg/m²

## 2023-03-24 ENCOUNTER — HOSPITAL ENCOUNTER (OUTPATIENT)
Dept: PHYSICAL THERAPY | Age: 63
Discharge: HOME | End: 2023-03-24
Payer: MEDICARE

## 2023-03-24 PROCEDURE — 97110 THERAPEUTIC EXERCISES: CPT

## 2023-03-24 PROCEDURE — 97014 ELECTRIC STIMULATION THERAPY: CPT

## 2023-03-24 PROCEDURE — 97150 GROUP THERAPEUTIC PROCEDURES: CPT

## 2023-03-24 NOTE — PROGRESS NOTES
PT DAILY TREATMENT NOTE - [facility] -15    Patient Name: Steve Haynes  Date:3/24/2023  : 1960  [x]  Patient  Verified  Payor: Eddie Danielle / Plan: [address] [place] [address] HMO / Product Type: Managed Care Medicare /    In time:2:07  Out time:2:58  Total Treatment Time (min): 51  Total Timed Codes (min): 28  1:1 Treatment Time ( [address] only): 28   Visit #:  15 of 16    Treatment Area: Rotator cuff arthropathy of left shoulder [M12.812]  Impingement syndrome of left shoulder [M75.42]  Arthritis of left acromioclavicular joint [M19.012]    SUBJECTIVE  Pain Level (0-10 scale): 5/10  Any medication changes, allergies to medications, adverse drug reactions, diagnosis change, or new procedure performed?: [x] No    [] Yes (see summary sheet for update)  Subjective functional status/changes:     \"I am a little sore today. \"    OBJECTIVE  Modality rationale: decrease inflammation, decrease pain, and increase tissue extensibility to improve the patients ability to perform daily tasks with less tissue irritation.     Min Type Additional Details   10 [x] Estim: []Att   [x]Unatt        []TENS instruct                  [x]IFC  []Premod   []NMES                     []Other:  []w/US   []w/ice   [x]w/heat  Position: seated  Location: L shoulder     []  Traction: [] Cervical       []Lumbar                       [] Prone          []Supine                       []Intermittent   []Continuous Lbs:  [] before manual  [] after manual  [] With heat  [] Simultaneously performed with E-Stim    []  Ultrasound: []Continuous   [] Pulsed                           []1MHz   []3MHz Location:  W/cm2:    []  Ice     []  heat  []  Ice massage Position:  Location:    []  Vasopneumatic Device  If using vaso (only need to measure limb vaso being performed on)      pre-treatment girth :       post-treatment girth :       measured at (landmark location)  Pressure:       [] lo [] med [] hi   Temperature: [] lo [] med [] Hi    [] With ice    [] Simultaneously performed with Estim   [x] Skin assessment post-treatment:  [x]intact [x]redness- no adverse reaction       []redness - adverse reaction:     28 min Therapeutic Exercise:  [x] See flow sheet :   Rationale: increase ROM and increase strength to improve the patients ability to perform reaching tasks with less irritation. 13 min Group Therapy:  [x] See flow sheet :   Billed while completing exercises while therapist was assisting another patient. With   [x] TE   [] TA   [] neuro   [] other: Patient Education: [x] Review HEP    [] Progressed/Changed HEP based on:   [] positioning   [] body mechanics   [] transfers   [] heat/ice application    [] other:          Pain Level (0-10 scale) post treatment: 0/10    ASSESSMENT/Changes in Function:   The pt tolerated treatment well today. She will attended next session and DC before returning to MD on 04/20/2023. She tolerated session well and had good pain relief at end of session. Patient will continue to benefit from skilled PT services to modify and progress therapeutic interventions, address functional mobility deficits, address ROM deficits, address strength deficits, and analyze and address soft tissue restrictions to attain remaining goals. [x]  See Plan of Care  []  See progress note/recertification  []  See Discharge Summary         Progress towards goals / Updated goals:  Short Term Goals: To be accomplished in 8 treatments. Patient will be able to perform HEP independently to increase left shoulder  strength and ROM to maintain mobility. MET  Patient will be able to cornelius and doff clothing with no difficulty or assistance with increase ROM. MET  Patient will be able to lift light objects and place on shelf with no difficulty. MET  Patient will be able to wash her back with towel with no difficulty with full range of motion in IR and ER. Partially MET (rotation no lift)     Long Term Goals: To be accomplished in 16 treatments.   Patient will be able to comb and brush hair with no difficulty with increase strength to 4+/5. Progressing( has to compensate with posture correction)  Patient will be able to lift medium weights objects and place on shelf with no difficulty with 4+/5 strength. Progressing (~ 4 lb max)  Patient will be able to lift her pots and pans on the stove to cook with no difficulty.  Progressing (empty only)    PLAN  [x]  Upgrade activities as tolerated     [x]  Continue plan of care  []  Update interventions per flow sheet       []  Discharge due to:_  []  Other:_      Zach Bolus, PT, DPT 3/24/2023

## 2023-03-25 LAB
APPEARANCE UR: CLEAR
BACTERIA #/AREA URNS HPF: NORMAL /[HPF]
BILIRUB UR QL STRIP: NEGATIVE
CASTS URNS QL MICRO: NORMAL /LPF
COLOR UR: YELLOW
EPI CELLS #/AREA URNS HPF: NORMAL /HPF (ref 0–10)
GLUCOSE UR QL STRIP: ABNORMAL
HGB UR QL STRIP: NEGATIVE
KETONES UR QL STRIP: NEGATIVE
LEUKOCYTE ESTERASE UR QL STRIP: NEGATIVE
MICRO URNS: ABNORMAL
MICRO URNS: ABNORMAL
NITRITE UR QL STRIP: NEGATIVE
PH UR STRIP: 5.5 [PH] (ref 5–7.5)
PROT UR QL STRIP: ABNORMAL
RBC #/AREA URNS HPF: NORMAL /HPF (ref 0–2)
SP GR UR STRIP: 1.02 (ref 1–1.03)
URINALYSIS REFLEX, 377202: ABNORMAL
UROBILINOGEN UR STRIP-MCNC: 0.2 MG/DL (ref 0.2–1)
WBC #/AREA URNS HPF: NORMAL /HPF (ref 0–5)

## 2023-03-26 NOTE — PROGRESS NOTES
Tell patient that her urinalysis was pretty much normal with only showing a little glucose in her urine otherwise normal.  No infection was noted. She should continue the antibiotic therapy for the diverticulitis.

## 2023-03-26 NOTE — PROGRESS NOTES
Deborah Pitt is a 60 y.o. female who presents today for the following:  Chief Complaint   Patient presents with    Follow-up    Abdominal Pain     Problems with bowels, have abd pain before bowels move. Has 2-3 Bms a day         Allergies   Allergen Reactions    Celebrex [Celecoxib] Rash    Cleocin [Clindamycin Phosphate] Palpitations    Mushroom Swelling    Strawberry Rash    Ultracet [Tramadol-Acetaminophen] Rash    Zithromax [Azithromycin] Rash    Lisinopril Swelling       Current Outpatient Medications   Medication Sig    ciprofloxacin HCl (CIPRO) 500 mg tablet Take 1 Tablet by mouth two (2) times a day. Indications: diverticulitis    metroNIDAZOLE (FLAGYL) 500 mg tablet Take 1 Tablet by mouth three (3) times daily. Indications: diverticulitis    pantoprazole (PROTONIX) 40 mg tablet Take 1 Tablet by mouth daily. allopurinoL (ZYLOPRIM) 300 mg tablet Take 1 Tablet by mouth daily. dicyclomine (BENTYL) 10 mg capsule Take 1 Capsule by mouth three (3) times daily as needed for Abdominal Cramps. Indications: irritable colon    atorvastatin (LIPITOR) 10 mg tablet TAKE 1 TABLET EVERY DAY    magnesium oxide (MAG-OX) 400 mg tablet TAKE 1 TABLET EVERY DAY    Insulin Needles, Disposable, 32 gauge x 5/32\" ndle Use pen needle to inject medicine every 7 days    furosemide (LASIX) 40 mg tablet TAKE 1 TABLET ON MONDAY, WEDNESDAY AND FRIDAY    alcohol swabs padm 1 Swab by Apply Externally route daily.     lancets misc Trueplus lancets 33g    glucose blood VI test strips (True Metrix Glucose Test Strip) strip Use one strip to check glucose twice a day    glimepiride (AMARYL) 1 mg tablet 1 tab before breakfast and 2 tabs before dinner, 90 days    metFORMIN ER (GLUCOPHAGE XR) 500 mg tablet TAKE 1 TABLET WITH DINNER FOR 2 WEEKS, THEN TAKE 2 TABLETS WITH DINNER THEREAFTER    Blood-Glucose Meter (True Metrix Glucose Meter) monitoring kit Use to check glucose daily    Blood Glucose Control, Low (True Metrix Level 1) soln 1 Bottle by Does Not Apply route daily. multivitamin (ONE A DAY) tablet Take 1 Tab by mouth daily. aspirin 81 mg chewable tablet Take 81 mg by mouth daily. cholecalciferol (VITAMIN D3) (1000 Units /25 mcg) tablet Take 1,000 Units by mouth daily. meclizine (ANTIVERT) 25 mg tablet Take 1 Tablet by mouth two (2) times a day. albuterol-ipratropium (DUO-NEB) 2.5 mg-0.5 mg/3 ml nebu 3 mL by Nebulization route every four (4) hours as needed for Wheezing. albuterol (PROVENTIL HFA, VENTOLIN HFA, PROAIR HFA) 90 mcg/actuation inhaler Take 1 Puff by inhalation every four (4) hours as needed for Wheezing. cpap machine kit by Does Not Apply route. (Patient not taking: Reported on 3/23/2023)    albuterol-ipratropium (DUO-NEB) 2.5 mg-0.5 mg/3 ml nebu 3 mL by Nebulization route. acetaminophen (TYLENOL) 650 mg TbER Take 650 mg by mouth every eight (8) hours. No current facility-administered medications for this visit.        Past Medical History:   Diagnosis Date    Arthritis     Asthma     Chronic kidney disease     stage 3    Chronic obstructive pulmonary disease (HCC)     Diabetes (HCC)     GERD (gastroesophageal reflux disease)     High cholesterol     Hypertension     Menopause     Morbid obesity ([address]     Obstructive sleep apnea on CPAP     Vertigo        Past Surgical History:   Procedure Laterality Date    HX BREAST BIOPSY Left 2019    HX CHOLECYSTECTOMY      HX ENDOSCOPY  01/01/2019    HX HYSTERECTOMY      HX OOPHORECTOMY      HX ORTHOPAEDIC      right shoulder spur    HX ROTATOR CUFF REPAIR Left     by suture    HX TUBAL LIGATION Bilateral        Family History   Problem Relation Age of Onset    Heart Disease Mother     Lung Disease Mother         lung ca    Lung Disease Father         PNA    Cancer Maternal Grandmother         gyn       Social History     Socioeconomic History    Marital status:      Spouse name: Not on file    Number of children: Not on file    Years of education: Not on file Highest education level: Some college, no degree   Occupational History    Not on file   Tobacco Use    Smoking status: Never    Smokeless tobacco: Never   Vaping Use    Vaping Use: Never used   Substance and Sexual Activity    Alcohol use: Never    Drug use: Never    Sexual activity: Not Currently     Partners: Male     Birth control/protection: Surgical   Other Topics Concern     Service Not Asked    Blood Transfusions Not Asked    Caffeine Concern Not Asked    Occupational Exposure Not Asked    Hobby Hazards Not Asked    Sleep Concern Not Asked    Stress Concern Not Asked    Weight Concern Not Asked    Special Diet Not Asked    Back Care Not Asked    Exercise Not Asked    Bike Helmet Not Asked    Seat Belt Not Asked    Self-Exams Not Asked   Social History Narrative    Not on file     Social Determinants of Health     Financial Resource Strain: Not on file   Food Insecurity: Not on file   Transportation Needs: Not on file   Physical Activity: Not on file   Stress: Not on file   Social Connections: Not on file   Intimate Partner Violence: Not on file   Housing Stability: Not on file         HPI  59-year-old female with history of hyperlipidemia diabetes mellitus type 2, gout, gastroesophageal reflux disease, asthma, COPD, obstructive sleep apnea, chronic kidney disease stage IV, morbid obesity, and irritable bowel syndrome who comes in for follow-up visit for abdominal pain. Patient states she still having problems with her abdomen. She has pain in the lower abdomen, especially in the suprapubic region. She states the pain is episodic. She has pain prior to urinating or having a bowel movement. No burning sensation on urination. Patient did have a cholecystectomy in the distant past.    Review of Systems   Constitutional: Negative. HENT: Negative. Eyes: Negative. Respiratory: Negative. Cardiovascular: Negative. Gastrointestinal:  Positive for abdominal pain, diarrhea and heartburn. Negative for blood in stool, constipation, melena, nausea and vomiting. Genitourinary: Negative. Musculoskeletal: Negative. Skin: Negative. Neurological: Negative. Endo/Heme/Allergies: Negative. Psychiatric/Behavioral: Negative. All other systems reviewed and are negative. Visit Vitals  BP (!) 140/80 (BP 1 Location: Right upper arm, BP Patient Position: Sitting, BP Cuff Size: Large adult)   Pulse 73   Temp 97.9 °F (36.6 °C) (Temporal)   Resp 14   Ht (P) 5' 6\" (1.676 m)   Wt (!) (P) 174.5 kg (384 lb 9.6 oz)   LMP  (LMP Unknown)   SpO2 97%   BMI (P) 62.08 kg/m²     Physical Exam  Vitals and nursing note reviewed. Constitutional:       Appearance: Normal appearance. She is obese. HENT:      Head: Normocephalic and atraumatic. Nose: Nose normal.      Mouth/Throat:      Mouth: Mucous membranes are moist.      Pharynx: Oropharynx is clear. Eyes:      General: No scleral icterus. Conjunctiva/sclera: Conjunctivae normal.      Pupils: Pupils are equal, round, and reactive to light. Cardiovascular:      Rate and Rhythm: Normal rate and regular rhythm. Pulses: Normal pulses. Heart sounds: Normal heart sounds. Pulmonary:      Effort: Pulmonary effort is normal.      Breath sounds: Normal breath sounds. Abdominal:      General: Bowel sounds are normal. There is no distension. Palpations: Abdomen is soft. There is no mass. Tenderness: There is abdominal tenderness. There is guarding. There is no right CVA tenderness, left CVA tenderness or rebound. Hernia: No hernia is present. Musculoskeletal:         General: Normal range of motion. Cervical back: Normal range of motion and neck supple. Skin:     General: Skin is warm and dry. Coloration: Skin is not jaundiced. Neurological:      General: No focal deficit present. Mental Status: She is alert and oriented to person, place, and time.    Psychiatric:         Mood and Affect: Mood normal. Behavior: Behavior normal.         Thought Content: Thought content normal.         Judgment: Judgment normal.          1. Urinary tract infection without hematuria, site unspecified  Obtain a urinalysis to evaluate possible abdominal pain. - URINALYSIS W/ REFLEX CULTURE; Future  - ciprofloxacin HCl (CIPRO) 500 mg tablet; Take 1 Tablet by mouth two (2) times a day. Indications: diverticulitis  Dispense: 20 Tablet; Refill: 0    2. Diverticulitis  Patient has had multiple episodes of diverticulitis so we will give patient another course of ciprofloxacin and metronidazole. - ciprofloxacin HCl (CIPRO) 500 mg tablet; Take 1 Tablet by mouth two (2) times a day. Indications: diverticulitis  Dispense: 20 Tablet; Refill: 0  - metroNIDAZOLE (FLAGYL) 500 mg tablet; Take 1 Tablet by mouth three (3) times daily. Indications: diverticulitis  Dispense: 42 Tablet; Refill: 0    3. Gastroesophageal reflux disease with esophagitis without hemorrhage  Continue the pantoprazole 40 mg daily. 4. Helicobacter pylori gastritis      5. Diverticular disease of colon      6.  Morbid obesity with BMI of 60.0-69.9, adult ([facility]

## 2023-03-27 ENCOUNTER — HOSPITAL ENCOUNTER (OUTPATIENT)
Dept: PHYSICAL THERAPY | Age: 63
Discharge: HOME | End: 2023-03-27
Payer: MEDICARE

## 2023-03-27 ENCOUNTER — TELEPHONE (OUTPATIENT)
Dept: GASTROENTEROLOGY | Age: 63
End: 2023-03-27

## 2023-03-27 PROCEDURE — 97110 THERAPEUTIC EXERCISES: CPT

## 2023-03-27 PROCEDURE — 97014 ELECTRIC STIMULATION THERAPY: CPT

## 2023-03-27 NOTE — PROGRESS NOTES
PT DAILY TREATMENT NOTE 2-15    Patient Name: Steve Haynes  Date:3/27/2023  : 1960  [x]  Patient  Verified  Payor: Eddie Lolis / Plan: [address] [place] [address] HMO / Product Type: Managed Care Medicare /    In time:2:00 PM  Out time:2:55 PM  Total Treatment Time (min): 55  Total Timed Codes (min): 40  1:1 Treatment Time ( only): 40   Visit #:  16 of 16    Treatment Area: Rotator cuff arthropathy of left shoulder [M12.812]  Impingement syndrome of left shoulder [M75.42]  Arthritis of left acromioclavicular joint [M19.012]    SUBJECTIVE  Pain Level (0-10 scale): 0/10  Any medication changes, allergies to medications, adverse drug reactions, diagnosis change, or new procedure performed?: [x] No    [] Yes (see summary sheet for update)  Subjective functional status/changes:     \"I can tell that my shoulders are stronger. I have a TENS Unit at home and I can continue to treat myself. \"    OBJECTIVE    Modality rationale: decrease inflammation, decrease pain, and increase tissue extensibility to improve the patients ability to perform daily activities with less pain.     Min Type Additional Details   15 [x] Estim: []Att   [x]Unatt        []TENS instruct                  [x]IFC  []Premod   []NMES                     []Other:  []w/US   []w/ice   [x]w/heat  Position:sitting  Location: left shoulder    []  Traction: [] Cervical       []Lumbar                       [] Prone          []Supine                       []Intermittent   []Continuous Lbs:  [] before manual  [] after manual  [] With heat  [] Simultaneously performed with E-Stim    []  Ultrasound: []Continuous   [] Pulsed                           []1MHz   []3MHz Location:  W/cm2:    []  Ice     []  heat  []  Ice massage Position:  Location:    []  Vasopneumatic Device  If using vaso (only need to measure limb vaso being performed on)      pre-treatment girth :       post-treatment girth :       measured at (landmark location)  Pressure:       [] lo [] med [] hi   Temperature: [] lo [] med [] Hi    [] With ice    [] Simultaneously performed with Estim   [x] Skin assessment post-treatment:  [x]intact [x]redness- no adverse reaction       []redness - adverse reaction:       40 min Therapeutic Exercise:  [x] See flow sheet :   Rationale: increase ROM and increase strength to improve the patients ability to comb and brush hair with no   difficulty with increase strength to 4+/5      With   [x] TE   [] TA   [] neuro   [] other: Patient Education: [x] Review HEP    [] Progressed/Changed HEP based on:   [] positioning   [] body mechanics   [] transfers   [] heat/ice application    [] other:      Pain Level (0-10 scale) post treatment: 0/10    ASSESSMENT/Changes in Function:   Patient tolerated treatment and has completed her plan of care. Patient continues to have weakness of both shoulder. She has been provided with a HEP to continue strengthening independently. She has a TENS Unit and will continue with self treatment for pain. Patient has benefited from skilled PT services to address ROM deficits, address strength deficits, and analyze and address soft tissue restrictions to attain goals. [x]  See Plan of Care  []  See progress note/recertification  []  See Discharge Summary         Progress towards goals / Updated goals:  Short Term Goals: To be accomplished in 8 treatments. Patient will be able to perform HEP independently to increase left shoulder  strength and ROM to maintain mobility. MET  Patient will be able to cornelius and doff clothing with no difficulty or assistance with increase ROM. MET  Patient will be able to lift light objects and place on shelf with no difficulty. MET  Patient will be able to wash her back with towel with no difficulty with full range of motion in IR and ER. Partially MET (rotation no lift)     Long Term Goals: To be accomplished in 16 treatments.   Patient will be able to comb and brush hair with no difficulty with increase strength to 4+/5. Progressing( has to compensate with posture correction)  Patient will be able to lift medium weights objects and place on shelf with no difficulty with 4+/5 strength. Progressing (~ 4 lb max)  Patient will be able to lift her pots and pans on the stove to cook with no difficulty.  Progressing (empty only)    PLAN  [x]  Upgrade activities as tolerated     [x]  Continue plan of care  []  Update interventions per flow sheet       []  Discharge due to:_  []  Other:_      Yo Kaiser, PT,  3/27/2023

## 2023-03-27 NOTE — TELEPHONE ENCOUNTER
----- Message from Hailee Gandhi MD sent at 3/25/2023  9:49 PM EDT -----  Tell patient that her urinalysis was pretty much normal with only showing a little glucose in her urine otherwise normal.  No infection was noted. She should continue the antibiotic therapy for the diverticulitis.

## 2023-03-27 NOTE — TELEPHONE ENCOUNTER
Patient returned call, I explained that the urinalysis was normal. Dr Marco Kaye wants her to continue antibiotic for diverticulitis. She said ok.

## 2023-03-27 NOTE — THERAPY DISCHARGE
[address]  [place], [address] [place]  Ph: [phone]     Fax: [phone]    Discharge Summary 2-15    Patient name: Mosie Meigs  : 1960  Provider#: [id number]  Referral source: Anila Henry, [place]      Medical/Treatment Diagnosis: Rotator cuff arthropathy of left shoulder [M12.812]  Impingement syndrome of left shoulder [M75.42]  Arthritis of left acromioclavicular joint [M19.012]     Prior Hospitalization: see medical history     Comorbidities: See Plan of Care  Prior Level of Function: See Plan of Care  Medications: Verified on Patient Summary List    Start of Care: 2023      Onset Date:   Visits from Start of Care: 16   Missed Visits: 3  Reporting Period : 23 to 3/27/2023    Assessment / Summary of care:   60year old female presented with rotator cuff repair of the left shoulder in  and right shoulder in 2016. Patient fell  and injured her left shoulder. Patient tolerated treatment and has completed her plan of care. Patient continues to have weakness of both shoulder. She has been provided with a HEP to continue strengthening independently at home. She has a TENS Unit and will continue with self treatment for pain. Patient has benefited from skilled PT services to address ROM deficits, address strength deficits, and analyze and address soft tissue restrictions to attain goals. Progress Toward Goals:  Short Term Goals: To be accomplished in 8 treatments. Patient will be able to perform HEP independently to increase left shoulder  strength and ROM to maintain mobility. MET  Patient will be able to cornelius and doff clothing with no difficulty or assistance with increase ROM. MET  Patient will be able to lift light objects and place on shelf with no difficulty. MET  Patient will be able to wash her back with towel with no difficulty with full range of motion in IR and ER.  Partially MET (rotation no lift) Long Term Goals: To be accomplished in 16 treatments. Patient will be able to comb and brush hair with no difficulty with increase strength to 4+/5. Progressing( has to compensate with posture correction)  Patient will be able to lift medium weights objects and place on shelf with no difficulty with 4+/5 strength. Progressing (~ 4 lb max)  Patient will be able to lift her pots and pans on the stove to cook with no difficulty.  Progressing (empty only)    RECOMMENDATIONS:  [x]Discontinue therapy: []Patient has reached or is progressing toward set goals     []Patient is non-compliant or has abdicated     []Due to lack of appreciable progress towards set goals     []Other    Karen Yanes, PT,  3/27/2023 English

## 2023-03-27 NOTE — TELEPHONE ENCOUNTER
----- Message from Magda Medrano MD sent at 3/25/2023  9:49 PM EDT -----  Tell patient that her urinalysis was pretty much normal with only showing a little glucose in her urine otherwise normal.  No infection was noted. She should continue the antibiotic therapy for the diverticulitis.

## 2023-04-12 PROBLEM — M1A.09X0 CHRONIC GOUT OF MULTIPLE SITES: Status: ACTIVE | Noted: 2023-04-12

## 2023-04-12 PROBLEM — I87.2 VENOUS INSUFFICIENCY: Status: ACTIVE | Noted: 2023-04-12

## 2023-04-12 PROBLEM — M19.012 ARTHROPATHY OF LEFT SHOULDER: Status: ACTIVE | Noted: 2023-04-12

## 2023-04-12 PROBLEM — M17.0 PRIMARY OSTEOARTHRITIS OF BOTH KNEES: Status: ACTIVE | Noted: 2023-04-12

## 2023-04-12 PROBLEM — K57.90 DIVERTICULAR DISEASE: Status: ACTIVE | Noted: 2023-04-12

## 2023-04-12 PROBLEM — D64.9 ANEMIA: Status: ACTIVE | Noted: 2023-04-12

## 2023-04-12 PROBLEM — J44.1 COPD EXACERBATION (HCC): Status: ACTIVE | Noted: 2023-04-12

## 2023-04-12 PROBLEM — I10 PRIMARY HYPERTENSION: Status: ACTIVE | Noted: 2023-04-12

## 2023-04-13 DIAGNOSIS — R42 DIZZINESS: ICD-10-CM

## 2023-04-13 RX ORDER — MECLIZINE HYDROCHLORIDE 25 MG/1
25 TABLET ORAL 2 TIMES DAILY
Qty: 60 TABLET | Refills: 0 | Status: SHIPPED | OUTPATIENT
Start: 2023-04-13

## 2023-05-08 RX ORDER — MECLIZINE HYDROCHLORIDE 25 MG/1
TABLET ORAL
Qty: 60 TABLET | Refills: 0 | Status: SHIPPED | OUTPATIENT
Start: 2023-05-08

## 2023-05-08 RX ORDER — ISOPROPYL ALCOHOL 70 ML/100ML
SWAB TOPICAL
Qty: 100 EACH | Refills: 1 | Status: SHIPPED | OUTPATIENT
Start: 2023-05-08

## 2023-05-08 RX ORDER — GLUCOSAM/CHON-MSM1/C/MANG/BOSW 500-416.6
TABLET ORAL
Qty: 200 EACH | Refills: 1 | Status: SHIPPED | OUTPATIENT
Start: 2023-05-08

## 2023-06-19 ENCOUNTER — HOSPITAL ENCOUNTER (EMERGENCY)
Facility: HOSPITAL | Age: 63
Discharge: HOME | End: 2023-06-19
Attending: EMERGENCY MEDICINE
Payer: MEDICARE

## 2023-06-19 ENCOUNTER — APPOINTMENT (OUTPATIENT)
Facility: HOSPITAL | Age: 63
End: 2023-06-19
Attending: EMERGENCY MEDICINE
Payer: MEDICARE

## 2023-06-19 VITALS
SYSTOLIC BLOOD PRESSURE: 153 MMHG | OXYGEN SATURATION: 97 % | RESPIRATION RATE: 19 BRPM | HEART RATE: 53 BPM | DIASTOLIC BLOOD PRESSURE: 94 MMHG | BODY MASS INDEX: 47.09 KG/M2 | WEIGHT: 293 LBS | HEIGHT: 66 IN | TEMPERATURE: 98 F

## 2023-06-19 DIAGNOSIS — R51.9 ACUTE NONINTRACTABLE HEADACHE, UNSPECIFIED HEADACHE TYPE: Primary | ICD-10-CM

## 2023-06-19 PROCEDURE — 99284 EMERGENCY DEPT VISIT MOD MDM: CPT

## 2023-06-19 PROCEDURE — 70450 CT HEAD/BRAIN W/O DYE: CPT

## 2023-06-19 PROCEDURE — 6370000000 HC RX 637 (ALT 250 FOR IP): Performed by: EMERGENCY MEDICINE

## 2023-06-19 RX ORDER — OXYCODONE HYDROCHLORIDE 5 MG/1
5 TABLET ORAL
Status: COMPLETED | OUTPATIENT
Start: 2023-06-19 | End: 2023-06-19

## 2023-06-19 RX ORDER — BUTALBITAL, ASPIRIN, AND CAFFEINE 50; 325; 40 MG/1; MG/1; MG/1
1 CAPSULE ORAL EVERY 6 HOURS PRN
Qty: 20 CAPSULE | Refills: 0 | Status: SHIPPED | OUTPATIENT
Start: 2023-06-19 | End: 2023-06-24

## 2023-06-19 RX ORDER — ACETAMINOPHEN 500 MG
1000 TABLET ORAL
Status: COMPLETED | OUTPATIENT
Start: 2023-06-19 | End: 2023-06-19

## 2023-06-19 RX ADMIN — OXYCODONE 5 MG: 5 TABLET ORAL at 15:19

## 2023-06-19 RX ADMIN — ACETAMINOPHEN 1000 MG: 500 TABLET ORAL at 16:53

## 2023-06-19 ASSESSMENT — PAIN - FUNCTIONAL ASSESSMENT: PAIN_FUNCTIONAL_ASSESSMENT: ACTIVITIES ARE NOT PREVENTED

## 2023-06-19 ASSESSMENT — PAIN DESCRIPTION - DESCRIPTORS: DESCRIPTORS: ACHING

## 2023-06-19 ASSESSMENT — PAIN DESCRIPTION - LOCATION: LOCATION: HEAD

## 2023-06-19 ASSESSMENT — PAIN DESCRIPTION - ORIENTATION: ORIENTATION: ANTERIOR

## 2023-06-19 ASSESSMENT — LIFESTYLE VARIABLES
HOW OFTEN DO YOU HAVE A DRINK CONTAINING ALCOHOL: NEVER
HOW MANY STANDARD DRINKS CONTAINING ALCOHOL DO YOU HAVE ON A TYPICAL DAY: PATIENT DOES NOT DRINK

## 2023-06-19 ASSESSMENT — PAIN SCALES - GENERAL: PAINLEVEL_OUTOF10: 9

## 2023-06-19 NOTE — ED TRIAGE NOTES
Pt reports headache since waking up this morning. Pain is centralized across bridge of nose and forehead. Pt has not taken any medications for headache. Pt also forgot to take her blood pressure medication today.

## 2023-06-19 NOTE — ED PROVIDER NOTES
Determinants of Health     Tobacco Use: Low Risk     Smoking Tobacco Use: Never    Smokeless Tobacco Use: Never    Passive Exposure: Not on file   Alcohol Use: Not At Risk    Frequency of Alcohol Consumption: Never    Average Number of Drinks: Patient does not drink    Frequency of Binge Drinking: Never   Financial Resource Strain: Low Risk     Difficulty of Paying Living Expenses: Not hard at all   Food Insecurity: No Food Insecurity    Worried About Running Out of Food in the Last Year: Never true    Ran Out of Food in the Last Year: Never true   Transportation Needs: No Transportation Needs    Lack of Transportation (Medical): No    Lack of Transportation (Non-Medical): No   Physical Activity: Not on file   Stress: Not on file   Social Connections: Not on file   Intimate Partner Violence: Not on file   Depression: Not at risk    PHQ-2 Score: 0   Housing Stability: Unknown    Unable to Pay for Housing in the Last Year: No    Number of Places Lived in the Last Year: Not on file    Unstable Housing in the Last Year: No       PHYSICAL EXAM   Physical Exam  Vitals and nursing note reviewed. Constitutional:       General: She is not in acute distress. Appearance: Normal appearance. She is morbidly obese. She is not ill-appearing, toxic-appearing or diaphoretic. HENT:      Head: Normocephalic and atraumatic. Nose: Nose normal.      Mouth/Throat:      Mouth: Mucous membranes are moist.      Pharynx: No oropharyngeal exudate. Eyes:      General: Lids are normal. Vision grossly intact. Extraocular Movements: Extraocular movements intact. Conjunctiva/sclera: Conjunctivae normal.      Pupils: Pupils are equal, round, and reactive to light. Cardiovascular:      Rate and Rhythm: Normal rate and regular rhythm. Pulses: Normal pulses. Heart sounds: Normal heart sounds. Pulmonary:      Effort: Pulmonary effort is normal.      Breath sounds: Normal breath sounds.    Abdominal:      General:

## 2023-06-20 ASSESSMENT — VISUAL ACUITY: OU: 1

## 2023-06-26 DIAGNOSIS — E11.65 TYPE 2 DIABETES MELLITUS WITH HYPERGLYCEMIA, WITHOUT LONG-TERM CURRENT USE OF INSULIN (HCC): Primary | ICD-10-CM

## 2023-06-26 RX ORDER — BLOOD-GLUCOSE METER
1 EACH MISCELLANEOUS 2 TIMES DAILY
Qty: 1 KIT | Refills: 0 | Status: SHIPPED | OUTPATIENT
Start: 2023-06-26

## 2023-07-03 ENCOUNTER — TELEPHONE (OUTPATIENT)
Facility: CLINIC | Age: 63
End: 2023-07-03

## 2023-07-06 DIAGNOSIS — R42 VERTIGO: ICD-10-CM

## 2023-07-06 DIAGNOSIS — E11.65 TYPE 2 DIABETES MELLITUS WITH HYPERGLYCEMIA, WITHOUT LONG-TERM CURRENT USE OF INSULIN (HCC): Primary | ICD-10-CM

## 2023-07-06 RX ORDER — MECLIZINE HYDROCHLORIDE 25 MG/1
TABLET ORAL
Qty: 90 TABLET | Refills: 0 | Status: SHIPPED | OUTPATIENT
Start: 2023-07-06

## 2023-07-06 RX ORDER — LANCETS 30 GAUGE
1 EACH MISCELLANEOUS DAILY
Qty: 100 EACH | Refills: 5 | Status: SHIPPED | OUTPATIENT
Start: 2023-07-06

## 2023-07-06 RX ORDER — BLOOD SUGAR DIAGNOSTIC
1 STRIP MISCELLANEOUS DAILY
Qty: 100 EACH | Refills: 3 | Status: SHIPPED | OUTPATIENT
Start: 2023-07-06

## 2023-07-11 ENCOUNTER — TELEPHONE (OUTPATIENT)
Age: 63
End: 2023-07-11

## 2023-07-11 NOTE — TELEPHONE ENCOUNTER
Pt cld & ask that her Pantoprazole be changed from [employer] to [employer] order thru [facility]

## 2023-07-17 ENCOUNTER — OFFICE VISIT (OUTPATIENT)
Facility: CLINIC | Age: 63
End: 2023-07-17
Payer: MEDICARE

## 2023-07-17 VITALS
HEART RATE: 68 BPM | SYSTOLIC BLOOD PRESSURE: 136 MMHG | TEMPERATURE: 97.3 F | RESPIRATION RATE: 20 BRPM | WEIGHT: 293 LBS | DIASTOLIC BLOOD PRESSURE: 72 MMHG | OXYGEN SATURATION: 99 % | BODY MASS INDEX: 47.09 KG/M2 | HEIGHT: 66 IN

## 2023-07-17 DIAGNOSIS — I10 ESSENTIAL (PRIMARY) HYPERTENSION: ICD-10-CM

## 2023-07-17 DIAGNOSIS — K21.00 GASTRO-ESOPHAGEAL REFLUX DISEASE WITH ESOPHAGITIS, WITHOUT BLEEDING: ICD-10-CM

## 2023-07-17 DIAGNOSIS — M1A.09X0 IDIOPATHIC CHRONIC GOUT, MULTIPLE SITES, WITHOUT TOPHUS (TOPHI): ICD-10-CM

## 2023-07-17 DIAGNOSIS — K57.90 DIVERTICULOSIS OF INTESTINE, PART UNSPECIFIED, WITHOUT PERFORATION OR ABSCESS WITHOUT BLEEDING: ICD-10-CM

## 2023-07-17 DIAGNOSIS — N18.31 CHRONIC KIDNEY DISEASE, STAGE 3A (HCC): ICD-10-CM

## 2023-07-17 DIAGNOSIS — M17.0 BILATERAL PRIMARY OSTEOARTHRITIS OF KNEE: ICD-10-CM

## 2023-07-17 DIAGNOSIS — I87.2 VENOUS INSUFFICIENCY (CHRONIC) (PERIPHERAL): ICD-10-CM

## 2023-07-17 DIAGNOSIS — E11.65 TYPE 2 DIABETES MELLITUS WITH HYPERGLYCEMIA, WITHOUT LONG-TERM CURRENT USE OF INSULIN (HCC): Primary | ICD-10-CM

## 2023-07-17 DIAGNOSIS — J44.1 CHRONIC OBSTRUCTIVE PULMONARY DISEASE WITH (ACUTE) EXACERBATION (HCC): ICD-10-CM

## 2023-07-17 DIAGNOSIS — G47.33 OBSTRUCTIVE SLEEP APNEA (ADULT) (PEDIATRIC): ICD-10-CM

## 2023-07-17 DIAGNOSIS — E66.01 MORBID (SEVERE) OBESITY DUE TO EXCESS CALORIES (HCC): ICD-10-CM

## 2023-07-17 DIAGNOSIS — D64.9 ANEMIA, UNSPECIFIED TYPE: ICD-10-CM

## 2023-07-17 DIAGNOSIS — E78.2 MIXED HYPERLIPIDEMIA: ICD-10-CM

## 2023-07-17 LAB
BASOPHILS # BLD AUTO: 0 X10E3/UL (ref 0–0.2)
BASOPHILS NFR BLD AUTO: 1 %
EOSINOPHIL # BLD AUTO: 0.1 X10E3/UL (ref 0–0.4)
EOSINOPHIL NFR BLD AUTO: 4 %
ERYTHROCYTE [DISTWIDTH] IN BLOOD BY AUTOMATED COUNT: 14.4 % (ref 11.7–15.4)
HBA1C MFR BLD: 11.4 %
HCT VFR BLD AUTO: 37.1 % (ref 34–46.6)
HGB BLD-MCNC: 12.1 G/DL (ref 11.1–15.9)
IMM GRANULOCYTES # BLD AUTO: 0 X10E3/UL (ref 0–0.1)
IMM GRANULOCYTES NFR BLD AUTO: 0 %
LYMPHOCYTES # BLD AUTO: 1 X10E3/UL (ref 0.7–3.1)
LYMPHOCYTES NFR BLD AUTO: 31 %
MCH RBC QN AUTO: 28.7 PG (ref 26.6–33)
MCHC RBC AUTO-ENTMCNC: 32.6 G/DL (ref 31.5–35.7)
MCV RBC AUTO: 88 FL (ref 79–97)
MONOCYTES # BLD AUTO: 0.2 X10E3/UL (ref 0.1–0.9)
MONOCYTES NFR BLD AUTO: 7 %
NEUTROPHILS # BLD AUTO: 1.9 X10E3/UL (ref 1.4–7)
NEUTROPHILS NFR BLD AUTO: 57 %
PLATELET # BLD AUTO: 253 X10E3/UL (ref 150–450)
RBC # BLD AUTO: 4.21 X10E6/UL (ref 3.77–5.28)
WBC # BLD AUTO: 3.3 X10E3/UL (ref 3.4–10.8)

## 2023-07-17 PROCEDURE — 3023F SPIROM DOC REV: CPT | Performed by: NURSE PRACTITIONER

## 2023-07-17 PROCEDURE — 2022F DILAT RTA XM EVC RTNOPTHY: CPT | Performed by: NURSE PRACTITIONER

## 2023-07-17 PROCEDURE — 1036F TOBACCO NON-USER: CPT | Performed by: NURSE PRACTITIONER

## 2023-07-17 PROCEDURE — 3017F COLORECTAL CA SCREEN DOC REV: CPT | Performed by: NURSE PRACTITIONER

## 2023-07-17 PROCEDURE — G8427 DOCREV CUR MEDS BY ELIG CLIN: HCPCS | Performed by: NURSE PRACTITIONER

## 2023-07-17 PROCEDURE — 83036 HEMOGLOBIN GLYCOSYLATED A1C: CPT | Performed by: NURSE PRACTITIONER

## 2023-07-17 PROCEDURE — 3075F SYST BP GE 130 - 139MM HG: CPT | Performed by: NURSE PRACTITIONER

## 2023-07-17 PROCEDURE — 3046F HEMOGLOBIN A1C LEVEL >9.0%: CPT | Performed by: NURSE PRACTITIONER

## 2023-07-17 PROCEDURE — G8417 CALC BMI ABV UP PARAM F/U: HCPCS | Performed by: NURSE PRACTITIONER

## 2023-07-17 PROCEDURE — 3078F DIAST BP <80 MM HG: CPT | Performed by: NURSE PRACTITIONER

## 2023-07-17 PROCEDURE — 99214 OFFICE O/P EST MOD 30 MIN: CPT | Performed by: NURSE PRACTITIONER

## 2023-07-17 RX ORDER — HUMAN INSULIN 100 [IU]/ML
50 INJECTION, SUSPENSION SUBCUTANEOUS
Qty: 90 ML | Refills: 1 | Status: SHIPPED | OUTPATIENT
Start: 2023-07-17

## 2023-07-17 RX ORDER — AMOXICILLIN AND CLAVULANATE POTASSIUM 875; 125 MG/1; MG/1
1 TABLET, FILM COATED ORAL 2 TIMES DAILY
Qty: 14 TABLET | Refills: 0 | Status: SHIPPED | OUTPATIENT
Start: 2023-07-17 | End: 2023-07-24

## 2023-07-17 SDOH — ECONOMIC STABILITY: INCOME INSECURITY: IN THE LAST 12 MONTHS, WAS THERE A TIME WHEN YOU WERE NOT ABLE TO PAY THE MORTGAGE OR RENT ON TIME?: NO

## 2023-07-17 SDOH — ECONOMIC STABILITY: HOUSING INSECURITY
IN THE LAST 12 MONTHS, WAS THERE A TIME WHEN YOU DID NOT HAVE A STEADY PLACE TO SLEEP OR SLEPT IN A SHELTER (INCLUDING NOW)?: NO

## 2023-07-17 NOTE — PROGRESS NOTES
Chief Complaint   Patient presents with    Diabetes    Cough     Pt stated she also has chest congestion and her lungs hurt     Pt brought a list of the meds, went over list in chart, pt confirmed      No other c/o     1. Have you been to the ER, urgent care clinic since your last visit? Hospitalized since your last visit? No    2. Have you seen or consulted any other health care providers outside of the [address] [place] [address] Avenue since your last visit? Include any pap smears or colon screening.  No
tenderness. There is no guarding. Musculoskeletal:         General: Normal range of motion. Right lower leg: No edema. Left lower leg: No edema. Skin:     General: Skin is warm and dry. Neurological:      Mental Status: She is alert and oriented to person, place, and time. Mental status is at baseline. Psychiatric:         Mood and Affect: Mood normal.         Behavior: Behavior normal.             1. Type 2 diabetes mellitus with other specified complication, without long-term current use of insulin ([facility])  Hemoglobin A1C, POC   Date Value Ref Range Status   07/17/2023 11.4 % Final      Last A1c: 8.1  Glucose control  worsened further since last visit. On metformin 1000mg XR daily  and glimepiride 1mg in am and 2mg in evening, novolin 70/30 40 units twice daily   Fasting glucose reported under 200 when last checked but did not bring meter  GLP-1 and SGL2 not affordable  Reports not following diabetic diet or getting regular exercise    Did increase her Novolin 7/30 to 40 units BID which has helped reduced glucose in the past month. Will increase Novolin 70/30 by 2 units every 2-3 days BID up to 50 units BID. She has not tolerated higher doses of metformin but would like to try metformin 500mg TID to see if tolerable as other alternative meds have not been affordable. Notify provider if any glucose < 70 or > 250 when checking. Bring glucose readings to next appointment  Continue annual eye exam and is seen at [facility] and recent follow up 6/2023  Encourage following diabetic diet and increasing regular exercise to 3-5 times weekly  Dr. Shiv Jacobson has left now and will hold on new referral for now unless not improvement with next a1c  Check fasting labs and she will carry copy to next nephrology appointment  - AMB POC HEMOGLOBIN A1C  - Insulin NPH Isophane & Regular (NOVOLIN 70/30 FLEXPEN) (70-30) 100 UNIT per ML injection pen;  Inject 50 Units into the skin 2 times daily (before meals)  Dispense: 90 mL;

## 2023-07-18 LAB
ALBUMIN SERPL-MCNC: 4.2 G/DL (ref 3.9–4.9)
ALBUMIN/CREAT UR: 28 MG/G CREAT (ref 0–29)
ALBUMIN/GLOB SERPL: 1.7 {RATIO} (ref 1.2–2.2)
ALP SERPL-CCNC: 107 IU/L (ref 44–121)
ALT SERPL-CCNC: 13 IU/L (ref 0–32)
AST SERPL-CCNC: 19 IU/L (ref 0–40)
BILIRUB SERPL-MCNC: 0.4 MG/DL (ref 0–1.2)
BUN SERPL-MCNC: 15 MG/DL (ref 8–27)
BUN/CREAT SERPL: 13 (ref 12–28)
CALCIUM SERPL-MCNC: 9.7 MG/DL (ref 8.7–10.3)
CHLORIDE SERPL-SCNC: 105 MMOL/L (ref 96–106)
CHOLEST SERPL-MCNC: 152 MG/DL (ref 100–199)
CO2 SERPL-SCNC: 24 MMOL/L (ref 20–29)
CREAT SERPL-MCNC: 1.17 MG/DL (ref 0.57–1)
CREAT UR-MCNC: 173 MG/DL
EGFRCR SERPLBLD CKD-EPI 2021: 52 ML/MIN/1.73
GLOBULIN SER CALC-MCNC: 2.5 G/DL (ref 1.5–4.5)
GLUCOSE SERPL-MCNC: 111 MG/DL (ref 70–99)
HBA1C MFR BLD: 11.4 % (ref 4.8–5.6)
HDLC SERPL-MCNC: 57 MG/DL
LDLC SERPL CALC-MCNC: 74 MG/DL (ref 0–99)
MICROALBUMIN UR-MCNC: 48.6 UG/ML
POTASSIUM SERPL-SCNC: 4.6 MMOL/L (ref 3.5–5.2)
PROT SERPL-MCNC: 6.7 G/DL (ref 6–8.5)
SODIUM SERPL-SCNC: 142 MMOL/L (ref 134–144)
TRIGL SERPL-MCNC: 121 MG/DL (ref 0–149)
TSH SERPL DL<=0.005 MIU/L-ACNC: 2.91 UIU/ML (ref 0.45–4.5)
VLDLC SERPL CALC-MCNC: 21 MG/DL (ref 5–40)

## 2023-07-20 ENCOUNTER — TELEPHONE (OUTPATIENT)
Facility: CLINIC | Age: 63
End: 2023-07-20

## 2023-07-20 ENCOUNTER — APPOINTMENT (OUTPATIENT)
Facility: HOSPITAL | Age: 63
End: 2023-07-20
Payer: MEDICARE

## 2023-07-20 ENCOUNTER — HOSPITAL ENCOUNTER (EMERGENCY)
Facility: HOSPITAL | Age: 63
Discharge: HOME | End: 2023-07-20
Attending: EMERGENCY MEDICINE
Payer: MEDICARE

## 2023-07-20 VITALS
SYSTOLIC BLOOD PRESSURE: 99 MMHG | HEART RATE: 72 BPM | DIASTOLIC BLOOD PRESSURE: 74 MMHG | WEIGHT: 293 LBS | BODY MASS INDEX: 47.09 KG/M2 | RESPIRATION RATE: 18 BRPM | TEMPERATURE: 98 F | HEIGHT: 66 IN | OXYGEN SATURATION: 99 %

## 2023-07-20 DIAGNOSIS — I10 UNCONTROLLED HYPERTENSION: ICD-10-CM

## 2023-07-20 DIAGNOSIS — J44.1 COPD EXACERBATION (HCC): Primary | ICD-10-CM

## 2023-07-20 PROCEDURE — 6370000000 HC RX 637 (ALT 250 FOR IP): Performed by: EMERGENCY MEDICINE

## 2023-07-20 PROCEDURE — 99283 EMERGENCY DEPT VISIT LOW MDM: CPT

## 2023-07-20 PROCEDURE — 71045 X-RAY EXAM CHEST 1 VIEW: CPT

## 2023-07-20 PROCEDURE — 94640 AIRWAY INHALATION TREATMENT: CPT

## 2023-07-20 RX ORDER — ISOPROPYL ALCOHOL 70 ML/100ML
SWAB TOPICAL
Qty: 100 EACH | Refills: 11 | Status: SHIPPED | OUTPATIENT
Start: 2023-07-20

## 2023-07-20 RX ORDER — IPRATROPIUM BROMIDE AND ALBUTEROL SULFATE 2.5; .5 MG/3ML; MG/3ML
1 SOLUTION RESPIRATORY (INHALATION) EVERY 6 HOURS PRN
Qty: 40 EACH | Refills: 0 | Status: SHIPPED | OUTPATIENT
Start: 2023-07-20 | End: 2023-07-30

## 2023-07-20 RX ORDER — IPRATROPIUM BROMIDE AND ALBUTEROL SULFATE 2.5; .5 MG/3ML; MG/3ML
1 SOLUTION RESPIRATORY (INHALATION)
Status: COMPLETED | OUTPATIENT
Start: 2023-07-20 | End: 2023-07-20

## 2023-07-20 RX ORDER — CLONIDINE HYDROCHLORIDE 0.1 MG/1
0.2 TABLET ORAL
Status: COMPLETED | OUTPATIENT
Start: 2023-07-20 | End: 2023-07-20

## 2023-07-20 RX ORDER — PREDNISONE 20 MG/1
60 TABLET ORAL
Status: COMPLETED | OUTPATIENT
Start: 2023-07-20 | End: 2023-07-20

## 2023-07-20 RX ORDER — PREDNISONE 20 MG/1
TABLET ORAL
Qty: 12 TABLET | Refills: 1 | Status: SHIPPED | OUTPATIENT
Start: 2023-07-20

## 2023-07-20 RX ADMIN — PREDNISONE 60 MG: 20 TABLET ORAL at 02:30

## 2023-07-20 RX ADMIN — CLONIDINE HYDROCHLORIDE 0.2 MG: 0.1 TABLET ORAL at 02:32

## 2023-07-20 RX ADMIN — IPRATROPIUM BROMIDE AND ALBUTEROL SULFATE 1 DOSE: .5; 3 SOLUTION RESPIRATORY (INHALATION) at 03:20

## 2023-07-20 RX ADMIN — IPRATROPIUM BROMIDE AND ALBUTEROL SULFATE 1 DOSE: .5; 3 SOLUTION RESPIRATORY (INHALATION) at 02:38

## 2023-07-20 ASSESSMENT — PAIN SCALES - GENERAL
PAINLEVEL_OUTOF10: 0
PAINLEVEL_OUTOF10: 0

## 2023-07-20 ASSESSMENT — PAIN - FUNCTIONAL ASSESSMENT
PAIN_FUNCTIONAL_ASSESSMENT: 0-10
PAIN_FUNCTIONAL_ASSESSMENT: 0-10

## 2023-07-20 NOTE — ED TRIAGE NOTES
Patient presents with cough and shortness of breath which began Sunday. Saw PCP Monday for the same. Condition has gotten progressively worse.

## 2023-07-20 NOTE — TELEPHONE ENCOUNTER
Patient contacted and message given. She understood. \"It will likely raise her glucose as we had discussed at visit but if she is wheezing and not improved otherwise then she will want to start it.  \"

## 2023-07-20 NOTE — ED PROVIDER NOTES
12 hour(s)). Radiologic Studies -   XR CHEST PORTABLE   Final Result      No acute process on portable chest.        [unfilled]  [unfilled]      Medical Decision Making   I am the first provider for this patient. I reviewed the vital signs, available nursing notes, past medical history, past surgical history, family history and social history. Patient with history of COPD presents emergency room with symptoms signs suggestive of COPD exacerbation. On exam patient alert and nontoxic-appearing. No respiratory distress. Lung sounds clear with FEW scattered rhonchi. Chest x-ray was negative for pneumonia or congestive heart failure. Patient was given 2 dose of DuoNeb along with p.o. prednisone with significant improvement in her symptoms. Patient remained without obvious respiratory distress with normal O2 saturation above 95% throughout the stay emergency room. Patient also was given dose of clonidine for elevated blood pressure. Patient not symptomatic in terms of no chest pain or headache. Patient is discharged improved stable condition. Return cautions given. Vital Signs-Reviewed the patient's vital signs. [unfilled]    Records Reviewed:  Available old ER/admission charts reviewed    ED Course:   Initial assessment performed. The patients presenting problems have been discussed, and they are in agreement with the care plan formulated and outlined with them. I have encouraged them to ask questions as they arise throughout their visit. Feeling much better. No respiratory distress. Lung sounds clear to auscultation. No coughing episode. PROCEDURES      Disposition: Condition stable   DC- Adult Discharges: All of the diagnostic tests were reviewed and questions answered. Diagnosis, care plan and treatment options were discussed. understand instructions and will follow up as directed. The patients results have been reviewed with them. They have been counseled regarding their diagnosis.   [facility] [phone]  [address] [place] [address] Drive      Phone: [phone]   ipratropium 0.5 mg-albuterol 2.5 mg 0.5-2.5 (3) MG/3ML Soln nebulizer solution  predniSONE 20 MG tablet       2. [unfilled]  Return to ED if worse     Diagnosis     Clinical Impression:   1. COPD exacerbation ([address])    2. Uncontrolled hypertension        Please note that this dictation was completed with [employer], the computer voice recognition software. Quite often unanticipated grammatical, syntax, homophones, and other interpretive errors are inadvertently transcribed by the computer software. Please disregard these errors. Please excuse any errors that have escaped final proofreading. Thank you.        Pietro Myrick MD  07/20/23 [id number]

## 2023-07-20 NOTE — ED NOTES
Discharged home to self. Ambulatory out of ED. VS WDL.  0 s/s acute distress. Respirations even and unlabored. Discharge instructions and follow up care reviewed. Patient receptive and demonstrated knowledge of instruction via teach-back method.         Alex Cassidy RN  07/20/23 0174

## 2023-07-20 NOTE — TELEPHONE ENCOUNTER
Patient left message stated that she went to emergency room early this morning with persistent cough and shortness of breath. Was given neb treatments RX for prednisone. Wants to know if okay to take prednisone because of blood sugar? Was advised to fu in 1 day. Please advise.

## 2023-07-25 ENCOUNTER — TELEPHONE (OUTPATIENT)
Age: 63
End: 2023-07-25

## 2023-07-25 DIAGNOSIS — K21.00 GASTROESOPHAGEAL REFLUX DISEASE WITH ESOPHAGITIS WITHOUT HEMORRHAGE: Primary | ICD-10-CM

## 2023-07-25 NOTE — TELEPHONE ENCOUNTER
Pt cld & wants Pantoprazole 40 mg sent to [facility] for refill ASAP

## 2023-08-03 RX ORDER — PANTOPRAZOLE SODIUM 40 MG/1
40 TABLET, DELAYED RELEASE ORAL
Qty: 90 TABLET | Refills: 3 | Status: SHIPPED | OUTPATIENT
Start: 2023-08-03

## 2023-08-11 RX ORDER — ALLOPURINOL 300 MG/1
TABLET ORAL
Qty: 90 TABLET | Refills: 1 | Status: SHIPPED | OUTPATIENT
Start: 2023-08-11

## 2023-08-17 ENCOUNTER — OFFICE VISIT (OUTPATIENT)
Facility: CLINIC | Age: 63
End: 2023-08-17
Payer: MEDICARE

## 2023-08-17 VITALS
WEIGHT: 293 LBS | SYSTOLIC BLOOD PRESSURE: 139 MMHG | OXYGEN SATURATION: 98 % | HEART RATE: 69 BPM | BODY MASS INDEX: 47.09 KG/M2 | RESPIRATION RATE: 18 BRPM | HEIGHT: 66 IN | TEMPERATURE: 96.9 F | DIASTOLIC BLOOD PRESSURE: 74 MMHG

## 2023-08-17 DIAGNOSIS — E11.649 UNCONTROLLED TYPE 2 DIABETES MELLITUS WITH HYPOGLYCEMIA WITHOUT COMA (HCC): Primary | ICD-10-CM

## 2023-08-17 PROCEDURE — G8427 DOCREV CUR MEDS BY ELIG CLIN: HCPCS | Performed by: NURSE PRACTITIONER

## 2023-08-17 PROCEDURE — 99214 OFFICE O/P EST MOD 30 MIN: CPT | Performed by: NURSE PRACTITIONER

## 2023-08-17 PROCEDURE — 1036F TOBACCO NON-USER: CPT | Performed by: NURSE PRACTITIONER

## 2023-08-17 PROCEDURE — 3017F COLORECTAL CA SCREEN DOC REV: CPT | Performed by: NURSE PRACTITIONER

## 2023-08-17 PROCEDURE — 3075F SYST BP GE 130 - 139MM HG: CPT | Performed by: NURSE PRACTITIONER

## 2023-08-17 PROCEDURE — 3046F HEMOGLOBIN A1C LEVEL >9.0%: CPT | Performed by: NURSE PRACTITIONER

## 2023-08-17 PROCEDURE — G8417 CALC BMI ABV UP PARAM F/U: HCPCS | Performed by: NURSE PRACTITIONER

## 2023-08-17 PROCEDURE — 2022F DILAT RTA XM EVC RTNOPTHY: CPT | Performed by: NURSE PRACTITIONER

## 2023-08-17 PROCEDURE — 3078F DIAST BP <80 MM HG: CPT | Performed by: NURSE PRACTITIONER

## 2023-08-17 RX ORDER — HUMAN INSULIN 100 [IU]/ML
50 INJECTION, SUSPENSION SUBCUTANEOUS EVERY MORNING
Qty: 90 ML | Refills: 1 | Status: SHIPPED
Start: 2023-08-17 | End: 2023-08-18 | Stop reason: SDUPTHER

## 2023-08-17 NOTE — ED TRIAGE NOTES
Pt reports right sided flank pain that began Thursday. Pt states she has some rib pain to that side as well. Pt denies changes in GI/. Pt denies SOB. Pt rates pain at 10/10. Pt is in NAD.
3 = A little assistance

## 2023-08-18 ENCOUNTER — TELEPHONE (OUTPATIENT)
Facility: CLINIC | Age: 63
End: 2023-08-18

## 2023-08-18 DIAGNOSIS — E11.649 UNCONTROLLED TYPE 2 DIABETES MELLITUS WITH HYPOGLYCEMIA WITHOUT COMA (HCC): ICD-10-CM

## 2023-08-18 RX ORDER — HUMAN INSULIN 100 [IU]/ML
50 INJECTION, SUSPENSION SUBCUTANEOUS EVERY MORNING
Qty: 90 ML | Refills: 2 | Status: SHIPPED | OUTPATIENT
Start: 2023-08-18

## 2023-08-18 NOTE — TELEPHONE ENCOUNTER
Patient called in wanting to know if Binta Longoria sent the RX's from yesterday to [facility], advised her I see 2 was sent but didn't see where there were sent to.  Please advise

## 2023-08-21 DIAGNOSIS — R42 VERTIGO: ICD-10-CM

## 2023-08-21 RX ORDER — MECLIZINE HYDROCHLORIDE 25 MG/1
TABLET ORAL
Qty: 90 TABLET | Refills: 0 | Status: SHIPPED | OUTPATIENT
Start: 2023-08-21

## 2023-09-03 ENCOUNTER — HOSPITAL ENCOUNTER (EMERGENCY)
Facility: HOSPITAL | Age: 63
Discharge: HOME | End: 2023-09-03
Attending: EMERGENCY MEDICINE
Payer: MEDICARE

## 2023-09-03 ENCOUNTER — APPOINTMENT (OUTPATIENT)
Facility: HOSPITAL | Age: 63
End: 2023-09-03
Attending: EMERGENCY MEDICINE
Payer: MEDICARE

## 2023-09-03 VITALS
DIASTOLIC BLOOD PRESSURE: 75 MMHG | RESPIRATION RATE: 18 BRPM | BODY MASS INDEX: 47.09 KG/M2 | SYSTOLIC BLOOD PRESSURE: 141 MMHG | TEMPERATURE: 98.7 F | HEIGHT: 66 IN | WEIGHT: 293 LBS | HEART RATE: 81 BPM | OXYGEN SATURATION: 98 %

## 2023-09-03 DIAGNOSIS — U07.1 COVID-19: Primary | ICD-10-CM

## 2023-09-03 LAB
FLUAV RNA SPEC QL NAA+PROBE: NOT DETECTED
FLUBV RNA SPEC QL NAA+PROBE: NOT DETECTED
SARS-COV-2 RNA RESP QL NAA+PROBE: DETECTED

## 2023-09-03 PROCEDURE — 87636 SARSCOV2 & INF A&B AMP PRB: CPT

## 2023-09-03 PROCEDURE — 94640 AIRWAY INHALATION TREATMENT: CPT

## 2023-09-03 PROCEDURE — 71045 X-RAY EXAM CHEST 1 VIEW: CPT

## 2023-09-03 PROCEDURE — 6370000000 HC RX 637 (ALT 250 FOR IP): Performed by: EMERGENCY MEDICINE

## 2023-09-03 PROCEDURE — 99285 EMERGENCY DEPT VISIT HI MDM: CPT

## 2023-09-03 PROCEDURE — 93005 ELECTROCARDIOGRAM TRACING: CPT | Performed by: EMERGENCY MEDICINE

## 2023-09-03 RX ORDER — GUAIFENESIN 600 MG/1
600 TABLET, EXTENDED RELEASE ORAL 2 TIMES DAILY
Qty: 20 TABLET | Refills: 0 | Status: SHIPPED | OUTPATIENT
Start: 2023-09-03 | End: 2023-09-13

## 2023-09-03 RX ORDER — BENZONATATE 100 MG/1
200 CAPSULE ORAL AS NEEDED
Status: COMPLETED | OUTPATIENT
Start: 2023-09-03 | End: 2023-09-03

## 2023-09-03 RX ORDER — HYDROCODONE POLISTIREX AND CHLORPHENIRAMINE POLISTIREX 10; 8 MG/5ML; MG/5ML
5 SUSPENSION, EXTENDED RELEASE ORAL EVERY 12 HOURS PRN
Status: DISCONTINUED | OUTPATIENT
Start: 2023-09-03 | End: 2023-09-03

## 2023-09-03 RX ORDER — IPRATROPIUM BROMIDE AND ALBUTEROL SULFATE 2.5; .5 MG/3ML; MG/3ML
1 SOLUTION RESPIRATORY (INHALATION)
Status: COMPLETED | OUTPATIENT
Start: 2023-09-03 | End: 2023-09-03

## 2023-09-03 RX ORDER — BENZONATATE 100 MG/1
100 CAPSULE ORAL 3 TIMES DAILY PRN
Qty: 20 CAPSULE | Refills: 0 | Status: SHIPPED | OUTPATIENT
Start: 2023-09-03 | End: 2023-09-10

## 2023-09-03 RX ORDER — ACETAMINOPHEN 500 MG
1000 TABLET ORAL
Status: COMPLETED | OUTPATIENT
Start: 2023-09-03 | End: 2023-09-03

## 2023-09-03 RX ORDER — DIPHENHYDRAMINE HCL 25 MG
25 TABLET ORAL EVERY 6 HOURS PRN
Qty: 20 TABLET | Refills: 0 | Status: SHIPPED | OUTPATIENT
Start: 2023-09-03 | End: 2023-10-03

## 2023-09-03 RX ADMIN — BENZONATATE 200 MG: 100 CAPSULE ORAL at 09:51

## 2023-09-03 RX ADMIN — IPRATROPIUM BROMIDE AND ALBUTEROL SULFATE 1 DOSE: .5; 3 SOLUTION RESPIRATORY (INHALATION) at 09:47

## 2023-09-03 RX ADMIN — ACETAMINOPHEN 1000 MG: 500 TABLET ORAL at 09:51

## 2023-09-03 ASSESSMENT — PAIN - FUNCTIONAL ASSESSMENT
PAIN_FUNCTIONAL_ASSESSMENT: 0-10
PAIN_FUNCTIONAL_ASSESSMENT: NONE - DENIES PAIN

## 2023-09-03 ASSESSMENT — LIFESTYLE VARIABLES
HOW MANY STANDARD DRINKS CONTAINING ALCOHOL DO YOU HAVE ON A TYPICAL DAY: PATIENT DOES NOT DRINK
HOW OFTEN DO YOU HAVE A DRINK CONTAINING ALCOHOL: NEVER

## 2023-09-03 ASSESSMENT — PAIN DESCRIPTION - LOCATION: LOCATION: GENERALIZED

## 2023-09-03 ASSESSMENT — PAIN DESCRIPTION - PAIN TYPE: TYPE: ACUTE PAIN

## 2023-09-03 ASSESSMENT — PAIN SCALES - GENERAL: PAINLEVEL_OUTOF10: 9

## 2023-09-03 NOTE — ED TRIAGE NOTES
Pt reports chills, body aches and cough that started this morning. Pt reports she only has pain in her joints.

## 2023-09-03 NOTE — ED PROVIDER NOTES
doctor or other care provider to review them with you. Where to Get Your Medications        These medications were sent to [address], [place]  [address] [place] [address], [address] [place] [address] Drive      Phone: [phone]   benzonatate 100 MG capsule  diphenhydrAMINE 25 MG tablet  guaiFENesin 600 MG extended release tablet  nirmatrelvir/ritonavir 10 x 150 MG & 10 x 100MG Tbpk           DISCONTINUED MEDICATIONS:  Discharge Medication List as of 9/3/2023 10:15 AM          I am the Primary Clinician of Record. Monet Cottrell MD (electronically signed)    (Please note that parts of this dictation were completed with voice recognition software. Quite often unanticipated grammatical, syntax, homophones, and other interpretive errors are inadvertently transcribed by the computer software. Please disregards these errors.  Please excuse any errors that have escaped final proofreading.)     Lynn Pink MD  09/03/23 0797

## 2023-09-04 RX ORDER — ATORVASTATIN CALCIUM 10 MG/1
TABLET, FILM COATED ORAL
Qty: 90 TABLET | Refills: 1 | Status: SHIPPED | OUTPATIENT
Start: 2023-09-04

## 2023-09-05 LAB
EKG ATRIAL RATE: 82 BPM
EKG DIAGNOSIS: NORMAL
EKG P AXIS: 21 DEGREES
EKG P-R INTERVAL: 153 MS
EKG Q-T INTERVAL: 393 MS
EKG QRS DURATION: 88 MS
EKG QTC CALCULATION (BAZETT): 459 MS
EKG R AXIS: 8 DEGREES
EKG T AXIS: 43 DEGREES
EKG VENTRICULAR RATE: 82 BPM

## 2023-09-20 RX ORDER — LANOLIN ALCOHOL/MO/W.PET/CERES
400 CREAM (GRAM) TOPICAL DAILY
Qty: 90 TABLET | Refills: 1 | Status: SHIPPED | OUTPATIENT
Start: 2023-09-20

## 2023-10-18 ENCOUNTER — OFFICE VISIT (OUTPATIENT)
Facility: CLINIC | Age: 63
End: 2023-10-18
Payer: MEDICARE

## 2023-10-18 VITALS
RESPIRATION RATE: 20 BRPM | SYSTOLIC BLOOD PRESSURE: 139 MMHG | BODY MASS INDEX: 47.09 KG/M2 | HEIGHT: 66 IN | DIASTOLIC BLOOD PRESSURE: 64 MMHG | TEMPERATURE: 98.1 F | WEIGHT: 293 LBS | OXYGEN SATURATION: 96 % | HEART RATE: 78 BPM

## 2023-10-18 DIAGNOSIS — M1A.09X0 IDIOPATHIC CHRONIC GOUT, MULTIPLE SITES, WITHOUT TOPHUS (TOPHI): ICD-10-CM

## 2023-10-18 DIAGNOSIS — Z23 IMMUNIZATION DUE: ICD-10-CM

## 2023-10-18 DIAGNOSIS — E11.22 TYPE 2 DIABETES MELLITUS WITH STAGE 3 CHRONIC KIDNEY DISEASE, WITH LONG-TERM CURRENT USE OF INSULIN, UNSPECIFIED WHETHER STAGE 3A OR 3B CKD (HCC): Primary | ICD-10-CM

## 2023-10-18 DIAGNOSIS — K57.90 DIVERTICULAR DISEASE: ICD-10-CM

## 2023-10-18 DIAGNOSIS — G47.33 OBSTRUCTIVE SLEEP APNEA (ADULT) (PEDIATRIC): ICD-10-CM

## 2023-10-18 DIAGNOSIS — D64.9 ANEMIA, UNSPECIFIED TYPE: ICD-10-CM

## 2023-10-18 DIAGNOSIS — M17.0 BILATERAL PRIMARY OSTEOARTHRITIS OF KNEE: ICD-10-CM

## 2023-10-18 DIAGNOSIS — J44.9 CHRONIC OBSTRUCTIVE PULMONARY DISEASE, UNSPECIFIED COPD TYPE (HCC): ICD-10-CM

## 2023-10-18 DIAGNOSIS — E78.2 MIXED HYPERLIPIDEMIA: ICD-10-CM

## 2023-10-18 DIAGNOSIS — Z79.4 TYPE 2 DIABETES MELLITUS WITH STAGE 3 CHRONIC KIDNEY DISEASE, WITH LONG-TERM CURRENT USE OF INSULIN, UNSPECIFIED WHETHER STAGE 3A OR 3B CKD (HCC): Primary | ICD-10-CM

## 2023-10-18 DIAGNOSIS — I10 ESSENTIAL (PRIMARY) HYPERTENSION: ICD-10-CM

## 2023-10-18 DIAGNOSIS — E66.01 MORBID (SEVERE) OBESITY DUE TO EXCESS CALORIES (HCC): ICD-10-CM

## 2023-10-18 DIAGNOSIS — N18.30 TYPE 2 DIABETES MELLITUS WITH STAGE 3 CHRONIC KIDNEY DISEASE, WITH LONG-TERM CURRENT USE OF INSULIN, UNSPECIFIED WHETHER STAGE 3A OR 3B CKD (HCC): Primary | ICD-10-CM

## 2023-10-18 DIAGNOSIS — K21.00 GASTRO-ESOPHAGEAL REFLUX DISEASE WITH ESOPHAGITIS, WITHOUT BLEEDING: ICD-10-CM

## 2023-10-18 DIAGNOSIS — N18.31 CHRONIC KIDNEY DISEASE, STAGE 3A (HCC): ICD-10-CM

## 2023-10-18 DIAGNOSIS — I87.2 VENOUS INSUFFICIENCY (CHRONIC) (PERIPHERAL): ICD-10-CM

## 2023-10-18 PROBLEM — B96.81 HELICOBACTER PYLORI GASTRITIS: Status: RESOLVED | Noted: 2020-11-11 | Resolved: 2023-10-18

## 2023-10-18 PROBLEM — K29.70 HELICOBACTER PYLORI GASTRITIS: Status: RESOLVED | Noted: 2020-11-11 | Resolved: 2023-10-18

## 2023-10-18 PROBLEM — K57.92 DIVERTICULITIS: Status: RESOLVED | Noted: 2020-11-11 | Resolved: 2023-10-18

## 2023-10-18 PROBLEM — D50.9 IRON DEFICIENCY ANEMIA: Status: RESOLVED | Noted: 2021-06-10 | Resolved: 2023-10-18

## 2023-10-18 PROBLEM — M19.012 ARTHROPATHY OF LEFT SHOULDER: Status: RESOLVED | Noted: 2023-04-12 | Resolved: 2023-10-18

## 2023-10-18 LAB — HBA1C MFR BLD: 6.9 %

## 2023-10-18 PROCEDURE — 3075F SYST BP GE 130 - 139MM HG: CPT | Performed by: NURSE PRACTITIONER

## 2023-10-18 PROCEDURE — G8482 FLU IMMUNIZE ORDER/ADMIN: HCPCS | Performed by: NURSE PRACTITIONER

## 2023-10-18 PROCEDURE — G8417 CALC BMI ABV UP PARAM F/U: HCPCS | Performed by: NURSE PRACTITIONER

## 2023-10-18 PROCEDURE — G8427 DOCREV CUR MEDS BY ELIG CLIN: HCPCS | Performed by: NURSE PRACTITIONER

## 2023-10-18 PROCEDURE — 3017F COLORECTAL CA SCREEN DOC REV: CPT | Performed by: NURSE PRACTITIONER

## 2023-10-18 PROCEDURE — 3078F DIAST BP <80 MM HG: CPT | Performed by: NURSE PRACTITIONER

## 2023-10-18 PROCEDURE — G0008 ADMIN INFLUENZA VIRUS VAC: HCPCS | Performed by: NURSE PRACTITIONER

## 2023-10-18 PROCEDURE — 3023F SPIROM DOC REV: CPT | Performed by: NURSE PRACTITIONER

## 2023-10-18 PROCEDURE — 99214 OFFICE O/P EST MOD 30 MIN: CPT | Performed by: NURSE PRACTITIONER

## 2023-10-18 PROCEDURE — 2022F DILAT RTA XM EVC RTNOPTHY: CPT | Performed by: NURSE PRACTITIONER

## 2023-10-18 PROCEDURE — 90674 CCIIV4 VAC NO PRSV 0.5 ML IM: CPT | Performed by: NURSE PRACTITIONER

## 2023-10-18 PROCEDURE — 1036F TOBACCO NON-USER: CPT | Performed by: NURSE PRACTITIONER

## 2023-10-18 PROCEDURE — 83036 HEMOGLOBIN GLYCOSYLATED A1C: CPT | Performed by: NURSE PRACTITIONER

## 2023-10-18 PROCEDURE — 3046F HEMOGLOBIN A1C LEVEL >9.0%: CPT | Performed by: NURSE PRACTITIONER

## 2023-10-18 NOTE — PROGRESS NOTES
Chief Complaint   Patient presents with    Diabetes     Follow up     Pt did not bring meds, went over list, pt confirmed      No other c/o        1. Have you been to the ER, urgent care clinic since your last visit? Hospitalized since your last visit? No    2. Have you seen or consulted any other health care providers outside of the [address] [place] [address] Avenue since your last visit? Include any pap smears or colon screening.  No
diabetes mellitus with stage 3 chronic kidney disease, with long-term current use of insulin ([facility])  Hemoglobin A1C, POC   Date Value Ref Range Status   10/18/2023 6.9 % Final      Last A1c: 8.1 11/2022, 11.4 7/2023  On metformin 1000mg XR daily,glimepiride 1mg in am and 2mg in evening, novolin 70/30 50 units in am and 45 units in the pm.  Home readings reported as less than 150 with no further hypoglycemia  GLP-1 and SGL2 not affordable  Has been following lower carb diet again and trying to be more active     Continue novolin 70/30 45 units in pm and 70/30 50 units in the am.  Continue metformin and glimepiride as directed  Notify provider if any glucose < 70 or > 250 when checking. Bring glucose readings to next appointment  Continue annual eye exam and is seen at [facility] and recent follow up 6/2023  Encourage to continue following diabetic diet and increasing regular exercise to 3-5 times weekly  - AMB POC HEMOGLOBIN A1C    2. Essential hypertension  Blood pressure is stable today  Continue on amlodipine 5mg daily   Check readings at home and notify provider if > 140/90 consistently    3. Chronic obstructive pulmonary disease, unspecified COPD type ([address])  Recent increased congestion and wheezing in the past 3-4 weeks  Will start on antibiotic due to prolonged congested cough  Hold on steroids given glucose control  Recommended using OTC mucinex to help clear congestion. Supportive care encouraged including rest, increased oral fluids, cool mist humidifier and Tylenol prn. Has albuterol inhaler/nebulizer to use prn for wheezing/sob  - amoxicillin-clavulanate (AUGMENTIN) 875-125 MG per tablet; Take 1 tablet by mouth 2 times daily for 7 days  Dispense: 14 tablet; Refill: 0    4. Stage 3a chronic kidney disease ([address])  Lab Results   Component Value Date    CREATININE 1.17 (H) 07/17/2023      Stable and follows with nephrology who manages  Scheduled for follow up in 11/2023    5.  Mixed hyperlipidemia due to type 2

## 2023-10-19 ENCOUNTER — HOSPITAL ENCOUNTER (OUTPATIENT)
Facility: HOSPITAL | Age: 63
Discharge: HOME | End: 2023-10-19
Payer: MEDICARE

## 2023-10-19 VITALS — WEIGHT: 293 LBS | HEIGHT: 66 IN | BODY MASS INDEX: 47.09 KG/M2

## 2023-10-19 DIAGNOSIS — Z12.31 SCREENING MAMMOGRAM FOR BREAST CANCER: ICD-10-CM

## 2023-10-19 PROCEDURE — 77063 BREAST TOMOSYNTHESIS BI: CPT

## 2023-11-03 RX ORDER — FUROSEMIDE 40 MG/1
TABLET ORAL
Qty: 36 TABLET | Refills: 1 | Status: SHIPPED | OUTPATIENT
Start: 2023-11-03

## 2023-11-14 RX ORDER — AMLODIPINE BESYLATE 5 MG/1
5 TABLET ORAL DAILY
Qty: 90 TABLET | Refills: 1 | Status: SHIPPED | OUTPATIENT
Start: 2023-11-14

## 2023-11-15 DIAGNOSIS — R42 VERTIGO: ICD-10-CM

## 2023-11-15 RX ORDER — MECLIZINE HYDROCHLORIDE 25 MG/1
TABLET ORAL
Qty: 90 TABLET | Refills: 1 | Status: SHIPPED | OUTPATIENT
Start: 2023-11-15

## 2023-11-22 RX ORDER — METFORMIN HYDROCHLORIDE 500 MG/1
TABLET, EXTENDED RELEASE ORAL
Qty: 180 TABLET | Refills: 1 | Status: SHIPPED | OUTPATIENT
Start: 2023-11-22

## 2023-11-22 RX ORDER — GLIMEPIRIDE 1 MG/1
TABLET ORAL
Qty: 270 TABLET | Refills: 1 | Status: SHIPPED | OUTPATIENT
Start: 2023-11-22

## 2023-12-17 DIAGNOSIS — E11.65 TYPE 2 DIABETES MELLITUS WITH HYPERGLYCEMIA, WITHOUT LONG-TERM CURRENT USE OF INSULIN (HCC): ICD-10-CM

## 2023-12-17 RX ORDER — GLUCOSAM/CHON-MSM1/C/MANG/BOSW 500-416.6
TABLET ORAL
Qty: 200 EACH | Refills: 3 | Status: SHIPPED | OUTPATIENT
Start: 2023-12-17

## 2023-12-27 ENCOUNTER — OFFICE VISIT (OUTPATIENT)
Facility: CLINIC | Age: 63
End: 2023-12-27
Payer: MEDICARE

## 2023-12-27 VITALS
WEIGHT: 293 LBS | BODY MASS INDEX: 47.09 KG/M2 | RESPIRATION RATE: 20 BRPM | HEIGHT: 66 IN | DIASTOLIC BLOOD PRESSURE: 67 MMHG | OXYGEN SATURATION: 99 % | TEMPERATURE: 97.4 F | SYSTOLIC BLOOD PRESSURE: 123 MMHG | HEART RATE: 76 BPM

## 2023-12-27 DIAGNOSIS — J20.9 ACUTE BRONCHITIS, UNSPECIFIED ORGANISM: Primary | ICD-10-CM

## 2023-12-27 LAB
EXP DATE SOLUTION: NORMAL
EXP DATE SWAB: NORMAL
EXPIRATION DATE: NORMAL
INFLUENZA A ANTIGEN, POC: NEGATIVE
INFLUENZA B ANTIGEN, POC: NEGATIVE
LOT NUMBER POC: NORMAL
LOT NUMBER SOLUTION: NORMAL
LOT NUMBER SWAB: NORMAL
SARS-COV-2 RNA, POC: NEGATIVE
VALID INTERNAL CONTROL, POC: YES

## 2023-12-27 PROCEDURE — 87502 INFLUENZA DNA AMP PROBE: CPT | Performed by: NURSE PRACTITIONER

## 2023-12-27 PROCEDURE — 3074F SYST BP LT 130 MM HG: CPT | Performed by: NURSE PRACTITIONER

## 2023-12-27 PROCEDURE — 87635 SARS-COV-2 COVID-19 AMP PRB: CPT | Performed by: NURSE PRACTITIONER

## 2023-12-27 PROCEDURE — 3017F COLORECTAL CA SCREEN DOC REV: CPT | Performed by: NURSE PRACTITIONER

## 2023-12-27 PROCEDURE — G8417 CALC BMI ABV UP PARAM F/U: HCPCS | Performed by: NURSE PRACTITIONER

## 2023-12-27 PROCEDURE — G8427 DOCREV CUR MEDS BY ELIG CLIN: HCPCS | Performed by: NURSE PRACTITIONER

## 2023-12-27 PROCEDURE — 1036F TOBACCO NON-USER: CPT | Performed by: NURSE PRACTITIONER

## 2023-12-27 PROCEDURE — 99213 OFFICE O/P EST LOW 20 MIN: CPT | Performed by: NURSE PRACTITIONER

## 2023-12-27 PROCEDURE — 3078F DIAST BP <80 MM HG: CPT | Performed by: NURSE PRACTITIONER

## 2023-12-27 PROCEDURE — G8482 FLU IMMUNIZE ORDER/ADMIN: HCPCS | Performed by: NURSE PRACTITIONER

## 2023-12-27 RX ORDER — PREDNISONE 20 MG/1
20 TABLET ORAL DAILY
Qty: 5 TABLET | Refills: 0 | Status: SHIPPED | OUTPATIENT
Start: 2023-12-27 | End: 2024-01-01

## 2023-12-27 RX ORDER — AMOXICILLIN AND CLAVULANATE POTASSIUM 875; 125 MG/1; MG/1
1 TABLET, FILM COATED ORAL 2 TIMES DAILY
Qty: 20 TABLET | Refills: 0 | Status: SHIPPED | OUTPATIENT
Start: 2023-12-27 | End: 2024-01-06

## 2023-12-27 RX ORDER — ALBUTEROL SULFATE 90 UG/1
2 AEROSOL, METERED RESPIRATORY (INHALATION) 4 TIMES DAILY PRN
Qty: 54 G | Refills: 1 | Status: SHIPPED | OUTPATIENT
Start: 2023-12-27

## 2023-12-27 NOTE — PROGRESS NOTES
Subjective  Chief Complaint   Patient presents with    Cough    Congestion    Sputum     green     HPI:  Junior Camacho is a 58 y.o. female who presents with concerns regarding cough, congestion, wheezing and body aches starting > 7 days ago. Denies any current fever or chills. Taking OTC cough medication but seeing no improvement. Mucus is green when coughing it up. Denies any breathing difficulty or current chest pain. Appetite and activity have been reduced.        Past Medical History:   Diagnosis Date    Arthritis     Asthma     Chronic kidney disease     stage 3    Chronic obstructive pulmonary disease (HCC)     Diabetes (HCC)     GERD (gastroesophageal reflux disease)     Helicobacter pylori gastritis 11/11/2020    High cholesterol     Hypertension     Menopause     Morbid obesity (HCC)     Obstructive sleep apnea on CPAP     Vertigo         Past Surgical History:   Procedure Laterality Date    BREAST BIOPSY Left 2019    CHOLECYSTECTOMY      HYSTERECTOMY (CERVIX STATUS UNKNOWN)      ORTHOPEDIC SURGERY      right shoulder spur    OVARY REMOVAL      ROTATOR CUFF REPAIR Left     by suture    TUBAL LIGATION Bilateral     UPPER GASTROINTESTINAL ENDOSCOPY  01/01/2019        Family History   Problem Relation Age of Onset    Lung Disease Mother         lung ca    Heart Disease Mother     Lung Disease Father         PNA    Cancer Maternal Grandmother         gyn        Social History     Socioeconomic History    Marital status:      Spouse name: None    Number of children: 2    Years of education: None    Highest education level: None   Tobacco Use    Smoking status: Never     Passive exposure: Never    Smokeless tobacco: Never   Vaping Use    Vaping Use: Never used   Substance and Sexual Activity    Alcohol use: Never    Drug use: Never    Sexual activity: Not Currently     Birth control/protection: Surgical     Social Determinants of Health     Financial Resource Strain: Low Risk  (9/29/2023)    Overall Financial

## 2023-12-27 NOTE — PROGRESS NOTES
Chief Complaint   Patient presents with    Cough    Congestion    Sputum     green     Symptoms for over a week     1. Have you been to the ER, urgent care clinic since your last visit? Hospitalized since your last visit? No    2. Have you seen or consulted any other health care providers outside of the [address] [place] [address] Avenue since your last visit? Include any pap smears or colon screening.  No

## 2023-12-30 DIAGNOSIS — R42 VERTIGO: ICD-10-CM

## 2023-12-30 RX ORDER — MECLIZINE HYDROCHLORIDE 25 MG/1
TABLET ORAL
Qty: 90 TABLET | Refills: 1 | Status: SHIPPED | OUTPATIENT
Start: 2023-12-30

## 2024-01-04 ENCOUNTER — TELEPHONE (OUTPATIENT)
Facility: CLINIC | Age: 64
End: 2024-01-04

## 2024-01-04 DIAGNOSIS — J20.9 ACUTE BRONCHITIS, UNSPECIFIED ORGANISM: Primary | ICD-10-CM

## 2024-01-04 NOTE — TELEPHONE ENCOUNTER
Pt states she is still feeling bad a lot of green mucus sore back and chest -low grade temp-tmrw is the last day for her antibiotics. Please advise

## 2024-01-05 ENCOUNTER — HOSPITAL ENCOUNTER (OUTPATIENT)
Facility: HOSPITAL | Age: 64
End: 2024-01-05
Payer: MEDICARE

## 2024-01-05 ENCOUNTER — OFFICE VISIT (OUTPATIENT)
Facility: CLINIC | Age: 64
End: 2024-01-05
Payer: MEDICARE

## 2024-01-05 VITALS
TEMPERATURE: 96.8 F | WEIGHT: 293 LBS | HEIGHT: 66 IN | SYSTOLIC BLOOD PRESSURE: 132 MMHG | DIASTOLIC BLOOD PRESSURE: 71 MMHG | BODY MASS INDEX: 47.09 KG/M2 | OXYGEN SATURATION: 98 % | HEART RATE: 78 BPM | RESPIRATION RATE: 18 BRPM

## 2024-01-05 DIAGNOSIS — J44.1 CHRONIC OBSTRUCTIVE PULMONARY DISEASE WITH ACUTE EXACERBATION (HCC): Primary | ICD-10-CM

## 2024-01-05 PROCEDURE — G8427 DOCREV CUR MEDS BY ELIG CLIN: HCPCS | Performed by: NURSE PRACTITIONER

## 2024-01-05 PROCEDURE — 3023F SPIROM DOC REV: CPT | Performed by: NURSE PRACTITIONER

## 2024-01-05 PROCEDURE — 71046 X-RAY EXAM CHEST 2 VIEWS: CPT

## 2024-01-05 PROCEDURE — 1036F TOBACCO NON-USER: CPT | Performed by: NURSE PRACTITIONER

## 2024-01-05 PROCEDURE — 3017F COLORECTAL CA SCREEN DOC REV: CPT | Performed by: NURSE PRACTITIONER

## 2024-01-05 PROCEDURE — 99213 OFFICE O/P EST LOW 20 MIN: CPT | Performed by: NURSE PRACTITIONER

## 2024-01-05 PROCEDURE — 3075F SYST BP GE 130 - 139MM HG: CPT | Performed by: NURSE PRACTITIONER

## 2024-01-05 PROCEDURE — G8482 FLU IMMUNIZE ORDER/ADMIN: HCPCS | Performed by: NURSE PRACTITIONER

## 2024-01-05 PROCEDURE — 3078F DIAST BP <80 MM HG: CPT | Performed by: NURSE PRACTITIONER

## 2024-01-05 PROCEDURE — G8417 CALC BMI ABV UP PARAM F/U: HCPCS | Performed by: NURSE PRACTITIONER

## 2024-01-05 RX ORDER — LEVOFLOXACIN 250 MG/1
TABLET, FILM COATED ORAL
Qty: 6 TABLET | Refills: 0 | Status: SHIPPED | OUTPATIENT
Start: 2024-01-05

## 2024-01-05 ASSESSMENT — ENCOUNTER SYMPTOMS
WHEEZING: 1
CHOKING: 0
COUGH: 1
VOMITING: 0
ABDOMINAL PAIN: 0
NAUSEA: 0
DIARRHEA: 0
SHORTNESS OF BREATH: 0

## 2024-01-05 NOTE — PROGRESS NOTES
Subjective  Chief Complaint   Patient presents with    Cough     HPI:  Yoana Man is a 63 y.o. female who presents for follow up  regarding cough, congestion, wheezing and low grade temperature starting 2 weeks ago. States that she completed the steroids as well as all but once dose of augmentin. Woke up yesterday sweaty and temperature around 99.6. Cough is productive and has had intermittent wheezing. Feels sore all over from coughing. Denies any fever > 100.6, vomiting or diarrhea. Appetite and activity decreased. Did have ordered xray done 1 day ago.      Past Medical History:   Diagnosis Date    Arthritis     Asthma     Chronic kidney disease     stage 3    Chronic obstructive pulmonary disease (HCC)     Diabetes (HCC)     GERD (gastroesophageal reflux disease)     Helicobacter pylori gastritis 11/11/2020    High cholesterol     Hypertension     Menopause     Morbid obesity (HCC)     Obstructive sleep apnea on CPAP     Vertigo         Past Surgical History:   Procedure Laterality Date    BREAST BIOPSY Left 2019    CHOLECYSTECTOMY      HYSTERECTOMY (CERVIX STATUS UNKNOWN)      ORTHOPEDIC SURGERY      right shoulder spur    OVARY REMOVAL      ROTATOR CUFF REPAIR Left     by suture    TUBAL LIGATION Bilateral     UPPER GASTROINTESTINAL ENDOSCOPY  01/01/2019        Family History   Problem Relation Age of Onset    Lung Disease Mother         lung ca    Heart Disease Mother     Lung Disease Father         PNA    Cancer Maternal Grandmother         gyn        Social History     Socioeconomic History    Marital status:      Spouse name: None    Number of children: 2    Years of education: None    Highest education level: None   Tobacco Use    Smoking status: Never     Passive exposure: Never    Smokeless tobacco: Never   Vaping Use    Vaping Use: Never used   Substance and Sexual Activity    Alcohol use: Never    Drug use: Never    Sexual activity: Not Currently     Birth control/protection: Surgical

## 2024-01-05 NOTE — PROGRESS NOTES
Chief Complaint   Patient presents with    Cough     Still not feeling well. Last day of antibiotic.    1. Have you been to the ER, urgent care clinic since your last visit?  Hospitalized since your last visit?No    2. Have you seen or consulted any other health care providers outside of the [facility] since your last visit?  Include any pap smears or colon screening. No

## 2024-01-11 RX ORDER — PEN NEEDLE, DIABETIC 32GX 5/32"
NEEDLE, DISPOSABLE MISCELLANEOUS
Qty: 100 EACH | Refills: 3 | Status: SHIPPED | OUTPATIENT
Start: 2024-01-11

## 2024-01-13 ENCOUNTER — HOSPITAL ENCOUNTER (EMERGENCY)
Facility: HOSPITAL | Age: 64
Discharge: HOME | End: 2024-01-13
Attending: STUDENT IN AN ORGANIZED HEALTH CARE EDUCATION/TRAINING PROGRAM
Payer: MEDICARE

## 2024-01-13 ENCOUNTER — APPOINTMENT (OUTPATIENT)
Facility: HOSPITAL | Age: 64
End: 2024-01-13
Attending: EMERGENCY MEDICINE
Payer: MEDICARE

## 2024-01-13 VITALS
HEART RATE: 93 BPM | BODY MASS INDEX: 47.09 KG/M2 | SYSTOLIC BLOOD PRESSURE: 154 MMHG | RESPIRATION RATE: 18 BRPM | WEIGHT: 293 LBS | TEMPERATURE: 98.8 F | OXYGEN SATURATION: 99 % | HEIGHT: 66 IN | DIASTOLIC BLOOD PRESSURE: 127 MMHG

## 2024-01-13 DIAGNOSIS — U07.1 COVID-19: Primary | ICD-10-CM

## 2024-01-13 DIAGNOSIS — E83.42 HYPOMAGNESEMIA: ICD-10-CM

## 2024-01-13 LAB
ALBUMIN SERPL-MCNC: 3.1 G/DL (ref 3.5–5)
ALBUMIN/GLOB SERPL: 0.7 (ref 1.1–2.2)
ALP SERPL-CCNC: 123 U/L (ref 45–117)
ALT SERPL-CCNC: 15 U/L (ref 12–78)
ANION GAP SERPL CALC-SCNC: 9 MMOL/L (ref 5–15)
AST SERPL W P-5'-P-CCNC: 19 U/L (ref 15–37)
BASOPHILS # BLD: 0 K/UL (ref 0–0.1)
BASOPHILS NFR BLD: 0 % (ref 0–1)
BILIRUB SERPL-MCNC: 0.7 MG/DL (ref 0.2–1)
BUN SERPL-MCNC: 20 MG/DL (ref 6–20)
BUN/CREAT SERPL: 16 (ref 12–20)
CA-I BLD-MCNC: 9.3 MG/DL (ref 8.5–10.1)
CHLORIDE SERPL-SCNC: 104 MMOL/L (ref 97–108)
CO2 SERPL-SCNC: 26 MMOL/L (ref 21–32)
CREAT SERPL-MCNC: 1.26 MG/DL (ref 0.55–1.02)
DIFFERENTIAL METHOD BLD: ABNORMAL
EOSINOPHIL # BLD: 0.1 K/UL (ref 0–0.4)
EOSINOPHIL NFR BLD: 2 % (ref 0–7)
ERYTHROCYTE [DISTWIDTH] IN BLOOD BY AUTOMATED COUNT: 14.7 % (ref 11.5–14.5)
FLUAV RNA SPEC QL NAA+PROBE: NOT DETECTED
FLUBV RNA SPEC QL NAA+PROBE: NOT DETECTED
GLOBULIN SER CALC-MCNC: 4.4 G/DL (ref 2–4)
GLUCOSE SERPL-MCNC: 149 MG/DL (ref 65–100)
HCT VFR BLD AUTO: 41.8 % (ref 35–47)
HGB BLD-MCNC: 13.3 G/DL (ref 11.5–16)
IMM GRANULOCYTES # BLD AUTO: 0 K/UL (ref 0–0.04)
IMM GRANULOCYTES NFR BLD AUTO: 0 % (ref 0–0.5)
LYMPHOCYTES # BLD: 0.5 K/UL (ref 0.8–3.5)
LYMPHOCYTES NFR BLD: 9 % (ref 12–49)
MAGNESIUM SERPL-MCNC: 1.4 MG/DL (ref 1.6–2.4)
MCH RBC QN AUTO: 28.9 PG (ref 26–34)
MCHC RBC AUTO-ENTMCNC: 31.8 G/DL (ref 30–36.5)
MCV RBC AUTO: 90.9 FL (ref 80–99)
MONOCYTES # BLD: 0.3 K/UL (ref 0–1)
MONOCYTES NFR BLD: 5 % (ref 5–13)
NEUTS SEG # BLD: 4.4 K/UL (ref 1.8–8)
NEUTS SEG NFR BLD: 84 % (ref 32–75)
NRBC # BLD: 0 K/UL (ref 0–0.01)
NRBC BLD-RTO: 0 PER 100 WBC
PLATELET # BLD AUTO: 230 K/UL (ref 150–400)
PMV BLD AUTO: 9.6 FL (ref 8.9–12.9)
POTASSIUM SERPL-SCNC: 4.2 MMOL/L (ref 3.5–5.1)
PROT SERPL-MCNC: 7.5 G/DL (ref 6.4–8.2)
RBC # BLD AUTO: 4.6 M/UL (ref 3.8–5.2)
RBC MORPH BLD: ABNORMAL
SARS-COV-2 RNA RESP QL NAA+PROBE: DETECTED
SODIUM SERPL-SCNC: 139 MMOL/L (ref 136–145)
WBC # BLD AUTO: 5.3 K/UL (ref 3.6–11)

## 2024-01-13 PROCEDURE — 83735 ASSAY OF MAGNESIUM: CPT

## 2024-01-13 PROCEDURE — 71045 X-RAY EXAM CHEST 1 VIEW: CPT

## 2024-01-13 PROCEDURE — 85025 COMPLETE CBC W/AUTO DIFF WBC: CPT

## 2024-01-13 PROCEDURE — 6370000000 HC RX 637 (ALT 250 FOR IP): Performed by: STUDENT IN AN ORGANIZED HEALTH CARE EDUCATION/TRAINING PROGRAM

## 2024-01-13 PROCEDURE — 80053 COMPREHEN METABOLIC PANEL: CPT

## 2024-01-13 PROCEDURE — 99284 EMERGENCY DEPT VISIT MOD MDM: CPT

## 2024-01-13 PROCEDURE — 87636 SARSCOV2 & INF A&B AMP PRB: CPT

## 2024-01-13 RX ORDER — FUROSEMIDE 40 MG/1
40 TABLET ORAL
Status: COMPLETED | OUTPATIENT
Start: 2024-01-13 | End: 2024-01-13

## 2024-01-13 RX ORDER — LANOLIN ALCOHOL/MO/W.PET/CERES
400 CREAM (GRAM) TOPICAL DAILY
Status: DISCONTINUED | OUTPATIENT
Start: 2024-01-13 | End: 2024-01-13 | Stop reason: HOSPADM

## 2024-01-13 RX ADMIN — FUROSEMIDE 40 MG: 40 TABLET ORAL at 10:06

## 2024-01-13 RX ADMIN — Medication 400 MG: at 11:02

## 2024-01-13 NOTE — ED TRIAGE NOTES
Pt reported a cough that started in December, pt seen by PCP and put on abx, pt finished abx on Friday but still feels worse, pcp recommended she be seen in ED

## 2024-01-13 NOTE — ED PROVIDER NOTES
morning 90 mL 2    allopurinol (ZYLOPRIM) 300 MG tablet TAKE 1 TABLET EVERY DAY 90 tablet 1    pantoprazole (PROTONIX) 40 MG tablet Take 1 tablet by mouth every morning (before breakfast) 90 tablet 3    Alcohol Swabs (DROPSAFE ALCOHOL PREP) 70 % PADS USE AS DIRECTED TWICE DAILY 100 each 11    blood glucose test strips (ACCU-CHEK GUIDE) strip 1 each by In Vitro route daily As needed. 100 each 3    Blood Glucose Monitoring Suppl (ACCU-CHEK RENÉ PLUS) w/Device KIT 1 each by Does not apply route in the morning and at bedtime 1 kit 0    butalbital-aspirin-caffeine (FIORINAL) -40 MG per capsule Take 1 capsule by mouth every 6 hours as needed for Headaches for up to 5 days. Max Daily Amount: 4 capsules 20 capsule 0    acetaminophen (TYLENOL) 650 MG extended release tablet Take 1 tablet by mouth in the morning and 1 tablet at noon and 1 tablet in the evening.      albuterol sulfate HFA (PROVENTIL;VENTOLIN;PROAIR) 108 (90 Base) MCG/ACT inhaler Inhale 1 puff into the lungs every 4 hours as needed      aspirin 81 MG chewable tablet Take 1 tablet by mouth daily      vitamin D (CHOLECALCIFEROL) 25 MCG (1000 UT) TABS tablet Take 1 tablet by mouth daily      ipratropium-albuterol (DUONEB) 0.5-2.5 (3) MG/3ML SOLN nebulizer solution Inhale 3 mLs into the lungs every 4 hours as needed         Social Determinants of Health:   Social Determinants of Health     Tobacco Use: Low Risk  (1/13/2024)    Patient History     Smoking Tobacco Use: Never     Smokeless Tobacco Use: Never     Passive Exposure: Never   Alcohol Use: Not At Risk (9/29/2023)    AUDIT-C     Frequency of Alcohol Consumption: Never     Average Number of Drinks: Patient does not drink     Frequency of Binge Drinking: Never   Financial Resource Strain: Low Risk  (9/29/2023)    Overall Financial Resource Strain (CARDIA)     Difficulty of Paying Living Expenses: Not hard at all   Food Insecurity: Not on file (9/29/2023)   Transportation Needs: No Transportation Needs

## 2024-01-13 NOTE — DISCHARGE INSTRUCTIONS
97 - 108 mmol/L    CO2 26 21 - 32 mmol/L    Anion Gap 9 5 - 15 mmol/L    Glucose 149 (H) 65 - 100 mg/dL    BUN 20 6 - 20 mg/dL    Creatinine 1.26 (H) 0.55 - 1.02 mg/dL    Bun/Cre Ratio 16 12 - 20      Est, Glom Filt Rate 48 (L) >60 ml/min/1.73m2    Calcium 9.3 8.5 - 10.1 mg/dL    Total Bilirubin 0.7 0.2 - 1.0 mg/dL    AST 19 15 - 37 U/L    ALT 15 12 - 78 U/L    Alk Phosphatase 123 (H) 45 - 117 U/L    Total Protein 7.5 6.4 - 8.2 g/dL    Albumin 3.1 (L) 3.5 - 5.0 g/dL    Globulin 4.4 (H) 2.0 - 4.0 g/dL    Albumin/Globulin Ratio 0.7 (L) 1.1 - 2.2     Magnesium    Collection Time: 01/13/24 10:20 AM   Result Value Ref Range    Magnesium 1.4 (L) 1.6 - 2.4 mg/dL       Radiologic Studies  XR CHEST PORTABLE   Final Result      Mild edema pattern and trace bilateral pleural effusions.            ------------------------------------------------------------------------------------------------------------  The exam and treatment you received in the Emergency Department were for an urgent problem and are not intended as complete care. It is important that you follow-up with a doctor, nurse practitioner, or physician assistant to:  (1) confirm your diagnosis,  (2) re-evaluation of changes in your illness and treatment, and  (3) for ongoing care. Please take your discharge instructions with you when you go to your follow-up appointment.     If you have any problem arranging a follow-up appointment, contact the Emergency Department.  If your symptoms become worse or you do not improve as expected and you are unable to reach your health care provider, please return to the Emergency Department. We are available 24 hours a day.

## 2024-01-15 ENCOUNTER — TELEPHONE (OUTPATIENT)
Facility: CLINIC | Age: 64
End: 2024-01-15

## 2024-01-15 NOTE — TELEPHONE ENCOUNTER
----- Message from Peggy Bushraavelino Kennedy sent at 1/15/2024 12:33 PM EST -----  Subject: Message to Provider    QUESTIONS  Information for Provider? Patient went to the ED on Saturday morning,   after taking a Covid and they gave chest X-ray, She has a fluid in her   chest and she is positive for Covid 19.   ---------------------------------------------------------------------------  --------------  CALL BACK INFO  [phone]; Do not leave any message, patient will call back for answer  ---------------------------------------------------------------------------  --------------  SCRIPT ANSWERS  Relationship to Patient? Self

## 2024-02-07 ENCOUNTER — TELEPHONE (OUTPATIENT)
Facility: CLINIC | Age: 64
End: 2024-02-07

## 2024-02-07 NOTE — TELEPHONE ENCOUNTER
So trying to do A PA, but they are asking:   Does the provider attest to the benefit outweighing the risks despite an increased risk of adverse outcomes, and provided the patient education?*  Need yes on no answer please     Pt DX with stage 4 kidney disease     Doing a PA through cover my meds

## 2024-02-12 ENCOUNTER — OFFICE VISIT (OUTPATIENT)
Facility: CLINIC | Age: 64
End: 2024-02-12
Payer: MEDICARE

## 2024-02-12 VITALS
HEIGHT: 66 IN | TEMPERATURE: 97.2 F | DIASTOLIC BLOOD PRESSURE: 61 MMHG | RESPIRATION RATE: 18 BRPM | WEIGHT: 293 LBS | OXYGEN SATURATION: 98 % | BODY MASS INDEX: 47.09 KG/M2 | HEART RATE: 81 BPM | SYSTOLIC BLOOD PRESSURE: 160 MMHG

## 2024-02-12 DIAGNOSIS — I10 ESSENTIAL (PRIMARY) HYPERTENSION: ICD-10-CM

## 2024-02-12 DIAGNOSIS — I87.2 VENOUS INSUFFICIENCY (CHRONIC) (PERIPHERAL): ICD-10-CM

## 2024-02-12 DIAGNOSIS — D64.9 ANEMIA, UNSPECIFIED TYPE: ICD-10-CM

## 2024-02-12 DIAGNOSIS — N18.31 CHRONIC KIDNEY DISEASE, STAGE 3A (HCC): ICD-10-CM

## 2024-02-12 DIAGNOSIS — K21.00 GASTRO-ESOPHAGEAL REFLUX DISEASE WITH ESOPHAGITIS, WITHOUT BLEEDING: ICD-10-CM

## 2024-02-12 DIAGNOSIS — Z00.00 MEDICARE ANNUAL WELLNESS VISIT, SUBSEQUENT: ICD-10-CM

## 2024-02-12 DIAGNOSIS — E78.2 MIXED HYPERLIPIDEMIA: ICD-10-CM

## 2024-02-12 DIAGNOSIS — M1A.09X0 IDIOPATHIC CHRONIC GOUT, MULTIPLE SITES, WITHOUT TOPHUS (TOPHI): ICD-10-CM

## 2024-02-12 DIAGNOSIS — M17.0 BILATERAL PRIMARY OSTEOARTHRITIS OF KNEE: ICD-10-CM

## 2024-02-12 DIAGNOSIS — G47.33 OBSTRUCTIVE SLEEP APNEA (ADULT) (PEDIATRIC): ICD-10-CM

## 2024-02-12 DIAGNOSIS — K57.90 DIVERTICULAR DISEASE: ICD-10-CM

## 2024-02-12 DIAGNOSIS — E11.65 TYPE 2 DIABETES MELLITUS WITH HYPERGLYCEMIA, WITHOUT LONG-TERM CURRENT USE OF INSULIN (HCC): Primary | ICD-10-CM

## 2024-02-12 DIAGNOSIS — J44.9 CHRONIC OBSTRUCTIVE PULMONARY DISEASE, UNSPECIFIED COPD TYPE (HCC): ICD-10-CM

## 2024-02-12 LAB — HBA1C MFR BLD: 7.7 %

## 2024-02-12 PROCEDURE — 83036 HEMOGLOBIN GLYCOSYLATED A1C: CPT | Performed by: NURSE PRACTITIONER

## 2024-02-12 PROCEDURE — 3077F SYST BP >= 140 MM HG: CPT | Performed by: NURSE PRACTITIONER

## 2024-02-12 PROCEDURE — 3078F DIAST BP <80 MM HG: CPT | Performed by: NURSE PRACTITIONER

## 2024-02-12 PROCEDURE — G8417 CALC BMI ABV UP PARAM F/U: HCPCS | Performed by: NURSE PRACTITIONER

## 2024-02-12 PROCEDURE — 1036F TOBACCO NON-USER: CPT | Performed by: NURSE PRACTITIONER

## 2024-02-12 PROCEDURE — 3017F COLORECTAL CA SCREEN DOC REV: CPT | Performed by: NURSE PRACTITIONER

## 2024-02-12 PROCEDURE — G8482 FLU IMMUNIZE ORDER/ADMIN: HCPCS | Performed by: NURSE PRACTITIONER

## 2024-02-12 PROCEDURE — 99214 OFFICE O/P EST MOD 30 MIN: CPT | Performed by: NURSE PRACTITIONER

## 2024-02-12 PROCEDURE — 3046F HEMOGLOBIN A1C LEVEL >9.0%: CPT | Performed by: NURSE PRACTITIONER

## 2024-02-12 PROCEDURE — G8427 DOCREV CUR MEDS BY ELIG CLIN: HCPCS | Performed by: NURSE PRACTITIONER

## 2024-02-12 PROCEDURE — G0439 PPPS, SUBSEQ VISIT: HCPCS | Performed by: NURSE PRACTITIONER

## 2024-02-12 PROCEDURE — 2022F DILAT RTA XM EVC RTNOPTHY: CPT | Performed by: NURSE PRACTITIONER

## 2024-02-12 PROCEDURE — 3023F SPIROM DOC REV: CPT | Performed by: NURSE PRACTITIONER

## 2024-02-12 RX ORDER — DULAGLUTIDE 0.75 MG/.5ML
0.75 INJECTION, SOLUTION SUBCUTANEOUS WEEKLY
Qty: 6 ML | Refills: 1 | Status: SHIPPED | OUTPATIENT
Start: 2024-02-12 | End: 2024-02-12 | Stop reason: SDUPTHER

## 2024-02-12 RX ORDER — DULAGLUTIDE 0.75 MG/.5ML
0.75 INJECTION, SOLUTION SUBCUTANEOUS WEEKLY
Qty: 6 ML | Refills: 1 | Status: SHIPPED | OUTPATIENT
Start: 2024-02-12

## 2024-02-12 RX ORDER — HYDRALAZINE HYDROCHLORIDE 25 MG/1
25 TABLET, FILM COATED ORAL 2 TIMES DAILY
Qty: 180 TABLET | Refills: 1 | Status: SHIPPED | OUTPATIENT
Start: 2024-02-12

## 2024-02-12 RX ORDER — HUMAN INSULIN 100 [IU]/ML
50 INJECTION, SUSPENSION SUBCUTANEOUS EVERY MORNING
Qty: 90 ML | Refills: 2 | Status: SHIPPED | OUTPATIENT
Start: 2024-02-12

## 2024-02-12 NOTE — PROGRESS NOTES
Chief Complaint   Patient presents with    Diabetes    Medicare AWV     wellness    No othere c/o    Pt did not bring meds, went over list, pt confirmed       1. Have you been to the ER, urgent care clinic since your last visit?  Hospitalized since your last visit?No    2. Have you seen or consulted any other health care providers outside of the [facility] since your last visit?  Include any pap smears or colon screening. No

## 2024-02-12 NOTE — PROGRESS NOTES
Yoana Man is a 62 y.o. female who presents to the office today for the following:    Chief Complaint   Patient presents with    Diabetes    Medicare AWV          Past Medical History:   Diagnosis Date    Arthritis     Asthma     Chronic kidney disease     stage 3    Chronic obstructive pulmonary disease (HCC)     Diabetes (HCC)     GERD (gastroesophageal reflux disease)     High cholesterol     Hypertension     Menopause     Morbid obesity (HCC)     Obstructive sleep apnea on CPAP     Vertigo        Past Surgical History:   Procedure Laterality Date    HX BREAST BIOPSY Left 2019    HX CHOLECYSTECTOMY      HX ENDOSCOPY  01/01/2019    HX HYSTERECTOMY      HX OOPHORECTOMY      HX ORTHOPAEDIC      right shoulder spur    HX ROTATOR CUFF REPAIR Left     by suture    HX TUBAL LIGATION Bilateral         Family History   Problem Relation Age of Onset    Heart Disease Mother     Lung Disease Mother         lung ca    Lung Disease Father         PNA    Cancer Maternal Grandmother         gyn        Social History     Tobacco Use    Smoking status: Never    Smokeless tobacco: Never   Vaping Use    Vaping Use: Never used   Substance Use Topics    Alcohol use: Never    Drug use: Never        HPI  Patient here today for follow up of chronic conditions with PMH of hypertension, hyperlipidemia, type 2 diabetes, venous insufficiency, osteoarthritis of knees, copd, CKD, morbid obesity, sleep apnea, anemia and GERD. States that she follows with multiple specialists including vascular, GI and nephrology . Reports taking medicines as directed . Glucose staying under 200 when checking but did not bring readings. Did recently recover after having covid 19 in 1/2024.     Current Outpatient Medications   Medication Sig Dispense Refill    hydrALAZINE (APRESOLINE) 25 MG tablet Take 1 tablet by mouth in the morning and at bedtime 180 tablet 1    Dulaglutide (TRULICITY) 0.75 MG/0.5ML SOPN Inject 0.75 mg into the skin once a week 6 mL 1

## 2024-02-13 ENCOUNTER — TELEPHONE (OUTPATIENT)
Facility: CLINIC | Age: 64
End: 2024-02-13

## 2024-02-13 NOTE — TELEPHONE ENCOUNTER
Patient called in and wanted to relay a message to you stating that she is able to get the medication Trulicity without having to pay for it and they will be sending it to her.

## 2024-02-19 DIAGNOSIS — E11.65 TYPE 2 DIABETES MELLITUS WITH HYPERGLYCEMIA, WITHOUT LONG-TERM CURRENT USE OF INSULIN (HCC): ICD-10-CM

## 2024-02-19 RX ORDER — CALCIUM CITRATE/VITAMIN D3 200MG-6.25
TABLET ORAL
Qty: 200 STRIP | Refills: 3 | Status: SHIPPED | OUTPATIENT
Start: 2024-02-19

## 2024-02-21 ENCOUNTER — TELEPHONE (OUTPATIENT)
Dept: PHARMACY | Facility: CLINIC | Age: 64
End: 2024-02-21

## 2024-02-21 NOTE — TELEPHONE ENCOUNTER
Sowmya Mallory RN  P s Clinical Pharmacy Clinical Staff    Referral to the Ambulatory Clinical Pharmacy Team today; re: diabetes testing supplies. Per chart review, current orders are for the patient to check her blood glucose twice daily, however patient reports that she has only been receiving 100 test strips per order from her mail delivery pharmacy and was notified by the mail delivery pharmacy staff that this is a 90-day supply. Patient reports that she has previously ran out of test strips when testing twice daily and requested a refill from the pharmacy, however her refill request was denied due to requesting too soon, therefore she has only been checking her blood glucose every other day to avoid running out of test strips prior to next refill. Patient is agreeable to pharmacy team referral.

## 2024-02-21 NOTE — TELEPHONE ENCOUNTER
Received a referral: from Care Coordinator to review patient’s medications.     Per chart review, current orders are for the patient to check her blood glucose twice daily, however patient reports that she has only been receiving 100 test strips per order from her mail delivery pharmacy and was notified by the mail delivery pharmacy staff that this is a 90-day supply. Patient reports that she has previously ran out of test strips when testing twice daily and requested a refill from the pharmacy, however her refill request was denied due to requesting too soon, therefore she has only been checking her blood glucose every other day to avoid running out of test strips prior to next refill. Patient is agreeable to pharmacy team referral.     Patient is located in [place].  Please schedule Monday-Thursday 8AM-4PM with Talia Stone or Yamilka for licensing purposes.      Called patient to schedule a time to speak with a pharmacist over the telephone.     No answer left VM: Please contact us at  [phone] to schedule this appointment.    Shira Robert CPhT  Population Health    [facility] [place] [facility]   [phone] option 2

## 2024-02-22 ENCOUNTER — CARE COORDINATION (OUTPATIENT)
Facility: CLINIC | Age: 64
End: 2024-02-22

## 2024-02-22 DIAGNOSIS — N18.30 TYPE 2 DIABETES MELLITUS WITH STAGE 3 CHRONIC KIDNEY DISEASE, WITH LONG-TERM CURRENT USE OF INSULIN, UNSPECIFIED WHETHER STAGE 3A OR 3B CKD (HCC): ICD-10-CM

## 2024-02-22 DIAGNOSIS — E11.22 TYPE 2 DIABETES MELLITUS WITH STAGE 3 CHRONIC KIDNEY DISEASE, WITH LONG-TERM CURRENT USE OF INSULIN, UNSPECIFIED WHETHER STAGE 3A OR 3B CKD (HCC): ICD-10-CM

## 2024-02-22 DIAGNOSIS — I10 HYPERTENSION, UNSPECIFIED TYPE: Primary | ICD-10-CM

## 2024-02-22 DIAGNOSIS — Z79.4 TYPE 2 DIABETES MELLITUS WITH STAGE 3 CHRONIC KIDNEY DISEASE, WITH LONG-TERM CURRENT USE OF INSULIN, UNSPECIFIED WHETHER STAGE 3A OR 3B CKD (HCC): ICD-10-CM

## 2024-02-22 NOTE — CARE COORDINATION
Remote Patient Kit Ordering Note      Date/Time:  2/22/2024 2:43 PM      CCSS placed phone call to patient/family today to notify of RPM kit order; patient/family was available; discussed the following topics below and all questions answered.    [x] CCSS confirmed patient shipping address  [x] Patient will receive package over the next 1-3 business days. Someone 21 years or older must be present to sign for UPS delivery.  [x] HRS will contact patient within 24 hours, an HRS  will call the patient directly: If the patient does not answer, HRS will follow up with the clinical team notifying them about the unsuccessful attempt to contact the patient. HRS will make three call attempts to the patient.Provide patient with [facility] Virtual install number is: [phone].  [x] ACM will contact patient once equipment is active to welcome them to the program.                                                         [x] Hours of RPM monitoring - Monday-Friday [phone]; encourage patient to get vitals entered by Noon each day to have the alert addressed same day.  [x][facility] to patient.                      ACM made aware the RPM kit has been ordered.

## 2024-02-23 NOTE — TELEPHONE ENCOUNTER
2nd Attempt Documentation:  2nd attempt to contact this patient regarding the previous message    CLINICAL PHARMACY: REFERRAL  Patient unavailable at the time of call. Left following message on home TAD: please call back at toll-free [phone] to retrieve previous message.    MyChart message sent.    Shira Robert CPhT  Population Health    [facility]   [phone] option 2     For Pharmacy Admin Tracking Only    Program: [email or web address]  CPA in place:  No  Gap Closed?: No   Time Spent (min): 20

## 2024-03-08 VITALS
DIASTOLIC BLOOD PRESSURE: 88 MMHG | BODY MASS INDEX: 59.59 KG/M2 | OXYGEN SATURATION: 95 % | HEART RATE: 71 BPM | WEIGHT: 293 LBS | SYSTOLIC BLOOD PRESSURE: 137 MMHG

## 2024-03-13 ENCOUNTER — CARE COORDINATION (OUTPATIENT)
Dept: CARE COORDINATION | Age: 64
End: 2024-03-13

## 2024-03-13 NOTE — CARE COORDINATION
Date/Time:  3/13/2024 9:51 AM  LPN attempted to reach patient by telephone regarding red alert in remote patient monitoring program. Left HIPPA compliant message requesting a return call. Will attempt to reach patient again.

## 2024-03-13 NOTE — CARE COORDINATION
Remote Alert Monitoring Note  Rpm alert to be reviewed by the provider   red alert   glucose reading (64)   Additional needs to be addressed by N/A: No      Date/Time:  3/13/2024 2:35 PM  Patient Current Location: [place]  Pt returned call at this time. Verified patients name and  as identifiers.  Background: Pt enrolled in RPM r/t HTN, DM.  Refer to 911 immediately if:  Patient unresponsive or unable to provide history  Change in cognition or sudden confusion  Patient unable to respond in complete sentences  Intense chest pain/tightness  Any concern for any clinical emergency  Red Alert: Provider response time of 1 hr required for any red alert requiring intervention  Yellow Alert: Provider response time of 3hr required for any escalated yellow alert  Hypoglycemia Triage  Are you having any vision changes? no   Are you having any increased thirst? no   Do you have increased urination? no   Are you having increased fatigue? no   Clinical Interventions: Reviewed and followed up on alerts and treatments-Spoke with pt who is in no apparent distress.  Offers no complaints at this time.  Reviewed medications, pt taking Trulicity 0.25 mg weekly, Novolin 70/30  50 units Q am, Metformin 1000 mg with dinner and Glimepiride 1 mg before lunch and 2 mg before dinner.  Pt states she did not take Novolin 70/30 this morning because glucose was low.  She just ate and will recheck glucose again within the hour.     Plan/Follow Up: Will continue to review, monitor and address alerts with follow up based on severity of symptoms and risk factors.

## 2024-03-13 NOTE — CARE COORDINATION
03/13 24 2:46 PM Patient is currently enrolled in Remote Patient Monitoring for Diabetes and HTN.  Pt rechecked glucose at 2:45 PM with updated reading of 166.  Glucose now within RPM parameters.  Plan/Follow Up: Will continue to review, monitor and address alerts with follow up based on severity of symptoms and risk factors.

## 2024-03-13 NOTE — CARE COORDINATION
03/13/24 1:19 PM Patient is currently enrolled in Remote Patient Monitoring for Diabetes and HTN.  Second attempt to reach pt to review RPM red alert with no answer and again left HIPAA compliant voice message requesting return call.  Outreach to emergency contact, Betty Guzman and she will attempt to reach pt and have her call nurse.

## 2024-03-15 ENCOUNTER — CARE COORDINATION (OUTPATIENT)
Facility: CLINIC | Age: 64
End: 2024-03-15

## 2024-03-15 NOTE — CARE COORDINATION
Remote Patient Kit Ordering Note      Date/Time:  3/15/2024 3:36 PM      CCSS placed phone call to patient/family today to notify of RPM kit order; patient/family was available; discussed the following topics below and all questions answered.    [x] CCSS confirmed patient shipping address  [x] Patient will receive package over the next 1-3 business days. Someone 21 years or older must be present to sign for UPS delivery.  [x] HRS will contact patient within 24 hours, an HRS  will call the patient directly: If the patient does not answer, HRS will follow up with the clinical team notifying them about the unsuccessful attempt to contact the patient. HRS will make three call attempts to the patient.Provide patient with [facility] Virtual install number is: [phone].  [x] ACM will contact patient once equipment is active to welcome them to the program.                                                         [x] Hours of RPM monitoring - Monday-Friday [phone]; encourage patient to get vitals entered by Noon each day to have the alert addressed same day.  [x][facility].                      ACM made aware the RPM kit has been ordered.           CCSS placed call to patient to arrange RPM kit  through UPS.     Reviewed with patient how to pack equipment in original packing.     Verified patient's availability to schedule UPS  time.     UPS  time requested. Anticipated  date range 3/19-3/20       Is This A New Presentation, Or A Follow-Up?: Skin Lesion What Type Of Note Output Would You Prefer (Optional)?: Standard Output Has Your Skin Lesion Been Treated?: not been treated

## 2024-03-18 ENCOUNTER — CARE COORDINATION (OUTPATIENT)
Dept: CARE COORDINATION | Age: 64
End: 2024-03-18

## 2024-03-18 NOTE — CARE COORDINATION
Remote Patient Monitoring Note  Date/Time:  3/18/2024 12:51 PM  [facility] yellow alert received for no BP x > 2 days. Verified patients name and  as identifiers.  Background: Pt is enrolled in RPM r/t DM, HTN.  Clinical Interventions: Reviewed and followed up on alerts and treatments-Spoke with pt who is waiting on a RPM full kit from [facility].  Pt education provided to continue to use mobile kit and enter manually until new kit received.  Pt verbalizes understanding.    Plan/Follow Up: Will continue to review, monitor and address alerts with follow up based on severity of symptoms and risk factors.

## 2024-03-18 NOTE — PROGRESS NOTES
Remote Patient Monitoring Change to Monitoring Parameters    Patient currently being managed with remote patient monitoring for Diabetes and HTN.    Request to change from a mobile kit to a full kit has been received due to patient having a difficult time connecting with the mobile kit.   See care coordination encounters for additional details.

## 2024-03-20 RX ORDER — FUROSEMIDE 40 MG/1
TABLET ORAL
Qty: 36 TABLET | Refills: 3 | Status: SHIPPED | OUTPATIENT
Start: 2024-03-20

## 2024-03-28 ENCOUNTER — CARE COORDINATION (OUTPATIENT)
Dept: CARE COORDINATION | Age: 64
End: 2024-03-28

## 2024-03-28 NOTE — CARE COORDINATION
03/28/24 12:03 PM Patient is currently enrolled in Remote Patient Monitoring for Diabetes and HTN.  Pt rechecked glucose at 11:59 AM with updated reading of 100.  Glucose now within RPM parameters.  Plan/Follow Up: Will continue to review, monitor and address alerts with follow up based on severity of symptoms and risk factors.

## 2024-03-28 NOTE — CARE COORDINATION
Remote Alert Monitoring Note  Rpm alert to be reviewed by the provider   red alert   glucose reading (66)   Additional needs to be addressed by N/A: No      Date/Time:  3/28/2024 11:28 AM  Patient Current Location: [place]  LPN contacted patient by telephone. Verified patients name and  as identifiers.  Background: Pt enrolled in RPM r/t DM, HTN.  Refer to 911 immediately if:  Patient unresponsive or unable to provide history  Change in cognition or sudden confusion  Patient unable to respond in complete sentences  Intense chest pain/tightness  Any concern for any clinical emergency  Red Alert: Provider response time of 1 hr required for any red alert requiring intervention  Yellow Alert: Provider response time of 3hr required for any escalated yellow alert  Hypoglycemia Triage  Are you having any vision changes? no   Are you having any increased thirst? no   Do you have increased urination? no   Are you having increased fatigue? no    Clinical Interventions: Reviewed and followed up on alerts and treatments-Spoke with pt who is in no apparent distress. Offers no complaints at this time. Reviewed medications, pt taking Trulicity 0.25 mg weekly, Novolin 70/30 50 units Q am, Metformin 1000 mg with dinner and Glimepiride 1 mg before lunch and 2 mg before dinner. Pt states she did not take any of her medications yet this morning because glucose was low. She is getting ready to eat now and will recheck glucose again within the hour.   Plan/Follow Up: Will continue to review, monitor and address alerts with follow up based on severity of symptoms and risk factors.

## 2024-03-28 NOTE — CARE COORDINATION
Date/Time:  3/28/2024 8:47 AM  LPN attempted to reach patient by telephone regarding red alert in remote patient monitoring program. Left HIPPA compliant message requesting a return call. Will attempt to reach patient again.

## 2024-04-05 NOTE — PROGRESS NOTES
Remote Patient Monitoring Treatment Plan    Received request from [facility]/Sowmya Shaw RN   to order remote patient monitoring for in home monitoring of Diabetes; Condition managed by PCP.  HTN; Condition managed by PCP.  and order completed.     Patient will be monitoring blood pressure   glucose.      Patient will engage in Remote Patient Monitoring each day to develop the skills necessary for self management.       RPM Care Team Responsibilities:   Alerts will be reviewed daily and addressed within 2-4 hours during operational hours (Monday -Friday 9 am-4 pm)  Alert response and intervention documented in patient medical record  Alert response escalated to PCP per protocol and documented in patient medical record  Patient monitored over approximately  days  Discharge from program based on self-management readiness    See care coordination encounters for additional details.      Spontaneous, unlabored and symmetrical

## 2024-04-11 RX ORDER — ATORVASTATIN CALCIUM 10 MG/1
TABLET, FILM COATED ORAL
Qty: 90 TABLET | Refills: 1 | Status: SHIPPED | OUTPATIENT
Start: 2024-04-11

## 2024-04-11 RX ORDER — AMLODIPINE BESYLATE 5 MG/1
5 TABLET ORAL DAILY
Qty: 90 TABLET | Refills: 1 | Status: SHIPPED | OUTPATIENT
Start: 2024-04-11

## 2024-04-29 ENCOUNTER — HOSPITAL ENCOUNTER (EMERGENCY)
Facility: HOSPITAL | Age: 64
Discharge: HOME | End: 2024-04-30
Attending: EMERGENCY MEDICINE
Payer: MEDICARE

## 2024-04-29 ENCOUNTER — APPOINTMENT (OUTPATIENT)
Facility: HOSPITAL | Age: 64
End: 2024-04-29
Payer: MEDICARE

## 2024-04-29 DIAGNOSIS — J06.9 ACUTE UPPER RESPIRATORY INFECTION: Primary | ICD-10-CM

## 2024-04-29 DIAGNOSIS — E11.65 TYPE 2 DIABETES MELLITUS WITH HYPERGLYCEMIA, WITHOUT LONG-TERM CURRENT USE OF INSULIN (HCC): ICD-10-CM

## 2024-04-29 PROCEDURE — 99284 EMERGENCY DEPT VISIT MOD MDM: CPT

## 2024-04-29 PROCEDURE — 71045 X-RAY EXAM CHEST 1 VIEW: CPT

## 2024-04-29 PROCEDURE — 87636 SARSCOV2 & INF A&B AMP PRB: CPT

## 2024-04-29 RX ORDER — BLOOD SUGAR DIAGNOSTIC
STRIP MISCELLANEOUS
Qty: 100 STRIP | Refills: 3 | Status: SHIPPED | OUTPATIENT
Start: 2024-04-29

## 2024-04-29 ASSESSMENT — ENCOUNTER SYMPTOMS
COUGH: 1
RHINORRHEA: 1

## 2024-04-29 ASSESSMENT — PAIN SCALES - GENERAL: PAINLEVEL_OUTOF10: 8

## 2024-04-29 ASSESSMENT — PAIN DESCRIPTION - PAIN TYPE: TYPE: ACUTE PAIN

## 2024-04-29 ASSESSMENT — PAIN DESCRIPTION - FREQUENCY: FREQUENCY: CONTINUOUS

## 2024-04-29 ASSESSMENT — PAIN DESCRIPTION - LOCATION: LOCATION: GENERALIZED

## 2024-04-29 ASSESSMENT — PAIN - FUNCTIONAL ASSESSMENT: PAIN_FUNCTIONAL_ASSESSMENT: 0-10

## 2024-04-30 VITALS
RESPIRATION RATE: 20 BRPM | TEMPERATURE: 97.8 F | BODY MASS INDEX: 47.09 KG/M2 | WEIGHT: 293 LBS | DIASTOLIC BLOOD PRESSURE: 84 MMHG | HEIGHT: 66 IN | OXYGEN SATURATION: 99 % | HEART RATE: 66 BPM | SYSTOLIC BLOOD PRESSURE: 142 MMHG

## 2024-04-30 LAB
FLUAV RNA SPEC QL NAA+PROBE: NOT DETECTED
FLUBV RNA SPEC QL NAA+PROBE: NOT DETECTED
SARS-COV-2 RNA RESP QL NAA+PROBE: NOT DETECTED

## 2024-04-30 ASSESSMENT — ENCOUNTER SYMPTOMS
EYES NEGATIVE: 1
GASTROINTESTINAL NEGATIVE: 1

## 2024-04-30 NOTE — ED TRIAGE NOTES
Patient reports productive cough, chills, body aches, and runny nose.  Pt's temp now 97.8F orally.

## 2024-04-30 NOTE — ED NOTES
[facility] EMERGENCY DEPT  EMERGENCY DEPARTMENT ENCOUNTER      Pt Name: Yoana Man  MRN: [id number]  Birthdate 1960  Date of evaluation: 4/29/2024  Provider: Walter Santos MD  11:14 PM    CHIEF COMPLAINT     No chief complaint on file.        HISTORY OF PRESENT ILLNESS    Yoana Man is a 63 y.o. female with history of diabetes and asthma who presents to the emergency department with complaint of chills, cough, pleuritic chest pain since last week.        Nursing Notes were reviewed.    REVIEW OF SYSTEMS       Review of Systems   Constitutional:  Positive for chills.   HENT:  Positive for rhinorrhea.    Eyes: Negative.    Respiratory:  Positive for cough.    Cardiovascular:  Positive for chest pain.   Gastrointestinal: Negative.    Genitourinary: Negative.    Musculoskeletal: Negative.    Neurological: Negative.    Hematological: Negative.        Except as noted above the remainder of the review of systems was reviewed and negative.       PAST MEDICAL HISTORY     Past Medical History:   Diagnosis Date    Arthritis     Asthma     Chronic kidney disease     stage 3    Chronic obstructive pulmonary disease (HCC)     Diabetes (HCC)     Diabetes mellitus (HCC) 11/11/2020    GERD (gastroesophageal reflux disease)     Helicobacter pylori gastritis 11/11/2020    High cholesterol     Hypertension     Menopause     Morbid obesity (HCC)     Obstructive sleep apnea on CPAP     Vertigo          SURGICAL HISTORY       Past Surgical History:   Procedure Laterality Date    BREAST BIOPSY Left 2019    CHOLECYSTECTOMY      HYSTERECTOMY (CERVIX STATUS UNKNOWN)      ORTHOPEDIC SURGERY      right shoulder spur    OVARY REMOVAL      ROTATOR CUFF REPAIR Left     by suture    TUBAL LIGATION Bilateral     UPPER GASTROINTESTINAL ENDOSCOPY  01/01/2019         CURRENT MEDICATIONS       Previous Medications    ACCU-CHEK GUIDE STRIP    TEST BLOOD SUGAR EVERY DAY AS NEEDED    ACETAMINOPHEN (TYLENOL) 650 MG EXTENDED RELEASE TABLET    Take 1 tablet by

## 2024-05-06 ENCOUNTER — HOSPITAL ENCOUNTER (EMERGENCY)
Facility: HOSPITAL | Age: 64
Discharge: HOME | End: 2024-05-06
Attending: EMERGENCY MEDICINE
Payer: MEDICARE

## 2024-05-06 VITALS
DIASTOLIC BLOOD PRESSURE: 84 MMHG | RESPIRATION RATE: 19 BRPM | HEIGHT: 66 IN | SYSTOLIC BLOOD PRESSURE: 152 MMHG | BODY MASS INDEX: 47.09 KG/M2 | OXYGEN SATURATION: 100 % | WEIGHT: 293 LBS | HEART RATE: 83 BPM | TEMPERATURE: 98.8 F

## 2024-05-06 DIAGNOSIS — M54.32 SCIATICA OF LEFT SIDE: ICD-10-CM

## 2024-05-06 DIAGNOSIS — S39.012A STRAIN OF LUMBAR REGION, INITIAL ENCOUNTER: Primary | ICD-10-CM

## 2024-05-06 PROCEDURE — 6370000000 HC RX 637 (ALT 250 FOR IP): Performed by: EMERGENCY MEDICINE

## 2024-05-06 PROCEDURE — 99283 EMERGENCY DEPT VISIT LOW MDM: CPT

## 2024-05-06 RX ORDER — METHOCARBAMOL 750 MG/1
750 TABLET, FILM COATED ORAL 4 TIMES DAILY PRN
Qty: 40 TABLET | Refills: 0 | Status: SHIPPED | OUTPATIENT
Start: 2024-05-06 | End: 2024-05-16

## 2024-05-06 RX ORDER — METHOCARBAMOL 750 MG/1
750 TABLET, FILM COATED ORAL
Status: COMPLETED | OUTPATIENT
Start: 2024-05-06 | End: 2024-05-06

## 2024-05-06 RX ADMIN — METHOCARBAMOL 750 MG: 750 TABLET ORAL at 18:58

## 2024-05-06 ASSESSMENT — PAIN SCALES - GENERAL: PAINLEVEL_OUTOF10: 10

## 2024-05-06 ASSESSMENT — PAIN DESCRIPTION - LOCATION: LOCATION: BACK

## 2024-05-06 ASSESSMENT — PAIN - FUNCTIONAL ASSESSMENT: PAIN_FUNCTIONAL_ASSESSMENT: 0-10

## 2024-05-06 ASSESSMENT — PAIN DESCRIPTION - ORIENTATION: ORIENTATION: LOWER

## 2024-05-06 ASSESSMENT — PAIN DESCRIPTION - PAIN TYPE: TYPE: ACUTE PAIN;CHRONIC PAIN

## 2024-05-06 NOTE — ED TRIAGE NOTES
PT reports acute on chronic lower back pain that radiates down left leg. PT reports pain started 4 days ago. PT ambulatory to exam room

## 2024-05-06 NOTE — ED PROVIDER NOTES
[facility] EMERGENCY DEPT  EMERGENCY DEPARTMENT HISTORY AND PHYSICAL EXAM      Date: 5/6/2024  Patient Name: Yoana Man  MRN: [id number]  YOB: 1960  Date of evaluation: 5/6/2024  Provider: Christina Kenny MD   Note Started: 6:36 PM EDT 5/6/24    HISTORY OF PRESENT ILLNESS     Chief Complaint   Patient presents with    Back Pain       History Provided By: Patient    HPI: Yoana Man is a 63 y.o. female with myriad of chronic illnesses presents with exacerbation of her low back pain with pain down back of her left leg without new trauma or injury. She denies leg weakness, bowel or bladder dysfunction or numbness around her genital region.    PAST MEDICAL HISTORY   Past Medical History:  Past Medical History:   Diagnosis Date    Arthritis     Asthma     Chronic kidney disease     stage 3    Chronic obstructive pulmonary disease (HCC)     Diabetes (HCC)     Diabetes mellitus (HCC) 11/11/2020    GERD (gastroesophageal reflux disease)     Helicobacter pylori gastritis 11/11/2020    High cholesterol     Hypertension     Menopause     Morbid obesity (HCC)     Obstructive sleep apnea on CPAP     Vertigo        Past Surgical History:  Past Surgical History:   Procedure Laterality Date    BREAST BIOPSY Left 2019    CHOLECYSTECTOMY      HYSTERECTOMY (CERVIX STATUS UNKNOWN)      ORTHOPEDIC SURGERY      right shoulder spur    OVARY REMOVAL      ROTATOR CUFF REPAIR Left     by suture    TUBAL LIGATION Bilateral     UPPER GASTROINTESTINAL ENDOSCOPY  01/01/2019       Family History:  Family History   Problem Relation Age of Onset    Lung Disease Mother         lung ca    Heart Disease Mother     Lung Disease Father         PNA    Cancer Maternal Grandmother         gyn       Social History:  Social History     Tobacco Use    Smoking status: Never     Passive exposure: Never    Smokeless tobacco: Never   Vaping Use    Vaping Use: Never used   Substance Use Topics    Alcohol use: Never    Drug use: Never       Allergies:  Allergies

## 2024-05-14 ENCOUNTER — TELEPHONE (OUTPATIENT)
Facility: CLINIC | Age: 64
End: 2024-05-14

## 2024-05-16 ENCOUNTER — CARE COORDINATION (OUTPATIENT)
Dept: CARE COORDINATION | Age: 64
End: 2024-05-16

## 2024-05-16 ENCOUNTER — OFFICE VISIT (OUTPATIENT)
Facility: CLINIC | Age: 64
End: 2024-05-16
Payer: MEDICARE

## 2024-05-16 VITALS
SYSTOLIC BLOOD PRESSURE: 139 MMHG | BODY MASS INDEX: 47.09 KG/M2 | OXYGEN SATURATION: 99 % | DIASTOLIC BLOOD PRESSURE: 63 MMHG | HEIGHT: 66 IN | TEMPERATURE: 97.4 F | HEART RATE: 78 BPM | RESPIRATION RATE: 18 BRPM | WEIGHT: 293 LBS

## 2024-05-16 DIAGNOSIS — M1A.09X0 IDIOPATHIC CHRONIC GOUT, MULTIPLE SITES, WITHOUT TOPHUS (TOPHI): ICD-10-CM

## 2024-05-16 DIAGNOSIS — I10 ESSENTIAL (PRIMARY) HYPERTENSION: ICD-10-CM

## 2024-05-16 DIAGNOSIS — J44.9 CHRONIC OBSTRUCTIVE PULMONARY DISEASE, UNSPECIFIED COPD TYPE (HCC): ICD-10-CM

## 2024-05-16 DIAGNOSIS — N18.30 TYPE 2 DIABETES MELLITUS WITH STAGE 3 CHRONIC KIDNEY DISEASE, WITH LONG-TERM CURRENT USE OF INSULIN, UNSPECIFIED WHETHER STAGE 3A OR 3B CKD (HCC): Primary | ICD-10-CM

## 2024-05-16 DIAGNOSIS — I87.2 VENOUS INSUFFICIENCY: ICD-10-CM

## 2024-05-16 DIAGNOSIS — K21.00 GASTRO-ESOPHAGEAL REFLUX DISEASE WITH ESOPHAGITIS, WITHOUT BLEEDING: ICD-10-CM

## 2024-05-16 DIAGNOSIS — K57.90 DIVERTICULAR DISEASE: ICD-10-CM

## 2024-05-16 DIAGNOSIS — G47.33 OBSTRUCTIVE SLEEP APNEA (ADULT) (PEDIATRIC): ICD-10-CM

## 2024-05-16 DIAGNOSIS — E78.2 MIXED HYPERLIPIDEMIA: ICD-10-CM

## 2024-05-16 DIAGNOSIS — M17.0 BILATERAL PRIMARY OSTEOARTHRITIS OF KNEE: ICD-10-CM

## 2024-05-16 DIAGNOSIS — D64.9 ANEMIA, UNSPECIFIED TYPE: ICD-10-CM

## 2024-05-16 DIAGNOSIS — N18.31 CHRONIC KIDNEY DISEASE, STAGE 3A (HCC): ICD-10-CM

## 2024-05-16 DIAGNOSIS — Z79.4 TYPE 2 DIABETES MELLITUS WITH STAGE 3 CHRONIC KIDNEY DISEASE, WITH LONG-TERM CURRENT USE OF INSULIN, UNSPECIFIED WHETHER STAGE 3A OR 3B CKD (HCC): Primary | ICD-10-CM

## 2024-05-16 DIAGNOSIS — E11.22 TYPE 2 DIABETES MELLITUS WITH STAGE 3 CHRONIC KIDNEY DISEASE, WITH LONG-TERM CURRENT USE OF INSULIN, UNSPECIFIED WHETHER STAGE 3A OR 3B CKD (HCC): Primary | ICD-10-CM

## 2024-05-16 PROBLEM — E11.69 MIXED HYPERLIPIDEMIA DUE TO TYPE 2 DIABETES MELLITUS (HCC): Status: RESOLVED | Noted: 2020-11-11 | Resolved: 2024-05-16

## 2024-05-16 LAB — HBA1C MFR BLD: 6.6 %

## 2024-05-16 PROCEDURE — 3017F COLORECTAL CA SCREEN DOC REV: CPT | Performed by: NURSE PRACTITIONER

## 2024-05-16 PROCEDURE — 3078F DIAST BP <80 MM HG: CPT | Performed by: NURSE PRACTITIONER

## 2024-05-16 PROCEDURE — 2022F DILAT RTA XM EVC RTNOPTHY: CPT | Performed by: NURSE PRACTITIONER

## 2024-05-16 PROCEDURE — 83036 HEMOGLOBIN GLYCOSYLATED A1C: CPT | Performed by: NURSE PRACTITIONER

## 2024-05-16 PROCEDURE — 3075F SYST BP GE 130 - 139MM HG: CPT | Performed by: NURSE PRACTITIONER

## 2024-05-16 PROCEDURE — 3046F HEMOGLOBIN A1C LEVEL >9.0%: CPT | Performed by: NURSE PRACTITIONER

## 2024-05-16 PROCEDURE — 1036F TOBACCO NON-USER: CPT | Performed by: NURSE PRACTITIONER

## 2024-05-16 PROCEDURE — 3023F SPIROM DOC REV: CPT | Performed by: NURSE PRACTITIONER

## 2024-05-16 PROCEDURE — G8427 DOCREV CUR MEDS BY ELIG CLIN: HCPCS | Performed by: NURSE PRACTITIONER

## 2024-05-16 PROCEDURE — 99214 OFFICE O/P EST MOD 30 MIN: CPT | Performed by: NURSE PRACTITIONER

## 2024-05-16 PROCEDURE — G8417 CALC BMI ABV UP PARAM F/U: HCPCS | Performed by: NURSE PRACTITIONER

## 2024-05-16 RX ORDER — LIRAGLUTIDE 6 MG/ML
1.2 INJECTION SUBCUTANEOUS DAILY
Qty: 2 ADJUSTABLE DOSE PRE-FILLED PEN SYRINGE | Refills: 3 | Status: SHIPPED | OUTPATIENT
Start: 2024-05-16 | End: 2024-05-16 | Stop reason: ALTCHOICE

## 2024-05-16 RX ORDER — DULAGLUTIDE 0.75 MG/.5ML
0.75 INJECTION, SOLUTION SUBCUTANEOUS WEEKLY
Qty: 6 ML | Refills: 0 | Status: SHIPPED | OUTPATIENT
Start: 2024-05-16

## 2024-05-16 NOTE — CARE COORDINATION
Remote Patient Monitoring Note  Date/Time:  5/16/2024 8:01 AM  Pt is enrolled in Remote Patient Monitoring r/t DM, HTN.  She has a scheduled office visit today at 10:30 AM.  RPM metrics added for review.  LPN reviewed patients reported daily Remote Patient Monitoring metrics. All reported metrics are within alert parameters.     Plan/Follow Up: Will continue to review, monitor and address alerts with follow up based on severity of symptoms and risk factors.

## 2024-05-16 NOTE — PROGRESS NOTES
Chief Complaint   Patient presents with    Diabetes     Follow up     Pt stated her insurance will no longer pay for the Trulicity     Pt did not bring meds, went over list, pt confirmed     \"Have you been to the ER, urgent care clinic since your last visit?  Hospitalized since your last visit?\"    Yes ED    “Have you seen or consulted any other health care providers outside of [facility] since your last visit?”    NO            Click Here for Release of Records Request    
apply route in the morning and at bedtime 1 kit 0    butalbital-aspirin-caffeine (FIORINAL) -40 MG per capsule Take 1 capsule by mouth every 6 hours as needed for Headaches for up to 5 days. Max Daily Amount: 4 capsules 20 capsule 0    acetaminophen (TYLENOL) 650 MG extended release tablet Take 1 tablet by mouth in the morning and 1 tablet at noon and 1 tablet in the evening.      albuterol sulfate HFA (PROVENTIL;VENTOLIN;PROAIR) 108 (90 Base) MCG/ACT inhaler Inhale 1 puff into the lungs every 4 hours as needed      aspirin 81 MG chewable tablet Take 1 tablet by mouth daily      vitamin D (CHOLECALCIFEROL) 25 MCG (1000 UT) TABS tablet Take 1 tablet by mouth daily      ipratropium-albuterol (DUONEB) 0.5-2.5 (3) MG/3ML SOLN nebulizer solution Inhale 3 mLs into the lungs every 4 hours as needed       No current facility-administered medications for this visit.         Review of Systems   Constitutional: Negative.    HENT: Negative.     Eyes:  Negative for pain, discharge and redness.   Respiratory: Negative.     Cardiovascular:  Negative for chest pain, palpitations, orthopnea and leg swelling.   Gastrointestinal:  Negative for abdominal pain, constipation, diarrhea, heartburn, nausea and vomiting.   Genitourinary: Negative.    Musculoskeletal:  Positive for joint pain and myalgias. Negative for falls.   Skin: Negative.    Neurological: Negative.    Psychiatric/Behavioral: Negative.        Vitals:    05/16/24 1037   BP: 139/63   Pulse: 78   Resp: 18   Temp: 97.4 °F (36.3 °C)   SpO2: 99%          Physical Exam  Vitals and nursing note reviewed.   Constitutional:       Appearance: Normal appearance. She is obese.   HENT:      Head: Normocephalic and atraumatic.      Right Ear: Tympanic membrane normal.      Left Ear: Tympanic membrane normal.      Mouth/Throat:      Mouth: Mucous membranes are moist.      Pharynx: Oropharynx is clear.   Eyes:      Pupils: Pupils are equal, round, and reactive to light.   Neck:

## 2024-05-17 LAB
ALBUMIN SERPL-MCNC: 4.1 G/DL (ref 3.9–4.9)
ALBUMIN/CREAT UR: 10 MG/G CREAT (ref 0–29)
ALBUMIN/GLOB SERPL: 1.4 {RATIO} (ref 1.2–2.2)
ALP SERPL-CCNC: 107 IU/L (ref 44–121)
ALT SERPL-CCNC: 15 IU/L (ref 0–32)
APPEARANCE UR: CLEAR
AST SERPL-CCNC: 18 IU/L (ref 0–40)
BACTERIA #/AREA URNS HPF: ABNORMAL /[HPF]
BASOPHILS # BLD AUTO: 0 X10E3/UL (ref 0–0.2)
BASOPHILS NFR BLD AUTO: 0 %
BILIRUB SERPL-MCNC: 0.5 MG/DL (ref 0–1.2)
BILIRUB UR QL STRIP: ABNORMAL
BUN SERPL-MCNC: 19 MG/DL (ref 8–27)
BUN/CREAT SERPL: 15 (ref 12–28)
CALCIUM SERPL-MCNC: 10.1 MG/DL (ref 8.7–10.3)
CASTS URNS QL MICRO: ABNORMAL /LPF
CHLORIDE SERPL-SCNC: 102 MMOL/L (ref 96–106)
CHOLEST SERPL-MCNC: 142 MG/DL (ref 100–199)
CO2 SERPL-SCNC: 25 MMOL/L (ref 20–29)
COLOR UR: YELLOW
CREAT SERPL-MCNC: 1.3 MG/DL (ref 0.57–1)
CREAT UR-MCNC: 367.5 MG/DL
EGFRCR SERPLBLD CKD-EPI 2021: 46 ML/MIN/1.73
EOSINOPHIL # BLD AUTO: 0.2 X10E3/UL (ref 0–0.4)
EOSINOPHIL NFR BLD AUTO: 4 %
EPI CELLS #/AREA URNS HPF: >10 /HPF (ref 0–10)
ERYTHROCYTE [DISTWIDTH] IN BLOOD BY AUTOMATED COUNT: 15.1 % (ref 11.7–15.4)
GLOBULIN SER CALC-MCNC: 3 G/DL (ref 1.5–4.5)
GLUCOSE SERPL-MCNC: 99 MG/DL (ref 70–99)
GLUCOSE UR QL STRIP: NEGATIVE
HCT VFR BLD AUTO: 39.4 % (ref 34–46.6)
HDLC SERPL-MCNC: 61 MG/DL
HGB BLD-MCNC: 12.4 G/DL (ref 11.1–15.9)
HGB UR QL STRIP: NEGATIVE
IMM GRANULOCYTES # BLD AUTO: 0 X10E3/UL (ref 0–0.1)
IMM GRANULOCYTES NFR BLD AUTO: 0 %
KETONES UR QL STRIP: ABNORMAL
LDLC SERPL CALC-MCNC: 56 MG/DL (ref 0–99)
LEUKOCYTE ESTERASE UR QL STRIP: ABNORMAL
LYMPHOCYTES # BLD AUTO: 1.5 X10E3/UL (ref 0.7–3.1)
LYMPHOCYTES NFR BLD AUTO: 34 %
MCH RBC QN AUTO: 28.2 PG (ref 26.6–33)
MCHC RBC AUTO-ENTMCNC: 31.5 G/DL (ref 31.5–35.7)
MCV RBC AUTO: 90 FL (ref 79–97)
MICRO URNS: ABNORMAL
MICROALBUMIN UR-MCNC: 37.4 UG/ML
MONOCYTES # BLD AUTO: 0.3 X10E3/UL (ref 0.1–0.9)
MONOCYTES NFR BLD AUTO: 7 %
NEUTROPHILS # BLD AUTO: 2.4 X10E3/UL (ref 1.4–7)
NEUTROPHILS NFR BLD AUTO: 55 %
NITRITE UR QL STRIP: NEGATIVE
PH UR STRIP: 5.5 [PH] (ref 5–7.5)
PLATELET # BLD AUTO: 270 X10E3/UL (ref 150–450)
POTASSIUM SERPL-SCNC: 4.3 MMOL/L (ref 3.5–5.2)
PROT SERPL-MCNC: 7.1 G/DL (ref 6–8.5)
PROT UR QL STRIP: ABNORMAL
RBC # BLD AUTO: 4.39 X10E6/UL (ref 3.77–5.28)
RBC #/AREA URNS HPF: ABNORMAL /HPF (ref 0–2)
SODIUM SERPL-SCNC: 142 MMOL/L (ref 134–144)
SP GR UR STRIP: >=1.03 (ref 1–1.03)
TRIGL SERPL-MCNC: 151 MG/DL (ref 0–149)
TSH SERPL DL<=0.005 MIU/L-ACNC: 3.66 UIU/ML (ref 0.45–4.5)
UROBILINOGEN UR STRIP-MCNC: 1 MG/DL (ref 0.2–1)
VLDLC SERPL CALC-MCNC: 25 MG/DL (ref 5–40)
WBC # BLD AUTO: 4.4 X10E3/UL (ref 3.4–10.8)
WBC #/AREA URNS HPF: ABNORMAL /HPF (ref 0–5)

## 2024-05-20 RX ORDER — LANOLIN ALCOHOL/MO/W.PET/CERES
400 CREAM (GRAM) TOPICAL DAILY
Qty: 90 TABLET | Refills: 1 | Status: SHIPPED | OUTPATIENT
Start: 2024-05-20

## 2024-05-20 RX ORDER — METFORMIN HYDROCHLORIDE 500 MG/1
TABLET, EXTENDED RELEASE ORAL
Qty: 180 TABLET | Refills: 1 | Status: SHIPPED | OUTPATIENT
Start: 2024-05-20

## 2024-05-20 RX ORDER — ALLOPURINOL 300 MG/1
TABLET ORAL
Qty: 90 TABLET | Refills: 1 | Status: SHIPPED | OUTPATIENT
Start: 2024-05-20

## 2024-05-23 ENCOUNTER — TELEPHONE (OUTPATIENT)
Facility: CLINIC | Age: 64
End: 2024-05-23

## 2024-05-23 DIAGNOSIS — M62.838 MUSCLE SPASM: Primary | ICD-10-CM

## 2024-05-23 RX ORDER — CYCLOBENZAPRINE HCL 5 MG
5 TABLET ORAL 2 TIMES DAILY PRN
Qty: 10 TABLET | Refills: 0 | Status: SHIPPED | OUTPATIENT
Start: 2024-05-23 | End: 2024-06-02

## 2024-05-23 NOTE — TELEPHONE ENCOUNTER
Patient is requesting a call, she stated Katiana advised her if she is having back pawel to let her know. I advised her she may have to schedule an appt but I would relay the message to you to call her.

## 2024-06-04 ENCOUNTER — TELEPHONE (OUTPATIENT)
Facility: CLINIC | Age: 64
End: 2024-06-04

## 2024-06-04 DIAGNOSIS — N18.30 TYPE 2 DIABETES MELLITUS WITH STAGE 3 CHRONIC KIDNEY DISEASE, WITH LONG-TERM CURRENT USE OF INSULIN, UNSPECIFIED WHETHER STAGE 3A OR 3B CKD (HCC): Primary | ICD-10-CM

## 2024-06-04 DIAGNOSIS — E11.22 TYPE 2 DIABETES MELLITUS WITH STAGE 3 CHRONIC KIDNEY DISEASE, WITH LONG-TERM CURRENT USE OF INSULIN, UNSPECIFIED WHETHER STAGE 3A OR 3B CKD (HCC): Primary | ICD-10-CM

## 2024-06-04 DIAGNOSIS — Z79.4 TYPE 2 DIABETES MELLITUS WITH STAGE 3 CHRONIC KIDNEY DISEASE, WITH LONG-TERM CURRENT USE OF INSULIN, UNSPECIFIED WHETHER STAGE 3A OR 3B CKD (HCC): Primary | ICD-10-CM

## 2024-06-04 RX ORDER — LIRAGLUTIDE 6 MG/ML
1.2 INJECTION SUBCUTANEOUS DAILY
Qty: 18 ML | Refills: 0 | Status: SHIPPED | OUTPATIENT
Start: 2024-06-04

## 2024-06-11 ENCOUNTER — TELEPHONE (OUTPATIENT)
Facility: CLINIC | Age: 64
End: 2024-06-11

## 2024-06-11 DIAGNOSIS — M62.838 MUSCLE SPASM: Primary | ICD-10-CM

## 2024-06-11 RX ORDER — CYCLOBENZAPRINE HCL 5 MG
5 TABLET ORAL 2 TIMES DAILY PRN
Qty: 60 TABLET | Refills: 0 | Status: SHIPPED | OUTPATIENT
Start: 2024-06-11 | End: 2024-08-10

## 2024-06-19 ENCOUNTER — TELEPHONE (OUTPATIENT)
Facility: CLINIC | Age: 64
End: 2024-06-19

## 2024-06-19 NOTE — TELEPHONE ENCOUNTER
Cld & std she has been coughing and getting up flem with greenish color -- began Mon.  Wants to know what she should do   Please advise

## 2024-07-02 ENCOUNTER — HOSPITAL ENCOUNTER (EMERGENCY)
Facility: HOSPITAL | Age: 64
Discharge: HOME | End: 2024-07-02
Attending: EMERGENCY MEDICINE
Payer: MEDICARE

## 2024-07-02 ENCOUNTER — APPOINTMENT (OUTPATIENT)
Facility: HOSPITAL | Age: 64
End: 2024-07-02
Attending: EMERGENCY MEDICINE
Payer: MEDICARE

## 2024-07-02 VITALS
HEIGHT: 66 IN | DIASTOLIC BLOOD PRESSURE: 74 MMHG | TEMPERATURE: 98 F | HEART RATE: 69 BPM | WEIGHT: 293 LBS | BODY MASS INDEX: 47.09 KG/M2 | RESPIRATION RATE: 18 BRPM | OXYGEN SATURATION: 97 % | SYSTOLIC BLOOD PRESSURE: 130 MMHG

## 2024-07-02 DIAGNOSIS — R53.83 FATIGUE, UNSPECIFIED TYPE: ICD-10-CM

## 2024-07-02 DIAGNOSIS — B34.9 VIRAL SYNDROME: ICD-10-CM

## 2024-07-02 DIAGNOSIS — R52 GENERALIZED PAIN: Primary | ICD-10-CM

## 2024-07-02 LAB
ALBUMIN SERPL-MCNC: 3.1 G/DL (ref 3.5–5)
ALBUMIN/GLOB SERPL: 0.8 (ref 1.1–2.2)
ALP SERPL-CCNC: 115 U/L (ref 45–117)
ALT SERPL-CCNC: 17 U/L (ref 12–78)
ANION GAP SERPL CALC-SCNC: 8 MMOL/L (ref 5–15)
APPEARANCE UR: CLEAR
AST SERPL W P-5'-P-CCNC: 16 U/L (ref 15–37)
BACTERIA URNS QL MICRO: ABNORMAL /HPF
BASOPHILS # BLD: 0 K/UL (ref 0–0.1)
BASOPHILS NFR BLD: 1 % (ref 0–1)
BILIRUB SERPL-MCNC: 0.6 MG/DL (ref 0.2–1)
BILIRUB UR QL: NEGATIVE
BUN SERPL-MCNC: 18 MG/DL (ref 6–20)
BUN/CREAT SERPL: 13 (ref 12–20)
CA-I BLD-MCNC: 9.2 MG/DL (ref 8.5–10.1)
CHLORIDE SERPL-SCNC: 102 MMOL/L (ref 97–108)
CO2 SERPL-SCNC: 28 MMOL/L (ref 21–32)
COLOR UR: ABNORMAL
CREAT SERPL-MCNC: 1.36 MG/DL (ref 0.55–1.02)
DIFFERENTIAL METHOD BLD: ABNORMAL
EOSINOPHIL # BLD: 0.1 K/UL (ref 0–0.4)
EOSINOPHIL NFR BLD: 4 % (ref 0–7)
ERYTHROCYTE [DISTWIDTH] IN BLOOD BY AUTOMATED COUNT: 15 % (ref 11.5–14.5)
FLUAV RNA SPEC QL NAA+PROBE: NOT DETECTED
FLUBV RNA SPEC QL NAA+PROBE: NOT DETECTED
GLOBULIN SER CALC-MCNC: 3.8 G/DL (ref 2–4)
GLUCOSE SERPL-MCNC: 106 MG/DL (ref 65–100)
GLUCOSE UR STRIP.AUTO-MCNC: NEGATIVE MG/DL
HCT VFR BLD AUTO: 36 % (ref 35–47)
HGB BLD-MCNC: 11.8 G/DL (ref 11.5–16)
HGB UR QL STRIP: NEGATIVE
IMM GRANULOCYTES # BLD AUTO: 0 K/UL (ref 0–0.04)
IMM GRANULOCYTES NFR BLD AUTO: 1 % (ref 0–0.5)
KETONES UR QL STRIP.AUTO: NEGATIVE MG/DL
LEUKOCYTE ESTERASE UR QL STRIP.AUTO: NEGATIVE
LYMPHOCYTES # BLD: 1.2 K/UL (ref 0.8–3.5)
LYMPHOCYTES NFR BLD: 32 % (ref 12–49)
MCH RBC QN AUTO: 29.4 PG (ref 26–34)
MCHC RBC AUTO-ENTMCNC: 32.8 G/DL (ref 30–36.5)
MCV RBC AUTO: 89.8 FL (ref 80–99)
MONOCYTES # BLD: 0.2 K/UL (ref 0–1)
MONOCYTES NFR BLD: 6 % (ref 5–13)
NEUTS SEG # BLD: 2 K/UL (ref 1.8–8)
NEUTS SEG NFR BLD: 56 % (ref 32–75)
NITRITE UR QL STRIP.AUTO: NEGATIVE
NRBC # BLD: 0 K/UL (ref 0–0.01)
NRBC BLD-RTO: 0 PER 100 WBC
PH UR STRIP: 7.5 (ref 5–8)
PLATELET # BLD AUTO: 280 K/UL (ref 150–400)
PMV BLD AUTO: 10.3 FL (ref 8.9–12.9)
POTASSIUM SERPL-SCNC: 4.3 MMOL/L (ref 3.5–5.1)
PROT SERPL-MCNC: 6.9 G/DL (ref 6.4–8.2)
PROT UR STRIP-MCNC: NEGATIVE MG/DL
RBC # BLD AUTO: 4.01 M/UL (ref 3.8–5.2)
RBC #/AREA URNS HPF: ABNORMAL /HPF (ref 0–3)
SARS-COV-2 RNA RESP QL NAA+PROBE: NOT DETECTED
SODIUM SERPL-SCNC: 138 MMOL/L (ref 136–145)
SP GR UR REFRACTOMETRY: 1.01 (ref 1–1.03)
TROPONIN I SERPL HS-MCNC: 11 NG/L (ref 0–51)
TSH SERPL DL<=0.05 MIU/L-ACNC: 3.22 UIU/ML (ref 0.36–3.74)
UROBILINOGEN UR QL STRIP.AUTO: 0.2 EU/DL (ref 0.2–1)
WBC # BLD AUTO: 3.6 K/UL (ref 3.6–11)
WBC URNS QL MICRO: ABNORMAL /HPF (ref 0–5)

## 2024-07-02 PROCEDURE — 87636 SARSCOV2 & INF A&B AMP PRB: CPT

## 2024-07-02 PROCEDURE — 80053 COMPREHEN METABOLIC PANEL: CPT

## 2024-07-02 PROCEDURE — 6370000000 HC RX 637 (ALT 250 FOR IP): Performed by: EMERGENCY MEDICINE

## 2024-07-02 PROCEDURE — 85025 COMPLETE CBC W/AUTO DIFF WBC: CPT

## 2024-07-02 PROCEDURE — 84484 ASSAY OF TROPONIN QUANT: CPT

## 2024-07-02 PROCEDURE — 84443 ASSAY THYROID STIM HORMONE: CPT

## 2024-07-02 PROCEDURE — 99285 EMERGENCY DEPT VISIT HI MDM: CPT

## 2024-07-02 PROCEDURE — 81001 URINALYSIS AUTO W/SCOPE: CPT

## 2024-07-02 PROCEDURE — 71045 X-RAY EXAM CHEST 1 VIEW: CPT

## 2024-07-02 PROCEDURE — 93005 ELECTROCARDIOGRAM TRACING: CPT | Performed by: EMERGENCY MEDICINE

## 2024-07-02 RX ORDER — ACETAMINOPHEN 500 MG
1000 TABLET ORAL
Status: COMPLETED | OUTPATIENT
Start: 2024-07-02 | End: 2024-07-02

## 2024-07-02 RX ADMIN — ACETAMINOPHEN 1000 MG: 500 TABLET ORAL at 18:05

## 2024-07-02 NOTE — ED PROVIDER NOTES
[facility] EMERGENCY DEPT  EMERGENCY DEPARTMENT HISTORY AND PHYSICAL EXAM      Date: 7/2/2024  Patient Name: Yoana Man  MRN: [id number]  Birthdate 1960  Date of evaluation: 7/2/2024  Provider: Yvrose Martínez MD   Note Started: 5:59 PM EDT 7/2/24    HISTORY OF PRESENT ILLNESS     Chief Complaint   Patient presents with    Fatigue       History Provided By: patient    HPI: Yoana Man is a 63 y.o. female with PMH as below significant for COPD, DM, CAD, CKD3, GIO, Morbid Obesity, DM, HTN presenting with bodyaches, fatigue. Onset yesterday. Having generalized bodyaches, cough. CP on coughing, states she will occasionally feel a random tingle as well but no persistent CP. Denies SOB. Denies F/C/N/V/D. Denies abd pain, dysuria, frequency.    PAST MEDICAL HISTORY   Past Medical History:  Past Medical History:   Diagnosis Date    Arthritis     Asthma     Chronic kidney disease     stage 3    Chronic obstructive pulmonary disease (HCC)     Diabetes (HCC)     Diabetes mellitus (HCC) 11/11/2020    GERD (gastroesophageal reflux disease)     Helicobacter pylori gastritis 11/11/2020    High cholesterol     Hypertension     Menopause     Morbid obesity (HCC)     Obstructive sleep apnea on CPAP     Vertigo        Past Surgical History:  Past Surgical History:   Procedure Laterality Date    BREAST BIOPSY Left 2019    CHOLECYSTECTOMY      HYSTERECTOMY (CERVIX STATUS UNKNOWN)      ORTHOPEDIC SURGERY      right shoulder spur    OVARY REMOVAL      ROTATOR CUFF REPAIR Left     by suture    TUBAL LIGATION Bilateral     UPPER GASTROINTESTINAL ENDOSCOPY  01/01/2019       Family History:  Family History   Problem Relation Age of Onset    Lung Disease Mother         lung ca    Heart Disease Mother     Lung Disease Father         PNA    Cancer Maternal Grandmother         gyn       Social History:  Social History     Tobacco Use    Smoking status: Never     Passive exposure: Never    Smokeless tobacco: Never   Vaping Use    Vaping Use: Never  (3) MG/3ML Soln nebulizer solution  Commonly known as: DUONEB     magnesium oxide 400 (240 Mg) MG tablet  Commonly known as: MAG-OX  TAKE 1 TABLET EVERY DAY     meclizine 25 MG tablet  Commonly known as: ANTIVERT  TAKE 1 TABLET THREE TIMES DAILY AS NEEDED FOR DIZZINESS     metFORMIN 500 MG extended release tablet  Commonly known as: GLUCOPHAGE-XR  TAKE 2 TABLETS WITH DINNER     NovoLIN 70/30 FlexPen (70-30) 100 UNIT per ML injection pen  Generic drug: Insulin NPH Isophane & Regular  Inject 50 Units into the skin every morning     ONE-A-DAY WOMENS PO     pantoprazole 40 MG tablet  Commonly known as: PROTONIX  Take 1 tablet by mouth every morning (before breakfast)     Victoza 18 MG/3ML Sopn SC injection  Generic drug: Liraglutide  Inject 1.2 mg into the skin daily     vitamin D 25 MCG (1000 UT) Tabs tablet  Commonly known as: CHOLECALCIFEROL           * This list has 2 medication(s) that are the same as other medications prescribed for you. Read the directions carefully, and ask your doctor or other care provider to review them with you.                    DISCONTINUED MEDICATIONS:  Current Discharge Medication List          I am the Primary Clinician of Record. Yvrose Martínez MD (electronically signed)    (Please note that parts of this dictation were completed with voice recognition software. Quite often unanticipated grammatical, syntax, homophones, and other interpretive errors are inadvertently transcribed by the computer software. Please disregards these errors. Please excuse any errors that have escaped final proofreading.)     Yvrose Martínez MD  07/02/24 6800

## 2024-07-02 NOTE — DISCHARGE INSTRUCTIONS
You were seen in the ER for your COVID/Flu-like symptoms. You should act as though you are positive even if you have had negative swabs. Thankfully, your vital signs are reassuring, including your oxygen and your heart rate. Your x-ray did not show a pneumonia and your urine did not show an infection either. This is likely due to a different virus.    We also did not see any signs of a heart attack or electrolyte abnormalities, or a problem with your thyroid.      You can take tylenol for fever, aches and pains for the next few days. Take in plenty of water to stay hydrated and follow up with your primary care doctor in the next few days. Return to the ER for any uncontrollable vomiting or diarrhea, difficulty breathing, or any other new or concerning symptoms.        Thank you for choosing our Emergency Department for your care.  It is our privilege to care for you in your time of need.  In the next several days, you may receive a survey via email or mailed to your home about your experience with our team.  We would greatly appreciate you taking a few minutes to complete the survey, as we use this information to learn what we have done well and what we could be doing better. Thank you for trusting us with your care!    Below you will find a list of your tests from today's visit.   Labs  Recent Results (from the past 12 hour(s))   Urinalysis with Microscopic    Collection Time: 07/02/24  6:00 PM   Result Value Ref Range    Color, UA Yellow/Straw      Appearance Clear Clear      Specific Gravity, UA 1.010 1.003 - 1.030      pH, Urine 7.5 5.0 - 8.0      Protein, UA Negative Negative mg/dL    Glucose, Ur Negative Negative mg/dL    Ketones, Urine Negative Negative mg/dL    Bilirubin, Urine Negative Negative      Blood, Urine Negative Negative      Urobilinogen, Urine 0.2 0.2 - 1.0 EU/dL    Nitrite, Urine Negative Negative      Leukocyte Esterase, Urine Negative Negative      WBC, UA 0-4 0 - 5 /hpf    RBC, UA 0-5 0 - 3

## 2024-07-03 LAB
EKG ATRIAL RATE: 66 BPM
EKG DIAGNOSIS: NORMAL
EKG P AXIS: 7 DEGREES
EKG P-R INTERVAL: 167 MS
EKG Q-T INTERVAL: 420 MS
EKG QRS DURATION: 85 MS
EKG QTC CALCULATION (BAZETT): 441 MS
EKG R AXIS: 13 DEGREES
EKG T AXIS: 45 DEGREES
EKG VENTRICULAR RATE: 66 BPM

## 2024-07-10 ENCOUNTER — HOSPITAL ENCOUNTER (OUTPATIENT)
Facility: HOSPITAL | Age: 64
Setting detail: OBSERVATION
Discharge: HOME | End: 2024-07-11
Attending: EMERGENCY MEDICINE | Admitting: STUDENT IN AN ORGANIZED HEALTH CARE EDUCATION/TRAINING PROGRAM
Payer: MEDICARE

## 2024-07-10 ENCOUNTER — APPOINTMENT (OUTPATIENT)
Facility: HOSPITAL | Age: 64
End: 2024-07-10
Attending: EMERGENCY MEDICINE
Payer: MEDICARE

## 2024-07-10 DIAGNOSIS — R07.9 CHEST PAIN, UNSPECIFIED TYPE: Primary | ICD-10-CM

## 2024-07-10 LAB
ALBUMIN SERPL-MCNC: 3.2 G/DL (ref 3.5–5)
ALBUMIN/GLOB SERPL: 0.8 (ref 1.1–2.2)
ALP SERPL-CCNC: 125 U/L (ref 45–117)
ALT SERPL-CCNC: 21 U/L (ref 12–78)
ANION GAP SERPL CALC-SCNC: 8 MMOL/L (ref 5–15)
AST SERPL W P-5'-P-CCNC: 17 U/L (ref 15–37)
BASOPHILS # BLD: 0 K/UL (ref 0–0.1)
BASOPHILS NFR BLD: 1 % (ref 0–1)
BILIRUB SERPL-MCNC: 0.5 MG/DL (ref 0.2–1)
BUN SERPL-MCNC: 19 MG/DL (ref 6–20)
BUN/CREAT SERPL: 12 (ref 12–20)
CA-I BLD-MCNC: 9.4 MG/DL (ref 8.5–10.1)
CHLORIDE SERPL-SCNC: 104 MMOL/L (ref 97–108)
CO2 SERPL-SCNC: 28 MMOL/L (ref 21–32)
CREAT SERPL-MCNC: 1.53 MG/DL (ref 0.55–1.02)
DIFFERENTIAL METHOD BLD: ABNORMAL
EOSINOPHIL # BLD: 0.1 K/UL (ref 0–0.4)
EOSINOPHIL NFR BLD: 4 % (ref 0–7)
ERYTHROCYTE [DISTWIDTH] IN BLOOD BY AUTOMATED COUNT: 15.2 % (ref 11.5–14.5)
GLOBULIN SER CALC-MCNC: 3.8 G/DL (ref 2–4)
GLUCOSE BLD STRIP.AUTO-MCNC: 106 MG/DL (ref 65–100)
GLUCOSE BLD STRIP.AUTO-MCNC: 149 MG/DL (ref 65–100)
GLUCOSE SERPL-MCNC: 240 MG/DL (ref 65–100)
HCT VFR BLD AUTO: 34.9 % (ref 35–47)
HGB BLD-MCNC: 11.4 G/DL (ref 11.5–16)
IMM GRANULOCYTES # BLD AUTO: 0 K/UL (ref 0–0.04)
IMM GRANULOCYTES NFR BLD AUTO: 0 % (ref 0–0.5)
LYMPHOCYTES # BLD: 1.1 K/UL (ref 0.8–3.5)
LYMPHOCYTES NFR BLD: 29 % (ref 12–49)
MAGNESIUM SERPL-MCNC: 1.6 MG/DL (ref 1.6–2.4)
MCH RBC QN AUTO: 29.2 PG (ref 26–34)
MCHC RBC AUTO-ENTMCNC: 32.7 G/DL (ref 30–36.5)
MCV RBC AUTO: 89.3 FL (ref 80–99)
MONOCYTES # BLD: 0.2 K/UL (ref 0–1)
MONOCYTES NFR BLD: 6 % (ref 5–13)
NEUTS SEG # BLD: 2.3 K/UL (ref 1.8–8)
NEUTS SEG NFR BLD: 60 % (ref 32–75)
NRBC # BLD: 0 K/UL (ref 0–0.01)
NRBC BLD-RTO: 0 PER 100 WBC
PERFORMED BY:: ABNORMAL
PERFORMED BY:: ABNORMAL
PLATELET # BLD AUTO: 229 K/UL (ref 150–400)
PMV BLD AUTO: 9.2 FL (ref 8.9–12.9)
POTASSIUM SERPL-SCNC: 4.1 MMOL/L (ref 3.5–5.1)
PROT SERPL-MCNC: 7 G/DL (ref 6.4–8.2)
RBC # BLD AUTO: 3.91 M/UL (ref 3.8–5.2)
SODIUM SERPL-SCNC: 140 MMOL/L (ref 136–145)
TROPONIN I SERPL HS-MCNC: 13 NG/L (ref 0–51)
TROPONIN I SERPL HS-MCNC: 13 NG/L (ref 0–51)
TSH SERPL DL<=0.05 MIU/L-ACNC: 1.85 UIU/ML (ref 0.36–3.74)
WBC # BLD AUTO: 3.8 K/UL (ref 3.6–11)

## 2024-07-10 PROCEDURE — 84484 ASSAY OF TROPONIN QUANT: CPT

## 2024-07-10 PROCEDURE — G0378 HOSPITAL OBSERVATION PER HR: HCPCS

## 2024-07-10 PROCEDURE — 93005 ELECTROCARDIOGRAM TRACING: CPT | Performed by: EMERGENCY MEDICINE

## 2024-07-10 PROCEDURE — 96372 THER/PROPH/DIAG INJ SC/IM: CPT

## 2024-07-10 PROCEDURE — 99285 EMERGENCY DEPT VISIT HI MDM: CPT

## 2024-07-10 PROCEDURE — 84443 ASSAY THYROID STIM HORMONE: CPT

## 2024-07-10 PROCEDURE — 83036 HEMOGLOBIN GLYCOSYLATED A1C: CPT

## 2024-07-10 PROCEDURE — 83735 ASSAY OF MAGNESIUM: CPT

## 2024-07-10 PROCEDURE — 6360000002 HC RX W HCPCS: Performed by: HOSPITALIST

## 2024-07-10 PROCEDURE — 85025 COMPLETE CBC W/AUTO DIFF WBC: CPT

## 2024-07-10 PROCEDURE — 36415 COLL VENOUS BLD VENIPUNCTURE: CPT

## 2024-07-10 PROCEDURE — 82962 GLUCOSE BLOOD TEST: CPT

## 2024-07-10 PROCEDURE — 71045 X-RAY EXAM CHEST 1 VIEW: CPT

## 2024-07-10 PROCEDURE — 80053 COMPREHEN METABOLIC PANEL: CPT

## 2024-07-10 RX ORDER — POLYETHYLENE GLYCOL 3350 17 G/17G
17 POWDER, FOR SOLUTION ORAL DAILY PRN
Status: DISCONTINUED | OUTPATIENT
Start: 2024-07-10 | End: 2024-07-11 | Stop reason: HOSPADM

## 2024-07-10 RX ORDER — POTASSIUM CHLORIDE 7.45 MG/ML
10 INJECTION INTRAVENOUS PRN
Status: DISCONTINUED | OUTPATIENT
Start: 2024-07-10 | End: 2024-07-11 | Stop reason: HOSPADM

## 2024-07-10 RX ORDER — SODIUM CHLORIDE 0.9 % (FLUSH) 0.9 %
5-40 SYRINGE (ML) INJECTION PRN
Status: DISCONTINUED | OUTPATIENT
Start: 2024-07-10 | End: 2024-07-11 | Stop reason: HOSPADM

## 2024-07-10 RX ORDER — IPRATROPIUM BROMIDE AND ALBUTEROL SULFATE 2.5; .5 MG/3ML; MG/3ML
1 SOLUTION RESPIRATORY (INHALATION) EVERY 4 HOURS PRN
Status: DISCONTINUED | OUTPATIENT
Start: 2024-07-10 | End: 2024-07-11 | Stop reason: HOSPADM

## 2024-07-10 RX ORDER — SODIUM CHLORIDE 9 MG/ML
INJECTION, SOLUTION INTRAVENOUS PRN
Status: DISCONTINUED | OUTPATIENT
Start: 2024-07-10 | End: 2024-07-11 | Stop reason: HOSPADM

## 2024-07-10 RX ORDER — SODIUM CHLORIDE 0.9 % (FLUSH) 0.9 %
5-40 SYRINGE (ML) INJECTION EVERY 12 HOURS SCHEDULED
Status: DISCONTINUED | OUTPATIENT
Start: 2024-07-10 | End: 2024-07-11 | Stop reason: HOSPADM

## 2024-07-10 RX ORDER — ONDANSETRON 2 MG/ML
4 INJECTION INTRAMUSCULAR; INTRAVENOUS EVERY 6 HOURS PRN
Status: DISCONTINUED | OUTPATIENT
Start: 2024-07-10 | End: 2024-07-11 | Stop reason: HOSPADM

## 2024-07-10 RX ORDER — ENOXAPARIN SODIUM 100 MG/ML
40 INJECTION SUBCUTANEOUS 2 TIMES DAILY
Status: DISCONTINUED | OUTPATIENT
Start: 2024-07-10 | End: 2024-07-11 | Stop reason: HOSPADM

## 2024-07-10 RX ORDER — ONDANSETRON 4 MG/1
4 TABLET, ORALLY DISINTEGRATING ORAL EVERY 8 HOURS PRN
Status: DISCONTINUED | OUTPATIENT
Start: 2024-07-10 | End: 2024-07-11 | Stop reason: HOSPADM

## 2024-07-10 RX ORDER — DEXTROSE MONOHYDRATE 100 MG/ML
INJECTION, SOLUTION INTRAVENOUS CONTINUOUS PRN
Status: DISCONTINUED | OUTPATIENT
Start: 2024-07-10 | End: 2024-07-11 | Stop reason: HOSPADM

## 2024-07-10 RX ORDER — HYDRALAZINE HYDROCHLORIDE 20 MG/ML
10 INJECTION INTRAMUSCULAR; INTRAVENOUS EVERY 6 HOURS PRN
Status: DISCONTINUED | OUTPATIENT
Start: 2024-07-10 | End: 2024-07-11 | Stop reason: HOSPADM

## 2024-07-10 RX ORDER — INSULIN LISPRO 100 [IU]/ML
0-8 INJECTION, SOLUTION INTRAVENOUS; SUBCUTANEOUS
Status: DISCONTINUED | OUTPATIENT
Start: 2024-07-10 | End: 2024-07-11 | Stop reason: HOSPADM

## 2024-07-10 RX ORDER — INSULIN LISPRO 100 [IU]/ML
0-4 INJECTION, SOLUTION INTRAVENOUS; SUBCUTANEOUS NIGHTLY
Status: DISCONTINUED | OUTPATIENT
Start: 2024-07-10 | End: 2024-07-11 | Stop reason: HOSPADM

## 2024-07-10 RX ORDER — POTASSIUM CHLORIDE 20 MEQ/1
40 TABLET, EXTENDED RELEASE ORAL PRN
Status: DISCONTINUED | OUTPATIENT
Start: 2024-07-10 | End: 2024-07-11 | Stop reason: HOSPADM

## 2024-07-10 RX ORDER — GLUCAGON 1 MG/ML
1 KIT INJECTION PRN
Status: DISCONTINUED | OUTPATIENT
Start: 2024-07-10 | End: 2024-07-11 | Stop reason: HOSPADM

## 2024-07-10 RX ORDER — MAGNESIUM SULFATE IN WATER 40 MG/ML
2000 INJECTION, SOLUTION INTRAVENOUS PRN
Status: DISCONTINUED | OUTPATIENT
Start: 2024-07-10 | End: 2024-07-11 | Stop reason: HOSPADM

## 2024-07-10 RX ADMIN — ENOXAPARIN SODIUM 40 MG: 100 INJECTION SUBCUTANEOUS at 22:09

## 2024-07-10 ASSESSMENT — PAIN - FUNCTIONAL ASSESSMENT: PAIN_FUNCTIONAL_ASSESSMENT: 0-10

## 2024-07-10 ASSESSMENT — PAIN SCALES - GENERAL: PAINLEVEL_OUTOF10: 10

## 2024-07-10 ASSESSMENT — PAIN DESCRIPTION - LOCATION: LOCATION: CHEST;BACK

## 2024-07-10 ASSESSMENT — HEART SCORE: ECG: NON-SPECIFC REPOLARIZATION DISTURBANCE/LBTB/PM

## 2024-07-10 NOTE — ED PROVIDER NOTES
[facility] EMERGENCY DEPT  EMERGENCY DEPARTMENT HISTORY AND PHYSICAL EXAM      Date: 7/10/2024  Patient Name: Yoana Man  MRN: [id number]  Birthdate 1960  Date of evaluation: 7/10/2024  Provider: Peter Jeong MD   Note Started: 4:23 PM EDT 7/10/24    HISTORY OF PRESENT ILLNESS     Chief Complaint   Patient presents with    Chest Pain    Back Pain       History Provided By: Patient    HPI: Yoana Man is a 63 y.o. female reports with left sided chest pain intermittent since Monday. Described as pressure Last week has URI symptoms/ body aching. Notes feels like gas pains on left side of chest described as pressure going into back. Notes some sob. Notes cough at home.     PAST MEDICAL HISTORY   Past Medical History:  Past Medical History:   Diagnosis Date    Arthritis     Asthma     Chronic kidney disease     stage 3    Chronic obstructive pulmonary disease (HCC)     Diabetes (HCC)     Diabetes mellitus (HCC) 11/11/2020    GERD (gastroesophageal reflux disease)     Helicobacter pylori gastritis 11/11/2020    High cholesterol     Hypertension     Menopause     Morbid obesity (HCC)     Obstructive sleep apnea on CPAP     Vertigo        Past Surgical History:  Past Surgical History:   Procedure Laterality Date    BREAST BIOPSY Left 2019    CHOLECYSTECTOMY      HYSTERECTOMY (CERVIX STATUS UNKNOWN)      ORTHOPEDIC SURGERY      right shoulder spur    OVARY REMOVAL      ROTATOR CUFF REPAIR Left     by suture    TUBAL LIGATION Bilateral     UPPER GASTROINTESTINAL ENDOSCOPY  01/01/2019       Family History:  Family History   Problem Relation Age of Onset    Lung Disease Mother         lung ca    Heart Disease Mother     Lung Disease Father         PNA    Cancer Maternal Grandmother         gyn       Social History:  Social History     Tobacco Use    Smoking status: Never     Passive exposure: Never    Smokeless tobacco: Never   Vaping Use    Vaping Use: Never used   Substance Use Topics    Alcohol use: Never    Drug use:  Continuous PRN Brad Ford MD        insulin lispro (HUMALOG,ADMELOG) injection vial 0-8 Units  0-8 Units SubCUTAneous TID WC Brad Ford MD        insulin lispro (HUMALOG,ADMELOG) injection vial 0-4 Units  0-4 Units SubCUTAneous Nightly Brad Ford MD        ipratropium 0.5 mg-albuterol 2.5 mg (DUONEB) nebulizer solution 1 Dose  1 Dose Inhalation Q4H PRN Brad Ford MD        hydrALAZINE (APRESOLINE) injection 10 mg  10 mg IntraVENous Q6H PRN Brad Ford MD         Current Outpatient Medications   Medication Sig Dispense Refill    cyclobenzaprine (FLEXERIL) 5 MG tablet Take 1 tablet by mouth 2 times daily as needed for Muscle spasms 60 tablet 0    Liraglutide (VICTOZA) 18 MG/3ML SOPN SC injection Inject 1.2 mg into the skin daily 18 mL 0    magnesium oxide (MAG-OX) 400 (240 Mg) MG tablet TAKE 1 TABLET EVERY DAY 90 tablet 1    allopurinol (ZYLOPRIM) 300 MG tablet TAKE 1 TABLET EVERY DAY 90 tablet 1    metFORMIN (GLUCOPHAGE-XR) 500 MG extended release tablet TAKE 2 TABLETS WITH DINNER 180 tablet 1    ACCU-CHEK GUIDE strip TEST BLOOD SUGAR EVERY DAY AS NEEDED 100 strip 3    atorvastatin (LIPITOR) 10 MG tablet TAKE 1 TABLET EVERY DAY 90 tablet 1    amLODIPine (NORVASC) 5 MG tablet TAKE 1 TABLET EVERY DAY 90 tablet 1    furosemide (LASIX) 40 MG tablet TAKE 1 TABLET ON MONDAY, WEDNESDAY AND FRIDAY 36 tablet 3    hydrALAZINE (APRESOLINE) 25 MG tablet Take 1 tablet by mouth in the morning and at bedtime 180 tablet 1    Insulin NPH Isophane & Regular (NOVOLIN 70/30 FLEXPEN) (70-30) 100 UNIT per ML injection pen Inject 50 Units into the skin every morning 90 mL 2    DROPLET PEN NEEDLES 32G X 4 MM MISC USE TO INJECT MEDICINE EVERY 7 DAYS 100 each 3    meclizine (ANTIVERT) 25 MG tablet TAKE 1 TABLET THREE TIMES DAILY AS NEEDED FOR DIZZINESS 90 tablet 1    albuterol sulfate HFA (VENTOLIN HFA) 108 (90 Base) MCG/ACT inhaler Inhale 2 puffs into the lungs 4 times daily as needed for Wheezing

## 2024-07-10 NOTE — ED TRIAGE NOTES
Pt reports left sided chest pain that radiates to back starting on 7/8/2024. Pt also reports intermittent SOB with exertion-- pt states this is baseline, hx of COPD, asthma. Pain is 10/10 rated as dull and feels \"like gas pain\".

## 2024-07-11 ENCOUNTER — APPOINTMENT (OUTPATIENT)
Facility: HOSPITAL | Age: 64
End: 2024-07-11
Attending: HOSPITALIST
Payer: MEDICARE

## 2024-07-11 VITALS
DIASTOLIC BLOOD PRESSURE: 68 MMHG | HEIGHT: 66 IN | OXYGEN SATURATION: 100 % | TEMPERATURE: 98.2 F | BODY MASS INDEX: 47.09 KG/M2 | SYSTOLIC BLOOD PRESSURE: 138 MMHG | WEIGHT: 293 LBS | RESPIRATION RATE: 18 BRPM | HEART RATE: 68 BPM

## 2024-07-11 PROBLEM — E66.01 MORBID OBESITY WITH BMI OF 50.0-59.9, ADULT (HCC): Status: ACTIVE | Noted: 2018-11-26

## 2024-07-11 LAB
ANION GAP SERPL CALC-SCNC: 5 MMOL/L (ref 5–15)
BASOPHILS # BLD: 0 K/UL (ref 0–0.1)
BASOPHILS NFR BLD: 1 % (ref 0–1)
BNP SERPL-MCNC: 121 PG/ML
BUN SERPL-MCNC: 18 MG/DL (ref 6–20)
BUN/CREAT SERPL: 14 (ref 12–20)
CA-I BLD-MCNC: 9.3 MG/DL (ref 8.5–10.1)
CHLORIDE SERPL-SCNC: 106 MMOL/L (ref 97–108)
CHOLEST SERPL-MCNC: 142 MG/DL
CO2 SERPL-SCNC: 30 MMOL/L (ref 21–32)
CREAT SERPL-MCNC: 1.26 MG/DL (ref 0.55–1.02)
DIFFERENTIAL METHOD BLD: ABNORMAL
ECHO AO ROOT DIAM: 2.9 CM
ECHO AO ROOT INDEX: 1.12 CM/M2
ECHO AV AREA PEAK VELOCITY: 1.8 CM2
ECHO AV AREA VTI: 2.2 CM2
ECHO AV AREA/BSA PEAK VELOCITY: 0.7 CM2/M2
ECHO AV AREA/BSA VTI: 0.8 CM2/M2
ECHO AV MEAN GRADIENT: 8 MMHG
ECHO AV MEAN VELOCITY: 1.4 M/S
ECHO AV PEAK GRADIENT: 17 MMHG
ECHO AV PEAK VELOCITY: 2.1 M/S
ECHO AV VELOCITY RATIO: 0.43
ECHO AV VTI: 41.1 CM
ECHO LA DIAMETER INDEX: 1.58 CM/M2
ECHO LA DIAMETER: 4.1 CM
ECHO LA TO AORTIC ROOT RATIO: 1.41
ECHO LA VOL A-L A2C: 103 ML (ref 22–52)
ECHO LA VOL A-L A4C: 85 ML (ref 22–52)
ECHO LA VOL BP: 86 ML (ref 22–52)
ECHO LA VOL MOD A2C: 96 ML (ref 22–52)
ECHO LA VOL MOD A4C: 78 ML (ref 22–52)
ECHO LA VOL/BSA BIPLANE: 33 ML/M2 (ref 16–34)
ECHO LA VOLUME AREA LENGTH: 94 ML
ECHO LA VOLUME INDEX A-L A2C: 40 ML/M2 (ref 16–34)
ECHO LA VOLUME INDEX A-L A4C: 33 ML/M2 (ref 16–34)
ECHO LA VOLUME INDEX AREA LENGTH: 36 ML/M2 (ref 16–34)
ECHO LA VOLUME INDEX MOD A2C: 37 ML/M2 (ref 16–34)
ECHO LA VOLUME INDEX MOD A4C: 30 ML/M2 (ref 16–34)
ECHO LV E' LATERAL VELOCITY: 8 CM/S
ECHO LV E' SEPTAL VELOCITY: 7 CM/S
ECHO LV EDV A2C: 112 ML
ECHO LV EDV A4C: 101 ML
ECHO LV EDV BP: 108 ML (ref 56–104)
ECHO LV EDV INDEX A4C: 39 ML/M2
ECHO LV EDV INDEX BP: 42 ML/M2
ECHO LV EDV NDEX A2C: 43 ML/M2
ECHO LV EJECTION FRACTION A2C: 68 %
ECHO LV EJECTION FRACTION A4C: 63 %
ECHO LV EJECTION FRACTION BIPLANE: 66 % (ref 55–100)
ECHO LV ESV A2C: 35 ML
ECHO LV ESV A4C: 37 ML
ECHO LV ESV BP: 36 ML (ref 19–49)
ECHO LV ESV INDEX A2C: 13 ML/M2
ECHO LV ESV INDEX A4C: 14 ML/M2
ECHO LV ESV INDEX BP: 14 ML/M2
ECHO LV FRACTIONAL SHORTENING: 24 % (ref 28–44)
ECHO LV GLOBAL LONGITUDINAL STRAIN (GLS): -14.2 %
ECHO LV INTERNAL DIMENSION DIASTOLE INDEX: 1.88 CM/M2
ECHO LV INTERNAL DIMENSION DIASTOLIC: 4.9 CM (ref 3.9–5.3)
ECHO LV INTERNAL DIMENSION SYSTOLIC INDEX: 1.42 CM/M2
ECHO LV INTERNAL DIMENSION SYSTOLIC: 3.7 CM
ECHO LV IVSD: 1.1 CM (ref 0.6–0.9)
ECHO LV MASS 2D: 200.5 G (ref 67–162)
ECHO LV MASS INDEX 2D: 77.1 G/M2 (ref 43–95)
ECHO LV POSTERIOR WALL DIASTOLIC: 1.1 CM (ref 0.6–0.9)
ECHO LV RELATIVE WALL THICKNESS RATIO: 0.45
ECHO LVOT AREA: 4.2 CM2
ECHO LVOT AV VTI INDEX: 0.55
ECHO LVOT DIAM: 2.3 CM
ECHO LVOT MEAN GRADIENT: 2 MMHG
ECHO LVOT PEAK GRADIENT: 3 MMHG
ECHO LVOT PEAK VELOCITY: 0.9 M/S
ECHO LVOT STROKE VOLUME INDEX: 36.3 ML/M2
ECHO LVOT SV: 94.3 ML
ECHO LVOT VTI: 22.7 CM
ECHO MV A VELOCITY: 0.82 M/S
ECHO MV E DECELERATION TIME (DT): 288.6 MS
ECHO MV E VELOCITY: 0.67 M/S
ECHO MV E/A RATIO: 0.82
ECHO MV E/E' LATERAL: 8.38
ECHO MV E/E' RATIO (AVERAGED): 8.97
ECHO MV E/E' SEPTAL: 9.57
ECHO RA AREA 4C: 21.2 CM2
ECHO RV INTERNAL DIMENSION: 3 CM
ECHO RV TAPSE: 2.3 CM (ref 1.7–?)
ECHO TV REGURGITANT MAX VELOCITY: 2.68 M/S
ECHO TV REGURGITANT PEAK GRADIENT: 29 MMHG
EKG ATRIAL RATE: 73 BPM
EKG DIAGNOSIS: NORMAL
EKG P AXIS: 62 DEGREES
EKG P-R INTERVAL: 162 MS
EKG Q-T INTERVAL: 436 MS
EKG QRS DURATION: 100 MS
EKG QTC CALCULATION (BAZETT): 474 MS
EKG R AXIS: 20 DEGREES
EKG T AXIS: 68 DEGREES
EKG VENTRICULAR RATE: 71 BPM
EOSINOPHIL # BLD: 0.1 K/UL (ref 0–0.4)
EOSINOPHIL NFR BLD: 4 % (ref 0–7)
ERYTHROCYTE [DISTWIDTH] IN BLOOD BY AUTOMATED COUNT: 15.3 % (ref 11.5–14.5)
EST. AVERAGE GLUCOSE BLD GHB EST-MCNC: 143 MG/DL
GLUCOSE BLD STRIP.AUTO-MCNC: 125 MG/DL (ref 65–100)
GLUCOSE BLD STRIP.AUTO-MCNC: 134 MG/DL (ref 65–100)
GLUCOSE BLD STRIP.AUTO-MCNC: 170 MG/DL (ref 65–100)
GLUCOSE SERPL-MCNC: 122 MG/DL (ref 65–100)
HBA1C MFR BLD: 6.6 % (ref 4–5.6)
HCT VFR BLD AUTO: 35.4 % (ref 35–47)
HDLC SERPL-MCNC: 57 MG/DL
HDLC SERPL: 2.5 (ref 0–5)
HGB BLD-MCNC: 11.4 G/DL (ref 11.5–16)
IMM GRANULOCYTES # BLD AUTO: 0 K/UL (ref 0–0.04)
IMM GRANULOCYTES NFR BLD AUTO: 0 % (ref 0–0.5)
LDLC SERPL CALC-MCNC: 63.8 MG/DL (ref 0–100)
LIPID PANEL: NORMAL
LYMPHOCYTES # BLD: 1.4 K/UL (ref 0.8–3.5)
LYMPHOCYTES NFR BLD: 39 % (ref 12–49)
MCH RBC QN AUTO: 28.7 PG (ref 26–34)
MCHC RBC AUTO-ENTMCNC: 32.2 G/DL (ref 30–36.5)
MCV RBC AUTO: 89.2 FL (ref 80–99)
MONOCYTES # BLD: 0.2 K/UL (ref 0–1)
MONOCYTES NFR BLD: 7 % (ref 5–13)
NEUTS SEG # BLD: 1.8 K/UL (ref 1.8–8)
NEUTS SEG NFR BLD: 49 % (ref 32–75)
NRBC # BLD: 0 K/UL (ref 0–0.01)
NRBC BLD-RTO: 0 PER 100 WBC
PERFORMED BY:: ABNORMAL
PLATELET # BLD AUTO: 246 K/UL (ref 150–400)
PMV BLD AUTO: 9.2 FL (ref 8.9–12.9)
POTASSIUM SERPL-SCNC: 4.3 MMOL/L (ref 3.5–5.1)
RBC # BLD AUTO: 3.97 M/UL (ref 3.8–5.2)
SODIUM SERPL-SCNC: 141 MMOL/L (ref 136–145)
TRIGL SERPL-MCNC: 106 MG/DL
VLDLC SERPL CALC-MCNC: 21.2 MG/DL
WBC # BLD AUTO: 3.7 K/UL (ref 3.6–11)

## 2024-07-11 PROCEDURE — 6370000000 HC RX 637 (ALT 250 FOR IP): Performed by: STUDENT IN AN ORGANIZED HEALTH CARE EDUCATION/TRAINING PROGRAM

## 2024-07-11 PROCEDURE — 6370000000 HC RX 637 (ALT 250 FOR IP): Performed by: HOSPITALIST

## 2024-07-11 PROCEDURE — 96372 THER/PROPH/DIAG INJ SC/IM: CPT

## 2024-07-11 PROCEDURE — G0378 HOSPITAL OBSERVATION PER HR: HCPCS

## 2024-07-11 PROCEDURE — 93306 TTE W/DOPPLER COMPLETE: CPT

## 2024-07-11 PROCEDURE — 80061 LIPID PANEL: CPT

## 2024-07-11 PROCEDURE — 85025 COMPLETE CBC W/AUTO DIFF WBC: CPT

## 2024-07-11 PROCEDURE — 6360000002 HC RX W HCPCS: Performed by: HOSPITALIST

## 2024-07-11 PROCEDURE — 82962 GLUCOSE BLOOD TEST: CPT

## 2024-07-11 PROCEDURE — 83880 ASSAY OF NATRIURETIC PEPTIDE: CPT

## 2024-07-11 PROCEDURE — 2580000003 HC RX 258: Performed by: HOSPITALIST

## 2024-07-11 PROCEDURE — 80048 BASIC METABOLIC PNL TOTAL CA: CPT

## 2024-07-11 RX ORDER — MECLIZINE HYDROCHLORIDE 25 MG/1
25 TABLET ORAL 3 TIMES DAILY PRN
Status: DISCONTINUED | OUTPATIENT
Start: 2024-07-11 | End: 2024-07-11 | Stop reason: HOSPADM

## 2024-07-11 RX ORDER — LANOLIN ALCOHOL/MO/W.PET/CERES
400 CREAM (GRAM) TOPICAL 2 TIMES DAILY
Qty: 90 TABLET | Refills: 1 | Status: SHIPPED | OUTPATIENT
Start: 2024-07-11

## 2024-07-11 RX ORDER — GLIMEPIRIDE 2 MG/1
2 TABLET ORAL
Status: ON HOLD | COMMUNITY
End: 2024-07-11 | Stop reason: HOSPADM

## 2024-07-11 RX ORDER — FLUTICASONE PROPIONATE 50 MCG
2 SPRAY, SUSPENSION (ML) NASAL DAILY
Qty: 16 G | Refills: 0 | Status: SHIPPED | OUTPATIENT
Start: 2024-07-11

## 2024-07-11 RX ORDER — ALBUTEROL SULFATE 90 UG/1
1 AEROSOL, METERED RESPIRATORY (INHALATION) EVERY 4 HOURS PRN
Status: DISCONTINUED | OUTPATIENT
Start: 2024-07-11 | End: 2024-07-11 | Stop reason: HOSPADM

## 2024-07-11 RX ORDER — HYDRALAZINE HYDROCHLORIDE 25 MG/1
25 TABLET, FILM COATED ORAL 2 TIMES DAILY
Status: DISCONTINUED | OUTPATIENT
Start: 2024-07-11 | End: 2024-07-11 | Stop reason: HOSPADM

## 2024-07-11 RX ORDER — FUROSEMIDE 40 MG/1
40 TABLET ORAL
Status: DISCONTINUED | OUTPATIENT
Start: 2024-07-12 | End: 2024-07-11 | Stop reason: HOSPADM

## 2024-07-11 RX ORDER — PANTOPRAZOLE SODIUM 40 MG/1
40 TABLET, DELAYED RELEASE ORAL
Status: DISCONTINUED | OUTPATIENT
Start: 2024-07-11 | End: 2024-07-11 | Stop reason: HOSPADM

## 2024-07-11 RX ORDER — ALLOPURINOL 300 MG/1
300 TABLET ORAL DAILY
Status: DISCONTINUED | OUTPATIENT
Start: 2024-07-11 | End: 2024-07-11 | Stop reason: HOSPADM

## 2024-07-11 RX ORDER — DOXYCYCLINE HYCLATE 100 MG
100 TABLET ORAL 2 TIMES DAILY
Qty: 10 TABLET | Refills: 0 | Status: SHIPPED | OUTPATIENT
Start: 2024-07-12 | End: 2024-07-17

## 2024-07-11 RX ORDER — AMLODIPINE BESYLATE 5 MG/1
5 TABLET ORAL DAILY
Status: DISCONTINUED | OUTPATIENT
Start: 2024-07-11 | End: 2024-07-11 | Stop reason: HOSPADM

## 2024-07-11 RX ORDER — CETIRIZINE HYDROCHLORIDE 10 MG/1
10 TABLET ORAL DAILY
Qty: 30 TABLET | Refills: 0 | Status: SHIPPED | OUTPATIENT
Start: 2024-07-11

## 2024-07-11 RX ORDER — CYCLOBENZAPRINE HCL 5 MG
5 TABLET ORAL 2 TIMES DAILY PRN
Status: DISCONTINUED | OUTPATIENT
Start: 2024-07-11 | End: 2024-07-11 | Stop reason: HOSPADM

## 2024-07-11 RX ORDER — INSULIN GLARGINE 100 [IU]/ML
28 INJECTION, SOLUTION SUBCUTANEOUS DAILY
Status: DISCONTINUED | OUTPATIENT
Start: 2024-07-11 | End: 2024-07-11 | Stop reason: HOSPADM

## 2024-07-11 RX ORDER — FUROSEMIDE 40 MG/1
40 TABLET ORAL DAILY PRN
Qty: 36 TABLET | Refills: 0 | Status: SHIPPED | OUTPATIENT
Start: 2024-07-11

## 2024-07-11 RX ORDER — OXYCODONE HYDROCHLORIDE 10 MG/1
10 TABLET ORAL EVERY 4 HOURS PRN
Status: DISCONTINUED | OUTPATIENT
Start: 2024-07-11 | End: 2024-07-11 | Stop reason: HOSPADM

## 2024-07-11 RX ORDER — LANOLIN ALCOHOL/MO/W.PET/CERES
400 CREAM (GRAM) TOPICAL DAILY
Status: DISCONTINUED | OUTPATIENT
Start: 2024-07-11 | End: 2024-07-11 | Stop reason: HOSPADM

## 2024-07-11 RX ORDER — ALUMINA, MAGNESIA, AND SIMETHICONE 2400; 2400; 240 MG/30ML; MG/30ML; MG/30ML
30 SUSPENSION ORAL
Qty: 630 ML | Refills: 0 | Status: SHIPPED | OUTPATIENT
Start: 2024-07-11 | End: 2024-07-18

## 2024-07-11 RX ORDER — ATORVASTATIN CALCIUM 10 MG/1
10 TABLET, FILM COATED ORAL DAILY
Status: DISCONTINUED | OUTPATIENT
Start: 2024-07-11 | End: 2024-07-11 | Stop reason: HOSPADM

## 2024-07-11 RX ORDER — OXYCODONE HYDROCHLORIDE 5 MG/1
5 TABLET ORAL EVERY 4 HOURS PRN
Status: DISCONTINUED | OUTPATIENT
Start: 2024-07-11 | End: 2024-07-11 | Stop reason: HOSPADM

## 2024-07-11 RX ORDER — INSULIN LISPRO 100 [IU]/ML
5 INJECTION, SOLUTION INTRAVENOUS; SUBCUTANEOUS
Status: DISCONTINUED | OUTPATIENT
Start: 2024-07-11 | End: 2024-07-11 | Stop reason: HOSPADM

## 2024-07-11 RX ORDER — ASPIRIN 81 MG/1
81 TABLET, CHEWABLE ORAL DAILY
Status: DISCONTINUED | OUTPATIENT
Start: 2024-07-11 | End: 2024-07-11 | Stop reason: HOSPADM

## 2024-07-11 RX ORDER — GUAIFENESIN 600 MG/1
600 TABLET, EXTENDED RELEASE ORAL 2 TIMES DAILY
Qty: 14 TABLET | Refills: 0 | Status: SHIPPED | OUTPATIENT
Start: 2024-07-11 | End: 2024-07-18

## 2024-07-11 RX ORDER — GUAIFENESIN 600 MG/1
600 TABLET, EXTENDED RELEASE ORAL 2 TIMES DAILY
Status: DISCONTINUED | OUTPATIENT
Start: 2024-07-11 | End: 2024-07-11 | Stop reason: HOSPADM

## 2024-07-11 RX ADMIN — PANTOPRAZOLE SODIUM 40 MG: 40 TABLET, DELAYED RELEASE ORAL at 08:55

## 2024-07-11 RX ADMIN — AMLODIPINE BESYLATE 5 MG: 5 TABLET ORAL at 08:55

## 2024-07-11 RX ADMIN — ATORVASTATIN CALCIUM 10 MG: 10 TABLET, FILM COATED ORAL at 08:55

## 2024-07-11 RX ADMIN — INSULIN GLARGINE 28 UNITS: 100 INJECTION, SOLUTION SUBCUTANEOUS at 08:55

## 2024-07-11 RX ADMIN — OXYCODONE HYDROCHLORIDE 10 MG: 10 TABLET ORAL at 03:23

## 2024-07-11 RX ADMIN — HYDRALAZINE HYDROCHLORIDE 25 MG: 25 TABLET, FILM COATED ORAL at 03:24

## 2024-07-11 RX ADMIN — INSULIN LISPRO 5 UNITS: 100 INJECTION, SOLUTION INTRAVENOUS; SUBCUTANEOUS at 12:12

## 2024-07-11 RX ADMIN — HYDRALAZINE HYDROCHLORIDE 25 MG: 25 TABLET, FILM COATED ORAL at 08:55

## 2024-07-11 RX ADMIN — GUAIFENESIN 600 MG: 600 TABLET, EXTENDED RELEASE ORAL at 03:24

## 2024-07-11 RX ADMIN — ASPIRIN 81 MG: 81 TABLET, CHEWABLE ORAL at 08:55

## 2024-07-11 RX ADMIN — ALLOPURINOL 300 MG: 300 TABLET ORAL at 08:55

## 2024-07-11 RX ADMIN — Medication 400 MG: at 08:55

## 2024-07-11 RX ADMIN — SODIUM CHLORIDE, PRESERVATIVE FREE 5 ML: 5 INJECTION INTRAVENOUS at 03:13

## 2024-07-11 RX ADMIN — SODIUM CHLORIDE, PRESERVATIVE FREE 10 ML: 5 INJECTION INTRAVENOUS at 08:55

## 2024-07-11 RX ADMIN — INSULIN LISPRO 5 UNITS: 100 INJECTION, SOLUTION INTRAVENOUS; SUBCUTANEOUS at 08:55

## 2024-07-11 RX ADMIN — GUAIFENESIN 600 MG: 600 TABLET, EXTENDED RELEASE ORAL at 08:55

## 2024-07-11 RX ADMIN — ENOXAPARIN SODIUM 40 MG: 100 INJECTION SUBCUTANEOUS at 08:55

## 2024-07-11 ASSESSMENT — PAIN SCALES - GENERAL
PAINLEVEL_OUTOF10: 10
PAINLEVEL_OUTOF10: 0
PAINLEVEL_OUTOF10: 10
PAINLEVEL_OUTOF10: 10

## 2024-07-11 ASSESSMENT — PAIN DESCRIPTION - LOCATION: LOCATION: BACK

## 2024-07-11 ASSESSMENT — PAIN SCALES - WONG BAKER: WONGBAKER_NUMERICALRESPONSE: NO HURT

## 2024-07-11 ASSESSMENT — PAIN DESCRIPTION - DESCRIPTORS: DESCRIPTORS: BURNING

## 2024-07-11 ASSESSMENT — PAIN DESCRIPTION - ORIENTATION: ORIENTATION: LEFT

## 2024-07-11 NOTE — ASSESSMENT & PLAN NOTE
Troponin 13 -> 13  Continue ASA and Statin. Consider Beta blocker. Allergy to ACE inhibitor  SL nitro prn  Nasal cannula oxygen prn  Check lipid panel and A1c  2D Echo in AM  Tele  Consulted cardiology

## 2024-07-11 NOTE — DISCHARGE INSTRUCTIONS
NOTIFY YOUR PHYSICIAN FOR ANY OF THE FOLLOWING:   Fever over 101 degrees for 24 hours.   Chest pain, shortness of breath, fever, chills, nausea, vomiting, diarrhea, change in mentation, falling, weakness, bleeding. Severe pain or pain not relieved by medications, as well as any other signs or symptoms that you may have questions about    Recommend to monitor daily weights and to keep a log and recommend if greater than 2 pound weight gain in 24 hours or 5 pound weight gain in 5 days to take her furosemide/Lasix 40 mg dose that day and continue to monitor    Monitor blood pressure daily in a.m. and random during afternoon  So monitor closely Accu-Cheks    Recommend keeping hydrated which will help your muscle cramping symptoms  Magnesium was slightly low you are on chronic supplementation daily so increase that to twice daily  Activity as tolerated  Cardiac diabetic diet

## 2024-07-11 NOTE — PROGRESS NOTES
Bedside shift change report given to kaye lomeli(oncoming nurse) by marilu reynolds (offgoing nurse). Report included the following information Nurse Handoff Report.

## 2024-07-11 NOTE — H&P
Activity    Alcohol use: Never    Drug use: Never    Sexual activity: Not Currently     Partners: Male     Birth control/protection: Surgical     Social Determinants of Health     Financial Resource Strain: Low Risk  (9/29/2023)    Overall Financial Resource Strain (CARDIA)     Difficulty of Paying Living Expenses: Not hard at all   Food Insecurity: No Food Insecurity (7/11/2024)    Hunger Vital Sign     Worried About Running Out of Food in the Last Year: Never true     Ran Out of Food in the Last Year: Never true   Transportation Needs: No Transportation Needs (7/11/2024)    PRAPARE - Transportation     Lack of Transportation (Medical): No     Lack of Transportation (Non-Medical): No   Physical Activity: Sufficiently Active (2/12/2024)    Exercise Vital Sign     Days of Exercise per Week: 5 days     Minutes of Exercise per Session: 30 min   Stress: No Stress Concern Present (9/29/2023)    [employer] [place] [employer] - Occupational Stress Questionnaire     Feeling of Stress : Not at all   Social Connections: Socially Integrated (9/29/2023)    Social Connection and Isolation Panel [NHANES]     Frequency of Communication with Friends and Family: More than three times a week     Frequency of Social Gatherings with Friends and Family: More than three times a week     Attends Protestant Services: More than 4 times per year     Active Member of Clubs or Organizations: Yes     Attends Club or Organization Meetings: More than 4 times per year     Marital Status:    Intimate Partner Violence: Not At Risk (9/29/2023)    Humiliation, Afraid, Rape, and Kick questionnaire     Fear of Current or Ex-Partner: No     Emotionally Abused: No     Physically Abused: No     Sexually Abused: No   Housing Stability: Low Risk  (7/11/2024)    Housing Stability Vital Sign     Unable to Pay for Housing in the Last Year: No     Number of Places Lived in the Last Year: 1     Unstable Housing in the Last Year: No

## 2024-07-11 NOTE — PROGRESS NOTES
Verbal report received from Yoana Marquez RN on Yoana Man being received from ED  for routine progression of care.     Report consisted of patient’s Situation, Background, Assessment and Recommendations(SBAR).     Information from the following report(s) SBAR was reviewed. Opportunity for questions and clarification was provided.      Assessment completed upon patient’s arrival to unit and care assumed.     Patient received to room 212. Patient connected to monitor and assessment completed. Plan of care reviewed. Patient oriented to room and call light. Patient aware to use call light to communicate any chest pain or needs.

## 2024-07-11 NOTE — CONSULTS
CARDIOLOGY CONSULTATION    REASON FOR CONSULT: Chest pain    REQUESTING PROVIDER: Dr. Ford    CHIEF COMPLAINT:  Chest pain    HISTORY OF PRESENT ILLNESS:  Yoana Man is a 63 y.o. year-old female with past medical history significant for COPD, HTN, GERD, CKD, DM, HLD who was evaluated today due to chest pain. She reports it as a heaviness that began after several days of coughing, which she reports was productive with yellow phlegm. She reports chronic shortness of breath secondary to COPD/morbid obesity. Workup rather unrevealing. Trop x2 negative. EKG without acute ischemic changes. Echo is pending. On exam she denies chest pain but reports left upper back pain, recently prescribed muscle relaxer which improved symptoms. She denies palpitations, dizziness. She appears compensated on exam and in no acute distress. She does endorse 2 pillow orthopnea and daytime fatigue. Recommend sleep study in the outpatient setting. Discussed findings and stability for discharge from a CV standpoint, she is agreeable to follow up as outpatient for further workup if needed.   Records from hospital admission course thus far reviewed.      Telemetry reviewed.  No events overnight. SR .    INPATIENT MEDICATIONS:  Home medications reviewed.    Current Facility-Administered Medications:     albuterol sulfate HFA (PROVENTIL;VENTOLIN;PROAIR) 108 (90 Base) MCG/ACT inhaler 1 puff, 1 puff, Inhalation, Q4H PRN, Garth Craig, DO    allopurinol (ZYLOPRIM) tablet 300 mg, 300 mg, Oral, Daily, Garth Craig, DO, 300 mg at 07/11/24 0855    amLODIPine (NORVASC) tablet 5 mg, 5 mg, Oral, Daily, Garth Craig, DO, 5 mg at 07/11/24 0855    atorvastatin (LIPITOR) tablet 10 mg, 10 mg, Oral, Daily, Garth Craig, DO, 10 mg at 07/11/24 0855    aspirin chewable tablet 81 mg, 81 mg, Oral, Daily, Garth Craig, DO, 81 mg at 07/11/24 0855    cyclobenzaprine (FLEXERIL) tablet 5 mg, 5 mg, Oral, BID PRN, Garth Craig DO    [START ON  7/12/2024] furosemide (LASIX) tablet 40 mg, 40 mg, Oral, Q MWF, Levarele, Garth, DO    hydrALAZINE (APRESOLINE) tablet 25 mg, 25 mg, Oral, BID, Kiara, Garth, DO, 25 mg at 07/11/24 0855    magnesium oxide (MAG-OX) tablet 400 mg, 400 mg, Oral, Daily, Kiara, Garth, DO, 400 mg at 07/11/24 0855    meclizine (ANTIVERT) tablet 25 mg, 25 mg, Oral, TID PRN, Kiara, Garth, DO    pantoprazole (PROTONIX) tablet 40 mg, 40 mg, Oral, QAM AC, Kiara, Garth, DO, 40 mg at 07/11/24 0855    guaiFENesin (MUCINEX) extended release tablet 600 mg, 600 mg, Oral, BID, Kiara, Agrth, DO, 600 mg at 07/11/24 0855    oxyCODONE (ROXICODONE) immediate release tablet 5 mg, 5 mg, Oral, Q4H PRN **OR** oxyCODONE HCl (OXY-IR) immediate release tablet 10 mg, 10 mg, Oral, Q4H PRN, Kiara, Garth, DO, 10 mg at 07/11/24 0323    insulin glargine (LANTUS) injection vial 28 Units, 28 Units, SubCUTAneous, Daily, Brad Ford MD, 28 Units at 07/11/24 0855    insulin lispro (HUMALOG,ADMELOG) injection vial 5 Units, 5 Units, SubCUTAneous, TID WC, Brad Ford MD, 5 Units at 07/11/24 0855    sodium chloride flush 0.9 % injection 5-40 mL, 5-40 mL, IntraVENous, 2 times per day, Brad Ford MD, 10 mL at 07/11/24 0855    sodium chloride flush 0.9 % injection 5-40 mL, 5-40 mL, IntraVENous, PRN, Brad Ford MD    0.9 % sodium chloride infusion, , IntraVENous, PRN, Brad Ford MD    potassium chloride (KLOR-CON M) extended release tablet 40 mEq, 40 mEq, Oral, PRN **OR** potassium bicarb-citric acid (EFFER-K) effervescent tablet 40 mEq, 40 mEq, Oral, PRN **OR** potassium chloride 10 mEq/100 mL IVPB (Peripheral Line), 10 mEq, IntraVENous, PRN, Brad Ford MD    magnesium sulfate 2000 mg in 50 mL IVPB premix, 2,000 mg, IntraVENous, PRN, Brad Ford MD    enoxaparin (LOVENOX) injection 40 mg, 40 mg, SubCUTAneous, BID, Brad Ford MD, 40 mg at 07/11/24 0855    ondansetron (ZOFRAN-ODT) disintegrating

## 2024-07-11 NOTE — ASSESSMENT & PLAN NOTE
Baseline Cr ~1.35; Cr 1.53 in ED; follow on labs  Baseline GFR ~46; GFR 38 in ED; follow on labs  Follow BUN/sCr, electrolytes, intake/output

## 2024-07-11 NOTE — PLAN OF CARE
Med/surg nurse to page when patient has arrived to unit and ready for teledoc visit.   If patient is to be boarded in the ED then the ED Nurse is to perfectserve or call so the patient can be seen.     Thank you  Electronically signed by Garth Craig DO on 7/10/24 at 11:52 PM EDT

## 2024-07-11 NOTE — PLAN OF CARE
Problem: Safety - Adult  Goal: Free from fall injury  7/11/2024 1008 by Rachel Looney LPN  Outcome: Progressing  7/11/2024 0355 by Sayra Gomes LPN  Outcome: Progressing

## 2024-07-11 NOTE — DISCHARGE SUMMARY
Physician Discharge Summary       Patient: Yoana Man MRN: [id number]     YOB: 1960  Age: 63 y.o.  Sex: female    PCP: Katiana Bryant APRN - NP    Allergies: Azithromycin, Celecoxib, Clindamycin phosphate, Mushroom extract complex, Strawberry, Tramadol-acetaminophen, Lisinopril, and Strawberry extract    Admit date: 7/10/2024  Admitting Provider: Garth Craig DO    Discharge date: 7/11/2024  Discharging Provider: Brad Ford MD    * Admission Diagnoses:   Chest pain [R07.9]  Chest pain, unspecified type [R07.9]      * Discharge Diagnoses:    Active Hospital Problems    Chest pain      Primary hypertension      Chronic gout of multiple sites      Stage 3a chronic kidney disease (HCC)      Chronic obstructive pulmonary disease (HCC)      Diabetes mellitus (HCC)      GERD (gastroesophageal reflux disease)      Hyperlipidemia      Morbid obesity with BMI of 50.0-59.9, adult (HCC)           Chief Complaint   Patient presents with    Chest Pain    Back Pain        Presentation on 7/10/2024  HPI as per admitting provider on 7/10/2024    Yoana Man is a 63 y.o. female with pmh as below who complains of chest pain. Onset was 4 days ago, with worsening course since that time. The patient describes the pain as constant, pressure like in nature, radiates to the upper back. Patient rates pain as a 10/10 in intensity.  Associated symptoms are HA. Aggravating factors are none.  Alleviating factors are: none. Patient's cardiac risk factors are diabetes mellitus, dyslipidemia, hypertension, obesity (BMI >= 30 kg/m2), and sedentary lifestyle. Patient states she also feels like she has congestion in her chest. She deneis any fevers, chills, SOB or cough associated with this. Otherwise she has been in her normal state of health and denies any abd pain, N/V/D, or dysuria.        * Hospital Course:     Chest pain  Patient presents with muscular type pain was  injection 5-40 mL, 5-40 mL, IntraVENous, 2 times per day, Brad Ford MD, 10 mL at 07/11/24 0855    sodium chloride flush 0.9 % injection 5-40 mL, 5-40 mL, IntraVENous, PRLiliana MOSLEY Goutham R, MD    0.9 % sodium chloride infusion, , IntraVENous, PRLiliana MOSLEY Goutham R, MD    potassium chloride (KLOR-CON M) extended release tablet 40 mEq, 40 mEq, Oral, PRN **OR** potassium bicarb-citric acid (EFFER-K) effervescent tablet 40 mEq, 40 mEq, Oral, PRN **OR** potassium chloride 10 mEq/100 mL IVPB (Peripheral Line), 10 mEq, IntraVENous, PRLiliana MOSLEY Goutham R, MD    magnesium sulfate 2000 mg in 50 mL IVPB premix, 2,000 mg, IntraVENous, PRNLiliana Goutham R, MD    enoxaparin (LOVENOX) injection 40 mg, 40 mg, SubCUTAneous, BID, Brad Ford MD, 40 mg at 07/11/24 0855    ondansetron (ZOFRAN-ODT) disintegrating tablet 4 mg, 4 mg, Oral, Q8H PRN **OR** ondansetron (ZOFRAN) injection 4 mg, 4 mg, IntraVENous, Q6H PRN, Brad Ford MD    polyethylene glycol (GLYCOLAX) packet 17 g, 17 g, Oral, Daily PRN, Brad Ford MD    glucose chewable tablet 16 g, 4 tablet, Oral, PRNLiliana Goutham R, MD    dextrose bolus 10% 125 mL, 125 mL, IntraVENous, PRN **OR** dextrose bolus 10% 250 mL, 250 mL, IntraVENous, PRNLiliana Goutham R, MD    glucagon injection 1 mg, 1 mg, SubCUTAneous, PRNLiliana Goutham R, MD    dextrose 10 % infusion, , IntraVENous, Continuous PRLiliana MOSLEY Goutham R, MD    insulin lispro (HUMALOG,ADMELOG) injection vial 0-8 Units, 0-8 Units, SubCUTAneous, TID WC, Brad Ford MD    insulin lispro (HUMALOG,ADMELOG) injection vial 0-4 Units, 0-4 Units, SubCUTAneous, Nightly, Brad Ford MD    ipratropium 0.5 mg-albuterol 2.5 mg (DUONEB) nebulizer solution 1 Dose, 1 Dose, Inhalation, Q4H PRN, Brad Ford MD    hydrALAZINE (APRESOLINE) injection 10 mg, 10 mg, IntraVENous, Q6H PRN, Brad Ford MD         ALLERGIES:  Allergies reviewed with the patient,

## 2024-07-11 NOTE — PLAN OF CARE
Problem: Safety - Adult  Goal: Free from fall injury  7/11/2024 1324 by Rachel Looney LPN  Outcome: Completed  7/11/2024 1008 by Rachel Looney LPN  Outcome: Progressing  7/11/2024 0355 by Sayra Gomes LPN  Outcome: Progressing

## 2024-07-22 DIAGNOSIS — K21.00 GASTROESOPHAGEAL REFLUX DISEASE WITH ESOPHAGITIS WITHOUT HEMORRHAGE: ICD-10-CM

## 2024-07-22 DIAGNOSIS — I10 ESSENTIAL (PRIMARY) HYPERTENSION: ICD-10-CM

## 2024-07-22 RX ORDER — HYDRALAZINE HYDROCHLORIDE 25 MG/1
TABLET, FILM COATED ORAL
Qty: 180 TABLET | Refills: 3 | Status: SHIPPED | OUTPATIENT
Start: 2024-07-22

## 2024-07-22 RX ORDER — GLIMEPIRIDE 1 MG/1
TABLET ORAL
Qty: 270 TABLET | Refills: 3 | Status: SHIPPED | OUTPATIENT
Start: 2024-07-22

## 2024-07-24 NOTE — TELEPHONE ENCOUNTER
Patient stated she takes the Hydralazine one in the morning and one at night.  The Glimepiride she takes one in the morning and two after dinner.

## 2024-08-05 ENCOUNTER — OFFICE VISIT (OUTPATIENT)
Facility: CLINIC | Age: 64
End: 2024-08-05
Payer: MEDICARE

## 2024-08-05 VITALS
OXYGEN SATURATION: 96 % | SYSTOLIC BLOOD PRESSURE: 143 MMHG | RESPIRATION RATE: 18 BRPM | BODY MASS INDEX: 47.09 KG/M2 | WEIGHT: 293 LBS | DIASTOLIC BLOOD PRESSURE: 75 MMHG | HEIGHT: 66 IN | TEMPERATURE: 97.8 F | HEART RATE: 88 BPM

## 2024-08-05 DIAGNOSIS — Z20.822 EXPOSURE TO COVID-19 VIRUS: Primary | ICD-10-CM

## 2024-08-05 LAB
EXP DATE SOLUTION: NORMAL
EXP DATE SWAB: NORMAL
EXPIRATION DATE: NORMAL
LOT NUMBER POC: NORMAL
LOT NUMBER SOLUTION: NORMAL
LOT NUMBER SWAB: NORMAL
SARS-COV-2 RNA, POC: NEGATIVE

## 2024-08-05 PROCEDURE — G8427 DOCREV CUR MEDS BY ELIG CLIN: HCPCS

## 2024-08-05 PROCEDURE — 3017F COLORECTAL CA SCREEN DOC REV: CPT

## 2024-08-05 PROCEDURE — 3077F SYST BP >= 140 MM HG: CPT

## 2024-08-05 PROCEDURE — 1036F TOBACCO NON-USER: CPT

## 2024-08-05 PROCEDURE — G8417 CALC BMI ABV UP PARAM F/U: HCPCS

## 2024-08-05 PROCEDURE — 3078F DIAST BP <80 MM HG: CPT

## 2024-08-05 PROCEDURE — 99213 OFFICE O/P EST LOW 20 MIN: CPT

## 2024-08-05 PROCEDURE — 87635 SARS-COV-2 COVID-19 AMP PRB: CPT

## 2024-08-05 ASSESSMENT — PATIENT HEALTH QUESTIONNAIRE - PHQ9
SUM OF ALL RESPONSES TO PHQ9 QUESTIONS 1 & 2: 0
SUM OF ALL RESPONSES TO PHQ QUESTIONS 1-9: 0
SUM OF ALL RESPONSES TO PHQ QUESTIONS 1-9: 0
2. FEELING DOWN, DEPRESSED OR HOPELESS: NOT AT ALL
1. LITTLE INTEREST OR PLEASURE IN DOING THINGS: NOT AT ALL
SUM OF ALL RESPONSES TO PHQ QUESTIONS 1-9: 0
SUM OF ALL RESPONSES TO PHQ QUESTIONS 1-9: 0

## 2024-08-05 ASSESSMENT — ENCOUNTER SYMPTOMS
BLOOD IN STOOL: 0
ABDOMINAL PAIN: 0
CONSTIPATION: 0
EYE PAIN: 0
VOMITING: 0
SINUS PAIN: 0
RHINORRHEA: 0
NAUSEA: 0
DIARRHEA: 0
BACK PAIN: 0
SORE THROAT: 0

## 2024-08-05 NOTE — PROGRESS NOTES
Yoana Man is a 63 y.o. female who presents to the office today for the following:    Chief Complaint   Patient presents with    Concern For COVID-19       Past Medical History:   Diagnosis Date    Arthritis     Asthma     Chronic kidney disease     stage 3    Chronic obstructive pulmonary disease (HCC)     Diabetes (HCC)     Diabetes mellitus (HCC) 11/11/2020    GERD (gastroesophageal reflux disease)     Helicobacter pylori gastritis 11/11/2020    High cholesterol     Hypertension     Menopause     Morbid obesity (HCC)     Obstructive sleep apnea on CPAP     Vertigo         Past Surgical History:   Procedure Laterality Date    BREAST BIOPSY Left 2019    CHOLECYSTECTOMY      HYSTERECTOMY (CERVIX STATUS UNKNOWN)      ORTHOPEDIC SURGERY      right shoulder spur    OVARY REMOVAL      ROTATOR CUFF REPAIR Left     by suture    TUBAL LIGATION Bilateral     UPPER GASTROINTESTINAL ENDOSCOPY  01/01/2019        Family History   Problem Relation Age of Onset    Lung Disease Mother         lung ca    Heart Disease Mother     Lung Disease Father         PNA    Cancer Maternal Grandmother         gyn        Social History     Tobacco Use    Smoking status: Never     Passive exposure: Never    Smokeless tobacco: Never   Vaping Use    Vaping Use: Never used   Substance Use Topics    Alcohol use: Never    Drug use: Never        HPI  Patient here for COVID testing after exposure over the weekend to family member that was positive for COVID yesterday.  Patient denies cough fever body aches chills nausea vomiting diarrhea abdominal pain.  Denies chest pain shortness of breath dizziness palpitations near syncope.  Would like to be tested for COVID due to extensive medical history.  Asymptomatic.    Chief Complaint   Patient presents with    Concern For COVID-19       Current Outpatient Medications on File Prior to Visit   Medication Sig Dispense Refill    hydrALAZINE (APRESOLINE) 25 MG tablet TAKE 1 TABLET IN THE MORNING AND AT BEDTIME

## 2024-08-05 NOTE — PROGRESS NOTES
Chief Complaint   Patient presents with    Concern For COVID-19     \"Have you been to the ER, urgent care clinic since your last visit?  Hospitalized since your last visit?\"    NO    “Have you seen or consulted any other health care providers outside of [facility] since your last visit?”    NO            Click Here for Release of Records Request

## 2024-08-19 ENCOUNTER — CARE COORDINATION (OUTPATIENT)
Dept: CASE MANAGEMENT | Age: 64
End: 2024-08-19

## 2024-08-19 NOTE — CARE COORDINATION
Pt appt with PCP 8/20/24. All metrics included in Care coordination note for PCP review. 8/1/24-8/19/24

## 2024-08-20 ENCOUNTER — TELEPHONE (OUTPATIENT)
Age: 64
End: 2024-08-20

## 2024-08-20 ENCOUNTER — OFFICE VISIT (OUTPATIENT)
Facility: CLINIC | Age: 64
End: 2024-08-20
Payer: MEDICARE

## 2024-08-20 VITALS
HEIGHT: 66 IN | RESPIRATION RATE: 18 BRPM | DIASTOLIC BLOOD PRESSURE: 69 MMHG | HEART RATE: 79 BPM | WEIGHT: 293 LBS | TEMPERATURE: 97.3 F | OXYGEN SATURATION: 96 % | SYSTOLIC BLOOD PRESSURE: 146 MMHG | BODY MASS INDEX: 47.09 KG/M2

## 2024-08-20 DIAGNOSIS — D64.9 ANEMIA, UNSPECIFIED TYPE: ICD-10-CM

## 2024-08-20 DIAGNOSIS — E78.2 MIXED HYPERLIPIDEMIA: ICD-10-CM

## 2024-08-20 DIAGNOSIS — J44.9 CHRONIC OBSTRUCTIVE PULMONARY DISEASE, UNSPECIFIED COPD TYPE (HCC): ICD-10-CM

## 2024-08-20 DIAGNOSIS — M17.0 BILATERAL PRIMARY OSTEOARTHRITIS OF KNEE: ICD-10-CM

## 2024-08-20 DIAGNOSIS — I87.2 VENOUS INSUFFICIENCY: ICD-10-CM

## 2024-08-20 DIAGNOSIS — J06.9 UPPER RESPIRATORY TRACT INFECTION, UNSPECIFIED TYPE: ICD-10-CM

## 2024-08-20 DIAGNOSIS — E11.22 TYPE 2 DIABETES MELLITUS WITH STAGE 3 CHRONIC KIDNEY DISEASE, WITH LONG-TERM CURRENT USE OF INSULIN, UNSPECIFIED WHETHER STAGE 3A OR 3B CKD (HCC): Primary | ICD-10-CM

## 2024-08-20 DIAGNOSIS — Z79.4 TYPE 2 DIABETES MELLITUS WITH STAGE 3 CHRONIC KIDNEY DISEASE, WITH LONG-TERM CURRENT USE OF INSULIN, UNSPECIFIED WHETHER STAGE 3A OR 3B CKD (HCC): Primary | ICD-10-CM

## 2024-08-20 DIAGNOSIS — G47.33 OBSTRUCTIVE SLEEP APNEA (ADULT) (PEDIATRIC): ICD-10-CM

## 2024-08-20 DIAGNOSIS — K21.00 GASTRO-ESOPHAGEAL REFLUX DISEASE WITH ESOPHAGITIS, WITHOUT BLEEDING: ICD-10-CM

## 2024-08-20 DIAGNOSIS — I10 ESSENTIAL (PRIMARY) HYPERTENSION: ICD-10-CM

## 2024-08-20 DIAGNOSIS — K57.90 DIVERTICULAR DISEASE: ICD-10-CM

## 2024-08-20 DIAGNOSIS — Z12.31 SCREENING MAMMOGRAM FOR BREAST CANCER: ICD-10-CM

## 2024-08-20 DIAGNOSIS — M1A.09X0 IDIOPATHIC CHRONIC GOUT, MULTIPLE SITES, WITHOUT TOPHUS (TOPHI): ICD-10-CM

## 2024-08-20 DIAGNOSIS — N18.31 CHRONIC KIDNEY DISEASE, STAGE 3A (HCC): ICD-10-CM

## 2024-08-20 DIAGNOSIS — N18.30 TYPE 2 DIABETES MELLITUS WITH STAGE 3 CHRONIC KIDNEY DISEASE, WITH LONG-TERM CURRENT USE OF INSULIN, UNSPECIFIED WHETHER STAGE 3A OR 3B CKD (HCC): Primary | ICD-10-CM

## 2024-08-20 PROCEDURE — 99214 OFFICE O/P EST MOD 30 MIN: CPT | Performed by: NURSE PRACTITIONER

## 2024-08-20 PROCEDURE — 3078F DIAST BP <80 MM HG: CPT | Performed by: NURSE PRACTITIONER

## 2024-08-20 PROCEDURE — 3023F SPIROM DOC REV: CPT | Performed by: NURSE PRACTITIONER

## 2024-08-20 PROCEDURE — G8417 CALC BMI ABV UP PARAM F/U: HCPCS | Performed by: NURSE PRACTITIONER

## 2024-08-20 PROCEDURE — 3017F COLORECTAL CA SCREEN DOC REV: CPT | Performed by: NURSE PRACTITIONER

## 2024-08-20 PROCEDURE — 3077F SYST BP >= 140 MM HG: CPT | Performed by: NURSE PRACTITIONER

## 2024-08-20 PROCEDURE — G8427 DOCREV CUR MEDS BY ELIG CLIN: HCPCS | Performed by: NURSE PRACTITIONER

## 2024-08-20 PROCEDURE — 1036F TOBACCO NON-USER: CPT | Performed by: NURSE PRACTITIONER

## 2024-08-20 PROCEDURE — 3044F HG A1C LEVEL LT 7.0%: CPT | Performed by: NURSE PRACTITIONER

## 2024-08-20 PROCEDURE — 2022F DILAT RTA XM EVC RTNOPTHY: CPT | Performed by: NURSE PRACTITIONER

## 2024-08-20 RX ORDER — LIRAGLUTIDE 6 MG/ML
1.8 INJECTION SUBCUTANEOUS DAILY
Qty: 9 ML | Refills: 1 | Status: SHIPPED | OUTPATIENT
Start: 2024-08-20

## 2024-08-20 RX ORDER — HYDRALAZINE HYDROCHLORIDE 25 MG/1
25 TABLET, FILM COATED ORAL 2 TIMES DAILY
Qty: 180 TABLET | Refills: 1 | Status: SHIPPED | OUTPATIENT
Start: 2024-08-20

## 2024-08-20 RX ORDER — HYDRALAZINE HYDROCHLORIDE 50 MG/1
50 TABLET, FILM COATED ORAL 2 TIMES DAILY
Qty: 180 TABLET | Refills: 1 | Status: SHIPPED | OUTPATIENT
Start: 2024-08-20 | End: 2024-08-20 | Stop reason: DRUGHIGH

## 2024-08-20 NOTE — PROGRESS NOTES
Patient presents with:  Cough: Has had cough congestion for 1 week yesterday had temp 100      Clinical Decision Making:  Mother was here ostensibly with the child concerned that the child had a cough and wanted to be evaluated for possible ear infection.  Examination did not show ear infection and child is treated symptomatically for the cough and congestion. Expected course of resolution and indication for return was gone over and questions were answered to patient/parent's satisfaction before discharge.        ICD-10-CM    1. Upper respiratory tract infection, unspecified type  J06.9           Patient Instructions   You were seen today for acute bronchitis. This is likely due to a viral illness.    Suggested increased rest increased fluids and bedside humidification with ultrasonic humidifier  Over the counter Tylenol dosed by weight.  Follow packaging directions.  Nasal saline drops for thinning nasal secretions  Use of bulb syringe to remove secretions    Symptom management:  - Get plenty of rest  - Avoid smoking and second hand smoke  - May take tylenol or ibuprofen for fever/discomfort  - Drink plenty of non-caffeinated fluids  - Use nasal steroid spray for sinus congestion  - OTC honey based cough syrup      Reasons to be seen in the emergency room:  - Develop a fever of 100.4 or higher  - Cough changes, coughing up blood, or become short of breath  - Neck stiffness  - Chest pain  - Severe headache  - Unable to tolerate eating or drinking fluids    Otherwise, if no symptom improvement after 5 days, follow-up with your primary care provider.          HPI:  Alvin Lyle is a 1 year old female who presents today accompanied by mother and sibling with with similar complaint of cough.  Mother was concerned that before traveling she wanted ensure the child did not have a pneumonia or ear infection.  There was concern that there has been a recurrent cough but it has not been productive of phlegm.  There is been  Yoana Man is a 62 y.o. female who presents to the office today for the following:    Chief Complaint   Patient presents with    Diabetes    Chest Congestion     Started two days ok. Pt stated she has green phlem        Past Medical History:   Diagnosis Date    Arthritis     Asthma     Chronic kidney disease     stage 3    Chronic obstructive pulmonary disease (HCC)     Diabetes (HCC)     Diabetes mellitus (HCC) 11/11/2020    GERD (gastroesophageal reflux disease)     Helicobacter pylori gastritis 11/11/2020    High cholesterol     Hypertension     Menopause     Morbid obesity (HCC)     Obstructive sleep apnea on CPAP     Vertigo           Past Surgical History:   Procedure Laterality Date    BREAST BIOPSY Left 2019    CHOLECYSTECTOMY      HYSTERECTOMY (CERVIX STATUS UNKNOWN)      ORTHOPEDIC SURGERY      right shoulder spur    OVARY REMOVAL      ROTATOR CUFF REPAIR Left     by suture    TUBAL LIGATION Bilateral     UPPER GASTROINTESTINAL ENDOSCOPY  01/01/2019        Family History   Problem Relation Age of Onset    Lung Disease Mother         lung ca    Heart Disease Mother     Lung Disease Father         PNA    Cancer Maternal Grandmother         gyn        Social History     Socioeconomic History    Marital status:      Spouse name: None    Number of children: 2    Years of education: None    Highest education level: None   Tobacco Use    Smoking status: Never     Passive exposure: Never    Smokeless tobacco: Never   Vaping Use    Vaping status: Never Used   Substance and Sexual Activity    Alcohol use: Never    Drug use: Never    Sexual activity: Not Currently     Partners: Male     Birth control/protection: Surgical     Social Determinants of Health     Financial Resource Strain: Low Risk  (9/29/2023)    Overall Financial Resource Strain (CARDIA)     Difficulty of Paying Living Expenses: Not hard at all   Food Insecurity: No Food Insecurity (7/11/2024)    Hunger Vital Sign     Worried About Running Out  no fever chills night sweats or fatigue.  Child continues to eat and drink normally and eliminating well.  There is no exposure to secondhand smoke.  Mother has been using over-the-counter Tylenol and some homeopathic cough remedy.    History obtained from chart review and the patient.    Problem List:  2022: Rash  2021: Labial adhesions  2019: Term , current hospitalization      History reviewed. No pertinent past medical history.    Social History     Tobacco Use     Smoking status: Never     Smokeless tobacco: Never   Substance Use Topics     Alcohol use: Not on file       Review of Systems  As above in HPI otherwise negative.    Vitals:    10/11/22 1608   Pulse: 114   Resp: 26   Temp: 99.3  F (37.4  C)   TempSrc: Axillary   SpO2: 97%   Weight: 14.2 kg (31 lb 5 oz)       General: Patient is resting comfortably no acute distress is afebrile  HEENT: Head is normocephalic atraumatic   eyes are PERRL EOMI sclera anicteric   TMs are clear bilaterally  Throat is clear  No cervical lymphadenopathy present  LUNGS: Clear to auscultation bilaterally normal respiratory effort and excursion  HEART: Regular rate and rhythm  Skin: Without rash non-diaphoretic    Physical Exam    At the end of the encounter, I discussed results, diagnosis, medications. Discussed red flags for immediate return to clinic/ER, as well as indications for follow up if no improvement. Patient understood and agreed to plan. Patient was stable for discharge.   mammogram for breast cancer    - [facility] DIGITAL SCREEN BILATERAL           Reviewed health maintenance items including recommended screenings and immunizations. She will obtain any desired vaccines at local pharmacy. Copy of recommended HM provided at discharge with summary.     Patient verbalizes understanding of plan of care as discussed above    Return in 3 months (on 11/20/2024), or 2 week nurse visit to bring glucose log..

## 2024-08-20 NOTE — PROGRESS NOTES
Chief Complaint   Patient presents with    Diabetes    Chest Congestion     Started two days ok. Pt stated she has green phlem         Follow up     Pt brought a list of the meds, went over list in chart, pt confirmed     \"Have you been to the ER, urgent care clinic since your last visit?  Hospitalized since your last visit?\"    Yes in chart     “Have you seen or consulted any other health care providers outside of [facility] since your last visit?”    NO            Click Here for Release of Records Request

## 2024-08-22 NOTE — TELEPHONE ENCOUNTER
Spoke with pt she has not been seen in the office for over a year. Advised it is recommended that she be seen to be sure the medication she is requesting a refill on is working. She said she will call her PCP first for the med refill and if necessary she will schedule a follow up appt with dr hernandez.

## 2024-08-26 ASSESSMENT — ENCOUNTER SYMPTOMS
COLOR CHANGE: 0
STRIDOR: 0
TROUBLE SWALLOWING: 0
SHORTNESS OF BREATH: 0
EYE PAIN: 0
WHEEZING: 0
CHEST TIGHTNESS: 0
CHOKING: 0
VOMITING: 0
ABDOMINAL PAIN: 0
ABDOMINAL DISTENTION: 0
EYE DISCHARGE: 0
CONSTIPATION: 0
BACK PAIN: 0
DIARRHEA: 0
EYE REDNESS: 0
BLOOD IN STOOL: 0
SORE THROAT: 0
SINUS PAIN: 0
APNEA: 0
EYE ITCHING: 0
COUGH: 1
FACIAL SWELLING: 0
PHOTOPHOBIA: 0
NAUSEA: 0

## 2024-08-28 ENCOUNTER — TELEPHONE (OUTPATIENT)
Facility: CLINIC | Age: 64
End: 2024-08-28

## 2024-08-28 DIAGNOSIS — K21.00 GASTROESOPHAGEAL REFLUX DISEASE WITH ESOPHAGITIS WITHOUT HEMORRHAGE: ICD-10-CM

## 2024-08-28 RX ORDER — PANTOPRAZOLE SODIUM 40 MG/1
40 TABLET, DELAYED RELEASE ORAL
Qty: 90 TABLET | Refills: 1 | Status: SHIPPED | OUTPATIENT
Start: 2024-08-28

## 2024-08-28 RX ORDER — PANTOPRAZOLE SODIUM 40 MG/1
40 TABLET, DELAYED RELEASE ORAL
Qty: 90 TABLET | Refills: 3 | OUTPATIENT
Start: 2024-08-28

## 2024-09-01 ENCOUNTER — APPOINTMENT (OUTPATIENT)
Facility: HOSPITAL | Age: 64
End: 2024-09-01
Attending: EMERGENCY MEDICINE
Payer: MEDICARE

## 2024-09-01 ENCOUNTER — HOSPITAL ENCOUNTER (EMERGENCY)
Facility: HOSPITAL | Age: 64
Discharge: HOME | End: 2024-09-01
Attending: EMERGENCY MEDICINE
Payer: MEDICARE

## 2024-09-01 VITALS
SYSTOLIC BLOOD PRESSURE: 175 MMHG | DIASTOLIC BLOOD PRESSURE: 93 MMHG | TEMPERATURE: 97.2 F | OXYGEN SATURATION: 99 % | HEART RATE: 73 BPM | HEIGHT: 66 IN | BODY MASS INDEX: 47.09 KG/M2 | WEIGHT: 293 LBS | RESPIRATION RATE: 18 BRPM

## 2024-09-01 DIAGNOSIS — J40 BRONCHITIS: Primary | ICD-10-CM

## 2024-09-01 PROCEDURE — 6370000000 HC RX 637 (ALT 250 FOR IP): Performed by: EMERGENCY MEDICINE

## 2024-09-01 PROCEDURE — 71046 X-RAY EXAM CHEST 2 VIEWS: CPT

## 2024-09-01 PROCEDURE — 99284 EMERGENCY DEPT VISIT MOD MDM: CPT

## 2024-09-01 PROCEDURE — 87636 SARSCOV2 & INF A&B AMP PRB: CPT

## 2024-09-01 RX ORDER — AMOXICILLIN 500 MG/1
500 CAPSULE ORAL 3 TIMES DAILY
Qty: 30 CAPSULE | Refills: 0 | Status: SHIPPED | OUTPATIENT
Start: 2024-09-01 | End: 2024-09-11

## 2024-09-01 RX ORDER — AMOXICILLIN 250 MG/1
500 CAPSULE ORAL
Status: COMPLETED | OUTPATIENT
Start: 2024-09-01 | End: 2024-09-01

## 2024-09-01 RX ADMIN — AMOXICILLIN 500 MG: 250 CAPSULE ORAL at 17:39

## 2024-09-01 ASSESSMENT — PAIN DESCRIPTION - LOCATION: LOCATION: BACK;RIB CAGE

## 2024-09-01 ASSESSMENT — PAIN - FUNCTIONAL ASSESSMENT: PAIN_FUNCTIONAL_ASSESSMENT: 0-10

## 2024-09-01 ASSESSMENT — PAIN SCALES - GENERAL: PAINLEVEL_OUTOF10: 8

## 2024-09-01 NOTE — ED PROVIDER NOTES
[facility] EMERGENCY DEPT  EMERGENCY DEPARTMENT ENCOUNTER      Pt Name: Yoana Man  MRN: [id number]  Birthdate 1960  Date of evaluation: 9/1/2024  Provider: Ezequiel Sullivan MD  4:28 PM    CHIEF COMPLAINT       Chief Complaint   Patient presents with    Rib Pain (injury)    Back Pain    Cough         HISTORY OF PRESENT ILLNESS    Yoana Man is a 63 y.o. female who presents to the emergency department with complaint of cough with pain in ribs , worse with cough. No fever or chills .      HPI    Nursing Notes were reviewed.    REVIEW OF SYSTEMS       Review of Systems   Constitutional: Negative.    HENT: Negative.     Eyes: Negative.    Respiratory:  Positive for cough and shortness of breath.         Ribs pain     Cardiovascular: Negative.    Gastrointestinal: Negative.    Endocrine: Negative.    Genitourinary: Negative.    Musculoskeletal: Negative.    Allergic/Immunologic: Negative.    Neurological: Negative.    Hematological: Negative.    Psychiatric/Behavioral: Negative.         Except as noted above the remainder of the review of systems was reviewed and negative.       PAST MEDICAL HISTORY     Past Medical History:   Diagnosis Date    Arthritis     Asthma     Chronic kidney disease     stage 3    Chronic obstructive pulmonary disease (HCC)     Diabetes (HCC)     Diabetes mellitus (HCC) 11/11/2020    GERD (gastroesophageal reflux disease)     Helicobacter pylori gastritis 11/11/2020    High cholesterol     Hypertension     Menopause     Morbid obesity (HCC)     Obstructive sleep apnea on CPAP     Vertigo          SURGICAL HISTORY       Past Surgical History:   Procedure Laterality Date    BREAST BIOPSY Left 2019    CHOLECYSTECTOMY      HYSTERECTOMY (CERVIX STATUS UNKNOWN)      ORTHOPEDIC SURGERY      right shoulder spur    OVARY REMOVAL      ROTATOR CUFF REPAIR Left     by suture    TUBAL LIGATION Bilateral     UPPER GASTROINTESTINAL ENDOSCOPY  01/01/2019         CURRENT MEDICATIONS       Previous Medications

## 2024-09-01 NOTE — ED TRIAGE NOTES
Patient arrives to ED with complaint of productive cough, left and right sided rib pain, and back pain when she takes a deep breath. Denies injury. Symptoms began today.   
Yes

## 2024-09-02 NOTE — ACP (ADVANCE CARE PLANNING)
Thank you for choosing our Emergency Department for your care.  It is our privilege to care for you in your time of need.  In the next several days, you may receive a survey via email or mailed to your home about your experience with our team.  We would greatly appreciate you taking a few minutes to complete the survey, as we use this information to learn what we have done well and what we could be doing better. Thank you for trusting us with your care!    Below you will find a list of your tests from today's visit.   Labs  No results found for this or any previous visit (from the past 12 hour(s)).    Radiologic Studies  XR CHEST PORTABLE   Final Result   No acute intrathoracic process is identified.             Electronically signed by GIDEON JURADO        ------------------------------------------------------------------------------------------------------------  The evaluation and treatment you received in the Emergency Department were for an urgent problem. It is important that you follow-up with a doctor, nurse practitioner, or physician assistant to:  (1) confirm your diagnosis,  (2) re-evaluation of changes in your illness and treatment, and (3) for ongoing care. Please take your discharge instructions with you when you go to your follow-up appointment.     If you have any problem arranging a follow-up appointment, contact us!  If your symptoms become worse or you do not improve as expected, please return to us. We are available 24 hours a day.     If a prescription has been provided, please fill it as soon as possible to prevent a delay in treatment. If you have any questions or reservations about taking the medication due to side effects or interactions with other medications, please call your primary care provider or contact us directly.  Again, THANK YOU for choosing us to care for YOU!     Advance Care Planning     Advance Care Planning Clinical Specialist  Conversation Note      Date of ACP Conversation: 07/06/21   It is noted that this conversation took place on 7/2/21. Conversation Conducted with:  Patient with Decision Making Capacity    ACP Clinical Specialist: Rosario Richardson RN      Health Care Decision Maker:    Current Designated Health Care Decision Maker:     Primary Decision Maker: Amber Petersen - Daughter - [phone]    Secondary Decision Maker: Pablo Manzo - Son - [phone]      Care Preferences    Hospitalization: \"If your health worsens and it becomes clear that your chance of recovery is unlikely, what would your preference be regarding hospitalization? \"    Choice:  [x]  The patient wants hospitalization  []  The patient prefers comfort-focused treatment without hospitalization. Ventilation: \"If you were in your present state of health and suddenly became very ill and were unable to breathe on your own, what would your preference be about the use of a ventilator (breathing machine) if it were available to you? \"      If patient would desire the use of a ventilator (breathing machine), answer \"yes\", if not \"no\": Yes    \"If your health worsens and it becomes clear that your chance of recovery is unlikely, what would your preference be about the use of a ventilator (breathing machine) if it were available to you? \"     Would the patient desire the use of a ventilator (breathing machine)? YES      Resuscitation  \"CPR works best to restart the heart when there is a sudden event, like a heart attack, in someone who is otherwise healthy. Unfortunately, CPR does not typically restart the heart for people who have serious health conditions or who are very sick. \"    \"In the event your heart stopped as a result of an underlying serious health condition, would you want attempts to be made to restart your heart (answer \"yes\" for attempt to resuscitate) or would you prefer a natural death (answer \"no\" for do not attempt to resuscitate)? \" yes      [x] Yes  [] No   Educated Patient / Benson Mehtaiden regarding differences between Advance Directives and portable DNR orders. Length of ACP Conversation in minutes:  40    Conversation Outcomes:  [x] ACP discussion completed  [] Existing advance directive reviewed with patient; no changes to patient's previously recorded wishes   [x] New Advance Directive completed   [] Portable Do Not Resuscitate prepared for Provider review and signature  [] POLST/POST/MOLST/MOST prepared for Provider review and signature      Follow-up plan:    [] Schedule follow-up conversation to continue planning  [] Referred individual to Provider for additional questions/concerns   [x] Advised patient/agent/surrogate to review completed ACP document and update if needed with changes in condition, patient preferences or care setting     [x] This note routed to one or more involved healthcare providers           RN discussed ACP with pt via phone call on 7/2/21. During [address] [place] [address] Avenue on 6/15/21, Pt's physician  Discussed with pt questions regarding Hospitalization, ventilation, and CPR which are noted in clinic note for that visit. RN reviewed the questions with pt again and she confirmed her responses. RN reviewed AMD with pt and this document was completed per pt's preferences. The document was emailed to pt through [employer] for signing. When the signed document is returned, it will be forwarded for scanning to pt's chart. Pt was advised to review the AMD annually for accuracy. She was also advised that the AMD is not a DDNR. This note will be routed to the provider.   Kimberly Hager RN

## 2024-09-04 ENCOUNTER — TELEPHONE (OUTPATIENT)
Dept: PHARMACY | Facility: CLINIC | Age: 64
End: 2024-09-04

## 2024-09-04 NOTE — TELEPHONE ENCOUNTER
CLINICAL PHARMACY NOTE - [facility] Pharmacy Referral    Patient can be scheduled with:  Team Schedule- General Referrals, etc.    Received a referral from: Care Coordinator to discuss patient’s medications. Called patient to schedule a time to speak with a pharmacist over the telephone.   Spoke to patient and advised them of the above message.  Patient verified understanding and scheduled their appointment: 9/9/24 at 1pm.    Informed patient call would appear just like mine did today.      Patient is located in [place].  Please schedule Monday-Thursday 8AM-4PM with Talia Stone or Yamilka for licensing purposes.      Kellen Bautista [facility].   [facility] Clinical   [facility] Clinical [facility]  Toll free: [phone] Option 1    For Pharmacy Admin Tracking Only    Program: [facility] [employer]  CPA in place:  No  Recommendation Provided To: Patient/Caregiver: 1 via Telephone  Intervention Detail: Scheduled Appointment  Intervention Accepted By: Patient/Caregiver: 1  Gap Closed?: Yes   Time Spent (min): 15

## 2024-09-04 NOTE — TELEPHONE ENCOUNTER
----- Message from ЮЛИЯ MURPHY RN sent at 9/4/2024  3:25 PM EDT -----  Regarding: Referral  Patient reports that she is currently in the Medicare Coverage Gap; patient reports Victoza copayment amount of $596 for a 90-day supply. Patient could benefit from a referral to the Ambulatory Clinical Pharmacy Team for further assessment and assistance; patient is agreeable to this referral. Referral sent today; patient notified.

## 2024-09-06 RX ORDER — ATORVASTATIN CALCIUM 10 MG/1
TABLET, FILM COATED ORAL
Qty: 90 TABLET | Refills: 1 | Status: SHIPPED | OUTPATIENT
Start: 2024-09-06

## 2024-09-06 RX ORDER — AMLODIPINE BESYLATE 5 MG/1
5 TABLET ORAL DAILY
Qty: 90 TABLET | Refills: 1 | Status: SHIPPED | OUTPATIENT
Start: 2024-09-06

## 2024-09-09 ENCOUNTER — TELEPHONE (OUTPATIENT)
Dept: PHARMACY | Facility: CLINIC | Age: 64
End: 2024-09-09

## 2024-09-11 ENCOUNTER — TELEPHONE (OUTPATIENT)
Facility: CLINIC | Age: 64
End: 2024-09-11

## 2024-09-14 DIAGNOSIS — E11.65 TYPE 2 DIABETES MELLITUS WITH HYPERGLYCEMIA, WITHOUT LONG-TERM CURRENT USE OF INSULIN (HCC): ICD-10-CM

## 2024-09-16 RX ORDER — LANCETS
EACH MISCELLANEOUS
Qty: 100 EACH | Refills: 3 | Status: SHIPPED | OUTPATIENT
Start: 2024-09-16

## 2024-09-29 ENCOUNTER — HOSPITAL ENCOUNTER (EMERGENCY)
Facility: HOSPITAL | Age: 64
Discharge: HOME | End: 2024-09-29
Attending: EMERGENCY MEDICINE
Payer: MEDICARE

## 2024-09-29 VITALS
BODY MASS INDEX: 43.07 KG/M2 | OXYGEN SATURATION: 98 % | TEMPERATURE: 98 F | DIASTOLIC BLOOD PRESSURE: 90 MMHG | HEIGHT: 66 IN | HEART RATE: 65 BPM | RESPIRATION RATE: 18 BRPM | SYSTOLIC BLOOD PRESSURE: 163 MMHG | WEIGHT: 268 LBS

## 2024-09-29 DIAGNOSIS — M77.9 TENDONITIS: Primary | ICD-10-CM

## 2024-09-29 DIAGNOSIS — M79.602 LEFT ARM PAIN: ICD-10-CM

## 2024-09-29 DIAGNOSIS — G56.02 CARPAL TUNNEL SYNDROME OF LEFT WRIST: ICD-10-CM

## 2024-09-29 PROCEDURE — 99283 EMERGENCY DEPT VISIT LOW MDM: CPT

## 2024-09-29 PROCEDURE — 6370000000 HC RX 637 (ALT 250 FOR IP): Performed by: EMERGENCY MEDICINE

## 2024-09-29 RX ORDER — HYDROCODONE BITARTRATE AND ACETAMINOPHEN 5; 325 MG/1; MG/1
1 TABLET ORAL
Status: DISCONTINUED | OUTPATIENT
Start: 2024-09-29 | End: 2024-09-29

## 2024-09-29 RX ORDER — OXYCODONE HYDROCHLORIDE 5 MG/1
5 TABLET ORAL
Status: COMPLETED | OUTPATIENT
Start: 2024-09-29 | End: 2024-09-29

## 2024-09-29 RX ADMIN — OXYCODONE 5 MG: 5 TABLET ORAL at 18:52

## 2024-09-29 ASSESSMENT — PAIN SCALES - GENERAL: PAINLEVEL_OUTOF10: 8

## 2024-09-29 ASSESSMENT — PAIN DESCRIPTION - LOCATION: LOCATION: ARM

## 2024-09-29 ASSESSMENT — PAIN - FUNCTIONAL ASSESSMENT: PAIN_FUNCTIONAL_ASSESSMENT: 0-10

## 2024-09-29 NOTE — ED PROVIDER NOTES
[facility] EMERGENCY DEPT  EMERGENCY DEPARTMENT HISTORY AND PHYSICAL EXAM      Date: 9/29/2024  Patient Name: Yoana Man  MRN: [id number]  YOB: 1960  Date of evaluation: 9/29/2024  Provider: Christina Kenny MD   Note Started: 6:27 PM EDT 9/29/24    HISTORY OF PRESENT ILLNESS     Chief Complaint   Patient presents with    Arm Pain       History Provided By: Patient    HPI: Yoana Man is a 64 y.o. female presents with worsend left forearm pain and pain into her hand with some tingling  into her fingers over past week, escalating last night. She does do some repetitive movements with cleaning that have aggravated symptoms. She denies a specific injury.    PAST MEDICAL HISTORY   Past Medical History:  Past Medical History:   Diagnosis Date    Arthritis     Asthma     Chronic kidney disease     stage 3    Chronic obstructive pulmonary disease (HCC)     Diabetes (HCC)     Diabetes mellitus (HCC) 11/11/2020    GERD (gastroesophageal reflux disease)     Helicobacter pylori gastritis 11/11/2020    High cholesterol     Hypertension     Menopause     Morbid obesity     Obstructive sleep apnea on CPAP     Vertigo        Past Surgical History:  Past Surgical History:   Procedure Laterality Date    BREAST BIOPSY Left 2019    CHOLECYSTECTOMY      HYSTERECTOMY (CERVIX STATUS UNKNOWN)      ORTHOPEDIC SURGERY      right shoulder spur    OVARY REMOVAL      ROTATOR CUFF REPAIR Left     by suture    TUBAL LIGATION Bilateral     UPPER GASTROINTESTINAL ENDOSCOPY  01/01/2019       Family History:  Family History   Problem Relation Age of Onset    Lung Disease Mother         lung ca    Heart Disease Mother     Lung Disease Father         PNA    Cancer Maternal Grandmother         gyn       Social History:  Social History     Tobacco Use    Smoking status: Never     Passive exposure: Never    Smokeless tobacco: Never   Vaping Use    Vaping status: Never Used   Substance Use Topics    Alcohol use: Never    Drug use: Never  Exam consistent with above and history; will treat conservatively at this time along with immobilizer    Records Reviewed (source and summary of external notes): Prior medical records and Nursing notes    Vitals:    Vitals:    09/29/24 1556 09/29/24 1820   BP: (!) 163/90    Pulse: 65    Resp: 18    Temp: 98 °F (36.7 °C)    SpO2: 98%    Weight:  121.6 kg (268 lb)   Height:  1.676 m (5' 6\")        ED COURSE       SEPSIS Reassessment: Sepsis reassessment not applicable    Clinical Management Tools:  Not Applicable    Patient was given the following medications:  Medications - No data to display    CONSULTS: See ED Course/MDM for further details.  None     Social Determinants affecting Diagnosis/Treatment: None    Smoking Cessation: Not Applicable    PROCEDURES   Unless otherwise noted above, none.  Procedures      CRITICAL CARE TIME   Patient does not meet Critical Care Time, 0 minutes    ED IMPRESSION     1. Tendonitis    2. Carpal tunnel syndrome of left wrist    3. Left arm pain          DISPOSITION/PLAN   DISPOSITION    Condition at Disposition: Data Unavailable    Discharge Note: The patient is stable for discharge home. The signs, symptoms, diagnosis, and discharge instructions have been discussed, understanding conveyed, and agreed upon. The patient is to follow up as recommended or return to ER should their symptoms worsen.      PATIENT REFERRED TO:  Katiana Bryant APRN - NP  [address] [place], [place] [id number] [phone]    Schedule an appointment as soon as possible for a visit       Dinh Sahni MD  [address] [facility] [address] [phone]    Schedule an appointment as soon as possible for a visit   As needed, If symptoms worsen or do not improve        DISCHARGE MEDICATIONS:     Medication List        CONTINUE taking these medications      Accu-Chek Rain Plus w/Device Kit  1 each by Does not apply route in the morning and at bedtime     Accu-Chek FastClix Lancets

## 2024-09-29 NOTE — ED TRIAGE NOTES
Patient arrives to ED with complaint of left forearm pain that began last night. States same is aching when she bends her wrist. No pain meds since this morning.

## 2024-10-11 ENCOUNTER — TELEPHONE (OUTPATIENT)
Facility: CLINIC | Age: 64
End: 2024-10-11

## 2024-10-21 ENCOUNTER — HOSPITAL ENCOUNTER (OUTPATIENT)
Facility: HOSPITAL | Age: 64
Discharge: HOME | End: 2024-10-24
Payer: MEDICARE

## 2024-10-21 VITALS — BODY MASS INDEX: 43.07 KG/M2 | WEIGHT: 268 LBS | HEIGHT: 66 IN

## 2024-10-21 PROCEDURE — 77063 BREAST TOMOSYNTHESIS BI: CPT

## 2024-11-04 DIAGNOSIS — Z79.4 TYPE 2 DIABETES MELLITUS WITH STAGE 3 CHRONIC KIDNEY DISEASE, WITH LONG-TERM CURRENT USE OF INSULIN, UNSPECIFIED WHETHER STAGE 3A OR 3B CKD (HCC): Primary | ICD-10-CM

## 2024-11-04 DIAGNOSIS — I10 HYPERTENSION, UNSPECIFIED TYPE: ICD-10-CM

## 2024-11-04 DIAGNOSIS — N18.30 TYPE 2 DIABETES MELLITUS WITH STAGE 3 CHRONIC KIDNEY DISEASE, WITH LONG-TERM CURRENT USE OF INSULIN, UNSPECIFIED WHETHER STAGE 3A OR 3B CKD (HCC): Primary | ICD-10-CM

## 2024-11-04 DIAGNOSIS — E11.22 TYPE 2 DIABETES MELLITUS WITH STAGE 3 CHRONIC KIDNEY DISEASE, WITH LONG-TERM CURRENT USE OF INSULIN, UNSPECIFIED WHETHER STAGE 3A OR 3B CKD (HCC): Primary | ICD-10-CM

## 2024-11-04 NOTE — PROGRESS NOTES
Remote Patient Order Discontinued    Received request from Sowmya Mallory RN   to discontinue order for remote patient monitoring of Diabetes and HTN and order completed.

## 2024-11-05 ENCOUNTER — CARE COORDINATION (OUTPATIENT)
Facility: CLINIC | Age: 64
End: 2024-11-05

## 2024-11-05 NOTE — CARE COORDINATION
Yoana Man  11/5/24    Care Coordination  placed call to Patient  to arrange RPM kit  through [employer].     CCSS spoke to Patient reviewed with Patient how to pack equipment in original packing. Patient aware [employer] will  equipment in 2-4 days.   All questions and concerns answered.

## 2024-11-15 ENCOUNTER — OFFICE VISIT (OUTPATIENT)
Facility: CLINIC | Age: 64
End: 2024-11-15
Payer: MEDICARE

## 2024-11-15 VITALS
OXYGEN SATURATION: 97 % | BODY MASS INDEX: 47.09 KG/M2 | HEIGHT: 66 IN | SYSTOLIC BLOOD PRESSURE: 130 MMHG | TEMPERATURE: 97.4 F | HEART RATE: 80 BPM | WEIGHT: 293 LBS | DIASTOLIC BLOOD PRESSURE: 73 MMHG | RESPIRATION RATE: 20 BRPM

## 2024-11-15 DIAGNOSIS — M1A.09X0 CHRONIC GOUT OF MULTIPLE SITES, UNSPECIFIED CAUSE: Primary | ICD-10-CM

## 2024-11-15 DIAGNOSIS — E11.22 TYPE 2 DIABETES MELLITUS WITH STAGE 3 CHRONIC KIDNEY DISEASE, WITH LONG-TERM CURRENT USE OF INSULIN, UNSPECIFIED WHETHER STAGE 3A OR 3B CKD (HCC): ICD-10-CM

## 2024-11-15 DIAGNOSIS — J44.1 CHRONIC OBSTRUCTIVE PULMONARY DISEASE WITH ACUTE EXACERBATION (HCC): Primary | ICD-10-CM

## 2024-11-15 DIAGNOSIS — Z79.4 TYPE 2 DIABETES MELLITUS WITH STAGE 3 CHRONIC KIDNEY DISEASE, WITH LONG-TERM CURRENT USE OF INSULIN, UNSPECIFIED WHETHER STAGE 3A OR 3B CKD (HCC): ICD-10-CM

## 2024-11-15 DIAGNOSIS — N18.30 TYPE 2 DIABETES MELLITUS WITH STAGE 3 CHRONIC KIDNEY DISEASE, WITH LONG-TERM CURRENT USE OF INSULIN, UNSPECIFIED WHETHER STAGE 3A OR 3B CKD (HCC): ICD-10-CM

## 2024-11-15 PROCEDURE — 3017F COLORECTAL CA SCREEN DOC REV: CPT | Performed by: NURSE PRACTITIONER

## 2024-11-15 PROCEDURE — 3023F SPIROM DOC REV: CPT | Performed by: NURSE PRACTITIONER

## 2024-11-15 PROCEDURE — 1123F ACP DISCUSS/DSCN MKR DOCD: CPT | Performed by: NURSE PRACTITIONER

## 2024-11-15 PROCEDURE — 3075F SYST BP GE 130 - 139MM HG: CPT | Performed by: NURSE PRACTITIONER

## 2024-11-15 PROCEDURE — 1036F TOBACCO NON-USER: CPT | Performed by: NURSE PRACTITIONER

## 2024-11-15 PROCEDURE — G8427 DOCREV CUR MEDS BY ELIG CLIN: HCPCS | Performed by: NURSE PRACTITIONER

## 2024-11-15 PROCEDURE — 1090F PRES/ABSN URINE INCON ASSESS: CPT | Performed by: NURSE PRACTITIONER

## 2024-11-15 PROCEDURE — G8400 PT W/DXA NO RESULTS DOC: HCPCS | Performed by: NURSE PRACTITIONER

## 2024-11-15 PROCEDURE — G8484 FLU IMMUNIZE NO ADMIN: HCPCS | Performed by: NURSE PRACTITIONER

## 2024-11-15 PROCEDURE — G8417 CALC BMI ABV UP PARAM F/U: HCPCS | Performed by: NURSE PRACTITIONER

## 2024-11-15 PROCEDURE — 3078F DIAST BP <80 MM HG: CPT | Performed by: NURSE PRACTITIONER

## 2024-11-15 PROCEDURE — 96372 THER/PROPH/DIAG INJ SC/IM: CPT | Performed by: NURSE PRACTITIONER

## 2024-11-15 PROCEDURE — 99213 OFFICE O/P EST LOW 20 MIN: CPT | Performed by: NURSE PRACTITIONER

## 2024-11-15 RX ORDER — METFORMIN HYDROCHLORIDE 500 MG/1
TABLET, EXTENDED RELEASE ORAL
Qty: 180 TABLET | Refills: 1 | OUTPATIENT
Start: 2024-11-15

## 2024-11-15 RX ORDER — BENZONATATE 200 MG/1
200 CAPSULE ORAL 3 TIMES DAILY PRN
Qty: 30 CAPSULE | Refills: 0 | Status: SHIPPED | OUTPATIENT
Start: 2024-11-15 | End: 2024-11-22

## 2024-11-15 RX ORDER — ALLOPURINOL 300 MG/1
300 TABLET ORAL DAILY
Qty: 90 TABLET | Refills: 1 | Status: SHIPPED | OUTPATIENT
Start: 2024-11-15

## 2024-11-15 RX ORDER — DEXAMETHASONE SODIUM PHOSPHATE 10 MG/ML
5 INJECTION INTRAMUSCULAR; INTRAVENOUS ONCE
Status: COMPLETED | OUTPATIENT
Start: 2024-11-15 | End: 2024-11-15

## 2024-11-15 RX ADMIN — DEXAMETHASONE SODIUM PHOSPHATE 5 MG: 10 INJECTION INTRAMUSCULAR; INTRAVENOUS at 10:25

## 2024-11-15 SDOH — ECONOMIC STABILITY: FOOD INSECURITY: WITHIN THE PAST 12 MONTHS, YOU WORRIED THAT YOUR FOOD WOULD RUN OUT BEFORE YOU GOT MONEY TO BUY MORE.: NEVER TRUE

## 2024-11-15 SDOH — ECONOMIC STABILITY: FOOD INSECURITY: WITHIN THE PAST 12 MONTHS, THE FOOD YOU BOUGHT JUST DIDN'T LAST AND YOU DIDN'T HAVE MONEY TO GET MORE.: NEVER TRUE

## 2024-11-15 SDOH — ECONOMIC STABILITY: INCOME INSECURITY: HOW HARD IS IT FOR YOU TO PAY FOR THE VERY BASICS LIKE FOOD, HOUSING, MEDICAL CARE, AND HEATING?: NOT HARD AT ALL

## 2024-11-15 ASSESSMENT — ENCOUNTER SYMPTOMS
SINUS PRESSURE: 0
WHEEZING: 1
VOMITING: 0
COUGH: 1
SINUS PAIN: 0
NAUSEA: 0
DIARRHEA: 0
ABDOMINAL PAIN: 0
CHEST TIGHTNESS: 0

## 2024-11-15 NOTE — PROGRESS NOTES
Chief Complaint   Patient presents with    Cough    Wheezing     Been going on for 2 weeks or more     1. Have you been to the ER, urgent care clinic since your last visit?  Hospitalized since your last visit?No    2. Have you seen or consulted any other health care providers outside of the [facility] since your last visit?  Include any pap smears or colon screening. No

## 2024-11-26 ENCOUNTER — TELEPHONE (OUTPATIENT)
Facility: CLINIC | Age: 64
End: 2024-11-26

## 2024-11-26 DIAGNOSIS — Z79.4 TYPE 2 DIABETES MELLITUS WITH STAGE 3 CHRONIC KIDNEY DISEASE, WITH LONG-TERM CURRENT USE OF INSULIN, UNSPECIFIED WHETHER STAGE 3A OR 3B CKD (HCC): ICD-10-CM

## 2024-11-26 DIAGNOSIS — N18.30 TYPE 2 DIABETES MELLITUS WITH STAGE 3 CHRONIC KIDNEY DISEASE, WITH LONG-TERM CURRENT USE OF INSULIN, UNSPECIFIED WHETHER STAGE 3A OR 3B CKD (HCC): ICD-10-CM

## 2024-11-26 DIAGNOSIS — K21.00 GASTROESOPHAGEAL REFLUX DISEASE WITH ESOPHAGITIS WITHOUT HEMORRHAGE: Primary | ICD-10-CM

## 2024-11-26 DIAGNOSIS — E11.22 TYPE 2 DIABETES MELLITUS WITH STAGE 3 CHRONIC KIDNEY DISEASE, WITH LONG-TERM CURRENT USE OF INSULIN, UNSPECIFIED WHETHER STAGE 3A OR 3B CKD (HCC): ICD-10-CM

## 2024-11-26 RX ORDER — LANOLIN ALCOHOL/MO/W.PET/CERES
400 CREAM (GRAM) TOPICAL 2 TIMES DAILY
Qty: 180 TABLET | Refills: 0 | Status: SHIPPED | OUTPATIENT
Start: 2024-11-26

## 2024-11-26 RX ORDER — METFORMIN HYDROCHLORIDE 500 MG/1
TABLET, EXTENDED RELEASE ORAL
Qty: 180 TABLET | Refills: 1 | OUTPATIENT
Start: 2024-11-26

## 2024-11-26 NOTE — TELEPHONE ENCOUNTER
Pt was in hospital and dr blankenship chged her Magnesium oxide script to 1 tab twice per day so her meds have run out. Please send order to [facility] as she needs now.

## 2024-11-26 NOTE — TELEPHONE ENCOUNTER
Joyce called in from [facility] on Yoana's behalf as a courtesy. He stated pt is completely out of the medication; metFORMIN (GLUCOPHAGE-XR) 500 MG extended release tablet and she would like for it to be sent to [facility] this one time.    He also stated they did not receive the refill request for the medication on the 15th and had to cancel the request on the 19th.    Please send medication to [facility] pharmacy.

## 2024-12-01 ENCOUNTER — HOSPITAL ENCOUNTER (EMERGENCY)
Facility: HOSPITAL | Age: 64
Discharge: HOME | End: 2024-12-01
Attending: EMERGENCY MEDICINE
Payer: MEDICARE

## 2024-12-01 VITALS
HEIGHT: 66 IN | WEIGHT: 293 LBS | OXYGEN SATURATION: 98 % | HEART RATE: 88 BPM | DIASTOLIC BLOOD PRESSURE: 77 MMHG | TEMPERATURE: 98 F | RESPIRATION RATE: 20 BRPM | SYSTOLIC BLOOD PRESSURE: 150 MMHG | BODY MASS INDEX: 47.09 KG/M2

## 2024-12-01 DIAGNOSIS — J41.1 MUCOPURULENT CHRONIC BRONCHITIS (HCC): Primary | ICD-10-CM

## 2024-12-01 PROCEDURE — 99283 EMERGENCY DEPT VISIT LOW MDM: CPT

## 2024-12-01 RX ORDER — BENZONATATE 100 MG/1
100 CAPSULE ORAL 3 TIMES DAILY PRN
Qty: 20 CAPSULE | Refills: 0 | Status: SHIPPED | OUTPATIENT
Start: 2024-12-01 | End: 2024-12-08

## 2024-12-01 RX ORDER — AMOXICILLIN 875 MG/1
875 TABLET, COATED ORAL 2 TIMES DAILY
Qty: 20 TABLET | Refills: 0 | Status: SHIPPED | OUTPATIENT
Start: 2024-12-01 | End: 2024-12-11

## 2024-12-01 ASSESSMENT — PAIN - FUNCTIONAL ASSESSMENT: PAIN_FUNCTIONAL_ASSESSMENT: 0-10

## 2024-12-01 ASSESSMENT — PAIN SCALES - GENERAL: PAINLEVEL_OUTOF10: 10

## 2024-12-01 ASSESSMENT — PAIN DESCRIPTION - LOCATION: LOCATION: HEAD

## 2024-12-01 NOTE — ED PROVIDER NOTES
BLOOD SUGAR EVERY DAY AS NEEDED    ACCU-CHEK GUIDE STRIP    TEST BLOOD SUGAR EVERY DAY AS NEEDED    ACETAMINOPHEN (TYLENOL) 650 MG EXTENDED RELEASE TABLET    Take 1 tablet by mouth in the morning and 1 tablet at noon and 1 tablet in the evening.    ALBUTEROL SULFATE HFA (PROVENTIL;VENTOLIN;PROAIR) 108 (90 BASE) MCG/ACT INHALER    Inhale 1 puff into the lungs every 4 hours as needed    ALBUTEROL SULFATE HFA (VENTOLIN HFA) 108 (90 BASE) MCG/ACT INHALER    Inhale 2 puffs into the lungs 4 times daily as needed for Wheezing    ALCOHOL SWABS (DROPSAFE ALCOHOL PREP) 70 % PADS    USE AS DIRECTED TWICE DAILY    ALLOPURINOL (ZYLOPRIM) 300 MG TABLET    Take 1 tablet by mouth daily    AMLODIPINE (NORVASC) 5 MG TABLET    TAKE 1 TABLET EVERY DAY    ASPIRIN 81 MG CHEWABLE TABLET    Take 1 tablet by mouth daily    ATORVASTATIN (LIPITOR) 10 MG TABLET    TAKE 1 TABLET EVERY DAY    BLOOD GLUCOSE MONITORING SUPPL (ACCU-CHEK RENÉ PLUS) W/DEVICE KIT    1 each by Does not apply route in the morning and at bedtime    BUTALBITAL-ASPIRIN-CAFFEINE (FIORINAL) -40 MG PER CAPSULE    Take 1 capsule by mouth every 6 hours as needed for Headaches for up to 5 days. Max Daily Amount: 4 capsules    CETIRIZINE (ZYRTEC) 10 MG TABLET    Take 1 tablet by mouth daily    DROPLET PEN NEEDLES 32G X 4 MM MISC    USE TO INJECT MEDICINE EVERY 7 DAYS    FLUTICASONE (FLONASE) 50 MCG/ACT NASAL SPRAY    2 sprays by Each Nostril route daily    FUROSEMIDE (LASIX) 40 MG TABLET    Take 1 tablet by mouth daily as needed (if weight gain of 2Lb in 24 hours or 5lb in 5 days)    HYDRALAZINE (APRESOLINE) 25 MG TABLET    Take 1 tablet by mouth in the morning and at bedtime    INSULIN NPH ISOPHANE & REGULAR (NOVOLIN 70/30 FLEXPEN) (70-30) 100 UNIT PER ML INJECTION PEN    Inject 50 Units into the skin every morning    IPRATROPIUM-ALBUTEROL (DUONEB) 0.5-2.5 (3) MG/3ML SOLN NEBULIZER SOLUTION    Inhale 3 mLs into the lungs every 4 hours as needed    LIRAGLUTIDE (VICTOZA) 18

## 2024-12-01 NOTE — ED NOTES
Pt given discharge and follow up instructions. Education on prescriptions given. No further questions at this time.

## 2024-12-13 ENCOUNTER — OFFICE VISIT (OUTPATIENT)
Facility: CLINIC | Age: 64
End: 2024-12-13

## 2024-12-13 VITALS
OXYGEN SATURATION: 98 % | RESPIRATION RATE: 18 BRPM | DIASTOLIC BLOOD PRESSURE: 61 MMHG | BODY MASS INDEX: 47.09 KG/M2 | HEIGHT: 66 IN | TEMPERATURE: 98.9 F | SYSTOLIC BLOOD PRESSURE: 133 MMHG | WEIGHT: 293 LBS | HEART RATE: 71 BPM

## 2024-12-13 DIAGNOSIS — Z79.4 TYPE 2 DIABETES MELLITUS WITH STAGE 3 CHRONIC KIDNEY DISEASE, WITH LONG-TERM CURRENT USE OF INSULIN, UNSPECIFIED WHETHER STAGE 3A OR 3B CKD (HCC): ICD-10-CM

## 2024-12-13 DIAGNOSIS — D64.9 ANEMIA, UNSPECIFIED TYPE: ICD-10-CM

## 2024-12-13 DIAGNOSIS — N18.31 CHRONIC KIDNEY DISEASE, STAGE 3A (HCC): ICD-10-CM

## 2024-12-13 DIAGNOSIS — E11.22 TYPE 2 DIABETES MELLITUS WITH STAGE 3 CHRONIC KIDNEY DISEASE, WITH LONG-TERM CURRENT USE OF INSULIN, UNSPECIFIED WHETHER STAGE 3A OR 3B CKD (HCC): ICD-10-CM

## 2024-12-13 DIAGNOSIS — I10 ESSENTIAL (PRIMARY) HYPERTENSION: Primary | ICD-10-CM

## 2024-12-13 DIAGNOSIS — K57.90 DIVERTICULAR DISEASE: ICD-10-CM

## 2024-12-13 DIAGNOSIS — K21.00 GASTRO-ESOPHAGEAL REFLUX DISEASE WITH ESOPHAGITIS, WITHOUT BLEEDING: ICD-10-CM

## 2024-12-13 DIAGNOSIS — J44.9 CHRONIC OBSTRUCTIVE PULMONARY DISEASE, UNSPECIFIED COPD TYPE (HCC): ICD-10-CM

## 2024-12-13 DIAGNOSIS — Z78.0 ENCOUNTER FOR OSTEOPOROSIS SCREENING IN ASYMPTOMATIC POSTMENOPAUSAL PATIENT: ICD-10-CM

## 2024-12-13 DIAGNOSIS — Z13.820 ENCOUNTER FOR OSTEOPOROSIS SCREENING IN ASYMPTOMATIC POSTMENOPAUSAL PATIENT: ICD-10-CM

## 2024-12-13 DIAGNOSIS — G47.33 OBSTRUCTIVE SLEEP APNEA (ADULT) (PEDIATRIC): ICD-10-CM

## 2024-12-13 DIAGNOSIS — M17.0 BILATERAL PRIMARY OSTEOARTHRITIS OF KNEE: ICD-10-CM

## 2024-12-13 DIAGNOSIS — E78.2 MIXED HYPERLIPIDEMIA: ICD-10-CM

## 2024-12-13 DIAGNOSIS — N18.30 TYPE 2 DIABETES MELLITUS WITH STAGE 3 CHRONIC KIDNEY DISEASE, WITH LONG-TERM CURRENT USE OF INSULIN, UNSPECIFIED WHETHER STAGE 3A OR 3B CKD (HCC): ICD-10-CM

## 2024-12-13 DIAGNOSIS — M1A.09X0 IDIOPATHIC CHRONIC GOUT, MULTIPLE SITES, WITHOUT TOPHUS (TOPHI): ICD-10-CM

## 2024-12-13 DIAGNOSIS — I87.2 VENOUS INSUFFICIENCY: ICD-10-CM

## 2024-12-13 LAB — HBA1C MFR BLD: 7.7 %

## 2024-12-13 RX ORDER — METFORMIN HYDROCHLORIDE 500 MG/1
1000 TABLET, EXTENDED RELEASE ORAL
Qty: 60 TABLET | Refills: 0 | Status: SHIPPED | OUTPATIENT
Start: 2024-12-13

## 2024-12-13 RX ORDER — METFORMIN HYDROCHLORIDE 500 MG/1
TABLET, EXTENDED RELEASE ORAL
Qty: 60 TABLET | Refills: 0 | Status: SHIPPED | OUTPATIENT
Start: 2024-12-13 | End: 2024-12-13 | Stop reason: SDUPTHER

## 2024-12-13 RX ORDER — METFORMIN HYDROCHLORIDE 500 MG/1
TABLET, EXTENDED RELEASE ORAL
Qty: 60 TABLET | Refills: 0 | Status: SHIPPED | OUTPATIENT
Start: 2024-12-13 | End: 2024-12-13 | Stop reason: ALTCHOICE

## 2024-12-13 RX ORDER — HUMAN INSULIN 100 [IU]/ML
50 INJECTION, SUSPENSION SUBCUTANEOUS EVERY MORNING
Qty: 12 ML | Refills: 0 | Status: SHIPPED | OUTPATIENT
Start: 2024-12-13

## 2024-12-13 RX ORDER — TIRZEPATIDE 2.5 MG/.5ML
2.5 INJECTION, SOLUTION SUBCUTANEOUS WEEKLY
Qty: 2 ML | Refills: 2 | Status: SHIPPED | OUTPATIENT
Start: 2024-12-13 | End: 2024-12-13 | Stop reason: ALTCHOICE

## 2024-12-13 ASSESSMENT — ENCOUNTER SYMPTOMS
CHOKING: 0
SINUS PAIN: 0
SORE THROAT: 0
EYE DISCHARGE: 0
TROUBLE SWALLOWING: 0
COLOR CHANGE: 0
FACIAL SWELLING: 0
VOMITING: 0
CONSTIPATION: 0
BACK PAIN: 0
DIARRHEA: 0
WHEEZING: 0
BLOOD IN STOOL: 0
ABDOMINAL PAIN: 0
CHEST TIGHTNESS: 0
EYE PAIN: 0
SHORTNESS OF BREATH: 0
EYE ITCHING: 0
EYE REDNESS: 0
COUGH: 1
PHOTOPHOBIA: 0
STRIDOR: 0
ABDOMINAL DISTENTION: 0
APNEA: 0
NAUSEA: 0

## 2024-12-13 NOTE — PROGRESS NOTES
Chief Complaint   Patient presents with    Diabetes     Follow up     Pt brought in meds went over list in chart, pt confirmed       \"Have you been to the ER, urgent care clinic since your last visit?  Hospitalized since your last visit?\"    Yes     “Have you seen or consulted any other health care providers outside our system since your last visit?”    NO      “Have you had a diabetic eye exam?”    NO     Date of last diabetic eye exam: 6/2/2022                
A1c: 8.1 11/2022, 11.4 7/2023, 10/2023 6.9, 7/2024 6.6  On metformin 1000mg XR daily, Victoza 1.8mg daily, novolin 70/30 50 units in am.  Discussed change in GLP-1 but she is on special program to help with costs for Victoza.  Home readings -did not bring but reports under 200 when checking.  Notify provider if any glucose < 70 or > 250 when checking.  Bring glucose readings to next appointment  Continue annual eye exam and is seen at [facility]  Encourage to continue following diabetic diet and increasing regular exercise to 3-5 times weekly  - AMB POC HEMOGLOBIN A1C  - Insulin NPH Isophane & Regular (NOVOLIN 70/30 FLEXPEN) (70-30) 100 UNIT per ML injection pen; Inject 50 Units into the skin every morning  Dispense: 12 mL; Refill: 0  - metFORMIN (GLUCOPHAGE-XR) 500 MG extended release tablet; Take 2 tablets by mouth daily (with breakfast)  Dispense: 60 tablet; Refill: 0    2. Essential hypertension  Blood pressure is at goal today.  Continue on amlodipine 5mg daily and  hydralazine 25mg BID.  Check readings at home and she will bring log to review in 2 weeks.    3. Chronic obstructive pulmonary disease, unspecified COPD type (HCC)  Stable.  Has albuterol inhaler/nebulizer to use prn for wheezing/sob.    4. Stage 3a chronic kidney disease (HCC)  Lab Results   Component Value Date    CREATININE 1.26 (H) 07/11/2024      Stable and follows with nephrology who manages.  Continue to keep glucose and blood pressure well controlled.  Avoid all NSAIDS and IV dye.  Nex follow up is tomorrow 4/2025    5. Mixed hyperlipidemia due to type 2 diabetes mellitus (HCC)  Lab Results   Component Value Date    LDL 63.8 07/11/2024   Stable and continue statin medication as directed    6. Obstructive sleep apnea syndrome  Continue cpap therapy but does admit she has not been as compliant due to problems with mask.  Encouraged to discuss concerns with mask with sleep medicine and/or medical supply.  Have counseled on dangers of untreated sleep

## 2024-12-30 ENCOUNTER — OFFICE VISIT (OUTPATIENT)
Facility: CLINIC | Age: 64
End: 2024-12-30
Payer: MEDICARE

## 2024-12-30 VITALS
BODY MASS INDEX: 47.09 KG/M2 | HEART RATE: 99 BPM | RESPIRATION RATE: 20 BRPM | SYSTOLIC BLOOD PRESSURE: 154 MMHG | HEIGHT: 66 IN | TEMPERATURE: 98.6 F | WEIGHT: 293 LBS | OXYGEN SATURATION: 93 % | DIASTOLIC BLOOD PRESSURE: 80 MMHG

## 2024-12-30 DIAGNOSIS — I10 ESSENTIAL (PRIMARY) HYPERTENSION: ICD-10-CM

## 2024-12-30 DIAGNOSIS — J06.9 URI WITH COUGH AND CONGESTION: Primary | ICD-10-CM

## 2024-12-30 PROCEDURE — G8417 CALC BMI ABV UP PARAM F/U: HCPCS

## 2024-12-30 PROCEDURE — 87635 SARS-COV-2 COVID-19 AMP PRB: CPT

## 2024-12-30 PROCEDURE — 3017F COLORECTAL CA SCREEN DOC REV: CPT

## 2024-12-30 PROCEDURE — G8484 FLU IMMUNIZE NO ADMIN: HCPCS

## 2024-12-30 PROCEDURE — 3079F DIAST BP 80-89 MM HG: CPT

## 2024-12-30 PROCEDURE — 1090F PRES/ABSN URINE INCON ASSESS: CPT

## 2024-12-30 PROCEDURE — 3077F SYST BP >= 140 MM HG: CPT

## 2024-12-30 PROCEDURE — 87502 INFLUENZA DNA AMP PROBE: CPT

## 2024-12-30 PROCEDURE — G8427 DOCREV CUR MEDS BY ELIG CLIN: HCPCS

## 2024-12-30 PROCEDURE — G8400 PT W/DXA NO RESULTS DOC: HCPCS

## 2024-12-30 PROCEDURE — 99213 OFFICE O/P EST LOW 20 MIN: CPT

## 2024-12-30 PROCEDURE — 1036F TOBACCO NON-USER: CPT

## 2024-12-30 PROCEDURE — 1123F ACP DISCUSS/DSCN MKR DOCD: CPT

## 2024-12-30 RX ORDER — GUAIFENESIN 600 MG/1
600 TABLET, EXTENDED RELEASE ORAL 2 TIMES DAILY
Qty: 20 TABLET | Refills: 0 | Status: SHIPPED | OUTPATIENT
Start: 2024-12-30

## 2024-12-30 ASSESSMENT — ENCOUNTER SYMPTOMS
COUGH: 1
WHEEZING: 0
CHEST TIGHTNESS: 0
GASTROINTESTINAL NEGATIVE: 1
SHORTNESS OF BREATH: 0
SINUS PRESSURE: 1

## 2024-12-30 NOTE — PROGRESS NOTES
Chief Complaint   Patient presents with    chills    Generalized Body Aches    Fever     100.1     Symptoms started this morning    Pt did not take any of her meds this today     1. Have you been to the ER, urgent care clinic since your last visit?  Hospitalized since your last visit?No    2. Have you seen or consulted any other health care providers outside of the [facility] since your last visit?  Include any pap smears or colon screening. No

## 2024-12-30 NOTE — PROGRESS NOTES
Yoana Man is a 64 y.o. female who presents to the office today for the following:    Chief Complaint   Patient presents with    chills    Generalized Body Aches    Fever     100.1       Past Medical History:   Diagnosis Date    Arthritis     Asthma     Chronic kidney disease     stage 3    Chronic obstructive pulmonary disease (HCC)     Diabetes (HCC)     Diabetes mellitus (HCC) 11/11/2020    GERD (gastroesophageal reflux disease)     Helicobacter pylori gastritis 11/11/2020    High cholesterol     Hypertension     Menopause     Morbid obesity     Obstructive sleep apnea on CPAP     Vertigo         Past Surgical History:   Procedure Laterality Date    BREAST BIOPSY Left 2019    CHOLECYSTECTOMY      HYSTERECTOMY (CERVIX STATUS UNKNOWN)      ORTHOPEDIC SURGERY      right shoulder spur    OVARY REMOVAL      ROTATOR CUFF REPAIR Left     by suture    TUBAL LIGATION Bilateral     UPPER GASTROINTESTINAL ENDOSCOPY  01/01/2019        Family History   Problem Relation Age of Onset    Lung Disease Mother         lung ca    Heart Disease Mother     Lung Disease Father         PNA    Cancer Maternal Grandmother         gyn        Social History     Tobacco Use    Smoking status: Never     Passive exposure: Never    Smokeless tobacco: Never   Vaping Use    Vaping status: Never Used   Substance Use Topics    Alcohol use: Never    Drug use: Never        HPI  Patient here for productive cough with clear to yellow sputum, nasal congestion, fatigue, body aches, chills and fever that started this morning. Denies wheezes SOB or CP. Took Tylenol which improved fever. No known sick contacts but has been \"out and about a lot\".     Chief Complaint   Patient presents with    chills    Generalized Body Aches    Fever     100.1       Current Outpatient Medications on File Prior to Visit   Medication Sig Dispense Refill    Insulin NPH Isophane & Regular (NOVOLIN 70/30 FLEXPEN) (70-30) 100 UNIT per ML injection pen Inject 50 Units into the

## 2024-12-31 ENCOUNTER — TELEPHONE (OUTPATIENT)
Facility: CLINIC | Age: 64
End: 2024-12-31

## 2024-12-31 NOTE — TELEPHONE ENCOUNTER
Pt std she saw Gracie yesterday and she advised her if she did not feel any better today to let her know. She std to advise Gracie she still has the chills and is not feeling any better today.

## 2024-12-31 NOTE — TELEPHONE ENCOUNTER
12/31/24  1:59 PM  Patient was informed per TOMÁS Hicks, TONIA this is likely a virus. Drink lots of water. Tylenol for fever. Rest. She can take Mucinex for congestion and cough syrup at night. Fever usually last the first 2-4 days of illness. If no better by Friday I will call in abx.  Patient stated understanding.

## 2025-01-06 RX ORDER — PEN NEEDLE, DIABETIC 32GX 5/32"
NEEDLE, DISPOSABLE MISCELLANEOUS
Qty: 100 EACH | Refills: 3 | Status: SHIPPED | OUTPATIENT
Start: 2025-01-06

## 2025-01-09 ENCOUNTER — TELEPHONE (OUTPATIENT)
Facility: CLINIC | Age: 65
End: 2025-01-09

## 2025-01-09 DIAGNOSIS — J06.9 URI WITH COUGH AND CONGESTION: Primary | ICD-10-CM

## 2025-01-09 NOTE — TELEPHONE ENCOUNTER
Patient stated she is not feeling any better, she is still having body aches and is requesting a antibiotic.     Pt's pharmacy is [facility]

## 2025-01-09 NOTE — PROGRESS NOTES
Patient is not feeling any better since last visit 12/30/24. Send augmentin now. Take as directed with food.

## 2025-01-10 NOTE — TELEPHONE ENCOUNTER
Informed patient of new RX and recommendations per TOMÁS Hicks NP.  Patient stated understanding.  She stated she was not feeling any better this morning and will let us know first of week after starting medication if no better.

## 2025-01-14 ENCOUNTER — HOSPITAL ENCOUNTER (OUTPATIENT)
Facility: HOSPITAL | Age: 65
Discharge: HOME | End: 2025-01-17
Payer: MEDICARE

## 2025-01-14 DIAGNOSIS — Z13.820 ENCOUNTER FOR OSTEOPOROSIS SCREENING IN ASYMPTOMATIC POSTMENOPAUSAL PATIENT: ICD-10-CM

## 2025-01-14 DIAGNOSIS — Z78.0 ENCOUNTER FOR OSTEOPOROSIS SCREENING IN ASYMPTOMATIC POSTMENOPAUSAL PATIENT: ICD-10-CM

## 2025-01-14 PROCEDURE — 77080 DXA BONE DENSITY AXIAL: CPT

## 2025-01-15 ENCOUNTER — TELEPHONE (OUTPATIENT)
Dept: PHARMACY | Facility: CLINIC | Age: 65
End: 2025-01-15

## 2025-01-15 DIAGNOSIS — Z79.4 TYPE 2 DIABETES MELLITUS WITH STAGE 3 CHRONIC KIDNEY DISEASE, WITH LONG-TERM CURRENT USE OF INSULIN, UNSPECIFIED WHETHER STAGE 3A OR 3B CKD (HCC): ICD-10-CM

## 2025-01-15 DIAGNOSIS — R42 VERTIGO: ICD-10-CM

## 2025-01-15 DIAGNOSIS — N18.30 TYPE 2 DIABETES MELLITUS WITH STAGE 3 CHRONIC KIDNEY DISEASE, WITH LONG-TERM CURRENT USE OF INSULIN, UNSPECIFIED WHETHER STAGE 3A OR 3B CKD (HCC): ICD-10-CM

## 2025-01-15 DIAGNOSIS — E11.22 TYPE 2 DIABETES MELLITUS WITH STAGE 3 CHRONIC KIDNEY DISEASE, WITH LONG-TERM CURRENT USE OF INSULIN, UNSPECIFIED WHETHER STAGE 3A OR 3B CKD (HCC): ICD-10-CM

## 2025-01-15 RX ORDER — MECLIZINE HYDROCHLORIDE 25 MG/1
TABLET ORAL
Qty: 90 TABLET | Refills: 1 | Status: SHIPPED | OUTPATIENT
Start: 2025-01-15

## 2025-01-15 RX ORDER — METFORMIN HYDROCHLORIDE 500 MG/1
TABLET, EXTENDED RELEASE ORAL
Qty: 180 TABLET | Refills: 1 | Status: SHIPPED | OUTPATIENT
Start: 2025-01-15

## 2025-01-15 NOTE — TELEPHONE ENCOUNTER
Incoming call from patient.     Patient wanted to know if she was still approved to get victoza. Educated patient one of our pharmacists helped her get signed up for [employer] program, however, we are not [employer] and can not see that information.     Phone number for [employer], [phone] provided to patient. Educated these programs do need renewed and provider may have to send a new prescription to them but she can call this number and check.     Patient thanked me for my assistance.     Steffi Troncoso, PharmD, [facility]  [facility] Clinical Pharmacist  Department: [phone]    For Pharmacy Admin Tracking Only    Program: G3  CPA in place:  No  Recommendation Provided To: Patient/Caregiver: 1 via Telephone  Intervention Accepted By: Patient/Caregiver: 1  Gap Closed?: Yes   Time Spent (min): 10

## 2025-01-26 ENCOUNTER — APPOINTMENT (OUTPATIENT)
Facility: HOSPITAL | Age: 65
End: 2025-01-26
Attending: EMERGENCY MEDICINE
Payer: MEDICARE

## 2025-01-26 ENCOUNTER — HOSPITAL ENCOUNTER (EMERGENCY)
Facility: HOSPITAL | Age: 65
Discharge: HOME | End: 2025-01-26
Attending: EMERGENCY MEDICINE
Payer: MEDICARE

## 2025-01-26 VITALS
DIASTOLIC BLOOD PRESSURE: 100 MMHG | HEIGHT: 66 IN | RESPIRATION RATE: 24 BRPM | SYSTOLIC BLOOD PRESSURE: 198 MMHG | OXYGEN SATURATION: 99 % | WEIGHT: 293 LBS | HEART RATE: 87 BPM | TEMPERATURE: 97.7 F | BODY MASS INDEX: 47.09 KG/M2

## 2025-01-26 DIAGNOSIS — M17.12 PRIMARY OSTEOARTHRITIS OF LEFT KNEE: Primary | ICD-10-CM

## 2025-01-26 PROCEDURE — 6370000000 HC RX 637 (ALT 250 FOR IP): Performed by: EMERGENCY MEDICINE

## 2025-01-26 PROCEDURE — 73560 X-RAY EXAM OF KNEE 1 OR 2: CPT

## 2025-01-26 PROCEDURE — 99284 EMERGENCY DEPT VISIT MOD MDM: CPT

## 2025-01-26 RX ORDER — ACETAMINOPHEN 325 MG/1
650 TABLET ORAL EVERY 6 HOURS PRN
Qty: 40 TABLET | Refills: 0 | Status: SHIPPED | OUTPATIENT
Start: 2025-01-26 | End: 2025-01-31

## 2025-01-26 RX ORDER — PREDNISONE 20 MG/1
60 TABLET ORAL
Status: COMPLETED | OUTPATIENT
Start: 2025-01-26 | End: 2025-01-26

## 2025-01-26 RX ORDER — PREDNISONE 20 MG/1
20 TABLET ORAL DAILY
Qty: 5 TABLET | Refills: 0 | Status: SHIPPED | OUTPATIENT
Start: 2025-01-26 | End: 2025-01-31

## 2025-01-26 RX ORDER — ACETAMINOPHEN 500 MG
1000 TABLET ORAL
Status: COMPLETED | OUTPATIENT
Start: 2025-01-26 | End: 2025-01-26

## 2025-01-26 RX ADMIN — ACETAMINOPHEN 1000 MG: 500 TABLET ORAL at 09:40

## 2025-01-26 RX ADMIN — PREDNISONE 60 MG: 20 TABLET ORAL at 09:40

## 2025-01-26 ASSESSMENT — PAIN - FUNCTIONAL ASSESSMENT
PAIN_FUNCTIONAL_ASSESSMENT: ACTIVITIES ARE NOT PREVENTED
PAIN_FUNCTIONAL_ASSESSMENT: 0-10

## 2025-01-26 ASSESSMENT — PAIN DESCRIPTION - PAIN TYPE: TYPE: ACUTE PAIN

## 2025-01-26 ASSESSMENT — PAIN SCALES - GENERAL: PAINLEVEL_OUTOF10: 9

## 2025-01-26 ASSESSMENT — PAIN DESCRIPTION - LOCATION: LOCATION: KNEE

## 2025-01-26 ASSESSMENT — PAIN DESCRIPTION - ORIENTATION: ORIENTATION: LEFT

## 2025-01-26 NOTE — ED PROVIDER NOTES
[facility] EMERGENCY DEPT  EMERGENCY DEPARTMENT HISTORY AND PHYSICAL EXAM      Date: 1/26/2025  Patient Name: Yoana Man  MRN: [id number]  YOB: 1960  Date of evaluation: 1/26/2025  Provider: Amarilys Hutchinson MD   Note Started: 9:35 AM EST 1/26/25    HISTORY OF PRESENT ILLNESS     Chief Complaint   Patient presents with    Knee Pain       History Provided By: Patient    HPI: Yoana Man is a 64 y.o. female     PAST MEDICAL HISTORY   Past Medical History:  Past Medical History:   Diagnosis Date    Arthritis     Asthma     Chronic kidney disease     stage 3    Chronic obstructive pulmonary disease (HCC)     Diabetes (HCC)     Diabetes mellitus (HCC) 11/11/2020    GERD (gastroesophageal reflux disease)     Helicobacter pylori gastritis 11/11/2020    High cholesterol     Hypertension     Menopause     Morbid obesity     Obstructive sleep apnea on CPAP     Vertigo        Past Surgical History:  Past Surgical History:   Procedure Laterality Date    BREAST BIOPSY Left 2019    CHOLECYSTECTOMY      HYSTERECTOMY (CERVIX STATUS UNKNOWN)      ORTHOPEDIC SURGERY      right shoulder spur    OVARY REMOVAL      ROTATOR CUFF REPAIR Left     by suture    TUBAL LIGATION Bilateral     UPPER GASTROINTESTINAL ENDOSCOPY  01/01/2019       Family History:  Family History   Problem Relation Age of Onset    Lung Disease Mother         lung ca    Heart Disease Mother     Lung Disease Father         PNA    Cancer Maternal Grandmother         gyn       Social History:  Social History     Tobacco Use    Smoking status: Never     Passive exposure: Never    Smokeless tobacco: Never   Vaping Use    Vaping status: Never Used   Substance Use Topics    Alcohol use: Never    Drug use: Never       Allergies:  Allergies   Allergen Reactions    Azithromycin Rash    Celecoxib Rash    Clindamycin Phosphate Palpitations    Mushroom Extract Complex (Do Not Select) Swelling    Strawberry Rash    Tramadol-Acetaminophen Rash    Lisinopril Swelling

## 2025-01-26 NOTE — ED TRIAGE NOTES
Patient presents for complaint of L knee pain since yesterday evening.  Patient noted to be with SOB on exertion in triage; however, she states that is normal due to her COPD.  No pain medications taken prior to patient arrival.  Took home bp medication.

## 2025-01-27 RX ORDER — ATORVASTATIN CALCIUM 10 MG/1
TABLET, FILM COATED ORAL
Qty: 90 TABLET | Refills: 1 | Status: SHIPPED | OUTPATIENT
Start: 2025-01-27

## 2025-01-27 RX ORDER — AMLODIPINE BESYLATE 5 MG/1
5 TABLET ORAL DAILY
Qty: 90 TABLET | Refills: 1 | Status: SHIPPED | OUTPATIENT
Start: 2025-01-27

## 2025-01-31 DIAGNOSIS — N18.30 TYPE 2 DIABETES MELLITUS WITH STAGE 3 CHRONIC KIDNEY DISEASE, WITH LONG-TERM CURRENT USE OF INSULIN, UNSPECIFIED WHETHER STAGE 3A OR 3B CKD (HCC): ICD-10-CM

## 2025-01-31 DIAGNOSIS — E11.22 TYPE 2 DIABETES MELLITUS WITH STAGE 3 CHRONIC KIDNEY DISEASE, WITH LONG-TERM CURRENT USE OF INSULIN, UNSPECIFIED WHETHER STAGE 3A OR 3B CKD (HCC): ICD-10-CM

## 2025-01-31 DIAGNOSIS — Z79.4 TYPE 2 DIABETES MELLITUS WITH STAGE 3 CHRONIC KIDNEY DISEASE, WITH LONG-TERM CURRENT USE OF INSULIN, UNSPECIFIED WHETHER STAGE 3A OR 3B CKD (HCC): ICD-10-CM

## 2025-01-31 RX ORDER — HUMAN INSULIN 100 [IU]/ML
50 INJECTION, SUSPENSION SUBCUTANEOUS EVERY MORNING
Qty: 12 ML | Refills: 0 | Status: SHIPPED | OUTPATIENT
Start: 2025-01-31

## 2025-02-04 DIAGNOSIS — K21.00 GASTROESOPHAGEAL REFLUX DISEASE WITH ESOPHAGITIS WITHOUT HEMORRHAGE: ICD-10-CM

## 2025-02-04 RX ORDER — HYDRALAZINE HYDROCHLORIDE 50 MG/1
TABLET, FILM COATED ORAL
Qty: 180 TABLET | Refills: 0 | OUTPATIENT
Start: 2025-02-04

## 2025-02-04 RX ORDER — PANTOPRAZOLE SODIUM 40 MG/1
40 TABLET, DELAYED RELEASE ORAL
Qty: 90 TABLET | Refills: 1 | Status: SHIPPED | OUTPATIENT
Start: 2025-02-04

## 2025-02-13 ENCOUNTER — TELEPHONE (OUTPATIENT)
Facility: CLINIC | Age: 65
End: 2025-02-13

## 2025-02-13 DIAGNOSIS — N18.30 TYPE 2 DIABETES MELLITUS WITH STAGE 3 CHRONIC KIDNEY DISEASE, WITH LONG-TERM CURRENT USE OF INSULIN, UNSPECIFIED WHETHER STAGE 3A OR 3B CKD (HCC): Primary | ICD-10-CM

## 2025-02-13 DIAGNOSIS — E11.22 TYPE 2 DIABETES MELLITUS WITH STAGE 3 CHRONIC KIDNEY DISEASE, WITH LONG-TERM CURRENT USE OF INSULIN, UNSPECIFIED WHETHER STAGE 3A OR 3B CKD (HCC): Primary | ICD-10-CM

## 2025-02-13 DIAGNOSIS — Z79.4 TYPE 2 DIABETES MELLITUS WITH STAGE 3 CHRONIC KIDNEY DISEASE, WITH LONG-TERM CURRENT USE OF INSULIN, UNSPECIFIED WHETHER STAGE 3A OR 3B CKD (HCC): Primary | ICD-10-CM

## 2025-02-13 NOTE — TELEPHONE ENCOUNTER
, from [facility], left message stated that Victoza is no longer available through the program.  Suggest sending a different medication.  She may be reached at [phone].

## 2025-02-17 DIAGNOSIS — I10 ESSENTIAL (PRIMARY) HYPERTENSION: ICD-10-CM

## 2025-02-17 RX ORDER — HYDRALAZINE HYDROCHLORIDE 25 MG/1
25 TABLET, FILM COATED ORAL 2 TIMES DAILY
Qty: 180 TABLET | Refills: 1 | Status: SHIPPED | OUTPATIENT
Start: 2025-02-17

## 2025-02-25 ENCOUNTER — OFFICE VISIT (OUTPATIENT)
Facility: CLINIC | Age: 65
End: 2025-02-25

## 2025-02-25 VITALS
DIASTOLIC BLOOD PRESSURE: 78 MMHG | TEMPERATURE: 98.2 F | HEART RATE: 92 BPM | WEIGHT: 293 LBS | BODY MASS INDEX: 47.09 KG/M2 | SYSTOLIC BLOOD PRESSURE: 153 MMHG | OXYGEN SATURATION: 94 % | HEIGHT: 66 IN | RESPIRATION RATE: 20 BRPM

## 2025-02-25 DIAGNOSIS — J10.1 INFLUENZA A: Primary | ICD-10-CM

## 2025-02-25 DIAGNOSIS — J44.1 CHRONIC OBSTRUCTIVE PULMONARY DISEASE WITH ACUTE EXACERBATION (HCC): ICD-10-CM

## 2025-02-25 DIAGNOSIS — I10 ESSENTIAL (PRIMARY) HYPERTENSION: ICD-10-CM

## 2025-02-25 LAB
INFLUENZA A ANTIGEN, POC: POSITIVE
INFLUENZA B ANTIGEN, POC: NEGATIVE
VALID INTERNAL CONTROL, POC: YES

## 2025-02-25 RX ORDER — PREDNISONE 20 MG/1
20 TABLET ORAL DAILY
Qty: 5 TABLET | Refills: 0 | Status: SHIPPED | OUTPATIENT
Start: 2025-02-25 | End: 2025-03-02

## 2025-02-25 RX ORDER — OSELTAMIVIR PHOSPHATE 75 MG/1
75 CAPSULE ORAL 2 TIMES DAILY
Qty: 10 CAPSULE | Refills: 0 | Status: SHIPPED | OUTPATIENT
Start: 2025-02-25 | End: 2025-03-02

## 2025-02-25 RX ORDER — LANOLIN ALCOHOL/MO/W.PET/CERES
400 CREAM (GRAM) TOPICAL 2 TIMES DAILY
Qty: 180 TABLET | Refills: 0 | Status: SHIPPED | OUTPATIENT
Start: 2025-02-25

## 2025-02-25 SDOH — ECONOMIC STABILITY: FOOD INSECURITY: WITHIN THE PAST 12 MONTHS, YOU WORRIED THAT YOUR FOOD WOULD RUN OUT BEFORE YOU GOT MONEY TO BUY MORE.: NEVER TRUE

## 2025-02-25 SDOH — ECONOMIC STABILITY: FOOD INSECURITY: WITHIN THE PAST 12 MONTHS, THE FOOD YOU BOUGHT JUST DIDN'T LAST AND YOU DIDN'T HAVE MONEY TO GET MORE.: NEVER TRUE

## 2025-02-25 ASSESSMENT — ENCOUNTER SYMPTOMS
SORE THROAT: 0
VOMITING: 0
COUGH: 1
SINUS PRESSURE: 0
TROUBLE SWALLOWING: 0
SINUS PAIN: 0
DIARRHEA: 0
ABDOMINAL PAIN: 0
CHEST TIGHTNESS: 0
WHEEZING: 1
NAUSEA: 0

## 2025-02-25 ASSESSMENT — PATIENT HEALTH QUESTIONNAIRE - PHQ9
SUM OF ALL RESPONSES TO PHQ QUESTIONS 1-9: 0
2. FEELING DOWN, DEPRESSED OR HOPELESS: NOT AT ALL
1. LITTLE INTEREST OR PLEASURE IN DOING THINGS: NOT AT ALL
SUM OF ALL RESPONSES TO PHQ9 QUESTIONS 1 & 2: 0

## 2025-02-25 NOTE — PROGRESS NOTES
Chief Complaint   Patient presents with    Cough    Nasal Congestion    Generalized Body Aches     Symptoms started yesterday     1. Have you been to the ER, urgent care clinic since your last visit?  Hospitalized since your last visit?No    2. Have you seen or consulted any other health care providers outside of the [facility] since your last visit?  Include any pap smears or colon screening. No    
or results within 1-2 weeks.  Patient expressed understanding with the diagnosis and plan.    Follow-up and Dispositions    Return if symptoms worsen or fail to improve.       Katiana Bryant, APRN - NP

## 2025-03-19 ENCOUNTER — OFFICE VISIT (OUTPATIENT)
Facility: CLINIC | Age: 65
End: 2025-03-19

## 2025-03-19 VITALS
WEIGHT: 293 LBS | RESPIRATION RATE: 20 BRPM | HEIGHT: 66 IN | HEART RATE: 81 BPM | SYSTOLIC BLOOD PRESSURE: 154 MMHG | BODY MASS INDEX: 47.09 KG/M2 | TEMPERATURE: 97.6 F | OXYGEN SATURATION: 94 % | DIASTOLIC BLOOD PRESSURE: 72 MMHG

## 2025-03-19 DIAGNOSIS — M1A.09X0 IDIOPATHIC CHRONIC GOUT, MULTIPLE SITES, WITHOUT TOPHUS (TOPHI): ICD-10-CM

## 2025-03-19 DIAGNOSIS — Z79.4 TYPE 2 DIABETES MELLITUS WITH STAGE 3 CHRONIC KIDNEY DISEASE, WITH LONG-TERM CURRENT USE OF INSULIN, UNSPECIFIED WHETHER STAGE 3A OR 3B CKD (HCC): Primary | ICD-10-CM

## 2025-03-19 DIAGNOSIS — I10 ESSENTIAL (PRIMARY) HYPERTENSION: ICD-10-CM

## 2025-03-19 DIAGNOSIS — K21.00 GASTRO-ESOPHAGEAL REFLUX DISEASE WITH ESOPHAGITIS, WITHOUT BLEEDING: ICD-10-CM

## 2025-03-19 DIAGNOSIS — N18.30 TYPE 2 DIABETES MELLITUS WITH STAGE 3 CHRONIC KIDNEY DISEASE, WITH LONG-TERM CURRENT USE OF INSULIN, UNSPECIFIED WHETHER STAGE 3A OR 3B CKD (HCC): Primary | ICD-10-CM

## 2025-03-19 DIAGNOSIS — E11.22 TYPE 2 DIABETES MELLITUS WITH STAGE 3 CHRONIC KIDNEY DISEASE, WITH LONG-TERM CURRENT USE OF INSULIN, UNSPECIFIED WHETHER STAGE 3A OR 3B CKD (HCC): Primary | ICD-10-CM

## 2025-03-19 DIAGNOSIS — D64.9 ANEMIA, UNSPECIFIED TYPE: ICD-10-CM

## 2025-03-19 DIAGNOSIS — N18.31 CHRONIC KIDNEY DISEASE, STAGE 3A (HCC): ICD-10-CM

## 2025-03-19 DIAGNOSIS — K57.90 DIVERTICULAR DISEASE: ICD-10-CM

## 2025-03-19 DIAGNOSIS — E78.2 MIXED HYPERLIPIDEMIA: ICD-10-CM

## 2025-03-19 DIAGNOSIS — M17.0 BILATERAL PRIMARY OSTEOARTHRITIS OF KNEE: ICD-10-CM

## 2025-03-19 DIAGNOSIS — I87.2 VENOUS INSUFFICIENCY: ICD-10-CM

## 2025-03-19 DIAGNOSIS — G47.33 OBSTRUCTIVE SLEEP APNEA (ADULT) (PEDIATRIC): ICD-10-CM

## 2025-03-19 DIAGNOSIS — J44.1 CHRONIC OBSTRUCTIVE PULMONARY DISEASE WITH ACUTE EXACERBATION (HCC): ICD-10-CM

## 2025-03-19 LAB — HBA1C MFR BLD: 10.2 %

## 2025-03-19 RX ORDER — HYDRALAZINE HYDROCHLORIDE 50 MG/1
50 TABLET, FILM COATED ORAL 2 TIMES DAILY
Qty: 180 TABLET | Refills: 1 | Status: SHIPPED | OUTPATIENT
Start: 2025-03-19

## 2025-03-19 RX ORDER — HUMAN INSULIN 100 [IU]/ML
50 INJECTION, SUSPENSION SUBCUTANEOUS EVERY MORNING
Qty: 12 ML | Refills: 1 | Status: SHIPPED | OUTPATIENT
Start: 2025-03-19

## 2025-03-19 RX ORDER — CYCLOBENZAPRINE HCL 10 MG
10 TABLET ORAL NIGHTLY PRN
Qty: 30 TABLET | Refills: 2 | Status: SHIPPED | OUTPATIENT
Start: 2025-03-19 | End: 2025-03-29

## 2025-03-19 NOTE — PROGRESS NOTES
Chief Complaint   Patient presents with    Diabetes     Follow up     Pt  having back spasms.     \"Have you been to the ER, urgent care clinic since your last visit?  Hospitalized since your last visit?\"    NO    “Have you seen or consulted any other health care providers outside our system since your last visit?”    NO      “Have you had a diabetic eye exam?”    NO     Date of last diabetic eye exam: 6/2/2022            
with plan to taper up.  Did not meet A1c goal with Victoza 1.8mg daily.  Home readings -did not bring   Notify provider if any glucose < 70 or > 250 when checking.  Bring glucose readings to next appointment  Continue annual eye exam and is seen at [facility]  Encourage to continue following diabetic diet and increasing regular exercise to 3-5 times weekly  Recommend evaluation with endocrinology and referral has been sent.  - AMB POC HEMOGLOBIN A1C  - Insulin NPH Isophane & Regular (NOVOLIN 70/30 FLEXPEN) (70-30) 100 UNIT per ML injection pen; Inject 50 Units into the skin every morning  Dispense: 12 mL; Refill: 1  - Lipid Panel  - Thyroid Cascade Profile  - Comprehensive Metabolic Panel  - Albumin/Creatinine Ratio, Urine  - CBC with Auto Differential  - Urinalysis with Microscopic    2. Essential hypertension  Blood pressure is above goal today.  Continue on amlodipine 5mg daily and  will increase hydralazine to 50mg BID.  Check readings at home and bring to follow up nurse visit in 4 weeks.  - hydrALAZINE (APRESOLINE) 50 MG tablet; Take 1 tablet by mouth in the morning and at bedtime  Dispense: 180 tablet; Refill: 1    3. Chronic obstructive pulmonary disease, unspecified COPD type (HCC)  Stable.  Has albuterol inhaler/nebulizer to use prn for wheezing/sob.    4. Stage 3a chronic kidney disease (HCC)  Lab Results   Component Value Date    CREATININE 1.22 (H) 03/19/2025      Stable and follows with nephrology who manages.  Continue to keep glucose and blood pressure well controlled.  Avoid all NSAIDS and IV dye.  Check fasting labs and will have sent to nephrology.    5. Mixed hyperlipidemia due to type 2 diabetes mellitus (HCC)  Lab Results   Component Value Date    LDL 69 03/19/2025   Stable and continue statin medication as directed    6. Obstructive sleep apnea syndrome  Continue cpap therapy but does admit she has not been as compliant due to problems with mask.  Encouraged to discuss concerns with mask with sleep

## 2025-03-20 LAB
ALBUMIN SERPL-MCNC: 4.3 G/DL (ref 3.9–4.9)
ALBUMIN/CREAT UR: 160 MG/G CREAT (ref 0–29)
ALP SERPL-CCNC: 175 IU/L (ref 44–121)
ALT SERPL-CCNC: 16 IU/L (ref 0–32)
APPEARANCE UR: CLEAR
AST SERPL-CCNC: 19 IU/L (ref 0–40)
BACTERIA #/AREA URNS HPF: ABNORMAL /[HPF]
BASOPHILS # BLD AUTO: 0 X10E3/UL (ref 0–0.2)
BASOPHILS NFR BLD AUTO: 1 %
BILIRUB SERPL-MCNC: 0.5 MG/DL (ref 0–1.2)
BILIRUB UR QL STRIP: NEGATIVE
BUN SERPL-MCNC: 23 MG/DL (ref 8–27)
BUN/CREAT SERPL: 19 (ref 12–28)
CALCIUM SERPL-MCNC: 10.2 MG/DL (ref 8.7–10.3)
CASTS URNS QL MICRO: ABNORMAL /LPF
CHLORIDE SERPL-SCNC: 98 MMOL/L (ref 96–106)
CHOLEST SERPL-MCNC: 159 MG/DL (ref 100–199)
CO2 SERPL-SCNC: 27 MMOL/L (ref 20–29)
COLOR UR: YELLOW
CREAT SERPL-MCNC: 1.22 MG/DL (ref 0.57–1)
CREAT UR-MCNC: 174.8 MG/DL
EGFRCR SERPLBLD CKD-EPI 2021: 49 ML/MIN/1.73
EOSINOPHIL # BLD AUTO: 0.2 X10E3/UL (ref 0–0.4)
EOSINOPHIL NFR BLD AUTO: 3 %
EPI CELLS #/AREA URNS HPF: >10 /HPF (ref 0–10)
ERYTHROCYTE [DISTWIDTH] IN BLOOD BY AUTOMATED COUNT: 14.8 % (ref 11.7–15.4)
GLOBULIN SER CALC-MCNC: 3.4 G/DL (ref 1.5–4.5)
GLUCOSE SERPL-MCNC: 352 MG/DL (ref 70–99)
GLUCOSE UR QL STRIP: ABNORMAL
HCT VFR BLD AUTO: 40.7 % (ref 34–46.6)
HDLC SERPL-MCNC: 68 MG/DL
HGB BLD-MCNC: 12.8 G/DL (ref 11.1–15.9)
HGB UR QL STRIP: NEGATIVE
IMM GRANULOCYTES # BLD AUTO: 0 X10E3/UL (ref 0–0.1)
IMM GRANULOCYTES NFR BLD AUTO: 0 %
KETONES UR QL STRIP: ABNORMAL
LDLC SERPL CALC-MCNC: 69 MG/DL (ref 0–99)
LEUKOCYTE ESTERASE UR QL STRIP: NEGATIVE
LYMPHOCYTES # BLD AUTO: 1.7 X10E3/UL (ref 0.7–3.1)
LYMPHOCYTES NFR BLD AUTO: 39 %
MCH RBC QN AUTO: 28 PG (ref 26.6–33)
MCHC RBC AUTO-ENTMCNC: 31.4 G/DL (ref 31.5–35.7)
MCV RBC AUTO: 89 FL (ref 79–97)
MICRO URNS: ABNORMAL
MICROALBUMIN UR-MCNC: 279.5 UG/ML
MONOCYTES # BLD AUTO: 0.4 X10E3/UL (ref 0.1–0.9)
MONOCYTES NFR BLD AUTO: 8 %
NEUTROPHILS # BLD AUTO: 2.2 X10E3/UL (ref 1.4–7)
NEUTROPHILS NFR BLD AUTO: 49 %
NITRITE UR QL STRIP: NEGATIVE
PH UR STRIP: 5.5 [PH] (ref 5–7.5)
PLATELET # BLD AUTO: 277 X10E3/UL (ref 150–450)
POTASSIUM SERPL-SCNC: 4.9 MMOL/L (ref 3.5–5.2)
PROT SERPL-MCNC: 7.7 G/DL (ref 6–8.5)
PROT UR QL STRIP: ABNORMAL
RBC # BLD AUTO: 4.57 X10E6/UL (ref 3.77–5.28)
RBC #/AREA URNS HPF: ABNORMAL /HPF (ref 0–2)
SODIUM SERPL-SCNC: 138 MMOL/L (ref 134–144)
SP GR UR STRIP: 1.03 (ref 1–1.03)
TRIGL SERPL-MCNC: 127 MG/DL (ref 0–149)
TSH SERPL DL<=0.005 MIU/L-ACNC: 3.09 UIU/ML (ref 0.45–4.5)
UROBILINOGEN UR STRIP-MCNC: 1 MG/DL (ref 0.2–1)
VLDLC SERPL CALC-MCNC: 22 MG/DL (ref 5–40)
WBC # BLD AUTO: 4.5 X10E3/UL (ref 3.4–10.8)
WBC #/AREA URNS HPF: ABNORMAL /HPF (ref 0–5)

## 2025-03-21 ENCOUNTER — TELEPHONE (OUTPATIENT)
Facility: CLINIC | Age: 65
End: 2025-03-21

## 2025-03-21 NOTE — TELEPHONE ENCOUNTER
Will you give this patient a call.  She has received the Ozempic, but needs instructions on dosing.

## 2025-03-22 DIAGNOSIS — E11.65 TYPE 2 DIABETES MELLITUS WITH HYPERGLYCEMIA, WITHOUT LONG-TERM CURRENT USE OF INSULIN (HCC): ICD-10-CM

## 2025-03-23 RX ORDER — BLOOD SUGAR DIAGNOSTIC
STRIP MISCELLANEOUS
Qty: 100 STRIP | Refills: 3 | Status: SHIPPED | OUTPATIENT
Start: 2025-03-23

## 2025-03-24 ENCOUNTER — RESULTS FOLLOW-UP (OUTPATIENT)
Facility: CLINIC | Age: 65
End: 2025-03-24

## 2025-04-07 VITALS — SYSTOLIC BLOOD PRESSURE: 120 MMHG | DIASTOLIC BLOOD PRESSURE: 68 MMHG

## 2025-04-10 DIAGNOSIS — M1A.09X0 CHRONIC GOUT OF MULTIPLE SITES, UNSPECIFIED CAUSE: ICD-10-CM

## 2025-04-10 RX ORDER — ALLOPURINOL 300 MG/1
300 TABLET ORAL DAILY
Qty: 90 TABLET | Refills: 1 | Status: SHIPPED | OUTPATIENT
Start: 2025-04-10

## 2025-05-23 ENCOUNTER — TELEPHONE (OUTPATIENT)
Facility: CLINIC | Age: 65
End: 2025-05-23

## 2025-05-23 NOTE — TELEPHONE ENCOUNTER
Patient left message stated that  [facility] would like for you to give them a call.  It is in reference to the way the RX is written for the Ozempic.  They want to explain how it needs to be put in.   May be reached at [phone].

## 2025-06-02 ENCOUNTER — TELEPHONE (OUTPATIENT)
Facility: CLINIC | Age: 65
End: 2025-06-02

## 2025-06-05 DIAGNOSIS — Z79.4 TYPE 2 DIABETES MELLITUS WITH STAGE 3 CHRONIC KIDNEY DISEASE, WITH LONG-TERM CURRENT USE OF INSULIN, UNSPECIFIED WHETHER STAGE 3A OR 3B CKD (HCC): ICD-10-CM

## 2025-06-05 DIAGNOSIS — N18.30 TYPE 2 DIABETES MELLITUS WITH STAGE 3 CHRONIC KIDNEY DISEASE, WITH LONG-TERM CURRENT USE OF INSULIN, UNSPECIFIED WHETHER STAGE 3A OR 3B CKD (HCC): ICD-10-CM

## 2025-06-05 DIAGNOSIS — E11.22 TYPE 2 DIABETES MELLITUS WITH STAGE 3 CHRONIC KIDNEY DISEASE, WITH LONG-TERM CURRENT USE OF INSULIN, UNSPECIFIED WHETHER STAGE 3A OR 3B CKD (HCC): ICD-10-CM

## 2025-06-05 RX ORDER — METFORMIN HYDROCHLORIDE 500 MG/1
TABLET, EXTENDED RELEASE ORAL
Qty: 180 TABLET | Refills: 1 | Status: SHIPPED | OUTPATIENT
Start: 2025-06-05

## 2025-06-06 RX ORDER — LANOLIN ALCOHOL/MO/W.PET/CERES
400 CREAM (GRAM) TOPICAL 2 TIMES DAILY
Qty: 180 TABLET | Refills: 1 | Status: SHIPPED | OUTPATIENT
Start: 2025-06-06

## 2025-06-23 RX ORDER — ATORVASTATIN CALCIUM 10 MG/1
10 TABLET, FILM COATED ORAL DAILY
Qty: 90 TABLET | Refills: 3 | Status: SHIPPED | OUTPATIENT
Start: 2025-06-23

## 2025-06-23 RX ORDER — AMLODIPINE BESYLATE 5 MG/1
5 TABLET ORAL DAILY
Qty: 90 TABLET | Refills: 3 | Status: SHIPPED | OUTPATIENT
Start: 2025-06-23

## 2025-06-30 ENCOUNTER — HOSPITAL ENCOUNTER (OUTPATIENT)
Facility: HOSPITAL | Age: 65
Discharge: HOME | End: 2025-07-03
Payer: MEDICARE

## 2025-06-30 ENCOUNTER — HOSPITAL ENCOUNTER (OUTPATIENT)
Facility: HOSPITAL | Age: 65
Setting detail: SPECIMEN
Discharge: HOME | End: 2025-07-03
Payer: MEDICARE

## 2025-06-30 DIAGNOSIS — R94.30 ABNORMAL RESULT OF CARDIOVASCULAR FUNCTION STUDY SUGGESTIVE OF NON-ST ELEVATION MYOCARDIAL INFARCTION (NSTEMI): ICD-10-CM

## 2025-06-30 DIAGNOSIS — E11.22 TYPE 2 DIABETES MELLITUS WITH STAGE 3 CHRONIC KIDNEY DISEASE, WITH LONG-TERM CURRENT USE OF INSULIN, UNSPECIFIED WHETHER STAGE 3A OR 3B CKD (HCC): ICD-10-CM

## 2025-06-30 DIAGNOSIS — Z79.4 TYPE 2 DIABETES MELLITUS WITH STAGE 3 CHRONIC KIDNEY DISEASE, WITH LONG-TERM CURRENT USE OF INSULIN, UNSPECIFIED WHETHER STAGE 3A OR 3B CKD (HCC): ICD-10-CM

## 2025-06-30 DIAGNOSIS — N18.30 TYPE 2 DIABETES MELLITUS WITH STAGE 3 CHRONIC KIDNEY DISEASE, WITH LONG-TERM CURRENT USE OF INSULIN, UNSPECIFIED WHETHER STAGE 3A OR 3B CKD (HCC): ICD-10-CM

## 2025-06-30 LAB
ALBUMIN SERPL-MCNC: 3.4 G/DL (ref 3.5–5)
ALBUMIN/GLOB SERPL: 0.9 (ref 1.1–2.2)
ALP SERPL-CCNC: 139 U/L (ref 45–117)
ALT SERPL-CCNC: 24 U/L (ref 12–78)
ANION GAP SERPL CALC-SCNC: 7 MMOL/L (ref 2–12)
APTT PPP: 26 SEC (ref 21.2–34.1)
AST SERPL W P-5'-P-CCNC: 17 U/L (ref 15–37)
BILIRUB SERPL-MCNC: 0.7 MG/DL (ref 0.2–1)
BUN SERPL-MCNC: 18 MG/DL (ref 6–20)
BUN/CREAT SERPL: 12 (ref 12–20)
CA-I BLD-MCNC: 9.4 MG/DL (ref 8.5–10.1)
CHLORIDE SERPL-SCNC: 102 MMOL/L (ref 97–108)
CO2 SERPL-SCNC: 29 MMOL/L (ref 21–32)
CREAT SERPL-MCNC: 1.46 MG/DL (ref 0.55–1.02)
ERYTHROCYTE [DISTWIDTH] IN BLOOD BY AUTOMATED COUNT: 15.1 % (ref 11.5–14.5)
GLOBULIN SER CALC-MCNC: 3.8 G/DL (ref 2–4)
GLUCOSE SERPL-MCNC: 317 MG/DL (ref 65–100)
HCT VFR BLD AUTO: 36.7 % (ref 35–47)
HGB BLD-MCNC: 12.1 G/DL (ref 11.5–16)
INR PPP: 1 (ref 0.9–1.1)
MCH RBC QN AUTO: 29.2 PG (ref 26–34)
MCHC RBC AUTO-ENTMCNC: 33 G/DL (ref 30–36.5)
MCV RBC AUTO: 88.4 FL (ref 80–99)
NRBC # BLD: 0 K/UL (ref 0–0.01)
NRBC BLD-RTO: 0 PER 100 WBC
PLATELET # BLD AUTO: 217 K/UL (ref 150–400)
PMV BLD AUTO: 9.6 FL (ref 8.9–12.9)
POTASSIUM SERPL-SCNC: 4.4 MMOL/L (ref 3.5–5.1)
PROT SERPL-MCNC: 7.2 G/DL (ref 6.4–8.2)
PROTHROMBIN TIME: 12.7 SEC (ref 11.9–14.1)
RBC # BLD AUTO: 4.15 M/UL (ref 3.8–5.2)
SODIUM SERPL-SCNC: 138 MMOL/L (ref 136–145)
THERAPEUTIC RANGE: NORMAL SEC (ref 82–109)
WBC # BLD AUTO: 3.3 K/UL (ref 3.6–11)

## 2025-06-30 PROCEDURE — 36415 COLL VENOUS BLD VENIPUNCTURE: CPT

## 2025-06-30 PROCEDURE — 85027 COMPLETE CBC AUTOMATED: CPT

## 2025-06-30 PROCEDURE — 80053 COMPREHEN METABOLIC PANEL: CPT

## 2025-06-30 PROCEDURE — 85610 PROTHROMBIN TIME: CPT

## 2025-06-30 PROCEDURE — 93005 ELECTROCARDIOGRAM TRACING: CPT

## 2025-06-30 PROCEDURE — 85730 THROMBOPLASTIN TIME PARTIAL: CPT

## 2025-07-01 LAB
EKG ATRIAL RATE: 56 BPM
EKG DIAGNOSIS: NORMAL
EKG P AXIS: 30 DEGREES
EKG P-R INTERVAL: 175 MS
EKG Q-T INTERVAL: 442 MS
EKG QRS DURATION: 88 MS
EKG QTC CALCULATION (BAZETT): 427 MS
EKG R AXIS: 6 DEGREES
EKG T AXIS: 41 DEGREES
EKG VENTRICULAR RATE: 56 BPM

## 2025-07-01 RX ORDER — HUMAN INSULIN 100 [IU]/ML
50 INJECTION, SUSPENSION SUBCUTANEOUS EVERY MORNING
Qty: 12 ML | Refills: 0 | Status: SHIPPED | OUTPATIENT
Start: 2025-07-01

## 2025-07-03 ENCOUNTER — HOSPITAL ENCOUNTER (OUTPATIENT)
Facility: HOSPITAL | Age: 65
Setting detail: OUTPATIENT SURGERY
Discharge: HOME | End: 2025-07-03
Attending: STUDENT IN AN ORGANIZED HEALTH CARE EDUCATION/TRAINING PROGRAM | Admitting: STUDENT IN AN ORGANIZED HEALTH CARE EDUCATION/TRAINING PROGRAM
Payer: MEDICARE

## 2025-07-03 VITALS
HEIGHT: 66 IN | SYSTOLIC BLOOD PRESSURE: 146 MMHG | WEIGHT: 293 LBS | OXYGEN SATURATION: 100 % | RESPIRATION RATE: 18 BRPM | HEART RATE: 65 BPM | BODY MASS INDEX: 47.09 KG/M2 | TEMPERATURE: 97.3 F | DIASTOLIC BLOOD PRESSURE: 75 MMHG

## 2025-07-03 DIAGNOSIS — R94.39 ABNORMAL STRESS TEST: ICD-10-CM

## 2025-07-03 LAB
GLUCOSE BLD STRIP.AUTO-MCNC: 372 MG/DL (ref 65–100)
PERFORMED BY:: ABNORMAL

## 2025-07-03 PROCEDURE — 2580000003 HC RX 258: Performed by: STUDENT IN AN ORGANIZED HEALTH CARE EDUCATION/TRAINING PROGRAM

## 2025-07-03 PROCEDURE — C1769 GUIDE WIRE: HCPCS | Performed by: STUDENT IN AN ORGANIZED HEALTH CARE EDUCATION/TRAINING PROGRAM

## 2025-07-03 PROCEDURE — 6370000000 HC RX 637 (ALT 250 FOR IP)

## 2025-07-03 PROCEDURE — 2709999900 HC NON-CHARGEABLE SUPPLY: Performed by: STUDENT IN AN ORGANIZED HEALTH CARE EDUCATION/TRAINING PROGRAM

## 2025-07-03 PROCEDURE — 7100000000 HC PACU RECOVERY - FIRST 15 MIN: Performed by: STUDENT IN AN ORGANIZED HEALTH CARE EDUCATION/TRAINING PROGRAM

## 2025-07-03 PROCEDURE — 7100000010 HC PHASE II RECOVERY - FIRST 15 MIN: Performed by: STUDENT IN AN ORGANIZED HEALTH CARE EDUCATION/TRAINING PROGRAM

## 2025-07-03 PROCEDURE — 93458 L HRT ARTERY/VENTRICLE ANGIO: CPT | Performed by: STUDENT IN AN ORGANIZED HEALTH CARE EDUCATION/TRAINING PROGRAM

## 2025-07-03 PROCEDURE — 7100000011 HC PHASE II RECOVERY - ADDTL 15 MIN: Performed by: STUDENT IN AN ORGANIZED HEALTH CARE EDUCATION/TRAINING PROGRAM

## 2025-07-03 PROCEDURE — 6360000004 HC RX CONTRAST MEDICATION: Performed by: STUDENT IN AN ORGANIZED HEALTH CARE EDUCATION/TRAINING PROGRAM

## 2025-07-03 PROCEDURE — 6360000002 HC RX W HCPCS: Performed by: STUDENT IN AN ORGANIZED HEALTH CARE EDUCATION/TRAINING PROGRAM

## 2025-07-03 PROCEDURE — 76937 US GUIDE VASCULAR ACCESS: CPT | Performed by: STUDENT IN AN ORGANIZED HEALTH CARE EDUCATION/TRAINING PROGRAM

## 2025-07-03 PROCEDURE — 7100000001 HC PACU RECOVERY - ADDTL 15 MIN: Performed by: STUDENT IN AN ORGANIZED HEALTH CARE EDUCATION/TRAINING PROGRAM

## 2025-07-03 PROCEDURE — 82962 GLUCOSE BLOOD TEST: CPT

## 2025-07-03 PROCEDURE — C1894 INTRO/SHEATH, NON-LASER: HCPCS | Performed by: STUDENT IN AN ORGANIZED HEALTH CARE EDUCATION/TRAINING PROGRAM

## 2025-07-03 PROCEDURE — 99152 MOD SED SAME PHYS/QHP 5/>YRS: CPT | Performed by: STUDENT IN AN ORGANIZED HEALTH CARE EDUCATION/TRAINING PROGRAM

## 2025-07-03 PROCEDURE — C1760 CLOSURE DEV, VASC: HCPCS | Performed by: STUDENT IN AN ORGANIZED HEALTH CARE EDUCATION/TRAINING PROGRAM

## 2025-07-03 RX ORDER — IOPAMIDOL 755 MG/ML
INJECTION, SOLUTION INTRAVASCULAR PRN
Status: DISCONTINUED | OUTPATIENT
Start: 2025-07-03 | End: 2025-07-03 | Stop reason: HOSPADM

## 2025-07-03 RX ORDER — SODIUM CHLORIDE 0.9 % (FLUSH) 0.9 %
5-40 SYRINGE (ML) INJECTION PRN
Status: DISCONTINUED | OUTPATIENT
Start: 2025-07-03 | End: 2025-07-03 | Stop reason: HOSPADM

## 2025-07-03 RX ORDER — FENTANYL CITRATE 50 UG/ML
INJECTION, SOLUTION INTRAMUSCULAR; INTRAVENOUS PRN
Status: DISCONTINUED | OUTPATIENT
Start: 2025-07-03 | End: 2025-07-03 | Stop reason: HOSPADM

## 2025-07-03 RX ORDER — 0.9 % SODIUM CHLORIDE 0.9 %
INTRAVENOUS SOLUTION INTRAVENOUS CONTINUOUS PRN
Status: COMPLETED | OUTPATIENT
Start: 2025-07-03 | End: 2025-07-03

## 2025-07-03 RX ORDER — ACETAMINOPHEN 325 MG/1
650 TABLET ORAL EVERY 4 HOURS PRN
Status: DISCONTINUED | OUTPATIENT
Start: 2025-07-03 | End: 2025-07-03 | Stop reason: HOSPADM

## 2025-07-03 RX ORDER — SODIUM CHLORIDE 9 MG/ML
INJECTION, SOLUTION INTRAVENOUS CONTINUOUS
Status: DISCONTINUED | OUTPATIENT
Start: 2025-07-03 | End: 2025-07-03 | Stop reason: HOSPADM

## 2025-07-03 RX ORDER — MIDAZOLAM HYDROCHLORIDE 1 MG/ML
INJECTION, SOLUTION INTRAMUSCULAR; INTRAVENOUS PRN
Status: DISCONTINUED | OUTPATIENT
Start: 2025-07-03 | End: 2025-07-03 | Stop reason: HOSPADM

## 2025-07-03 RX ORDER — SODIUM CHLORIDE 0.9 % (FLUSH) 0.9 %
5-40 SYRINGE (ML) INJECTION EVERY 12 HOURS SCHEDULED
Status: DISCONTINUED | OUTPATIENT
Start: 2025-07-03 | End: 2025-07-03 | Stop reason: HOSPADM

## 2025-07-03 RX ORDER — LIDOCAINE HYDROCHLORIDE 10 MG/ML
INJECTION, SOLUTION INFILTRATION; PERINEURAL PRN
Status: DISCONTINUED | OUTPATIENT
Start: 2025-07-03 | End: 2025-07-03 | Stop reason: HOSPADM

## 2025-07-03 RX ORDER — SODIUM CHLORIDE 9 MG/ML
INJECTION, SOLUTION INTRAVENOUS PRN
Status: DISCONTINUED | OUTPATIENT
Start: 2025-07-03 | End: 2025-07-03 | Stop reason: HOSPADM

## 2025-07-03 RX ORDER — HEPARIN SODIUM 200 [USP'U]/100ML
INJECTION, SOLUTION INTRAVENOUS CONTINUOUS PRN
Status: COMPLETED | OUTPATIENT
Start: 2025-07-03 | End: 2025-07-03

## 2025-07-03 RX ADMIN — ACETAMINOPHEN 650 MG: 325 TABLET ORAL at 12:10

## 2025-07-03 RX ADMIN — SODIUM CHLORIDE: 0.9 INJECTION, SOLUTION INTRAVENOUS at 07:32

## 2025-07-03 ASSESSMENT — PAIN DESCRIPTION - LOCATION: LOCATION: HIP

## 2025-07-03 ASSESSMENT — PAIN - FUNCTIONAL ASSESSMENT
PAIN_FUNCTIONAL_ASSESSMENT: 0-10
PAIN_FUNCTIONAL_ASSESSMENT: 0-10
PAIN_FUNCTIONAL_ASSESSMENT: NONE - DENIES PAIN

## 2025-07-03 ASSESSMENT — PAIN SCALES - GENERAL: PAINLEVEL_OUTOF10: 10

## 2025-07-03 ASSESSMENT — PAIN DESCRIPTION - ORIENTATION: ORIENTATION: RIGHT

## 2025-07-03 NOTE — PROGRESS NOTES
Go Prather RN in cath lab notified her of blood glucose of 372 pt did not take metformin for 2 days and no insulin the last couple days. No new orders received.

## 2025-07-03 NOTE — H&P
HISTORY AND PHYSICAL    CHIEF COMPLAINT:  Abnormal stress test     HISTORY OF PRESENT ILLNESS:  Yoana Man is a 64 y.o. year-old female with past medical history significant for CHF, HTN, HLD, GIO, COPD, and diabetes who was evaluated today due to abnormal stress test. Patient presents today for scheduled outpatient cardiac catheterization.     Records from hospital admission course thus far reviewed.      Telemetry reviewed.       INPATIENT MEDICATIONS:  Home medications reviewed.    Current Facility-Administered Medications:     0.9 % sodium chloride infusion, , IntraVENous, Continuous, Earl Cha MD, Last Rate: 100 mL/hr at 07/03/25 0732, New Bag at 07/03/25 0732    heparin 2 units/mL solution in 0.9% sodium chloride, , , Continuous PRN, Earl Cha MD, 500 mL at 07/03/25 0947     ALLERGIES:  Allergies reviewed with the patient,  Allergies   Allergen Reactions    Latex Rash    Azithromycin Rash    Celecoxib Rash    Clindamycin Phosphate Palpitations    Mushroom Extract Complex (Obsolete) Swelling    Strawberry Rash    Tramadol-Acetaminophen Rash    Lisinopril Swelling    Nutmeg Oil (Myristica Oil) Hives    .      FAMILY HISTORY:  Family history reviewed.        SOCIAL HISTORY:  Notable for no tobacco use, no heavy alcohol or illicit drug use.      REVIEW OF SYSTEMS:  Complete review of systems performed, pertinents noted above, all other systems are negative.    PHYSICAL EXAMINATION:    General:  Alert  Cardiovascular:  RRR, No murmur  Respiratory:  Lungs are clear  Abdomen:  Soft, nontender  Extremities:  No lower extremity edema  Skin:  Dry, warm  Psych:  Normal affect      Vitals:    07/03/25 0707   BP: (!) 140/81   Pulse: 76   Resp: 20   Temp: 98.6 °F (37 °C)   SpO2: 98%       Recent labs results and imaging reviewed.     Discussed case with Dr. Cha and our impression and recommendations are as follows:  Abnormal stress test,   -abnormal PET/CT-moderate area of ischemia apical and

## 2025-07-03 NOTE — PROGRESS NOTES
1230: pt sat up 30 degrees, VSS, right groin site clean, dry, and intact, bilateral pedal pulses +2.    1300: pt up walking around unit assisted by nursing staff assessed right groin clean,dry and intact. Pt had no c/o chest pain, SOB, dizziness, etc. Pt getting dressed at this time.    Discharge instructions printed and provided to patient and keira daughter with all questions answered in full. PIV x1 removed with cath tip intact. Pt VSS and no signs of distress noted. Pt tolerating liquids and solids with no issues. Pt ambulating within room with no issues. Pt wheeled down to main entrance accompanied by staff. Pt condition stable at time of discharge.    TO PREVENT AN INFECTION  WASH YOUR HANDS  To prevent infection, good handwashing is the most important thing you or your caregiver can do.  Wash your hands with soap and water or use the hand  we gave you before you touch any wounds.  SSI kit given and reviewed with pt and daughter    2. SHOWER  Use the antibacterial soap we gave you when your surgeon says it is okay to take a shower.  Shower with this soap until your wounds are healed.  To reach all areas of your body, you may need someone to help you.  Do not forget to clean your bell button with every shower.    3. USE CLEAN SHEETS  Use freshly cleaned sheets on your bed after surgery.  To keep the surgery site clean, do not allow pets to sleep with you while your wound is still healing.    4. STOP SMOKING  Stop smoking, or at least cut back on smoking.  Smoking slows your healing.    5. CONTROL YOUR BLOOD SUGAR  High blood sugars slow wound healing.  If you are diabetic, control your blood sugar levels before and after your surgery.

## 2025-07-05 DIAGNOSIS — E11.65 TYPE 2 DIABETES MELLITUS WITH HYPERGLYCEMIA, WITHOUT LONG-TERM CURRENT USE OF INSULIN (HCC): ICD-10-CM

## 2025-07-07 RX ORDER — LANCETS
EACH MISCELLANEOUS
Qty: 102 EACH | Refills: 3 | Status: SHIPPED | OUTPATIENT
Start: 2025-07-07

## 2025-07-30 ENCOUNTER — TRANSCRIBE ORDERS (OUTPATIENT)
Facility: HOSPITAL | Age: 65
End: 2025-07-30

## 2025-07-30 DIAGNOSIS — N64.4 MASTODYNIA: Primary | ICD-10-CM

## 2025-08-26 ENCOUNTER — HOSPITAL ENCOUNTER (OUTPATIENT)
Facility: HOSPITAL | Age: 65
Discharge: HOME | End: 2025-08-29
Payer: MEDICARE

## 2025-08-26 DIAGNOSIS — N64.4 MASTODYNIA: ICD-10-CM

## 2025-08-26 PROCEDURE — 76642 ULTRASOUND BREAST LIMITED: CPT

## 2025-08-26 PROCEDURE — G0279 TOMOSYNTHESIS, MAMMO: HCPCS
